# Patient Record
Sex: MALE | Race: BLACK OR AFRICAN AMERICAN | NOT HISPANIC OR LATINO | Employment: OTHER | URBAN - METROPOLITAN AREA
[De-identification: names, ages, dates, MRNs, and addresses within clinical notes are randomized per-mention and may not be internally consistent; named-entity substitution may affect disease eponyms.]

---

## 2017-01-12 RX ORDER — METFORMIN HYDROCHLORIDE 500 MG/1
TABLET, EXTENDED RELEASE ORAL
Qty: 360 TABLET | Refills: 5 | Status: SHIPPED | OUTPATIENT
Start: 2017-01-12 | End: 2017-02-20

## 2017-01-19 ENCOUNTER — TELEPHONE (OUTPATIENT)
Dept: UROLOGY | Facility: CLINIC | Age: 67
End: 2017-01-19

## 2017-01-19 ENCOUNTER — HOSPITAL ENCOUNTER (EMERGENCY)
Facility: OTHER | Age: 67
Discharge: HOME | End: 2017-01-19
Attending: EMERGENCY MEDICINE
Payer: MEDICARE

## 2017-01-19 VITALS
SYSTOLIC BLOOD PRESSURE: 143 MMHG | DIASTOLIC BLOOD PRESSURE: 64 MMHG | RESPIRATION RATE: 18 BRPM | OXYGEN SATURATION: 97 % | HEIGHT: 69 IN | HEART RATE: 88 BPM | WEIGHT: 193 LBS | BODY MASS INDEX: 28.58 KG/M2 | TEMPERATURE: 99 F

## 2017-01-19 DIAGNOSIS — N39.0 URINARY TRACT INFECTION WITH HEMATURIA, SITE UNSPECIFIED: Primary | ICD-10-CM

## 2017-01-19 DIAGNOSIS — N41.9 PROSTATITIS, UNSPECIFIED PROSTATITIS TYPE: ICD-10-CM

## 2017-01-19 DIAGNOSIS — R31.9 URINARY TRACT INFECTION WITH HEMATURIA, SITE UNSPECIFIED: Primary | ICD-10-CM

## 2017-01-19 LAB
ALBUMIN SERPL BCP-MCNC: 3.9 G/DL
ALP SERPL-CCNC: 86 U/L
ALT SERPL W/O P-5'-P-CCNC: 30 U/L
ANION GAP SERPL CALC-SCNC: 13 MMOL/L
AST SERPL-CCNC: 25 U/L
BACTERIA #/AREA URNS HPF: ABNORMAL /HPF
BASOPHILS # BLD AUTO: 0.03 K/UL
BASOPHILS NFR BLD: 0.2 %
BILIRUB SERPL-MCNC: 0.2 MG/DL
BILIRUB UR QL STRIP: ABNORMAL
BUN SERPL-MCNC: 11 MG/DL
CALCIUM SERPL-MCNC: 9.5 MG/DL
CHLORIDE SERPL-SCNC: 105 MMOL/L
CLARITY UR: ABNORMAL
CO2 SERPL-SCNC: 21 MMOL/L
COLOR UR: ABNORMAL
CREAT SERPL-MCNC: 0.9 MG/DL
DIFFERENTIAL METHOD: ABNORMAL
EOSINOPHIL # BLD AUTO: 0.2 K/UL
EOSINOPHIL NFR BLD: 1.4 %
ERYTHROCYTE [DISTWIDTH] IN BLOOD BY AUTOMATED COUNT: 13.7 %
EST. GFR  (AFRICAN AMERICAN): >60 ML/MIN/1.73 M^2
EST. GFR  (NON AFRICAN AMERICAN): >60 ML/MIN/1.73 M^2
GLUCOSE SERPL-MCNC: 136 MG/DL
GLUCOSE UR QL STRIP: ABNORMAL
HCT VFR BLD AUTO: 37.2 %
HGB BLD-MCNC: 12.3 G/DL
HGB UR QL STRIP: ABNORMAL
HYALINE CASTS #/AREA URNS LPF: 0 /LPF
KETONES UR QL STRIP: ABNORMAL
LEUKOCYTE ESTERASE UR QL STRIP: ABNORMAL
LYMPHOCYTES # BLD AUTO: 3.4 K/UL
LYMPHOCYTES NFR BLD: 20.6 %
MCH RBC QN AUTO: 31.8 PG
MCHC RBC AUTO-ENTMCNC: 33.1 %
MCV RBC AUTO: 96 FL
MICROSCOPIC COMMENT: ABNORMAL
MONOCYTES # BLD AUTO: 0.8 K/UL
MONOCYTES NFR BLD: 5.2 %
NEUTROPHILS # BLD AUTO: 11.8 K/UL
NEUTROPHILS NFR BLD: 72.4 %
NITRITE UR QL STRIP: NEGATIVE
PH UR STRIP: 6 [PH] (ref 5–8)
PLATELET # BLD AUTO: 286 K/UL
PMV BLD AUTO: 8.6 FL
POTASSIUM SERPL-SCNC: 4.2 MMOL/L
PROT SERPL-MCNC: 7.5 G/DL
PROT UR QL STRIP: ABNORMAL
RBC # BLD AUTO: 3.87 M/UL
RBC #/AREA URNS HPF: >100 /HPF (ref 0–4)
SODIUM SERPL-SCNC: 139 MMOL/L
SP GR UR STRIP: >=1.03 (ref 1–1.03)
SQUAMOUS #/AREA URNS HPF: 1 /HPF
URN SPEC COLLECT METH UR: ABNORMAL
UROBILINOGEN UR STRIP-ACNC: 1 EU/DL
WBC # BLD AUTO: 16.26 K/UL
WBC #/AREA URNS HPF: 20 /HPF (ref 0–5)
WBC CLUMPS URNS QL MICRO: ABNORMAL

## 2017-01-19 PROCEDURE — 63600175 PHARM REV CODE 636 W HCPCS: Performed by: EMERGENCY MEDICINE

## 2017-01-19 PROCEDURE — 85025 COMPLETE CBC W/AUTO DIFF WBC: CPT

## 2017-01-19 PROCEDURE — 99283 EMERGENCY DEPT VISIT LOW MDM: CPT | Mod: 25

## 2017-01-19 PROCEDURE — 87086 URINE CULTURE/COLONY COUNT: CPT

## 2017-01-19 PROCEDURE — 96365 THER/PROPH/DIAG IV INF INIT: CPT

## 2017-01-19 PROCEDURE — 87088 URINE BACTERIA CULTURE: CPT

## 2017-01-19 PROCEDURE — 80053 COMPREHEN METABOLIC PANEL: CPT

## 2017-01-19 PROCEDURE — 25000003 PHARM REV CODE 250: Performed by: EMERGENCY MEDICINE

## 2017-01-19 PROCEDURE — 81000 URINALYSIS NONAUTO W/SCOPE: CPT

## 2017-01-19 PROCEDURE — 87186 SC STD MICRODIL/AGAR DIL: CPT

## 2017-01-19 PROCEDURE — 87077 CULTURE AEROBIC IDENTIFY: CPT

## 2017-01-19 RX ORDER — SODIUM CHLORIDE 9 MG/ML
500 INJECTION, SOLUTION INTRAVENOUS
Status: COMPLETED | OUTPATIENT
Start: 2017-01-19 | End: 2017-01-19

## 2017-01-19 RX ORDER — CIPROFLOXACIN 500 MG/1
500 TABLET ORAL 2 TIMES DAILY
Qty: 60 TABLET | Refills: 0 | Status: SHIPPED | OUTPATIENT
Start: 2017-01-19 | End: 2017-02-18

## 2017-01-19 RX ADMIN — SODIUM CHLORIDE 500 ML: 0.9 INJECTION, SOLUTION INTRAVENOUS at 05:01

## 2017-01-19 RX ADMIN — CEFTRIAXONE 1 G: 1 INJECTION, SOLUTION INTRAVENOUS at 05:01

## 2017-01-19 NOTE — ED NOTES
Rounding completed; no s/s of distress; respirations regular, even and unlabored; pain is currently 4/10, provider notified and with no new orders at this time; bed in lowest position with side rails up x2; family member at bedside; pt/family updated on POC and verbalizes understanding; personal items and call light within reach; continue to monitor for any changes.

## 2017-01-19 NOTE — TELEPHONE ENCOUNTER
I spoke to pt.  He has another UTI and went to ER today. He was started on Cipro. He is concerned because he has another UTI.  He was treated for one less than a month ago.  I instructed him to take cipro as prescribed and that I would send you a message.

## 2017-01-19 NOTE — ED PROVIDER NOTES
Encounter Date: 1/19/2017    SCRIBE #1 NOTE: I, Cammy Messer , am scribing for, and in the presence of, Dr. Beebe.       History     Chief Complaint   Patient presents with    Hematuria     pt reports having blood in urine, frequency, chills that started around 0100. Pt reports had MRI done yesterday      Review of patient's allergies indicates:   Allergen Reactions    Aspirin Nausea And Vomiting     If over 81mg     HPI Comments: Time seen by provider: 5:19 AM    This is a 66 y.o. male, with histrory of BPH, who presents with complaint of hematuria. Symptoms began last night. Pt reports subjective fever, chills, joint pain, frequency, urgency, intermittent incontinence, and dysuria, but denies nausea, vomiting, abdominal pain, diarrhea, constipation, weakness, or myalgias. Symptoms are described as constant. Pt completed a thirty day course of Cipro to treat UTI, and denies any alleviating factors.    The history is provided by the patient.     Past Medical History   Diagnosis Date    Cluster headaches      1999    Colon polyps      5 polyps in Dec 2010    Coronary artery disease 11/2011     moderate nonobstructive cad on left heart cath at [facility] 11/2011    Diabetes mellitus     GERD (gastroesophageal reflux disease)      history of peptic ulcer disease    Hyperlipidemia     Hypertension     Lumbar disc disease      MRI 2008 - L5-S1    Nephrolithiasis      July 2013     No past medical history pertinent negatives.  Past Surgical History   Procedure Laterality Date    Cholecystectomy      Orif left humerus      Colonoscopy N/A 12/7/2016     Procedure: COLONOSCOPY;  Surgeon: Lance Crocker MD;  Location: [address];  Service: Endoscopy;  Laterality: N/A;     Family History   Problem Relation Age of Onset    Celiac disease Neg Hx     Cirrhosis Neg Hx     Colon cancer Neg Hx     Colon polyps Neg Hx     Crohn's disease Neg Hx     Esophageal cancer Neg Hx     Inflammatory bowel disease  Neg Hx     Liver cancer Neg Hx     Rectal cancer Neg Hx     Stomach cancer Neg Hx     Ulcerative colitis Neg Hx      Social History   Substance Use Topics    Smoking status: Current Every Day Smoker     Packs/day: 1.00    Smokeless tobacco: Never Used      Comment: 1 pack whole life    Alcohol use Yes      Comment: special occassions     Review of Systems   Constitutional: Positive for chills and fever.   HENT: Negative for congestion and sore throat.    Eyes: Negative for redness and visual disturbance.   Respiratory: Negative for cough and shortness of breath.    Cardiovascular: Negative for chest pain and palpitations.   Gastrointestinal: Negative for abdominal pain, constipation, diarrhea, nausea and vomiting.   Genitourinary: Positive for dysuria, frequency, hematuria and urgency.        Positive for incontinence.    Musculoskeletal: Negative for back pain and myalgias.        Positive for joint pain.   Skin: Negative for rash.   Neurological: Negative for weakness and headaches.   Psychiatric/Behavioral: Negative for confusion.       Physical Exam   Initial Vitals   BP Pulse Resp Temp SpO2   01/19/17 0504 01/19/17 0504 01/19/17 0504 01/19/17 0504 01/19/17 0504   170/79 110 18 99.4 °F (37.4 °C) 99 %     Physical Exam    Nursing note and vitals reviewed.  Constitutional: He appears well-developed and well-nourished. He is not diaphoretic. No distress.   Moderately ill-appearing.   HENT:   Head: Normocephalic and atraumatic.   Right Ear: External ear normal.   Left Ear: External ear normal.   Eyes: EOM are normal. Pupils are equal, round, and reactive to light. Right eye exhibits no discharge. Left eye exhibits no discharge.   Neck: Normal range of motion.   Cardiovascular: Normal rate, regular rhythm and normal heart sounds. Exam reveals no gallop and no friction rub.    No murmur heard.  Pulmonary/Chest: Breath sounds normal. No respiratory distress. He has no wheezes. He has no rhonchi. He has no rales.    Abdominal: Soft. There is no tenderness. There is no rebound and no guarding.   Musculoskeletal: Normal range of motion. He exhibits no edema or tenderness.   Lymphadenopathy:     He has no cervical adenopathy.   Neurological: He is alert and oriented to person, place, and time.   Skin: Skin is warm and dry. No rash and no abscess noted. No erythema. No pallor.   Psychiatric: He has a normal mood and affect. His behavior is normal. Judgment and thought content normal.         ED Course   Procedures  Labs Reviewed   CULTURE, URINE   URINALYSIS   CBC W/ AUTO DIFFERENTIAL   COMPREHENSIVE METABOLIC PANEL             Medical Decision Making:   Clinical Tests:   Lab Tests: Ordered and Reviewed  ED Management:  Patient presents with history of prostatitis just completed a course of antibiotics.  Now has burning in blood in his urine again.  Feels like he needs a bowel movement.  Urinalysis is infected.  Culture sent.  Prostate is large significant BPH, tender no significant bogginess at this point.  I still think that recurrent prostatitis is likely diagnosis.  Receives ceftriaxone here.  Prescribed ciprofloxacin.  Will need close follow-up with urology.    SHANNAN Beebe M.D.  01/19/2017  6:27 AM              Scribe Attestation:   Scribe #1: I performed the above scribed service and the documentation accurately describes the services I performed. I attest to the accuracy of the note.    Attending Attestation:           Physician Attestation for Scribe:  Physician Attestation Statement for Scribe #1: I, Dr. Beebe, reviewed documentation, as scribed by Cammy Messer  in my presence, and it is both accurate and complete.                 ED Course     Clinical Impression:     1. Urinary tract infection with hematuria, site unspecified    2. Prostatitis, unspecified prostatitis type                Michel Beebe MD  01/19/17 0628

## 2017-01-19 NOTE — ED NOTES
Pt c/o fever and chills x 8 hrs and hematuria x 3 hrs. Pt states he just finished a month long course of abx for a UTI. Pt denies SOB and N/V/D. Skin is warm and dry w/ pink mucosa. VS. CHRISTIANE x 3mm. BBS- CTA. Abd- SNT. PSM x 4 exts. Bed is locked and in the low position w/ the side rails up and locked for safety. Call bell @ BS. Will continue to monitor closely.

## 2017-01-19 NOTE — ED AVS SNAPSHOT
[facility]  [address] [address]               Tony Han   2017  5:05 AM   ED    Description:  Male : 1950   Department:  [facility]           Your Care was Coordinated By:     Provider Role From To    Michel Beebe MD Attending Provider 17 0500 --      Reason for Visit     Hematuria           Diagnoses this Visit        Comments    Urinary tract infection with hematuria, site unspecified    -  Primary     Prostatitis, unspecified prostatitis type           ED Disposition     None           To Do List           Follow-up Information     Schedule an appointment as soon as possible for a visit with Mehdi Dexter MD.    Specialty:  Urology    Contact information:    [address]  [facility] [address]  [phone]          Follow up with [facility].    Specialty:  Emergency Medicine    Why:  If symptoms worsen    Contact information:    [address] [address] [phone]       These Medications        Disp Refills Start End    ciprofloxacin HCl (CIPRO) 500 MG tablet 60 tablet 0 2017    Take 1 tablet (500 mg total) by mouth 2 (two) times daily. - Oral    Pharmacy: [facility] [address] [place] [address] #: [phone]         [facility]     [facility] Nurse Care [facility] -  Assistance  Registered nurses in the [facility] provide clinical advisement, health education, appointment booking, and other advisory services.  Call for this free service at [phone].             Medications           Message regarding Medications     Verify the changes and/or additions to your medication regime listed below are the same as discussed with your clinician today.  If any of these changes or additions are incorrect, please notify your healthcare provider.         START taking these NEW medications        Refills    ciprofloxacin HCl (CIPRO) 500 MG tablet 0    Sig: Take 1 tablet (500 mg total) by mouth 2 (two) times daily.    Class: Print    Route: Oral      These medications were administered today        Dose Freq    0.9%  NaCl infusion 500 mL ED 1 Time    Sig: Inject 500 mLs into the vein ED 1 Time.    Class: Normal    Route: Intravenous    cefTRIAXone (ROCEPHIN) 1 g in dextrose 5 % 50 mL IVPB 1 g ED 1 Time    Sig: Inject 50 mLs (1 g total) into the vein ED 1 Time.    Class: Normal    Route: Intravenous           Verify that the below list of medications is an accurate representation of the medications you are currently taking.  If none reported, the list may be blank. If incorrect, please contact your healthcare provider. Carry this list with you in case of emergency.           Current Medications     amlodipine (NORVASC) 10 MG tablet Take 1 tablet (10 mg total) by mouth once daily.    aspirin 81 MG Chew Take 1 tablet (81 mg total) by mouth once daily.    blood sugar diagnostic (CONTOUR TEST STRIPS) Strp CHECK BLOOD SUGAR ONCE A DAY    cefTRIAXone (ROCEPHIN) 1 g in dextrose 5 % 50 mL IVPB Inject 50 mLs (1 g total) into the vein ED 1 Time.    ciprofloxacin HCl (CIPRO) 500 MG tablet Take 1 tablet (500 mg total) by mouth 2 (two) times daily.    hydrocodone-acetaminophen 7.5-325mg (NORCO) 7.5-325 mg per tablet Take 1 tablet by mouth every 4 to 6 hours as needed for Pain.     lancets Misc Malu Contour lancets - use daily    losartan (COZAAR) 25 MG tablet TAKE 1 TABLET BY MOUTH ONCE DAILY    metformin (GLUCOPHAGE-XR) 500 MG 24 hr tablet Take 2 tablets (1,000 mg total) by mouth 2 (two) times daily.    metformin (GLUCOPHAGE-XR) 500 MG 24 hr tablet TAKE 2 TABLETS BY MOUTH TWICE DAILY.    metoprolol succinate (TOPROL-XL) 25 MG 24 hr tablet Take 1 tablet (25 mg total) by mouth once daily.    MULTIVITS-MINERALS/FA/LYCOPENE (ONE-A-DAY MEN'S ORAL) Take 1 tablet by mouth once daily.  "   simvastatin (ZOCOR) 20 MG tablet Take 1 tablet (20 mg total) by mouth every evening.    tadalafil (CIALIS) 20 MG Tab Use one tablet every 36 hours    tamsulosin (FLOMAX) 0.4 mg Cp24 Take 1 capsule (0.4 mg total) by mouth once daily.    vitamin D 1000 units Tab Take 1,000 Units by mouth once daily. Take 1 tablet daily           Clinical Reference Information           Your Vitals Were     BP Pulse Temp Resp Height Weight    143/65 96 99.4 °F (37.4 °C) (Oral) 18 5' 9" (1.753 m) 87.5 kg (193 lb)    SpO2 BMI             98% 28.5 kg/m2         Allergies as of 1/19/2017        Reactions    Aspirin Nausea And Vomiting    If over 81mg      Immunizations Administered on Date of Encounter - 1/19/2017     None      ED Micro, Lab, POCT     Start Ordered       Status Ordering Provider    01/19/17 0520 01/19/17 0519  CBC auto differential  STAT      Final result     01/19/17 0520 01/19/17 0519  Comprehensive metabolic panel  STAT      In process     01/19/17 0516 01/19/17 0515  Urinalysis  STAT      Final result     01/19/17 0516 01/19/17 0515  Urine culture **CANNOT BE ORDERED STAT**  Once      In process     01/19/17 0515 01/19/17 0515  Urinalysis Microscopic  Once      Final result       ED Imaging Orders     None      Discharge References/Attachments     PROSTATITIS, CHRONIC/CHRONIC PELVIC PAIN SYNDROME (ENGLISH)    URINARY TRACT INFECTIONS IN MEN  (ENGLISH)    CIPROFLOXACIN HYDROCHLORIDE ORAL TABLET (ENGLISH)      Your Scheduled Appointments     Mar 15, 2017 10:00 AM CDT   Fasting Lab with LAB, APPOINTMENT [facility])    [address]  [place] [address]   [phone]            Mar 22, 2017 11:00 AM CDT   Established Patient Visit with Elizabeth Benitez MD   [facility] ([facility])    [address]  [place] [address]   [phone]            Mar 23, 2017 10:30 AM CDT "   Established Patient Visit with Mehdi Dexter MD   [facility]    [address], [address]  [facility] [address] [phone]              Smoking Cessation     If you would like to quit smoking:   You may be eligible for free services if you are a [place] resident and started smoking cigarettes before September 1, 1988.  Call the Smoking Cessation Trust ([facility]) toll free at ([phone] 419-7988 or (839) 610-4599.   Call 1-800-QUIT-NOW if you do not meet the above criteria.             [facility] complies with applicable Federal civil rights laws and does not discriminate on the basis of race, color, national origin, age, disability, or sex.        Language Assistance Services     ATTENTION: Language assistance services are available, free of charge. Please call [phone].      ATENCIÓN: Si habla español, tiene a gutiérrez disposición servicios gratuitos de asistencia lingüística. Llame al [phone].     CHÚ Ý: N?u b?n nói Ti?ng Vi?t, có các d?ch v? h? tr? ngôn ng? mi?n phí dành cho b?n. G?i s? [phone].

## 2017-01-19 NOTE — TELEPHONE ENCOUNTER
----- Message from Susanna Osborne sent at 1/19/2017  9:00 AM CST -----  Contact: pt   X_  1st Request  _  2nd Request  _  3rd Request    Who:PETER STRATTON [[id number]]    Why: Patient states he would like a call back with instruction on a medication he was prescribed in the ER ciprofloxacin HCl (CIPRO) 500 MG tablet    What Number to Call Back: [phone] or [phone]    When to Expect a call back: (Before the end of the day)   -- if call after 3:00 call back will be tomorrow.

## 2017-01-21 LAB — BACTERIA UR CULT: NORMAL

## 2017-01-30 ENCOUNTER — OFFICE VISIT (OUTPATIENT)
Dept: UROLOGY | Facility: CLINIC | Age: 67
End: 2017-01-30
Attending: UROLOGY
Payer: MEDICARE

## 2017-01-30 VITALS
HEART RATE: 93 BPM | WEIGHT: 191 LBS | DIASTOLIC BLOOD PRESSURE: 77 MMHG | RESPIRATION RATE: 18 BRPM | HEIGHT: 69 IN | SYSTOLIC BLOOD PRESSURE: 139 MMHG | BODY MASS INDEX: 28.29 KG/M2

## 2017-01-30 DIAGNOSIS — N30.00 ACUTE CYSTITIS WITHOUT HEMATURIA: ICD-10-CM

## 2017-01-30 DIAGNOSIS — N41.0 ACUTE PROSTATITIS: Primary | ICD-10-CM

## 2017-01-30 PROCEDURE — 1160F RVW MEDS BY RX/DR IN RCRD: CPT | Mod: S$GLB,,, | Performed by: NURSE PRACTITIONER

## 2017-01-30 PROCEDURE — 99999 PR PBB SHADOW E&M-EST. PATIENT-LVL III: CPT | Mod: PBBFAC,,, | Performed by: UROLOGY

## 2017-01-30 PROCEDURE — 3075F SYST BP GE 130 - 139MM HG: CPT | Mod: S$GLB,,, | Performed by: NURSE PRACTITIONER

## 2017-01-30 PROCEDURE — 1126F AMNT PAIN NOTED NONE PRSNT: CPT | Mod: S$GLB,,, | Performed by: NURSE PRACTITIONER

## 2017-01-30 PROCEDURE — 3078F DIAST BP <80 MM HG: CPT | Mod: S$GLB,,, | Performed by: NURSE PRACTITIONER

## 2017-01-30 PROCEDURE — 1159F MED LIST DOCD IN RCRD: CPT | Mod: S$GLB,,, | Performed by: NURSE PRACTITIONER

## 2017-01-30 PROCEDURE — 1157F ADVNC CARE PLAN IN RCRD: CPT | Mod: S$GLB,,, | Performed by: NURSE PRACTITIONER

## 2017-01-30 PROCEDURE — 99214 OFFICE O/P EST MOD 30 MIN: CPT | Mod: S$GLB,,, | Performed by: NURSE PRACTITIONER

## 2017-01-30 NOTE — PROGRESS NOTES
Subjective:      Tony Han is a 66 y.o. male who returns today regarding his recurrent UTI.     Seen in ER in December 2016 with dysuria and low grade fever. Diagnosed with UTI and DC'd with cipro. No urine culture. He reports that he had 1 prior UTI 2-3 years ago.  He was seen in the clinic on 12/22/16, and was treated for acute prostatitis with a 30 day course of cipro. The patient was not on medications for PSA, which has always been low (0.82 on 2/16). His AUASS was 21/6.  On 1/1917, he again went to the ER c/o fever and chills x 8 hrs and hematuria x 3 hrs. His last dose of Cipro was 1/16/17. + Urine culture with E. Coli. He received ceftriaxone and cipro x 30 days.     Today the patient reports that his symptoms have improved with restarting the Cipro again. He denies dysuria, fever, hematuria. He does report urgency and night sweats, which he has had for months. He denies a history of kidney stones.       The following portions of the patient's history were reviewed and updated as appropriate: allergies, current medications, past family history, past medical history, past social history, past surgical history and problem list.    Review of Systems  A comprehensive multipoint review of systems was negative except as otherwise stated in the HPI.     Objective:   Vitals: There were no vitals taken for this visit.    Physical Exam   General: alert and oriented, no acute distress  Respiratory: Symmetric expansion, non-labored breathing  Cardiovascular: regular rate and rhythm, no peripheral edema  Abdomen: soft, non distended  Genitourinary: not examined  Rectal: not examined  Skin: normal coloration and turgor, no rashes, no suspicious skin lesions noted  Neuro: no gross deficits  Psych: normal judgment and insight, normal mood/affect and non-anxious    Lab Review   Urinalysis demonstrates positive for red blood cells (5-10 kiara/mL), glucose   Lab Results   Component Value Date    WBC 16.26 (H) 01/19/2017    HGB  12.3 (L) 01/19/2017    HCT 37.2 (L) 01/19/2017    MCV 96 01/19/2017     01/19/2017     Lab Results   Component Value Date    CREATININE 0.9 01/19/2017    BUN 11 01/19/2017     Lab Results   Component Value Date    PSA 0.82 01/06/2016     Urine Culture, Routine ESCHERICHIA COLI  >100,000 cfu/ml    Resulting Agency [facility]   Susceptibility    Escherichia coli    CULTURE, URINE    Amikacin <=16  Sensitive    Amox/K Clav'ate <=8/4  Sensitive    Amp/Sulbactam 16/8  Intermediate    Ampicillin >16  Resistant    Cefazolin <=8  Sensitive    Cefepime <=8  Sensitive    Ceftriaxone <=8  Sensitive    Ciprofloxacin <=1  Sensitive    Ertapenem <=2  Sensitive    Gentamicin >8  Resistant    Meropenem <=4  Sensitive    Nitrofurantoin <=32  Sensitive    Piperacillin/Tazo <=16  Sensitive    Tetracycline <=4  Sensitive    Tobramycin >8  Resistant    Trimeth/Sulfa >2/38  Resistant        Assessment and Plan:   Tony was seen today for urinary tract infection.    Diagnoses and all orders for this visit:    Acute prostatitis  -     CT Renal Stone Study ABD Pelvis WO; Future  -     Cystoscopy; Future    Acute cystitis without hematuria  -     CT Renal Stone Study ABD Pelvis WO; Future  -     Cystoscopy; Future    Plan: Continue full 30 days of cipro to treat acute prostatitis. CT renal stone study and cystoscopy scheduled.

## 2017-01-31 ENCOUNTER — TELEPHONE (OUTPATIENT)
Dept: INTERNAL MEDICINE | Facility: CLINIC | Age: 67
End: 2017-01-31

## 2017-01-31 RX ORDER — LANCETS
EACH MISCELLANEOUS
Qty: 100 EACH | Refills: 11 | Status: SHIPPED | OUTPATIENT
Start: 2017-01-31 | End: 2020-01-13

## 2017-01-31 NOTE — TELEPHONE ENCOUNTER
----- Message from Gary Montalvo sent at 1/31/2017  9:11 AM CST -----  Contact: [facility] [phone] Ext [phone]  Type: Orders Request    What orders/ testing are being requested? Diabetic Supplies lancets Misc &  blood sugar diagnostic (CONTOUR TEST STRIPS) Strp    Is there a future appointment scheduled for the patient with PCP? No    Comments:Please fax orders to [phone], advice    Thanks

## 2017-02-15 ENCOUNTER — PATIENT MESSAGE (OUTPATIENT)
Dept: UROLOGY | Facility: CLINIC | Age: 67
End: 2017-02-15

## 2017-02-15 ENCOUNTER — HOSPITAL ENCOUNTER (OUTPATIENT)
Dept: RADIOLOGY | Facility: OTHER | Age: 67
Discharge: HOME | End: 2017-02-15
Attending: UROLOGY
Payer: MEDICARE

## 2017-02-15 ENCOUNTER — PATIENT MESSAGE (OUTPATIENT)
Dept: INTERNAL MEDICINE | Facility: CLINIC | Age: 67
End: 2017-02-15

## 2017-02-15 DIAGNOSIS — N41.0 ACUTE PROSTATITIS: ICD-10-CM

## 2017-02-15 DIAGNOSIS — N30.00 ACUTE CYSTITIS WITHOUT HEMATURIA: ICD-10-CM

## 2017-02-15 PROCEDURE — 74176 CT ABD & PELVIS W/O CONTRAST: CPT | Mod: TC

## 2017-02-15 PROCEDURE — 74176 CT ABD & PELVIS W/O CONTRAST: CPT | Mod: 26,,, | Performed by: INTERNAL MEDICINE

## 2017-02-19 ENCOUNTER — PATIENT MESSAGE (OUTPATIENT)
Dept: INTERNAL MEDICINE | Facility: CLINIC | Age: 67
End: 2017-02-19

## 2017-02-20 ENCOUNTER — PROCEDURE VISIT (OUTPATIENT)
Dept: UROLOGY | Facility: CLINIC | Age: 67
End: 2017-02-20
Attending: UROLOGY
Payer: MEDICARE

## 2017-02-20 ENCOUNTER — TELEPHONE (OUTPATIENT)
Dept: INTERNAL MEDICINE | Facility: CLINIC | Age: 67
End: 2017-02-20

## 2017-02-20 VITALS
HEART RATE: 83 BPM | DIASTOLIC BLOOD PRESSURE: 69 MMHG | BODY MASS INDEX: 28.95 KG/M2 | WEIGHT: 195.44 LBS | HEIGHT: 69 IN | SYSTOLIC BLOOD PRESSURE: 152 MMHG

## 2017-02-20 DIAGNOSIS — N41.0 ACUTE PROSTATITIS: ICD-10-CM

## 2017-02-20 DIAGNOSIS — N30.00 ACUTE CYSTITIS WITHOUT HEMATURIA: ICD-10-CM

## 2017-02-20 PROCEDURE — 52000 CYSTOURETHROSCOPY: CPT | Mod: S$GLB,,, | Performed by: UROLOGY

## 2017-02-20 RX ORDER — FINASTERIDE 5 MG/1
5 TABLET, FILM COATED ORAL DAILY
Qty: 30 TABLET | Refills: 11 | Status: SHIPPED | OUTPATIENT
Start: 2017-02-20 | End: 2017-10-23 | Stop reason: SDUPTHER

## 2017-02-20 RX ORDER — HYDROCODONE BITARTRATE AND ACETAMINOPHEN 5; 325 MG/1; MG/1
TABLET ORAL
Refills: 0 | COMMUNITY
Start: 2017-01-10 | End: 2017-07-24

## 2017-02-20 RX ORDER — CIPROFLOXACIN 500 MG/1
500 TABLET ORAL
Status: COMPLETED | OUTPATIENT
Start: 2017-02-20 | End: 2017-02-20

## 2017-02-20 RX ORDER — LIDOCAINE HYDROCHLORIDE 20 MG/ML
JELLY TOPICAL
Status: COMPLETED | OUTPATIENT
Start: 2017-02-20 | End: 2017-02-20

## 2017-02-20 RX ADMIN — CIPROFLOXACIN 500 MG: 500 TABLET ORAL at 01:02

## 2017-02-20 RX ADMIN — LIDOCAINE HYDROCHLORIDE: 20 JELLY TOPICAL at 02:02

## 2017-02-20 NOTE — MR AVS SNAPSHOT
[facility]  [address], [address]  [place] [address]  Phone: [phone]                  Tony Han   2017 11:00 AM   Procedure visit    Description:  Male : 1950   Provider:  Mehid Dexter MD   Department:  [facility]           Reason for Visit     cystoscopy           Diagnoses this Visit        Comments    Acute prostatitis         Acute cystitis without hematuria                To Do List           Future Appointments        Provider Department Dept Phone    2017 3:00 PM Elizabeth Benitez MD [facility] [phone]    3/15/2017 10:00 AM LAB, APPOINTMENT [facility] [phone]    3/22/2017 11:00 AM Elizabeth Benitez MD [facility] [phone]    2017 10:30 AM Mehdi Dexter MD [facility] [phone]      Goals (5 Years of Data)     None       These Medications        Disp Refills Start End    finasteride (PROSCAR) 5 mg tablet 30 tablet 11 2017    Take 1 tablet (5 mg total) by mouth once daily. - Oral    Pharmacy: [facility] [address] [place] [address] AT [facility] Ph #: [phone]         [facility]     [facility] On Call Nurse Care Line -  Assistance  Registered nurses in the [facility] provide clinical advisement, health education, appointment booking, and other advisory services.  Call for this free service at [phone].             Medications           Message regarding Medications     Verify the changes and/or additions to your medication regime listed below are the same as discussed with your clinician today.  If any of these changes or additions are incorrect, please notify your healthcare provider.        START taking these NEW medications        Refills    finasteride (PROSCAR) 5 mg tablet 11    Sig: Take 1 tablet (5 mg total) by mouth once daily.    Class: Normal    Route:  "Oral      STOP taking these medications     tadalafil (CIALIS) 20 MG Tab Use one tablet every 36 hours           Verify that the below list of medications is an accurate representation of the medications you are currently taking.  If none reported, the list may be blank. If incorrect, please contact your healthcare provider. Carry this list with you in case of emergency.           Current Medications     amlodipine (NORVASC) 10 MG tablet Take 1 tablet (10 mg total) by mouth once daily.    aspirin 81 MG Chew Take 1 tablet (81 mg total) by mouth once daily.    blood sugar diagnostic (CONTOUR TEST STRIPS) Strp CHECK BLOOD SUGAR ONCE A DAY    finasteride (PROSCAR) 5 mg tablet Take 1 tablet (5 mg total) by mouth once daily.    hydrocodone-acetaminophen 5-325mg (NORCO) 5-325 mg per tablet TK 1 T PO  Q 6 H    hydrocodone-acetaminophen 7.5-325mg (NORCO) 7.5-325 mg per tablet Take 1 tablet by mouth every 4 to 6 hours as needed for Pain.     lancets Misc Malu Contour lancets - use one lancet once daily    losartan (COZAAR) 25 MG tablet TAKE 1 TABLET BY MOUTH ONCE DAILY    metformin (GLUCOPHAGE-XR) 500 MG 24 hr tablet Take 2 tablets (1,000 mg total) by mouth 2 (two) times daily.    metoprolol succinate (TOPROL-XL) 25 MG 24 hr tablet Take 1 tablet (25 mg total) by mouth once daily.    MULTIVITS-MINERALS/FA/LYCOPENE (ONE-A-DAY MEN'S ORAL) Take 1 tablet by mouth once daily.    simvastatin (ZOCOR) 20 MG tablet Take 1 tablet (20 mg total) by mouth every evening.    tamsulosin (FLOMAX) 0.4 mg Cp24 Take 1 capsule (0.4 mg total) by mouth once daily.    vitamin D 1000 units Tab Take 1,000 Units by mouth once daily. Take 1 tablet daily           Clinical Reference Information           Your Vitals Were     BP Pulse Height Weight BMI    152/69 (BP Location: Right arm, Patient Position: Sitting, BP Method: Automatic) 83 5' 9" (1.753 m) 88.6 kg (195 lb 7 oz) 28.86 kg/m2      Blood Pressure          Most Recent Value    BP  (!)  152/69    "   Allergies as of 2/20/2017     Aspirin      Immunizations Administered on Date of Encounter - 2/20/2017     None      Orders Placed During Today's Visit      Normal Orders This Visit    Cystoscopy     POCT URINE DIPSTICK WITHOUT MICROSCOPE       Smoking Cessation     If you would like to quit smoking:   You may be eligible for free services if you are a [place] resident and started smoking cigarettes before September 1, 1988.  Call the Smoking Cessation Trust ([facility]) toll free at (968) [phone] or (665) 179-0438.   Call 1-133-QUIT-NOW if you do not meet the above criteria.            Language Assistance Services     ATTENTION: Language assistance services are available, free of charge. Please call [phone].      ATENCIÓN: Si habla español, tiene a gutiérrez disposición servicios gratuitos de asistencia lingüística. Llame al [phone].     CHÚ Ý: N?u b?n nói Ti?ng Vi?t, có các d?ch v? h? tr? ngôn ng? mi?n phí dành cho b?n. G?i s? [phone].         [facility] complies with applicable Federal civil rights laws and does not discriminate on the basis of race, color, national origin, age, disability, or sex.

## 2017-02-20 NOTE — PROCEDURES
Cystoscopy  Date/Time: 2/20/2017 12:23 PM  Performed by: MADHAVI RODRIGUEZ  Authorized by: MADHAVI RODRIGUEZ     Consent Done?:  Yes (Written)  Indications: recurrent UTIs    Position:  Supine  Anesthesia:  Lidocaine jelly  Preparation: Patient was prepped and draped in usual sterile fashion      Scope type:  Flexible cystoscope  External exam normal: Yes    Urethra normal: Yes    Prostate normal: No          Hyperplasia (Trilobar)(Length (cm):  5)Bladder neck normal: Bladder neck normal   Bladder normal: Yes (No tumors, lesions, or stones noted.  Normal bilateral UOs.)      Patient tolerance:  Patient tolerated the procedure well with no immediate complications    Imaging  CT renal stone reviewed.  7mm left renal stone.    Impression:   BPH  Left renal stone    Plan:  -- Discussed treatment of stone and BPH as options to reduce infections  -- He wishes to avoid surgery for now, so will treat BPH first w/ addition of finasteride  -- Understands that he will likely need ESWL if UTIs recur  -- FU 6 months

## 2017-02-20 NOTE — TELEPHONE ENCOUNTER
----- Message from Gary Montalvo sent at 2/20/2017 12:20 PM CST -----  Contact: Self [phone]  The above pt is calling regarding his scheduled apt for tomorrow 02/21/2017, advice    Thanks

## 2017-02-27 ENCOUNTER — HOSPITAL ENCOUNTER (EMERGENCY)
Facility: OTHER | Age: 67
Discharge: HOME | End: 2017-02-27
Attending: EMERGENCY MEDICINE
Payer: MEDICARE

## 2017-02-27 ENCOUNTER — PATIENT MESSAGE (OUTPATIENT)
Dept: UROLOGY | Facility: CLINIC | Age: 67
End: 2017-02-27

## 2017-02-27 VITALS
DIASTOLIC BLOOD PRESSURE: 74 MMHG | BODY MASS INDEX: 28.58 KG/M2 | HEIGHT: 69 IN | HEART RATE: 83 BPM | WEIGHT: 193 LBS | RESPIRATION RATE: 16 BRPM | OXYGEN SATURATION: 98 % | TEMPERATURE: 99 F | SYSTOLIC BLOOD PRESSURE: 161 MMHG

## 2017-02-27 DIAGNOSIS — R80.9 PROTEINURIA, UNSPECIFIED TYPE: ICD-10-CM

## 2017-02-27 DIAGNOSIS — N30.01 ACUTE CYSTITIS WITH HEMATURIA: Primary | ICD-10-CM

## 2017-02-27 DIAGNOSIS — N39.0 RECURRENT UTI: Primary | ICD-10-CM

## 2017-02-27 DIAGNOSIS — S39.012A BACK STRAIN, INITIAL ENCOUNTER: ICD-10-CM

## 2017-02-27 LAB
BACTERIA #/AREA URNS HPF: ABNORMAL /HPF
BILIRUB UR QL STRIP: NEGATIVE
CLARITY UR: ABNORMAL
COLOR UR: YELLOW
GLUCOSE UR QL STRIP: NEGATIVE
HGB UR QL STRIP: ABNORMAL
HYALINE CASTS #/AREA URNS LPF: 0 /LPF
KETONES UR QL STRIP: NEGATIVE
LEUKOCYTE ESTERASE UR QL STRIP: ABNORMAL
MICROSCOPIC COMMENT: ABNORMAL
NITRITE UR QL STRIP: POSITIVE
PH UR STRIP: 6 [PH] (ref 5–8)
PROT UR QL STRIP: ABNORMAL
RBC #/AREA URNS HPF: 5 /HPF (ref 0–4)
SP GR UR STRIP: 1.02 (ref 1–1.03)
URN SPEC COLLECT METH UR: ABNORMAL
UROBILINOGEN UR STRIP-ACNC: NEGATIVE EU/DL
WBC #/AREA URNS HPF: ABNORMAL /HPF (ref 0–5)

## 2017-02-27 PROCEDURE — 87186 SC STD MICRODIL/AGAR DIL: CPT

## 2017-02-27 PROCEDURE — 81000 URINALYSIS NONAUTO W/SCOPE: CPT

## 2017-02-27 PROCEDURE — 87086 URINE CULTURE/COLONY COUNT: CPT

## 2017-02-27 PROCEDURE — 87088 URINE BACTERIA CULTURE: CPT

## 2017-02-27 PROCEDURE — 87077 CULTURE AEROBIC IDENTIFY: CPT

## 2017-02-27 PROCEDURE — 99284 EMERGENCY DEPT VISIT MOD MDM: CPT

## 2017-02-27 RX ORDER — NITROFURANTOIN 25; 75 MG/1; MG/1
100 CAPSULE ORAL 2 TIMES DAILY
Qty: 14 CAPSULE | Refills: 0 | Status: SHIPPED | OUTPATIENT
Start: 2017-02-27 | End: 2017-03-06

## 2017-02-27 NOTE — ED NOTES
Pt to ED c/o right sided flank pain 8 out of 10 since last night. Pt reports urine frequency last night, passing blood clot, and then mami urine. Denies pain on urination. Pt took tylenol 300mg with no relief.

## 2017-02-27 NOTE — ED AVS SNAPSHOT
[facility]  [address]  [facility] [address]               Tony Han   2017  4:58 AM   ED    Description:  Male : 1950   Department:  [facility]           Your Care was Coordinated By:     Provider Role From To    Edgardo Petersen MD Attending Provider 17 0459 --      Reason for Visit     Flank Pain           Diagnoses this Visit        Comments    Acute cystitis with hematuria    -  Primary     Proteinuria, unspecified type         Back strain, initial encounter           ED Disposition     None           To Do List           Follow-up Information     Follow up with Elizabeth Benitez MD In 2 days.    Specialty:  Internal Medicine    Contact information:    [address] [facility] [address]  [phone]          Follow up with Mehdi Dexter MD In 2 days.    Specialty:  Urology    Contact information:    [address] [facility]  [address]  [facility] [address]  [phone]         These Medications        Disp Refills Start End    nitrofurantoin, macrocrystal-monohydrate, (MACROBID) 100 MG capsule 14 capsule 0 2017 3/6/2017    Take 1 capsule (100 mg total) by mouth 2 (two) times daily. - Oral    Pharmacy: [facility] [id number] [place] [address] AT [facility] Ph #: [phone]         [facility]     [facility] Nurse Care [facility] - / Assistance  Registered nurses in the [facility] provide clinical advisement, health education, appointment booking, and other advisory services.  Call for this free service at [phone].             Medications           Message regarding Medications     Verify the changes and/or additions to your medication regime listed below are the same as discussed with your clinician today.  If any of these changes or additions are incorrect, please notify your healthcare provider.        START taking these NEW medications        Refills     nitrofurantoin, macrocrystal-monohydrate, (MACROBID) 100 MG capsule 0    Sig: Take 1 capsule (100 mg total) by mouth 2 (two) times daily.    Class: Print    Route: Oral           Verify that the below list of medications is an accurate representation of the medications you are currently taking.  If none reported, the list may be blank. If incorrect, please contact your healthcare provider. Carry this list with you in case of emergency.           Current Medications     amlodipine (NORVASC) 10 MG tablet Take 1 tablet (10 mg total) by mouth once daily.    aspirin 81 MG Chew Take 1 tablet (81 mg total) by mouth once daily.    blood sugar diagnostic (CONTOUR TEST STRIPS) Strp CHECK BLOOD SUGAR ONCE A DAY    finasteride (PROSCAR) 5 mg tablet Take 1 tablet (5 mg total) by mouth once daily.    hydrocodone-acetaminophen 7.5-325mg (NORCO) 7.5-325 mg per tablet Take 1 tablet by mouth every 4 to 6 hours as needed for Pain.     lancets Misc [employer] Contour lancets - use one lancet once daily    losartan (COZAAR) 25 MG tablet TAKE 1 TABLET BY MOUTH ONCE DAILY    metformin (GLUCOPHAGE-XR) 500 MG 24 hr tablet Take 2 tablets (1,000 mg total) by mouth 2 (two) times daily.    metoprolol succinate (TOPROL-XL) 25 MG 24 hr tablet Take 1 tablet (25 mg total) by mouth once daily.    MULTIVITS-MINERALS/FA/LYCOPENE (ONE-A-DAY MEN'S ORAL) Take 1 tablet by mouth once daily.    simvastatin (ZOCOR) 20 MG tablet Take 1 tablet (20 mg total) by mouth every evening.    tamsulosin (FLOMAX) 0.4 mg Cp24 Take 1 capsule (0.4 mg total) by mouth once daily.    vitamin D 1000 units Tab Take 1,000 Units by mouth once daily. Take 1 tablet daily    hydrocodone-acetaminophen 5-325mg (NORCO) 5-325 mg per tablet TK 1 T PO  Q 6 H    nitrofurantoin, macrocrystal-monohydrate, (MACROBID) 100 MG capsule Take 1 capsule (100 mg total) by mouth 2 (two) times daily.           Clinical Reference Information           Your Vitals Were     BP                   161/74 (BP  Location: Right arm, Patient Position: Sitting)           Allergies as of 2/27/2017        Reactions    Aspirin Nausea And Vomiting    If over 81mg      Immunizations Administered on Date of Encounter - 2/27/2017     None      ED Micro, Lab, POCT     Start Ordered       Status Ordering Provider    02/27/17 0558 02/27/17 0557  Urine culture  Add-on      Completed     02/27/17 0521 02/27/17 0520  Urinalysis Clean Catch  STAT      Final result     02/27/17 0520 02/27/17 0520  Urinalysis Microscopic  Once      Final result       ED Imaging Orders     Start Ordered       Status Ordering Provider    02/27/17 0500 02/27/17 0459    1 time imaging,   Status:  Canceled      Canceled       Discharge References/Attachments     BACK SPRAIN/STRAIN (ENGLISH)    HEMATURIA (ENGLISH)    PROTEIN IN THE URINE (PROTEINURIA) (ENGLISH)    URINARY TRACT INFECTIONS IN MEN  (ENGLISH)      Your Scheduled Appointments     Mar 15, 2017 10:00 AM CDT   Fasting Lab with LAB, APPOINTMENT [facility] ([facility])    [address]  [place] [address]   [phone]            Mar 22, 2017 11:00 AM CDT   Established Patient Visit with Elizabeht Benitez MD   [facility] ([facility])    [address] [place] [address] [phone]            May 23, 2017 10:30 AM CDT   Established Patient Visit with Mehdi Dexter MD   [facility]    [address] [place] [address] [phone]              Smoking Cessation     If you would like to quit smoking:   You may be eligible for free services if you are a [place] resident and started smoking cigarettes before September 1, 1988.  Call the Smoking Cessation Trust (SCT) toll free at (968) 954-3909 or (237) 809-4790.   Call 4-706-QUIT-NOW if you do not meet the above criteria.             [facility] complies with  applicable Federal civil rights laws and does not discriminate on the basis of race, color, national origin, age, disability, or sex.        Language Assistance Services     ATTENTION: Language assistance services are available, free of charge. Please call [phone].      ATENCIÓN: Si habla alfa, tiene a gutiérrez disposición servicios gratuitos de asistencia lingüística. Llame al [phone].     CHÚ Ý: N?u b?n nói Ti?ng Vi?t, có các d?ch v? h? tr? ngôn ng? mi?n phí dành cho b?n. G?i s? [phone].

## 2017-02-27 NOTE — ED PROVIDER NOTES
Encounter Date: 2/27/2017    SCRIBE #1 NOTE: I, Sasha Mcdonough, am scribing for, and in the presence of, Dr. Patiño.       History     Chief Complaint   Patient presents with    Flank Pain     to rt flank history of kidney stones, pains started today     Review of patient's allergies indicates:   Allergen Reactions    Aspirin Nausea And Vomiting     If over 81mg     HPI Comments: Time seen by provider: 5:14 AM    This is a 66 y.o. male who presents with complaint of constant, right lower back pain that began yesterday afternoon. He describes the pain as dull and rates the pain 8/10. He reports taking tylenol without relief. He reports associated dysuria, hematuria, and urinary frequency. He was seen in the ED eight days ago for similar complaints and was diagnosed with UTI. He was given Cipro, which he finished without relief. He denies fever. He also reports sleeping on a sofa last night, and he preformed heavy lifting frequently for the last few days.     The history is provided by the patient and a relative.     Past Medical History:   Diagnosis Date    Cluster headaches     1999    Colon polyps     5 polyps in Dec 2010    Coronary artery disease 11/2011    moderate nonobstructive cad on left heart cath at [facility] 11/2011    Diabetes mellitus     GERD (gastroesophageal reflux disease)     history of peptic ulcer disease    Hyperlipidemia     Hypertension     Lumbar disc disease     MRI 2008 - L5-S1    Nephrolithiasis     July 2013     Past Surgical History:   Procedure Laterality Date    CHOLECYSTECTOMY      COLONOSCOPY N/A 12/7/2016    Procedure: COLONOSCOPY;  Surgeon: Lance Crocker MD;  Location: [address];  Service: Endoscopy;  Laterality: N/A;    ORIF left humerus       Family History   Problem Relation Age of Onset    Celiac disease Neg Hx     Cirrhosis Neg Hx     Colon cancer Neg Hx     Colon polyps Neg Hx     Crohn's disease Neg Hx     Esophageal cancer Neg Hx      Inflammatory bowel disease Neg Hx     Liver cancer Neg Hx     Rectal cancer Neg Hx     Stomach cancer Neg Hx     Ulcerative colitis Neg Hx      Social History   Substance Use Topics    Smoking status: Current Every Day Smoker     Packs/day: 1.00    Smokeless tobacco: Never Used      Comment: 1 pack whole life    Alcohol use Yes      Comment: special occassions     Review of Systems   Constitutional: Negative for chills and fever.   HENT: Negative for congestion and facial swelling.    Respiratory: Negative for chest tightness and shortness of breath.    Cardiovascular: Negative for chest pain.   Gastrointestinal: Negative for abdominal pain, nausea and vomiting.   Endocrine: Negative for polyuria.   Genitourinary: Positive for dysuria, frequency and hematuria.   Musculoskeletal: Positive for back pain. Negative for myalgias.   Skin: Negative for rash.   Neurological: Negative for headaches.       Physical Exam   Initial Vitals   BP Pulse Resp Temp SpO2   02/27/17 0456 02/27/17 0456 02/27/17 0456 02/27/17 0456 02/27/17 0456   161/74 83 16 98.9 °F (37.2 °C) 98 %     Physical Exam    Nursing note and vitals reviewed.  Constitutional: He appears well-developed and well-nourished. He is not diaphoretic. No distress.   HENT:   Head: Normocephalic and atraumatic.   Mouth/Throat: Oropharynx is clear and moist.   Eyes: Conjunctivae and EOM are normal. Pupils are equal, round, and reactive to light.   Neck: Normal range of motion. Neck supple.   Cardiovascular: Normal rate, regular rhythm, S1 normal, S2 normal and normal heart sounds. Exam reveals no gallop and no friction rub.    No murmur heard.  Pulmonary/Chest: Breath sounds normal. No respiratory distress. He has no wheezes. He has no rhonchi. He has no rales.   Abdominal: Soft. Bowel sounds are normal. There is no tenderness. There is no rebound and no guarding.   Musculoskeletal: Normal range of motion. He exhibits no edema or tenderness.   Right-sided  paraspinal tenderness to the right of T11/T12.   No flank tenderness. No lower extremity edema.    Lymphadenopathy:     He has no cervical adenopathy.   Neurological: He is alert and oriented to person, place, and time.   Skin: Skin is warm and dry. No rash noted. No pallor.   Psychiatric: He has a normal mood and affect. His behavior is normal. Judgment and thought content normal.         ED Course   Procedures  Labs Reviewed   URINALYSIS - Abnormal; Notable for the following:        Result Value    Appearance, UA Cloudy (*)     Protein, UA 2+ (*)     Occult Blood UA 3+ (*)     Nitrite, UA Positive (*)     Leukocytes, UA 3+ (*)     All other components within normal limits   URINALYSIS MICROSCOPIC - Abnormal; Notable for the following:     RBC, UA 5 (*)     Bacteria, UA Moderate (*)     All other components within normal limits   CULTURE, URINE   CULTURE, URINE                 Medical Decision Making:   Clinical Tests:   Lab Tests: Ordered and Reviewed            Scribe Attestation:   Scribe #1: I performed the above scribed service and the documentation accurately describes the services I performed. I attest to the accuracy of the note.    Attending Attestation:           Physician Attestation for Scribe:  Physician Attestation Statement for Scribe #1: I, Dr. Patiño, reviewed documentation, as scribed by Sasha Mcdonough in my presence, and it is both accurate and complete.         Attending ED Notes:   Emergent evaluation of 66-year-old male with right-sided flank pain.  Patient also complains of urinary frequency and burning on urination that started 2 days ago then stopped and then began again.  Patient is afebrile, nontoxic-appearing with stable vital signs except for elevation in blood pressure.  There is no flank tenderness to palpation.  Patient has right-sided paraspinal tenderness to palpation to the right of the lower thoracic spine.  No midline C-spine, T-spine or L-spine tenderness to palpation, crepitus  or step-offs.  Patient's medical record was reviewed in Epic including most recent CT scan less than one week ago which revealed a left-sided 7 mm kidney stone without obstruction.  I also reviewed patient's most recent urine culture.  Urinary analysis reveals protein, blood, leukocytes and nitrites with moderate bacteria.  The patient is extensively counseled on his diagnosis and treatment including all laboratory and physical exam findings.  The patient is discharged in good condition and directed to follow-up with his urologist and PCP in the next 24-48 hours.          ED Course     Clinical Impression:     1. Acute cystitis with hematuria    2. Proteinuria, unspecified type    3. Back strain, initial encounter             Edgardo Petersen MD  02/27/17 0606

## 2017-03-01 LAB — BACTERIA UR CULT: NORMAL

## 2017-03-06 NOTE — TELEPHONE ENCOUNTER
He needs a CT (non-contrast) and a cysto for UTI workup. I'll send him message. Can someone call him to schedule? Thanks.

## 2017-03-07 ENCOUNTER — PATIENT MESSAGE (OUTPATIENT)
Dept: UROLOGY | Facility: CLINIC | Age: 67
End: 2017-03-07

## 2017-03-07 ENCOUNTER — TELEPHONE (OUTPATIENT)
Dept: UROLOGY | Facility: CLINIC | Age: 67
End: 2017-03-07

## 2017-03-07 NOTE — TELEPHONE ENCOUNTER
Can you call patient and schedule appointment with me next available. I can see Thursday AM (overbook ok).

## 2017-03-07 NOTE — TELEPHONE ENCOUNTER
"----- Message from Lauryn Basurtoin sent at 3/7/2017 11:06 AM CST -----  Contact: Patient himself / # [phone]  _  1st Request  X  2nd Request  _  3rd Request    Who: Tony Han    Why: Patient called requesting further clarification on a [facility] message he received. Says, "it appear they're requesting him to have another CT scan and another Cystoscopy?" Please give a call back at your earliest convenience.  THANKS!     What Number to Call Back:  [phone]    When to Expect a call back: (Before the end of the day)   -- if the call is after 12:00, the call back will be tomorrow.                        "

## 2017-03-09 ENCOUNTER — OFFICE VISIT (OUTPATIENT)
Dept: UROLOGY | Facility: CLINIC | Age: 67
End: 2017-03-09
Attending: UROLOGY
Payer: MEDICARE

## 2017-03-09 ENCOUNTER — HOSPITAL ENCOUNTER (OUTPATIENT)
Dept: RADIOLOGY | Facility: OTHER | Age: 67
Discharge: HOME | End: 2017-03-09
Attending: UROLOGY
Payer: MEDICARE

## 2017-03-09 ENCOUNTER — PATIENT MESSAGE (OUTPATIENT)
Dept: UROLOGY | Facility: HOSPITAL | Age: 67
End: 2017-03-09

## 2017-03-09 VITALS
HEART RATE: 88 BPM | DIASTOLIC BLOOD PRESSURE: 74 MMHG | HEIGHT: 69 IN | SYSTOLIC BLOOD PRESSURE: 133 MMHG | WEIGHT: 193 LBS | BODY MASS INDEX: 28.58 KG/M2

## 2017-03-09 DIAGNOSIS — N20.0 NEPHROLITHIASIS: ICD-10-CM

## 2017-03-09 DIAGNOSIS — N39.0 RECURRENT UTI: Primary | ICD-10-CM

## 2017-03-09 DIAGNOSIS — N30.00 ACUTE CYSTITIS WITHOUT HEMATURIA: ICD-10-CM

## 2017-03-09 LAB
BILIRUB SERPL-MCNC: ABNORMAL MG/DL
BLOOD URINE, POC: 250
COLOR, POC UA: YELLOW
GLUCOSE UR QL STRIP: ABNORMAL
KETONES UR QL STRIP: ABNORMAL
LEUKOCYTE ESTERASE URINE, POC: ABNORMAL
NITRITE, POC UA: ABNORMAL
PH, POC UA: 5
PROTEIN, POC: ABNORMAL
SPECIFIC GRAVITY, POC UA: 1.01
UROBILINOGEN, POC UA: ABNORMAL

## 2017-03-09 PROCEDURE — 74000 XR KUB: CPT | Mod: 26,,, | Performed by: RADIOLOGY

## 2017-03-09 PROCEDURE — 1159F MED LIST DOCD IN RCRD: CPT | Mod: S$GLB,,, | Performed by: UROLOGY

## 2017-03-09 PROCEDURE — 99214 OFFICE O/P EST MOD 30 MIN: CPT | Mod: 25,S$GLB,, | Performed by: UROLOGY

## 2017-03-09 PROCEDURE — 99999 PR PBB SHADOW E&M-EST. PATIENT-LVL IV: CPT | Mod: PBBFAC,,, | Performed by: UROLOGY

## 2017-03-09 PROCEDURE — 3078F DIAST BP <80 MM HG: CPT | Mod: S$GLB,,, | Performed by: UROLOGY

## 2017-03-09 PROCEDURE — 1125F AMNT PAIN NOTED PAIN PRSNT: CPT | Mod: S$GLB,,, | Performed by: UROLOGY

## 2017-03-09 PROCEDURE — 81002 URINALYSIS NONAUTO W/O SCOPE: CPT | Mod: S$GLB,,, | Performed by: UROLOGY

## 2017-03-09 PROCEDURE — 74000 XR KUB: CPT | Mod: TC

## 2017-03-09 PROCEDURE — 1160F RVW MEDS BY RX/DR IN RCRD: CPT | Mod: S$GLB,,, | Performed by: UROLOGY

## 2017-03-09 PROCEDURE — 1157F ADVNC CARE PLAN IN RCRD: CPT | Mod: S$GLB,,, | Performed by: UROLOGY

## 2017-03-09 PROCEDURE — 3075F SYST BP GE 130 - 139MM HG: CPT | Mod: S$GLB,,, | Performed by: UROLOGY

## 2017-03-09 RX ORDER — CIPROFLOXACIN 500 MG/1
500 TABLET ORAL EVERY 12 HOURS
Qty: 60 TABLET | Refills: 0 | Status: SHIPPED | OUTPATIENT
Start: 2017-03-09 | End: 2017-04-08

## 2017-03-09 NOTE — PROGRESS NOTES
"Subjective:      Tony Han is a 66 y.o. male who returns today regarding his recurrent UTI.    He has had near constant UTI since December with multiple courses of abx. Just finished most recent abx 2 days ago and feels symptoms are returning. He had workup last month with primarily finding of left renal stone. He opted against surgery at that time but given persistence of UTI now wishes to proceed.    The following portions of the patient's history were reviewed and updated as appropriate: allergies, current medications, past family history, past medical history, past social history, past surgical history and problem list.    Review of Systems  A comprehensive multipoint review of systems was negative except as otherwise stated in the HPI.     Objective:   Vitals: /74  Pulse 88  Ht 5' 9" (1.753 m)  Wt 87.5 kg (193 lb)  BMI 28.5 kg/m2    Physical Exam   General: alert and oriented, no acute distress  Respiratory: Symmetric expansion, non-labored breathing  Neuro: no gross deficits  Psych: normal judgment and insight, normal mood/affect and non-anxious    Lab Review   Urinalysis demonstrates positive for leukocytes, red blood cells  Lab Results   Component Value Date    WBC 16.26 (H) 01/19/2017    HGB 12.3 (L) 01/19/2017    HCT 37.2 (L) 01/19/2017    MCV 96 01/19/2017     01/19/2017     Lab Results   Component Value Date    CREATININE 0.9 01/19/2017    BUN 11 01/19/2017     Lab Results   Component Value Date    PSA 0.82 01/06/2016         Assessment and Plan:   1. Recurrent UTI  - Stone management as per below    2. Acute cystitis without hematuria  - Start cipro now and continue through surgery  - Urine culture    3. Nephrolithiasis  - KUB today  - Recommend ESWL in 2 weeks (pending KUB); also discussed possible need for URS if stone not visible on KUB  - He agrees with above  "

## 2017-03-09 NOTE — H&P
Subjective:      Tony Han is a 66 y.o. male who returns today regarding his recurrent UTI.    He has had near constant UTI since December with multiple courses of abx. Just finished most recent abx 2 days ago and feels symptoms are returning. He had workup last month with primarily finding of left renal stone. He opted against surgery at that time but given persistence of UTI now wishes to proceed.    Past Medical History:   Diagnosis Date    Cluster headaches     1999    Colon polyps     5 polyps in Dec 2010    Coronary artery disease 11/2011    moderate nonobstructive cad on [facility] heart cath [facility] 11/2011    Diabetes mellitus     GERD (gastroesophageal reflux disease)     history of peptic ulcer disease    Hyperlipidemia     Hypertension     Lumbar disc disease     MRI 2008 - L5-S1    Nephrolithiasis     July 2013       Past Surgical History:   Procedure Laterality Date    CHOLECYSTECTOMY      COLONOSCOPY N/A 12/7/2016    Procedure: COLONOSCOPY;  Surgeon: Lance Crocker MD;  Location: [address]);  Service: Endoscopy;  Laterality: N/A;    ORIF left humerus         Social History     Social History    Marital status:      Spouse name: N/A    Number of children: N/A    Years of education: N/A     Occupational History    Not on file.     Social History Main Topics    Smoking status: Current Every Day Smoker     Packs/day: 1.00    Smokeless tobacco: Never Used      Comment: 1 pack whole life    Alcohol use Yes      Comment: special occassions    Drug use: No    Sexual activity: Yes     Partners: Female     Birth control/ protection: None     Other Topics Concern    Not on file     Social History Narrative    , 5 children - healthy.  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       Family History   Problem Relation Age of Onset    Celiac disease Neg Hx     Cirrhosis Neg Hx     Colon cancer Neg Hx     Colon polyps Neg Hx     Crohn's disease  Neg Hx     Esophageal cancer Neg Hx     Inflammatory bowel disease Neg Hx     Liver cancer Neg Hx     Rectal cancer Neg Hx     Stomach cancer Neg Hx     Ulcerative colitis Neg Hx        Review of patient's allergies indicates:   Allergen Reactions    Aspirin Nausea And Vomiting     If over 81mg       Current Outpatient Prescriptions on File Prior to Visit   Medication Sig Dispense Refill    amlodipine (NORVASC) 10 MG tablet Take 1 tablet (10 mg total) by mouth once daily. 90 tablet 4    aspirin 81 MG Chew Take 1 tablet (81 mg total) by mouth once daily. 180 tablet 4    blood sugar diagnostic (CONTOUR TEST STRIPS) Strp CHECK BLOOD SUGAR ONCE A  strip 11    finasteride (PROSCAR) 5 mg tablet Take 1 tablet (5 mg total) by mouth once daily. 30 tablet 11    hydrocodone-acetaminophen 5-325mg (NORCO) 5-325 mg per tablet TK 1 T PO  Q 6 H  0    lancets Misc [employer] Contour lancets - use one lancet once daily 100 each 11    losartan (COZAAR) 25 MG tablet TAKE 1 TABLET BY MOUTH ONCE DAILY 90 tablet 4    metformin (GLUCOPHAGE-XR) 500 MG 24 hr tablet Take 2 tablets (1,000 mg total) by mouth 2 (two) times daily. 360 tablet 4    metoprolol succinate (TOPROL-XL) 25 MG 24 hr tablet Take 1 tablet (25 mg total) by mouth once daily. 90 tablet 4    MULTIVITS-MINERALS/FA/LYCOPENE (ONE-A-DAY MEN'S ORAL) Take 1 tablet by mouth once daily.      simvastatin (ZOCOR) 20 MG tablet Take 1 tablet (20 mg total) by mouth every evening. 90 tablet 4    tamsulosin (FLOMAX) 0.4 mg Cp24 Take 1 capsule (0.4 mg total) by mouth once daily. 30 capsule 11    vitamin D 1000 units Tab Take 1,000 Units by mouth once daily. Take 1 tablet daily      [DISCONTINUED] hydrocodone-acetaminophen 7.5-325mg (NORCO) 7.5-325 mg per tablet Take 1 tablet by mouth every 4 to 6 hours as needed for Pain.        No current facility-administered medications on file prior to visit.       Review of Systems  A comprehensive multipoint review of systems was  "negative except as otherwise stated in the HPI.     Objective:   Vitals: /74  Pulse 88  Ht 5' 9" (1.753 m)  Wt 87.5 kg (193 lb)  BMI 28.5 kg/m2    Physical Exam   General: alert and oriented, no acute distress  Respiratory: Symmetric expansion, non-labored breathing  Neuro: no gross deficits  Psych: normal judgment and insight, normal mood/affect and non-anxious    Lab Review   Urinalysis demonstrates positive for leukocytes, red blood cells  Lab Results   Component Value Date    WBC 16.26 (H) 01/19/2017    HGB 12.3 (L) 01/19/2017    HCT 37.2 (L) 01/19/2017    MCV 96 01/19/2017     01/19/2017     Lab Results   Component Value Date    CREATININE 0.9 01/19/2017    BUN 11 01/19/2017     Lab Results   Component Value Date    PSA 0.82 01/06/2016         Assessment and Plan:   1. Recurrent UTI  - Stone management as per below    2. Acute cystitis without hematuria  - Start cipro now and continue through surgery  - Urine culture    3. Nephrolithiasis  - KUB today  - Recommend ESWL in 2 weeks (pending KUB); also discussed possible need for URS if stone not visible on KUB  - He agrees with above    "

## 2017-03-15 ENCOUNTER — LAB VISIT (OUTPATIENT)
Dept: LAB | Facility: HOSPITAL | Age: 67
End: 2017-03-15
Attending: INTERNAL MEDICINE
Payer: MEDICARE

## 2017-03-15 DIAGNOSIS — E13.9 DIABETES MELLITUS DUE TO ABNORMAL INSULIN: ICD-10-CM

## 2017-03-15 DIAGNOSIS — Z12.5 SCREENING PSA (PROSTATE SPECIFIC ANTIGEN): ICD-10-CM

## 2017-03-15 DIAGNOSIS — Z11.59 NEED FOR HEPATITIS C SCREENING TEST: ICD-10-CM

## 2017-03-15 LAB
ALBUMIN SERPL BCP-MCNC: 4 G/DL
ALP SERPL-CCNC: 81 U/L
ALT SERPL W/O P-5'-P-CCNC: 44 U/L
ANION GAP SERPL CALC-SCNC: 13 MMOL/L
AST SERPL-CCNC: 34 U/L
BASOPHILS # BLD AUTO: 0.03 K/UL
BASOPHILS NFR BLD: 0.4 %
BILIRUB SERPL-MCNC: 0.1 MG/DL
BUN SERPL-MCNC: 13 MG/DL
CALCIUM SERPL-MCNC: 10.1 MG/DL
CHLORIDE SERPL-SCNC: 106 MMOL/L
CHOLEST/HDLC SERPL: 4 {RATIO}
CO2 SERPL-SCNC: 21 MMOL/L
COMPLEXED PSA SERPL-MCNC: 3.2 NG/ML
CREAT SERPL-MCNC: 1 MG/DL
DIFFERENTIAL METHOD: ABNORMAL
EOSINOPHIL # BLD AUTO: 0.2 K/UL
EOSINOPHIL NFR BLD: 3.3 %
ERYTHROCYTE [DISTWIDTH] IN BLOOD BY AUTOMATED COUNT: 13.8 %
EST. GFR  (AFRICAN AMERICAN): >60 ML/MIN/1.73 M^2
EST. GFR  (NON AFRICAN AMERICAN): >60 ML/MIN/1.73 M^2
GLUCOSE SERPL-MCNC: 158 MG/DL
HCT VFR BLD AUTO: 40.4 %
HDL/CHOLESTEROL RATIO: 25 %
HDLC SERPL-MCNC: 112 MG/DL
HDLC SERPL-MCNC: 28 MG/DL
HGB BLD-MCNC: 13.1 G/DL
LDLC SERPL CALC-MCNC: 37.8 MG/DL
LYMPHOCYTES # BLD AUTO: 3.5 K/UL
LYMPHOCYTES NFR BLD: 47.8 %
MCH RBC QN AUTO: 32 PG
MCHC RBC AUTO-ENTMCNC: 32.4 %
MCV RBC AUTO: 99 FL
MONOCYTES # BLD AUTO: 0.3 K/UL
MONOCYTES NFR BLD: 3.9 %
NEUTROPHILS # BLD AUTO: 3.3 K/UL
NEUTROPHILS NFR BLD: 44.5 %
NONHDLC SERPL-MCNC: 84 MG/DL
PLATELET # BLD AUTO: 332 K/UL
PMV BLD AUTO: 9.1 FL
POTASSIUM SERPL-SCNC: 4.6 MMOL/L
PROT SERPL-MCNC: 7.7 G/DL
RBC # BLD AUTO: 4.1 M/UL
SODIUM SERPL-SCNC: 140 MMOL/L
TRIGL SERPL-MCNC: 231 MG/DL
TSH SERPL DL<=0.005 MIU/L-ACNC: 0.59 UIU/ML
WBC # BLD AUTO: 7.36 K/UL

## 2017-03-15 PROCEDURE — 80061 LIPID PANEL: CPT

## 2017-03-15 PROCEDURE — 84153 ASSAY OF PSA TOTAL: CPT

## 2017-03-15 PROCEDURE — 83036 HEMOGLOBIN GLYCOSYLATED A1C: CPT

## 2017-03-15 PROCEDURE — 84443 ASSAY THYROID STIM HORMONE: CPT

## 2017-03-15 PROCEDURE — 86803 HEPATITIS C AB TEST: CPT

## 2017-03-15 PROCEDURE — 85025 COMPLETE CBC W/AUTO DIFF WBC: CPT

## 2017-03-15 PROCEDURE — 36415 COLL VENOUS BLD VENIPUNCTURE: CPT

## 2017-03-15 PROCEDURE — 80053 COMPREHEN METABOLIC PANEL: CPT

## 2017-03-16 ENCOUNTER — TELEPHONE (OUTPATIENT)
Dept: UROLOGY | Facility: CLINIC | Age: 67
End: 2017-03-16

## 2017-03-16 LAB
ESTIMATED AVG GLUCOSE: 163 MG/DL
HBA1C MFR BLD HPLC: 7.3 %
HCV AB SERPL QL IA: NEGATIVE

## 2017-03-16 NOTE — TELEPHONE ENCOUNTER
I spoke to pt.  He understands that he will be instructed by anesthesia as to which meds to take. States understanding.

## 2017-03-20 ENCOUNTER — ANESTHESIA EVENT (OUTPATIENT)
Dept: SURGERY | Facility: OTHER | Age: 67
End: 2017-03-20
Payer: MEDICARE

## 2017-03-20 ENCOUNTER — HOSPITAL ENCOUNTER (OUTPATIENT)
Dept: PREADMISSION TESTING | Facility: OTHER | Age: 67
Discharge: HOME | End: 2017-03-20
Attending: UROLOGY
Payer: MEDICARE

## 2017-03-20 VITALS
OXYGEN SATURATION: 100 % | HEART RATE: 74 BPM | TEMPERATURE: 99 F | SYSTOLIC BLOOD PRESSURE: 139 MMHG | BODY MASS INDEX: 28.14 KG/M2 | HEIGHT: 69 IN | WEIGHT: 190 LBS | DIASTOLIC BLOOD PRESSURE: 67 MMHG

## 2017-03-20 RX ORDER — MIDAZOLAM HYDROCHLORIDE 1 MG/ML
5 INJECTION INTRAMUSCULAR; INTRAVENOUS
Status: ACTIVE | OUTPATIENT
Start: 2017-03-20 | End: 2017-03-20

## 2017-03-20 RX ORDER — LIDOCAINE HYDROCHLORIDE 10 MG/ML
1 INJECTION, SOLUTION EPIDURAL; INFILTRATION; INTRACAUDAL; PERINEURAL ONCE
Status: CANCELLED | OUTPATIENT
Start: 2017-03-20 | End: 2017-03-20

## 2017-03-20 RX ORDER — SODIUM CHLORIDE, SODIUM LACTATE, POTASSIUM CHLORIDE, CALCIUM CHLORIDE 600; 310; 30; 20 MG/100ML; MG/100ML; MG/100ML; MG/100ML
INJECTION, SOLUTION INTRAVENOUS CONTINUOUS
Status: CANCELLED | OUTPATIENT
Start: 2017-03-20

## 2017-03-20 NOTE — ANESTHESIA PREPROCEDURE EVALUATION
03/20/2017  Tony Han is a 66 y.o., male.    [facility] Anesthesia Evaluation    I have reviewed the Patient Summary Reports.    I have reviewed the Nursing Notes.   I have reviewed the Medications.     Review of Systems  Anesthesia Hx:  Denies Family Hx of Anesthesia complications.   Denies Personal Hx of Anesthesia complications.   Social:  Smoker 2ppd   Hematology/Oncology:     Oncology Normal     Cardiovascular:   Exercise tolerance: good Hypertension CAD   hyperlipidemia    Pulmonary:  Pulmonary Normal    Renal/:   renal calculi BPH    Hepatic/GI:   GERD Liver Disease,    Musculoskeletal:  Spine Disorders: lumbar    Neurological:   Headaches   Chronic Pain Syndrome   Endocrine:   Diabetes        Physical Exam  General:  Well nourished    Airway/Jaw/Neck:  Airway Findings: Mouth Opening: Normal Tongue: Normal  General Airway Assessment: Adult  Mallampati: III  TM Distance: Normal, at least 6 cm      Dental:  Dental Findings: Upper Dentures, lower partial dentures        Mental Status:  Mental Status Findings:  Alert and Oriented, Cooperative         Anesthesia Plan  Type of Anesthesia, risks & benefits discussed:  Anesthesia Type:  general  Patient's Preference:   Intra-op Monitoring Plan:   Intra-op Monitoring Plan Comments:   Post Op Pain Control Plan:   Post Op Pain Control Plan Comments:   Induction:   IV  Beta Blocker:         Informed Consent: Patient understands risks and agrees with Anesthesia plan.  Questions answered. Anesthesia consent signed with patient.  ASA Score: 3     Day of Surgery Review of History & Physical:    H&P update referred to the surgeon.         Ready For Surgery From Anesthesia Perspective.

## 2017-03-20 NOTE — IP AVS SNAPSHOT
[facility] Location ([facility])  [address] [place], [place] [address]  Phone: [phone]           Patient Discharge Instructions    Our goal is to set you up for success. This packet includes information on your condition, medications, and your home care. It will help you to care for yourself so you don't get sicker.     Please ask your nurse if you have any questions.        There are many details to remember when preparing for your surgery. Here is what you will need to do, please ask your nurse if there are more specific instructions and if you have any questions:    1. 24 hours before procedure Do not smoke or drink alcoholic beverages 24 hours prior to your procedure    2. Eating before procedure Do not eat or drink anything 8 hours before your procedure - this includes gum, mints, and candy.     3. Day of procedure Please remove all jewelry for the procedure. If you wear contact lenses, dentures, hearing aids or glasses, bring a container to put them in during your surgery and give to a family member for safekeeping.  If your doctor has scheduled you for an overnight stay, bring a small overnight bag with any personal items that you need.    4. After procedure Make arrangements in advance for transportation home by a responsible adult. It is not safe to drive a vehicle during the 24 hours following surgery.     PLEASE NOTE: You may be contacted the day before your surgery to confirm your surgery date and arrival time. The Surgery schedule has many variables which may affect the time of your surgery case. Family members should be available if your surgery time changes.                [facility]  Unless otherwise directed by your provider, please contact [facility], our nurse care line that is available for 24/7 assistance.     [phone] (toll-free)    Registered nurses in the [facility] provide clinical advisement, health education, appointment booking, and other  advisory services.                    ** Verify the list of medication(s) below is accurate and up to date. Carry this with you in case of emergency. If your medications have changed, please notify your healthcare provider.             Medication List      TAKE these medications        Additional Info                      amlodipine 10 MG tablet   Commonly known as:  NORVASC   Quantity:  90 tablet   Refills:  4   Dose:  10 mg    Instructions:  Take 1 tablet (10 mg total) by mouth once daily.     Begin Date    AM    Noon    PM    Bedtime       aspirin 81 MG Chew   Quantity:  180 tablet   Refills:  4   Dose:  81 mg    Instructions:  Take 1 tablet (81 mg total) by mouth once daily.     Begin Date    AM    Noon    PM    Bedtime       blood sugar diagnostic Strp   Commonly known as:  CONTOUR TEST STRIPS   Quantity:  100 strip   Refills:  11    Instructions:  CHECK BLOOD SUGAR ONCE A DAY     Begin Date    AM    Noon    PM    Bedtime       ciprofloxacin HCl 500 MG tablet   Commonly known as:  CIPRO   Quantity:  60 tablet   Refills:  0   Dose:  500 mg    Instructions:  Take 1 tablet (500 mg total) by mouth every 12 (twelve) hours.     Begin Date    AM    Noon    PM    Bedtime       finasteride 5 mg tablet   Commonly known as:  PROSCAR   Quantity:  30 tablet   Refills:  11   Dose:  5 mg    Instructions:  Take 1 tablet (5 mg total) by mouth once daily.     Begin Date    AM    Noon    PM    Bedtime       hydrocodone-acetaminophen 5-325mg 5-325 mg per tablet   Commonly known as:  NORCO   Refills:  0    Instructions:  TK 1 T PO  Q 6 H     Begin Date    AM    Noon    PM    Bedtime       lancets Misc   Quantity:  100 each   Refills:  11    Instructions:  Malu Contour lancets - use one lancet once daily     Begin Date    AM    Noon    PM    Bedtime       losartan 25 MG tablet   Commonly known as:  COZAAR   Quantity:  90 tablet   Refills:  4    Instructions:  TAKE 1 TABLET BY MOUTH ONCE DAILY     Begin Date    AM    Noon    PM     Bedtime       metformin 500 MG 24 hr tablet   Commonly known as:  GLUCOPHAGE-XR   Quantity:  360 tablet   Refills:  4   Dose:  1000 mg   Comments:  **Patient requests 90 days supply**    Instructions:  Take 2 tablets (1,000 mg total) by mouth 2 (two) times daily.     Begin Date    AM    Noon    PM    Bedtime       metoprolol succinate 25 MG 24 hr tablet   Commonly known as:  TOPROL-XL   Quantity:  90 tablet   Refills:  4   Dose:  25 mg    Instructions:  Take 1 tablet (25 mg total) by mouth once daily.     Begin Date    AM    Noon    PM    Bedtime       ONE-A-DAY MEN'S ORAL   Refills:  0   Dose:  1 tablet    Instructions:  Take 1 tablet by mouth once daily.     Begin Date    AM    Noon    PM    Bedtime       simvastatin 20 MG tablet   Commonly known as:  ZOCOR   Quantity:  90 tablet   Refills:  4   Dose:  20 mg    Instructions:  Take 1 tablet (20 mg total) by mouth every evening.     Begin Date    AM    Noon    PM    Bedtime       tamsulosin 0.4 mg Cp24   Commonly known as:  FLOMAX   Quantity:  30 capsule   Refills:  11   Dose:  0.4 mg    Instructions:  Take 1 capsule (0.4 mg total) by mouth once daily.     Begin Date    AM    Noon    PM    Bedtime       vitamin D 1000 units Tab   Refills:  0   Dose:  1000 Units    Instructions:  Take 1,000 Units by mouth once daily. Take 1 tablet daily     Begin Date    AM    Noon    PM    Bedtime                  Please bring to all follow up appointments:    1. A copy of your discharge instructions.  2. All medicines you are currently taking in their original bottles.  3. Identification and insurance card.    Please arrive 15 minutes ahead of scheduled appointment time.    Please call 24 hours in advance if you must reschedule your appointment and/or time.        Your Scheduled Appointments     Mar 22, 2017 11:00 AM CDT   Established Patient Visit with MD Ibrahima Schroeder - Internal Medicine ([facility])    [address]  [facility]  [address]   [phone]            May 23, 2017 10:30 AM CDT   Established Patient Visit with Mehdi Dexter MD   [facility] - Urology ([facility])    [address], [address]  [facility] [address] [phone]              Your Future Surgeries/Procedures     Mar 23, 2017   Surgery with Mehdi Dexter MD   [facility]    [address] [place] [address] [phone]                  Discharge Instructions       PRE-ADMIT TESTING -  [phone]    [address] [place] [address]        OUTPATIENT SURGERY UNIT - [phone]    Your surgery has been scheduled at [facility]. We are pleased to have the opportunity to serve you. For Further Information please call [phone].    On the day of surgery please report to the Information Desk on the 1st floor.    CONTACT YOUR PHYSICIAN'S OFFICE THE DAY PRIOR TO YOUR SURGERY TO OBTAIN YOUR ARRIVAL TIME.     The evening before surgery do not eat anything after 9 p.m. ( this includes hard candy, chewing gum and mints).  You may have GATORADE, POWERADE AND WATER FROM 9 p.m. until leaving home to come to the hospital.   DO NOT DRINK ANY LIQUIDS ON THE WAY TO THE HOSPITAL.     SPECIAL MEDICATION INSTRUCTIONS: TAKE medications checked off by the Anesthesiologist on your Medication List.    Angiogram Patients: Take medications as instructed by your physician, including aspirin.     Surgery Patients:    If you take ASPIRIN - Your PHYSICIAN/SURGEON will need to inform you IF/OR when you need to stop taking aspirin prior to your surgery.     Do Not take any medications containing IBUPROFEN.  Do Not Wear any make-up or dark nail polish   (especially eye make-up) to surgery. If you come to surgery with makeup on you will be required to remove the makeup or nail polish.    Do not shave your surgical area at least 5 days prior to your surgery. The surgical prep will be performed at the hospital  according to Infection Control regulations.    Leave all valuables at home.   Do Not wear any jewelry or watches, including any metal in body piercings.  Contact Lens must be removed before surgery. Either do not wear the contact lens or bring a case and solution for storage.  Please bring a container for eyeglasses or dentures as required.  Bring any paperwork your physician has provided, such as consent forms,  history and physicals, doctor's orders, etc.   Bring comfortable clothes that are loose fitting to wear upon discharge. Take into consideration the type of surgery being performed.  Maintain your diet as advised per your physician the day prior to surgery.      Adequate rest the night before surgery is advised.   Park in the Parking lot behind the hospital or in the [employer] [facility] across the street from the parking lot. Parking is complimentary.  If you will be discharged the same day as your procedure, please arrange for a responsible adult to drive you home or to accompany you if traveling by taxi.   YOU WILL NOT BE PERMITTED TO DRIVE OR TO LEAVE THE HOSPITAL ALONE AFTER SURGERY.   It is strongly recommended that you arrange for someone to remain with you for the first 24 hrs following your surgery.       Thank you for your cooperation.  The Staff of [facility].        Bathing Instructions                                                                 Please shower the evening before and morning of your procedure with    ANTIBACTERIAL SOAP. ( DIAL, etc )  Concentrate on the surgical area   for at least 3 minutes and rinse completely. Dry off as usual.   Do not use any deodorant, powder, body lotions, perfume, after shave or    cologne.                                                Admission Information     Date & Time Provider Department CSN    3/20/2017  9:00 AM Mehdi Dexter MD [facility] [id number]      Care Providers     Provider Role Specialty  "Primary office phone    Mehdi Dexter MD Attending Provider Urology [phone]      Your Vitals Were     BP Pulse Temp Height Weight SpO2    139/67 74 98.5 °F (36.9 °C) (Oral) 5' 9" (1.753 m) 86.2 kg (190 lb) 100%    BMI                28.06 kg/m2          Recent Lab Values        6/10/2015 9/30/2015 11/19/2015 1/6/2016 5/4/2016 8/31/2016 12/8/2016 3/15/2017     10:00 AM 10:15 AM  5:28 AM 11:08 AM 10:14 AM 10:25 AM  8:07 AM 10:44 AM    A1C 6.7 (H) 6.5 (H) 6.5 (H) 6.8 (H) 6.7 (H) 6.7 (H) 6.5 (H) 7.3 (H)    Comment for A1C at 10:25 AM on 8/31/2016:  According to ADA guidelines, hemoglobin A1C <7.0% represents  optimal control in non-pregnant diabetic patients.  Different  metrics may apply to specific populations.   Standards of Medical Care in Diabetes - 2016.  For the purpose of screening for the presence of diabetes:  <5.7%     Consistent with the absence of diabetes  5.7-6.4%  Consistent with increasing risk for diabetes   (prediabetes)  >or=6.5%  Consistent with diabetes  Currently no consensus exists for use of hemoglobin A1C  for diagnosis of diabetes for children.      Comment for A1C at  8:07 AM on 12/8/2016:  According to ADA guidelines, hemoglobin A1C <7.0% represents  optimal control in non-pregnant diabetic patients.  Different  metrics may apply to specific populations.   Standards of Medical Care in Diabetes - 2016.  For the purpose of screening for the presence of diabetes:  <5.7%     Consistent with the absence of diabetes  5.7-6.4%  Consistent with increasing risk for diabetes   (prediabetes)  >or=6.5%  Consistent with diabetes  Currently no consensus exists for use of hemoglobin A1C  for diagnosis of diabetes for children.      Comment for A1C at 10:44 AM on 3/15/2017:  According to ADA guidelines, hemoglobin A1C <7.0% represents  optimal control in non-pregnant diabetic patients.  Different  metrics may apply to specific populations.   Standards of Medical Care in Diabetes - 2016.  For the " purpose of screening for the presence of diabetes:  <5.7%     Consistent with the absence of diabetes  5.7-6.4%  Consistent with increasing risk for diabetes   (prediabetes)  >or=6.5%  Consistent with diabetes  Currently no consensus exists for use of hemoglobin A1C  for diagnosis of diabetes for children.        Allergies as of 3/20/2017        Reactions    Aspirin Nausea And Vomiting    If over 81mg      Advance Directives     An advance directive is a document which, in the event you are no longer able to make decisions for yourself, tells your healthcare team what kind of treatment you do or do not want to receive, or who you would like to make those decisions for you.  If you do not currently have an advance directive, [facility] encourages you to create one.  For more information call:  [phone]), [phone] ([phone]),  or log on to [email or web address].        Smoking Cessation     If you would like to quit smoking:   You may be eligible for free services if you are a [place] resident and started smoking cigarettes before September 1, 1988.  Call the Smoking Cessation Trust (SCT) toll free at (195) 158-1277 or (064) 186-2820.   Call 4-297-QUIT-NOW if you do not meet the above criteria.            Language Assistance Services     ATTENTION: Language assistance services are available, free of charge. Please call [phone].      ATENCIÓN: Si habla español, tiene a gutiérrez disposición servicios gratuitos de asistencia lingüística. Llame al [phone].     CHÚ Ý: N?u b?n nói Ti?ng Vi?t, có các d?ch v? h? tr? ngôn ng? mi?n phí dành cho b?n. G?i s? [phone].        Diabetes Discharge Instructions                                    [facility] complies with applicable Federal civil rights laws and does not discriminate on the basis of race, color, national origin, age, disability, or sex.

## 2017-03-20 NOTE — DISCHARGE INSTRUCTIONS
PRE-ADMIT TESTING -  [phone]    [address]  [facility]        OUTPATIENT SURGERY UNIT - [phone]    Your surgery has been scheduled at [facility]. We are pleased to have the opportunity to serve you. For Further Information please call [phone].    On the day of surgery please report to the Information Desk on the 1st floor.    CONTACT YOUR PHYSICIAN'S OFFICE THE DAY PRIOR TO YOUR SURGERY TO OBTAIN YOUR ARRIVAL TIME.     The evening before surgery do not eat anything after 9 p.m. ( this includes hard candy, chewing gum and mints).  You may have GATORADE, POWERADE AND WATER FROM 9 p.m. until leaving home to come to the hospital.   DO NOT DRINK ANY LIQUIDS ON THE WAY TO THE HOSPITAL.     SPECIAL MEDICATION INSTRUCTIONS: TAKE medications checked off by the Anesthesiologist on your Medication List.    Angiogram Patients: Take medications as instructed by your physician, including aspirin.     Surgery Patients:    If you take ASPIRIN - Your PHYSICIAN/SURGEON will need to inform you IF/OR when you need to stop taking aspirin prior to your surgery.     Do Not take any medications containing IBUPROFEN.  Do Not Wear any make-up or dark nail polish   (especially eye make-up) to surgery. If you come to surgery with makeup on you will be required to remove the makeup or nail polish.    Do not shave your surgical area at least 5 days prior to your surgery. The surgical prep will be performed at the hospital according to Infection Control regulations.    Leave all valuables at home.   Do Not wear any jewelry or watches, including any metal in body piercings.  Contact Lens must be removed before surgery. Either do not wear the contact lens or bring a case and solution for storage.  Please bring a container for eyeglasses or dentures as required.  Bring any paperwork your physician has provided, such as consent forms,  history and physicals, doctor's orders, etc.   Bring comfortable  clothes that are loose fitting to wear upon discharge. Take into consideration the type of surgery being performed.  Maintain your diet as advised per your physician the day prior to surgery.      Adequate rest the night before surgery is advised.   Park in the Parking lot behind the hospital or in the [employer] [facility] across the street from the parking lot. Parking is complimentary.  If you will be discharged the same day as your procedure, please arrange for a responsible adult to drive you home or to accompany you if traveling by taxi.   YOU WILL NOT BE PERMITTED TO DRIVE OR TO LEAVE THE HOSPITAL ALONE AFTER SURGERY.   It is strongly recommended that you arrange for someone to remain with you for the first 24 hrs following your surgery.       Thank you for your cooperation.  The Staff of [facility].        Bathing Instructions                                                                 Please shower the evening before and morning of your procedure with    ANTIBACTERIAL SOAP. ( DIAL, etc )  Concentrate on the surgical area   for at least 3 minutes and rinse completely. Dry off as usual.   Do not use any deodorant, powder, body lotions, perfume, after shave or    cologne.

## 2017-03-22 ENCOUNTER — OFFICE VISIT (OUTPATIENT)
Dept: INTERNAL MEDICINE | Facility: CLINIC | Age: 67
End: 2017-03-22
Payer: MEDICARE

## 2017-03-22 VITALS
HEIGHT: 69 IN | DIASTOLIC BLOOD PRESSURE: 60 MMHG | WEIGHT: 194 LBS | BODY MASS INDEX: 28.73 KG/M2 | SYSTOLIC BLOOD PRESSURE: 128 MMHG | HEART RATE: 91 BPM

## 2017-03-22 DIAGNOSIS — E78.5 HYPERLIPIDEMIA, UNSPECIFIED HYPERLIPIDEMIA TYPE: ICD-10-CM

## 2017-03-22 DIAGNOSIS — R97.20 ELEVATED PSA: ICD-10-CM

## 2017-03-22 DIAGNOSIS — K21.9 GASTROESOPHAGEAL REFLUX DISEASE WITHOUT ESOPHAGITIS: ICD-10-CM

## 2017-03-22 DIAGNOSIS — E13.9 DIABETES MELLITUS DUE TO ABNORMAL INSULIN: ICD-10-CM

## 2017-03-22 DIAGNOSIS — I10 ESSENTIAL HYPERTENSION: ICD-10-CM

## 2017-03-22 DIAGNOSIS — Z00.00 ROUTINE GENERAL MEDICAL EXAMINATION AT A HEALTH CARE FACILITY: Primary | ICD-10-CM

## 2017-03-22 DIAGNOSIS — N39.0 RECURRENT UTI: ICD-10-CM

## 2017-03-22 LAB
BILIRUB UR QL STRIP: NEGATIVE
CLARITY UR REFRACT.AUTO: CLEAR
COLOR UR AUTO: YELLOW
CREAT UR-MCNC: 168 MG/DL
GLUCOSE UR QL STRIP: NEGATIVE
HGB UR QL STRIP: NEGATIVE
KETONES UR QL STRIP: NEGATIVE
LEUKOCYTE ESTERASE UR QL STRIP: NEGATIVE
MICROALBUMIN UR DL<=1MG/L-MCNC: 88 UG/ML
MICROALBUMIN/CREATININE RATIO: 52.4 UG/MG
NITRITE UR QL STRIP: NEGATIVE
PH UR STRIP: 5 [PH] (ref 5–8)
PROT UR QL STRIP: NEGATIVE
SP GR UR STRIP: 1.01 (ref 1–1.03)
URN SPEC COLLECT METH UR: NORMAL
UROBILINOGEN UR STRIP-ACNC: NEGATIVE EU/DL

## 2017-03-22 PROCEDURE — 81003 URINALYSIS AUTO W/O SCOPE: CPT

## 2017-03-22 PROCEDURE — 99213 OFFICE O/P EST LOW 20 MIN: CPT | Mod: 25,S$GLB,, | Performed by: INTERNAL MEDICINE

## 2017-03-22 PROCEDURE — 82570 ASSAY OF URINE CREATININE: CPT

## 2017-03-22 PROCEDURE — 99499 UNLISTED E&M SERVICE: CPT | Mod: S$GLB,,, | Performed by: INTERNAL MEDICINE

## 2017-03-22 PROCEDURE — 3074F SYST BP LT 130 MM HG: CPT | Mod: S$GLB,,, | Performed by: INTERNAL MEDICINE

## 2017-03-22 PROCEDURE — 99999 PR PBB SHADOW E&M-EST. PATIENT-LVL III: CPT | Mod: PBBFAC,,, | Performed by: INTERNAL MEDICINE

## 2017-03-22 PROCEDURE — 87086 URINE CULTURE/COLONY COUNT: CPT

## 2017-03-22 PROCEDURE — 99397 PER PM REEVAL EST PAT 65+ YR: CPT | Mod: 25,S$GLB,, | Performed by: INTERNAL MEDICINE

## 2017-03-22 PROCEDURE — 3078F DIAST BP <80 MM HG: CPT | Mod: S$GLB,,, | Performed by: INTERNAL MEDICINE

## 2017-03-22 RX ORDER — PROPRANOLOL HYDROCHLORIDE 10 MG/1
TABLET ORAL
Qty: 270 TABLET | Refills: 1 | OUTPATIENT
Start: 2017-03-22

## 2017-03-22 RX ORDER — PROPRANOLOL HYDROCHLORIDE 10 MG/1
10 TABLET ORAL 3 TIMES DAILY PRN
Qty: 90 TABLET | Refills: 1 | Status: SHIPPED | OUTPATIENT
Start: 2017-03-22 | End: 2017-04-18 | Stop reason: SDDI

## 2017-03-22 NOTE — PROGRESS NOTES
CHIEF COMPLAINT: Annual exam and follow up of pancreatitis, hypertension, diabetes      HISTORY OF PRESENT ILLNESS: This is a 66-year-old man who presents for follow up of above. HE has had recurrent UTI since our last visit. He was admitted in December and had 2 subsequent ER visits, 1/19/17 and 2/27/17.  HE is seeing Dr Mills in Urology. Last saw him on 3/9/17. HE has a left kidney stone - and will have ESWL tomorrow.  No dysuria or hematuria. He is on Cipro 500 mg twice daily. No fever or chills. He has night sweats.    Every time he gets off antibiotics, the symptoms return. He has dysuria and urinary frequency and urgency then hematuria.     He has been doing well. He has not had any alcohol and has not had a recurrence of pancreatitis. No nausea, vomiting, constipation, diarrhea, melana, bloody stools.      He is taking amlodipine 10 mg once daily, Toprol-XL 25 mg once daily and losartan 25 mg daily. No chest pain, shortness of breath. He is now taking metformin  mg 2 tablets twice daily. Blood sugars have been higher with the infection. No polydipsia.  He has a history of hyperlipidemia, currently on Zocor 20 mg daily. No joint pain or muscle pain.       He continues to have one to two irregular heart beats when he climbs his steps in his house. Holter in January revealed PVC. No chest pain or shortness of breath. NO heart racing.       He has some low back pain and takes a hydrocodone apap 7.5/325 one tablet in the evening for his back. He sees Dr Mcdaniel for his back.     He has been anxious and depressed due to the UTI. He has had to stay near a bathroom> He has difficulty sleeping due to the urinary frequency. He has missed social engagements because of the urinary issues. NO homicidal or suicidal ideations.           PAST MEDICAL HISTORY:    1. Hypertension.    2. Coronary artery disease with left heart catheterization in November 2011 with 40 to 50% narrowing in the mid LAD.    3. Diabetes  "mellitus.    4. Hyperlipidemia.    5. History of reflux.    6. Colon polyps.    7. History of cluster headaches.    8. History of glaucoma.    9. Lumbar disk disease at L5 to S1.    10. History of stomach ulcers.    11. Nephrolithiasis.       PAST SURGICAL HISTORY: Cholecystectomy in May 2013, ORIF of the left humerus.       SOCIAL HISTORY: Currently smokes a pack of cigarettes daily, has smoked his whole life. 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 He is a retired schoolteacher.    FAMILY HISTORY: Updated on [facility].       PHYSICAL EXAMINATION:    /60 (BP Location: Right arm, Patient Position: Sitting, BP Method: Manual)  Pulse 91  Ht 5' 9" (1.753 m)  Wt 88 kg (194 lb 0.1 oz)  BMI 28.65 kg/m2    GENERAL: He is alert, oriented, no apparent distress. Affect within normal limits.    HEENT: Conjunctivae anicteric. Tympanic membranes clear.  . .    NECK: Supple. No cervical lymphadenopathy, no thyroid enlargement.    Respiratory: Effort normal. Lungs are clear to auscultation.    HEART: Regular rate and rhythm without murmurs, gallops or rubs. No lower extremity edema.    ABDOMEN: soft, non distended, non tender, bowel sounds present, no hepatosplenomgaly              3/15/17 - CBC, CMP. Lipids, TSH acceptable. Hemoglobin A1C 7.3  PSA 3.2      ASSESSMENT AND PLAN:      Annual exam - discussed diet, exercise and safety issues.    Colonoscopy 12/7/16 - one hyperplastic polyp due in 2021  Influenza 9/7/16  Pneumovax 7/2014  Zostavax 5/16  Prevnar today 12/14/16  PSA 1/16    Other medical problems discussed at this visit. Billing will be separate and based on time. Spent 15 minutes in counseling regarding these medical problems.        1. Recurrent UTI - to have ESWL tomorrow  2. Elevated PSA due to recurrent UTI - will recheck  3. Hypertension - Continue current medications  3. Diabetes mellitus. Doing well.  Sugars are higher due to infection  4. Hyperlipidemia. on Zocor.    5. History of " reflux and peptic ulcer disease -asx. Off pantoprazole  6. Tubular adenomas - colonoscopy  12/2016 - one polyp - due 2021  7. History of glaucoma - not on treatment - pressure in eyes ok - followed [facility]  8. History of nephrolithiasis -asymptomatic.    9. Irregular heart beat sensation - holter revealed PVC  10. Pancreatitis - resolved  11. 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 Anxiety and depression - counseling given. Suspect should improved when infection clears up. Propranolol 10 mg tid prn anxiety  I will see him back in 4 months, sooner if problems arise.

## 2017-03-23 ENCOUNTER — SURGERY (OUTPATIENT)
Age: 67
End: 2017-03-23

## 2017-03-23 ENCOUNTER — ANESTHESIA (OUTPATIENT)
Dept: SURGERY | Facility: OTHER | Age: 67
End: 2017-03-23
Payer: MEDICARE

## 2017-03-23 ENCOUNTER — HOSPITAL ENCOUNTER (OUTPATIENT)
Facility: OTHER | Age: 67
Discharge: HOME | End: 2017-03-23
Attending: UROLOGY | Admitting: UROLOGY
Payer: MEDICARE

## 2017-03-23 VITALS
OXYGEN SATURATION: 96 % | DIASTOLIC BLOOD PRESSURE: 77 MMHG | HEIGHT: 69 IN | RESPIRATION RATE: 18 BRPM | HEART RATE: 60 BPM | WEIGHT: 194 LBS | BODY MASS INDEX: 28.73 KG/M2 | TEMPERATURE: 98 F | SYSTOLIC BLOOD PRESSURE: 173 MMHG

## 2017-03-23 DIAGNOSIS — N20.0 NEPHROLITHIASIS: ICD-10-CM

## 2017-03-23 LAB
BACTERIA UR CULT: NO GROWTH
POCT GLUCOSE: 124 MG/DL (ref 70–110)

## 2017-03-23 PROCEDURE — 37000008 HC ANESTHESIA 1ST 15 MINUTES: Performed by: UROLOGY

## 2017-03-23 PROCEDURE — 36000704 HC OR TIME LEV I 1ST 15 MIN: Performed by: UROLOGY

## 2017-03-23 PROCEDURE — 36000705 HC OR TIME LEV I EA ADD 15 MIN: Performed by: UROLOGY

## 2017-03-23 PROCEDURE — 63600175 PHARM REV CODE 636 W HCPCS: Performed by: ANESTHESIOLOGY

## 2017-03-23 PROCEDURE — 25000242 PHARM REV CODE 250 ALT 637 W/ HCPCS: Performed by: ANESTHESIOLOGY

## 2017-03-23 PROCEDURE — 71000015 HC POSTOP RECOV 1ST HR: Performed by: UROLOGY

## 2017-03-23 PROCEDURE — 71000033 HC RECOVERY, INTIAL HOUR: Performed by: UROLOGY

## 2017-03-23 PROCEDURE — 94640 AIRWAY INHALATION TREATMENT: CPT

## 2017-03-23 PROCEDURE — 25000003 PHARM REV CODE 250: Performed by: NURSE ANESTHETIST, CERTIFIED REGISTERED

## 2017-03-23 PROCEDURE — 63600175 PHARM REV CODE 636 W HCPCS: Performed by: NURSE ANESTHETIST, CERTIFIED REGISTERED

## 2017-03-23 PROCEDURE — 37000009 HC ANESTHESIA EA ADD 15 MINS: Performed by: UROLOGY

## 2017-03-23 PROCEDURE — 71000016 HC POSTOP RECOV ADDL HR: Performed by: UROLOGY

## 2017-03-23 PROCEDURE — 25000003 PHARM REV CODE 250: Performed by: ANESTHESIOLOGY

## 2017-03-23 PROCEDURE — 50590 FRAGMENTING OF KIDNEY STONE: CPT | Mod: LT,,, | Performed by: UROLOGY

## 2017-03-23 RX ORDER — FENTANYL CITRATE 50 UG/ML
25 INJECTION, SOLUTION INTRAMUSCULAR; INTRAVENOUS EVERY 5 MIN PRN
Status: DISCONTINUED | OUTPATIENT
Start: 2017-03-23 | End: 2017-03-23 | Stop reason: HOSPADM

## 2017-03-23 RX ORDER — SODIUM CHLORIDE 0.9 % (FLUSH) 0.9 %
3 SYRINGE (ML) INJECTION
Status: DISCONTINUED | OUTPATIENT
Start: 2017-03-23 | End: 2017-03-23 | Stop reason: HOSPADM

## 2017-03-23 RX ORDER — PHENAZOPYRIDINE HYDROCHLORIDE 200 MG/1
200 TABLET, FILM COATED ORAL ONCE AS NEEDED
Status: DISCONTINUED | OUTPATIENT
Start: 2017-03-23 | End: 2017-03-23 | Stop reason: HOSPADM

## 2017-03-23 RX ORDER — LIDOCAINE HCL/PF 100 MG/5ML
SYRINGE (ML) INTRAVENOUS
Status: DISCONTINUED | OUTPATIENT
Start: 2017-03-23 | End: 2017-03-23

## 2017-03-23 RX ORDER — PROPOFOL 10 MG/ML
VIAL (ML) INTRAVENOUS
Status: DISCONTINUED | OUTPATIENT
Start: 2017-03-23 | End: 2017-03-23

## 2017-03-23 RX ORDER — GLYCOPYRROLATE 0.2 MG/ML
INJECTION INTRAMUSCULAR; INTRAVENOUS
Status: DISCONTINUED | OUTPATIENT
Start: 2017-03-23 | End: 2017-03-23

## 2017-03-23 RX ORDER — HYDROCODONE BITARTRATE AND ACETAMINOPHEN 5; 325 MG/1; MG/1
1 TABLET ORAL EVERY 6 HOURS PRN
Qty: 10 TABLET | Refills: 0 | Status: SHIPPED | OUTPATIENT
Start: 2017-03-23 | End: 2017-07-24

## 2017-03-23 RX ORDER — LIDOCAINE HYDROCHLORIDE 10 MG/ML
1 INJECTION, SOLUTION EPIDURAL; INFILTRATION; INTRACAUDAL; PERINEURAL ONCE
Status: DISCONTINUED | OUTPATIENT
Start: 2017-03-23 | End: 2017-03-23 | Stop reason: HOSPADM

## 2017-03-23 RX ORDER — ACETAMINOPHEN 10 MG/ML
INJECTION, SOLUTION INTRAVENOUS
Status: DISCONTINUED | OUTPATIENT
Start: 2017-03-23 | End: 2017-03-23

## 2017-03-23 RX ORDER — OXYCODONE HYDROCHLORIDE 5 MG/1
5 TABLET ORAL
Status: DISCONTINUED | OUTPATIENT
Start: 2017-03-23 | End: 2017-03-23 | Stop reason: HOSPADM

## 2017-03-23 RX ORDER — MEPERIDINE HYDROCHLORIDE 50 MG/ML
12.5 INJECTION INTRAMUSCULAR; INTRAVENOUS; SUBCUTANEOUS ONCE AS NEEDED
Status: DISCONTINUED | OUTPATIENT
Start: 2017-03-23 | End: 2017-03-23 | Stop reason: HOSPADM

## 2017-03-23 RX ORDER — SODIUM CHLORIDE 9 MG/ML
INJECTION, SOLUTION INTRAVENOUS CONTINUOUS
Status: DISCONTINUED | OUTPATIENT
Start: 2017-03-23 | End: 2017-03-23 | Stop reason: HOSPADM

## 2017-03-23 RX ORDER — ONDANSETRON 2 MG/ML
4 INJECTION INTRAMUSCULAR; INTRAVENOUS ONCE AS NEEDED
Status: DISCONTINUED | OUTPATIENT
Start: 2017-03-23 | End: 2017-03-23 | Stop reason: HOSPADM

## 2017-03-23 RX ORDER — HYDROMORPHONE HYDROCHLORIDE 2 MG/ML
1 INJECTION, SOLUTION INTRAMUSCULAR; INTRAVENOUS; SUBCUTANEOUS EVERY 4 HOURS PRN
Status: DISCONTINUED | OUTPATIENT
Start: 2017-03-23 | End: 2017-03-23 | Stop reason: HOSPADM

## 2017-03-23 RX ORDER — HYDROCODONE BITARTRATE AND ACETAMINOPHEN 5; 325 MG/1; MG/1
1 TABLET ORAL EVERY 4 HOURS PRN
Status: DISCONTINUED | OUTPATIENT
Start: 2017-03-23 | End: 2017-03-23 | Stop reason: HOSPADM

## 2017-03-23 RX ORDER — HYDROMORPHONE HYDROCHLORIDE 2 MG/ML
0.4 INJECTION, SOLUTION INTRAMUSCULAR; INTRAVENOUS; SUBCUTANEOUS EVERY 5 MIN PRN
Status: DISCONTINUED | OUTPATIENT
Start: 2017-03-23 | End: 2017-03-23 | Stop reason: HOSPADM

## 2017-03-23 RX ORDER — ATROPA BELLADONNA AND OPIUM 16.2; 6 MG/1; MG/1
60 SUPPOSITORY RECTAL ONCE AS NEEDED
Status: DISCONTINUED | OUTPATIENT
Start: 2017-03-23 | End: 2017-03-23 | Stop reason: HOSPADM

## 2017-03-23 RX ORDER — MIDAZOLAM HYDROCHLORIDE 1 MG/ML
INJECTION INTRAMUSCULAR; INTRAVENOUS
Status: DISCONTINUED | OUTPATIENT
Start: 2017-03-23 | End: 2017-03-23

## 2017-03-23 RX ORDER — FENTANYL CITRATE 50 UG/ML
INJECTION, SOLUTION INTRAMUSCULAR; INTRAVENOUS
Status: DISCONTINUED | OUTPATIENT
Start: 2017-03-23 | End: 2017-03-23

## 2017-03-23 RX ORDER — ONDANSETRON HYDROCHLORIDE 2 MG/ML
INJECTION, SOLUTION INTRAMUSCULAR; INTRAVENOUS
Status: DISCONTINUED | OUTPATIENT
Start: 2017-03-23 | End: 2017-03-23

## 2017-03-23 RX ORDER — ONDANSETRON 2 MG/ML
4 INJECTION INTRAMUSCULAR; INTRAVENOUS EVERY 12 HOURS PRN
Status: DISCONTINUED | OUTPATIENT
Start: 2017-03-23 | End: 2017-03-23 | Stop reason: HOSPADM

## 2017-03-23 RX ORDER — SODIUM CHLORIDE, SODIUM LACTATE, POTASSIUM CHLORIDE, CALCIUM CHLORIDE 600; 310; 30; 20 MG/100ML; MG/100ML; MG/100ML; MG/100ML
INJECTION, SOLUTION INTRAVENOUS CONTINUOUS
Status: DISCONTINUED | OUTPATIENT
Start: 2017-03-23 | End: 2017-03-23 | Stop reason: HOSPADM

## 2017-03-23 RX ORDER — OXYCODONE HYDROCHLORIDE 5 MG/1
5 TABLET ORAL ONCE AS NEEDED
Status: DISCONTINUED | OUTPATIENT
Start: 2017-03-23 | End: 2017-03-23 | Stop reason: HOSPADM

## 2017-03-23 RX ORDER — ALBUTEROL SULFATE 0.83 MG/ML
2.5 SOLUTION RESPIRATORY (INHALATION) EVERY 4 HOURS PRN
Status: DISCONTINUED | OUTPATIENT
Start: 2017-03-23 | End: 2017-03-23 | Stop reason: HOSPADM

## 2017-03-23 RX ADMIN — ACETAMINOPHEN 1000 MG: 10 INJECTION, SOLUTION INTRAVENOUS at 02:03

## 2017-03-23 RX ADMIN — ALBUTEROL SULFATE 2.5 MG: 2.5 SOLUTION RESPIRATORY (INHALATION) at 02:03

## 2017-03-23 RX ADMIN — GLYCOPYRROLATE 0.2 MG: 0.2 INJECTION, SOLUTION INTRAMUSCULAR; INTRAVENOUS at 02:03

## 2017-03-23 RX ADMIN — PROPOFOL 150 MG: 10 INJECTION, EMULSION INTRAVENOUS at 02:03

## 2017-03-23 RX ADMIN — FENTANYL CITRATE 100 MCG: 50 INJECTION, SOLUTION INTRAMUSCULAR; INTRAVENOUS at 02:03

## 2017-03-23 RX ADMIN — PROPOFOL 100 MG: 10 INJECTION, EMULSION INTRAVENOUS at 02:03

## 2017-03-23 RX ADMIN — SODIUM CHLORIDE, SODIUM LACTATE, POTASSIUM CHLORIDE, AND CALCIUM CHLORIDE: 600; 310; 30; 20 INJECTION, SOLUTION INTRAVENOUS at 02:03

## 2017-03-23 RX ADMIN — ONDANSETRON 4 MG: 2 INJECTION, SOLUTION INTRAMUSCULAR; INTRAVENOUS at 02:03

## 2017-03-23 RX ADMIN — LIDOCAINE HYDROCHLORIDE 100 MG: 20 INJECTION, SOLUTION INTRAVENOUS at 02:03

## 2017-03-23 RX ADMIN — MIDAZOLAM HYDROCHLORIDE 2 MG: 1 INJECTION, SOLUTION INTRAMUSCULAR; INTRAVENOUS at 02:03

## 2017-03-23 RX ADMIN — CARBOXYMETHYLCELLULOSE SODIUM 2 DROP: 2.5 SOLUTION/ DROPS OPHTHALMIC at 02:03

## 2017-03-23 NOTE — ANESTHESIA POSTPROCEDURE EVALUATION
"Anesthesia Post Evaluation    Patient: Tony Han    Procedure(s) Performed: Procedure(s) (LRB):  LITHOTRIPSY-EXTRACORPOREAL SHOCK WAVE (Left)    Final Anesthesia Type: general  Patient location during evaluation: PACU  Patient participation: Yes- Able to Participate  Level of consciousness: awake and alert  Post-procedure vital signs: reviewed and stable  Pain management: adequate  Airway patency: patent  PONV status at discharge: No PONV  Anesthetic complications: no      Cardiovascular status: hemodynamically stable  Respiratory status: unassisted  Hydration status: euvolemic  Follow-up not needed.        Visit Vitals    BP (!) 145/73    Pulse 70    Temp 36.6 °C (97.9 °F)    Resp 16    Ht 5' 9" (1.753 m)    Wt 88 kg (194 lb 0.1 oz)    SpO2 100%    BMI 28.65 kg/m2       Pain/Laura Score: Pain Assessment Performed: Yes (3/23/2017  4:00 PM)  Presence of Pain: denies (3/23/2017  4:00 PM)  Laura Score: 10 (3/23/2017  4:00 PM)      "

## 2017-03-23 NOTE — BRIEF OP NOTE
[facility]  Brief Operative Note     SUMMARY     Surgery Date: 3/23/2017     Surgeon(s) and Role:     * Mehdi Dexter MD - Primary    Assisting Surgeon: None    Pre-op Diagnosis:  Nephrolithiasis [N20.0]    Post-op Diagnosis:  Post-Op Diagnosis Codes:     * Nephrolithiasis [N20.0]    Procedure(s) (LRB):  LITHOTRIPSY-EXTRACORPOREAL SHOCK WAVE (Left)    Anesthesia: General    Description of the findings of the procedure: Stone in left kidney - dissipated well on fluoro imaging.    Estimated Blood Loss: 0cc         Specimens:   Specimen     None          Discharge Note    SUMMARY     Admit Date: 3/23/2017    Discharge Date and Time: 3/23/2017    Hospital Course: Patient was admitted for elective outpatient procedure, which was uncomplicated and well tolerated.      Final Diagnosis: Post-Op Diagnosis Codes:     * Nephrolithiasis [N20.0]    Disposition: Home or Self Care    Follow Up/Patient Instructions:     Medications:  Reconciled Home Medications: Current Discharge Medication List      START taking these medications    Details   !! hydrocodone-acetaminophen 5-325mg (NORCO) 5-325 mg per tablet Take 1 tablet by mouth every 6 (six) hours as needed for Pain.  Qty: 10 tablet, Refills: 0       !! - Potential duplicate medications found. Please discuss with provider.      CONTINUE these medications which have NOT CHANGED    Details   amlodipine (NORVASC) 10 MG tablet Take 1 tablet (10 mg total) by mouth once daily.  Qty: 90 tablet, Refills: 4      ciprofloxacin HCl (CIPRO) 500 MG tablet Take 1 tablet (500 mg total) by mouth every 12 (twelve) hours.  Qty: 60 tablet, Refills: 0    Associated Diagnoses: Nephrolithiasis      finasteride (PROSCAR) 5 mg tablet Take 1 tablet (5 mg total) by mouth once daily.  Qty: 30 tablet, Refills: 11    Associated Diagnoses: Acute prostatitis; Acute cystitis without hematuria      losartan (COZAAR) 25 MG tablet TAKE 1 TABLET BY MOUTH ONCE DAILY  Qty: 90 tablet, Refills: 4       metformin (GLUCOPHAGE-XR) 500 MG 24 hr tablet Take 2 tablets (1,000 mg total) by mouth 2 (two) times daily.  Qty: 360 tablet, Refills: 4    Comments: **Patient requests 90 days supply**      metoprolol succinate (TOPROL-XL) 25 MG 24 hr tablet Take 1 tablet (25 mg total) by mouth once daily.  Qty: 90 tablet, Refills: 4      simvastatin (ZOCOR) 20 MG tablet Take 1 tablet (20 mg total) by mouth every evening.  Qty: 90 tablet, Refills: 4      tamsulosin (FLOMAX) 0.4 mg Cp24 Take 1 capsule (0.4 mg total) by mouth once daily.  Qty: 30 capsule, Refills: 11    Associated Diagnoses: Acute prostatitis      aspirin 81 MG Chew Take 1 tablet (81 mg total) by mouth once daily.  Qty: 180 tablet, Refills: 4      blood sugar diagnostic (CONTOUR TEST STRIPS) Strp CHECK BLOOD SUGAR ONCE A DAY  Qty: 100 strip, Refills: 11      !! hydrocodone-acetaminophen 5-325mg (NORCO) 5-325 mg per tablet TK 1 T PO  Q 6 H  Refills: 0      lancets Misc Malu Contour lancets - use one lancet once daily  Qty: 100 each, Refills: 11      MULTIVITS-MINERALS/FA/LYCOPENE (ONE-A-DAY MEN'S ORAL) Take 1 tablet by mouth once daily.      propranolol (INDERAL) 10 MG tablet Take 1 tablet (10 mg total) by mouth 3 (three) times daily as needed (anxiety).  Qty: 90 tablet, Refills: 1      vitamin D 1000 units Tab Take 1,000 Units by mouth once daily. Take 1 tablet daily       !! - Potential duplicate medications found. Please discuss with provider.          Discharge Procedure Orders  Diet general     Activity as tolerated       Follow-up Information     Follow up with Mehdi Dexter MD In 4 weeks.    Specialty:  Urology    Why:  For post-operative follow-up (go to radiology prior to appointment for x-ray)    Contact information:    [address] [id number]  [facility] [address] [phone]

## 2017-03-23 NOTE — TRANSFER OF CARE
"Anesthesia Transfer of Care Note    Patient: Tony Han    Procedure(s) Performed: Procedure(s) (LRB):  LITHOTRIPSY-EXTRACORPOREAL SHOCK WAVE (Left)    Patient location: PACU    Anesthesia Type: general    Transport from OR: Transported from OR on 2-3 L/min O2 by NC with adequate spontaneous ventilation    Post pain: adequate analgesia    Post assessment: no apparent anesthetic complications    Post vital signs: stable    Level of consciousness: awake and alert    Nausea/Vomiting: no nausea/vomiting    Complications: none          Last vitals:   Visit Vitals    Pulse 80    Resp 18    Ht 5' 9" (1.753 m)    Wt 88 kg (194 lb 0.1 oz)    SpO2 98%    BMI 28.65 kg/m2     "

## 2017-03-23 NOTE — H&P (VIEW-ONLY)
Subjective:      Tony Han is a 66 y.o. male who returns today regarding his recurrent UTI.    He has had near constant UTI since December with multiple courses of abx. Just finished most recent abx 2 days ago and feels symptoms are returning. He had workup last month with primarily finding of left renal stone. He opted against surgery at that time but given persistence of UTI now wishes to proceed.    Past Medical History:   Diagnosis Date    Cluster headaches     1999    Colon polyps     5 polyps in Dec 2010    Coronary artery disease 11/2011    moderate nonobstructive cad on left heart cath at [facility] 11/2011    Diabetes mellitus     GERD (gastroesophageal reflux disease)     history of peptic ulcer disease    Hyperlipidemia     Hypertension     Lumbar disc disease     MRI 2008 - L5-S1    Nephrolithiasis     July 2013       Past Surgical History:   Procedure Laterality Date    CHOLECYSTECTOMY      COLONOSCOPY N/A 12/7/2016    Procedure: COLONOSCOPY;  Surgeon: Lance Crocker MD;  Location: [address]);  Service: Endoscopy;  Laterality: N/A;    ORIF left humerus         Social History     Social History    Marital status:      Spouse name: N/A    Number of children: N/A    Years of education: N/A     Occupational History    Not on file.     Social History Main Topics    Smoking status: Current Every Day Smoker     Packs/day: 1.00    Smokeless tobacco: Never Used      Comment: 1 pack whole life    Alcohol use Yes      Comment: special occassions    Drug use: No    Sexual activity: Yes     Partners: Female     Birth control/ protection: None     Other Topics Concern    Not on file     Social History Narrative    , 5 children - healthy.  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       Family History   Problem Relation Age of Onset    Celiac disease Neg Hx     Cirrhosis Neg Hx     Colon cancer Neg Hx     Colon polyps Neg Hx     Crohn's disease  Neg Hx     Esophageal cancer Neg Hx     Inflammatory bowel disease Neg Hx     Liver cancer Neg Hx     Rectal cancer Neg Hx     Stomach cancer Neg Hx     Ulcerative colitis Neg Hx        Review of patient's allergies indicates:   Allergen Reactions    Aspirin Nausea And Vomiting     If over 81mg       Current Outpatient Prescriptions on File Prior to Visit   Medication Sig Dispense Refill    amlodipine (NORVASC) 10 MG tablet Take 1 tablet (10 mg total) by mouth once daily. 90 tablet 4    aspirin 81 MG Chew Take 1 tablet (81 mg total) by mouth once daily. 180 tablet 4    blood sugar diagnostic (CONTOUR TEST STRIPS) Strp CHECK BLOOD SUGAR ONCE A  strip 11    finasteride (PROSCAR) 5 mg tablet Take 1 tablet (5 mg total) by mouth once daily. 30 tablet 11    hydrocodone-acetaminophen 5-325mg (NORCO) 5-325 mg per tablet TK 1 T PO  Q 6 H  0    lancets Misc Malu Contour lancets - use one lancet once daily 100 each 11    losartan (COZAAR) 25 MG tablet TAKE 1 TABLET BY MOUTH ONCE DAILY 90 tablet 4    metformin (GLUCOPHAGE-XR) 500 MG 24 hr tablet Take 2 tablets (1,000 mg total) by mouth 2 (two) times daily. 360 tablet 4    metoprolol succinate (TOPROL-XL) 25 MG 24 hr tablet Take 1 tablet (25 mg total) by mouth once daily. 90 tablet 4    MULTIVITS-MINERALS/FA/LYCOPENE (ONE-A-DAY MEN'S ORAL) Take 1 tablet by mouth once daily.      simvastatin (ZOCOR) 20 MG tablet Take 1 tablet (20 mg total) by mouth every evening. 90 tablet 4    tamsulosin (FLOMAX) 0.4 mg Cp24 Take 1 capsule (0.4 mg total) by mouth once daily. 30 capsule 11    vitamin D 1000 units Tab Take 1,000 Units by mouth once daily. Take 1 tablet daily      [DISCONTINUED] hydrocodone-acetaminophen 7.5-325mg (NORCO) 7.5-325 mg per tablet Take 1 tablet by mouth every 4 to 6 hours as needed for Pain.        No current facility-administered medications on file prior to visit.       Review of Systems  A comprehensive multipoint review of systems was  "negative except as otherwise stated in the HPI.     Objective:   Vitals: /74  Pulse 88  Ht 5' 9" (1.753 m)  Wt 87.5 kg (193 lb)  BMI 28.5 kg/m2    Physical Exam   General: alert and oriented, no acute distress  Respiratory: Symmetric expansion, non-labored breathing  Neuro: no gross deficits  Psych: normal judgment and insight, normal mood/affect and non-anxious    Lab Review   Urinalysis demonstrates positive for leukocytes, red blood cells  Lab Results   Component Value Date    WBC 16.26 (H) 01/19/2017    HGB 12.3 (L) 01/19/2017    HCT 37.2 (L) 01/19/2017    MCV 96 01/19/2017     01/19/2017     Lab Results   Component Value Date    CREATININE 0.9 01/19/2017    BUN 11 01/19/2017     Lab Results   Component Value Date    PSA 0.82 01/06/2016         Assessment and Plan:   1. Recurrent UTI  - Stone management as per below    2. Acute cystitis without hematuria  - Start cipro now and continue through surgery  - Urine culture    3. Nephrolithiasis  - KUB today  - Recommend ESWL in 2 weeks (pending KUB); also discussed possible need for URS if stone not visible on KUB  - He agrees with above    "

## 2017-03-23 NOTE — PLAN OF CARE
Tony Han has met all discharge criteria from Phase II. Vital Signs are stable, ambulating  without difficulty. Discharge instructions given, patient verbalized understanding. Discharged from facility via wheelchair in stable condition.

## 2017-03-23 NOTE — DISCHARGE INSTRUCTIONS
Anesthesia: After Your Surgery  Youve just had surgery. During surgery, you received medication called anesthesia to keep you comfortable and pain-free. After surgery, you may experience some pain or nausea. This is common. Here are some tips for feeling better and recovering after surgery.    Going home  Your doctor or nurse will show you how to take care of yourself when you go home. He or she will also answer your questions. Have an adult family member or friend drive you home. For the first 24 hours after your surgery:  · Do not drive or use heavy equipment.  · Do not make important decisions or sign legal documents.  · Avoid alcohol.  · Have someone stay with you, if needed. He or she can watch for problems and help keep you safe.  Be sure to keep all follow-up appointments with your doctor. And rest after your procedure for as long as your doctor tells you to.    Coping with pain  If you have pain after surgery, pain medication will help you feel better. Take it as directed, before pain becomes severe. Also, ask your doctor or pharmacist about other ways to control pain, such as with heat, ice, and relaxation. And follow any other instructions your surgeon or nurse gives you.    Tips for taking pain medication  To get the best relief possible, remember these points:  · Pain medications can upset your stomach. Taking them with a little food may help.  · Most pain relievers taken by mouth need at least 20 to 30 minutes to take effect.  · Taking medication on a schedule can help you remember to take it. Try to time your medication so that you can take it before beginning an activity, such as dressing, walking, or sitting down for dinner.  · Constipation is a common side effect of pain medications. Contact your doctor before taking any medications like laxatives or stool softeners to help relieve constipation. Also ask about any dietary restrictions, because drinking lots of fluids and eating foods like fruits  and vegetables that are high in fiber can also help. Remember, dont take laxatives unless your surgeon has prescribed them.  · Mixing alcohol and pain medication can cause dizziness and slow your breathing. It can even be fatal. Dont drink alcohol while taking pain medication.  · Pain medication can slow your reflexes. Dont drive or operate machinery while taking pain medication.  If your health care provider tells you to take acetaminophen to help relieve your pain, ask him or her how much you are supposed to take each day. (Acetaminophen is the generic name for Tylenol and other brand-name pain relievers.) Acetaminophen or other pain relievers may interact with your prescription medicines or other over-the-counter (OTC) drugs. Some prescription medications contain acetaminophen along with other active ingredients. Using both prescription and OTC acetaminophen for pain can cause you to overdose. The FDA recommends that you read the labels on your OTC medications carefully. This will help you to clearly understand the list of active ingredients, dosing instructions, and any warnings. It may also help you avoid taking too much acetaminophen. If you have questions or don't understand the information, ask your pharmacist or health care provider to explain it to you before you take the OTC medication.    Managing nausea  Some people have an upset stomach after surgery. This is often due to anesthesia, pain, pain medications, or the stress of surgery. The following tips will help you manage nausea and get good nutrition as you recover. If you were on a special diet before surgery, ask your doctor if you should follow it during recovery. These tips may help:  · Dont push yourself to eat. Your body will tell you when to eat and how much.  · Start off with clear liquids and soup. They are easier to digest.  · Progress to semi-solid foods (mashed potatoes, applesauce, and gelatin) as you feel ready.  · Slowly move to  solid foods. Dont eat fatty, rich, or spicy foods at first.  · Dont force yourself to have three large meals a day. Instead, eat smaller amounts more often.  · Take pain medications with a small amount of solid food, such as crackers or toast to avoid nausea.      Call your surgeon if  · You still have pain an hour after taking medication (it may not be strong enough).  · You feel too sleepy, dizzy, or groggy (medication may be too strong).  · You have side effects like nausea, vomiting, or skin changes (rash, itching, or hives).   © 3824-0046 [employer]. [address] [place] [address], [place] [address]. All rights reserved. This information is not intended as a substitute for professional medical care. Always follow your healthcare professional's instructions.          Shock Wave Lithotripsy  Passing a kidney stone can be very painful. Shock wave lithotripsy is a treatment that helps by breaking the kidney stone into smaller pieces that are easier to pass. This treatment is also called extracorporeal shock wave lithotripsy (ESWL). Lithotripsy takes about an hour. Its done in a hospital, lithotripsy center, or mobile lithotripsy van. You will likely go home the same day. This treatment is not used for all types of kidney stones. Your healthcare provider will discuss whether this is the right treatment for the type of stone you have.      Energy waves strike the stone, which begins to crack. The stone crumbles into tiny pieces.   During the procedure  · You get medicine to prevent pain and help you relax or sleep during lithotripsy. Once this takes effect, the procedure will start.  · A stent (flexible tube with holes in it) may be placed into your ureter (the tube that connects the kidney and the bladder). This helps keep urine flowing from the kidney.  · Your healthcare provider then uses X-ray or ultrasound to find the exact location of the kidney stone.  · Sound waves are aimed at the stone and sent  at high speed. If youre awake, you may feel a tapping as they pass through your body.  After the procedure  · Youll be monitored in a recovery room for about 1 hour to 3 hours. Antibiotics and pain medicine may be prescribed before you leave.  · Youll have a follow-up visit in a few weeks. If you received a stent, it will be removed. Your doctor will also check for pieces of stone. If large pieces remain, you may need a second lithotripsy or another procedure.  Possible risks and complications  · Infection  · Bleeding in the kidney  · Bruising of the kidney or skin  · Obstruction (blockage) of the ureter  · Failure to break up the stone (other procedures may be needed)   Passing the stone  It can take a day to several weeks for the pieces of stone to leave your body. Drink plenty of liquids to help flush your system. During this time:  · Your urine may be cloudy or slightly bloody. You may even see small pieces of stone.  · You may have a slight fever and some pain. Take prescribed or over-the-counter pain medication as instructed by your healthcare provider.  · You may be asked to strain your urine to collect some stone particles. These will be studied in the lab.  When to call your healthcare provider   Contact your healthcare provider right away if you have any of the following:  · Fever of 100.4°F (38°C) or higher, or as directed by your healthcare provider  · Heavy bleeding  · Pain that doesnt go away with medication  · Upset stomach and vomiting  · Problems urinating   Date Last Reviewed: 11/26/2014  © 0258-7735 [employer]. [address] [place] [address], [place] [address]. All rights reserved. This information is not intended as a substitute for professional medical care. Always follow your healthcare professional's instructions.

## 2017-03-23 NOTE — IP AVS SNAPSHOT
[facility] Location ([facility])  [address] [place] [address]  Phone: [phone]           Patient Discharge Instructions     Our goal is to set you up for success. This packet includes information on your condition, medications, and your home care. It will help you to care for yourself so you don't get sicker and need to go back to the hospital.     Please ask your nurse if you have any questions.        There are many details to remember when preparing to leave the hospital. Here is what you will need to do:    1. Take your medicine. If you are prescribed medications, review your Medication List in the following pages. You may have new medications to  at the pharmacy and others that you'll need to stop taking. Review the instructions for how and when to take your medications. Talk with your doctor or nurses if you are unsure of what to do.     2. Go to your follow-up appointments. Specific follow-up information is listed in the following pages. Your may be contacted by a transition nurse or clinical provider about future appointments. Be sure we have all of the phone numbers to reach you, if needed. Please contact your provider's office if you are unable to make an appointment.     3. Watch for warning signs. Your doctor or nurse will give you detailed warning signs to watch for and when to call for assistance. These instructions may also include educational information about your condition. If you experience any of warning signs to your health, call your doctor.               ** Verify the list of medication(s) below is accurate and up to date. Carry this with you in case of emergency. If your medications have changed, please notify your healthcare provider.             Medication List      CHANGE how you take these medications        Additional Info                      * hydrocodone-acetaminophen 5-325mg 5-325 mg per tablet   Commonly known as:  NORCO   Refills:  0   What changed:   Another medication with the same name was added. Make sure you understand how and when to take each.    Instructions:  TK 1 T PO  Q 6 H     Begin Date    AM    Noon    PM    Bedtime       * hydrocodone-acetaminophen 5-325mg 5-325 mg per tablet   Commonly known as:  NORCO   Quantity:  10 tablet   Refills:  0   Dose:  1 tablet   What changed:  You were already taking a medication with the same name, and this prescription was added. Make sure you understand how and when to take each.    Instructions:  Take 1 tablet by mouth every 6 (six) hours as needed for Pain.     Begin Date    AM    Noon    PM    Bedtime       * Notice:  This list has 2 medication(s) that are the same as other medications prescribed for you. Read the directions carefully, and ask your doctor or other care provider to review them with you.      CONTINUE taking these medications        Additional Info                      amlodipine 10 MG tablet   Commonly known as:  NORVASC   Quantity:  90 tablet   Refills:  4   Dose:  10 mg    Instructions:  Take 1 tablet (10 mg total) by mouth once daily.     Begin Date    AM    Noon    PM    Bedtime       aspirin 81 MG Chew   Quantity:  180 tablet   Refills:  4   Dose:  81 mg    Instructions:  Take 1 tablet (81 mg total) by mouth once daily.     Begin Date    AM    Noon    PM    Bedtime       blood sugar diagnostic Strp   Commonly known as:  CONTOUR TEST STRIPS   Quantity:  100 strip   Refills:  11    Instructions:  CHECK BLOOD SUGAR ONCE A DAY     Begin Date    AM    Noon    PM    Bedtime       ciprofloxacin HCl 500 MG tablet   Commonly known as:  CIPRO   Quantity:  60 tablet   Refills:  0   Dose:  500 mg    Instructions:  Take 1 tablet (500 mg total) by mouth every 12 (twelve) hours.     Begin Date    AM    Noon    PM    Bedtime       finasteride 5 mg tablet   Commonly known as:  PROSCAR   Quantity:  30 tablet   Refills:  11   Dose:  5 mg    Instructions:  Take 1 tablet (5 mg total) by mouth once daily.      Begin Date    AM    Noon    PM    Bedtime       lancets Misc   Quantity:  100 each   Refills:  11    Instructions:  Malu Contour lancets - use one lancet once daily     Begin Date    AM    Noon    PM    Bedtime       losartan 25 MG tablet   Commonly known as:  COZAAR   Quantity:  90 tablet   Refills:  4    Instructions:  TAKE 1 TABLET BY MOUTH ONCE DAILY     Begin Date    AM    Noon    PM    Bedtime       metformin 500 MG 24 hr tablet   Commonly known as:  GLUCOPHAGE-XR   Quantity:  360 tablet   Refills:  4   Dose:  1000 mg   Comments:  **Patient requests 90 days supply**    Instructions:  Take 2 tablets (1,000 mg total) by mouth 2 (two) times daily.     Begin Date    AM    Noon    PM    Bedtime       metoprolol succinate 25 MG 24 hr tablet   Commonly known as:  TOPROL-XL   Quantity:  90 tablet   Refills:  4   Dose:  25 mg    Instructions:  Take 1 tablet (25 mg total) by mouth once daily.     Begin Date    AM    Noon    PM    Bedtime       ONE-A-DAY MEN'S ORAL   Refills:  0   Dose:  1 tablet    Instructions:  Take 1 tablet by mouth once daily.     Begin Date    AM    Noon    PM    Bedtime       propranolol 10 MG tablet   Commonly known as:  INDERAL   Quantity:  90 tablet   Refills:  1   Dose:  10 mg    Instructions:  Take 1 tablet (10 mg total) by mouth 3 (three) times daily as needed (anxiety).     Begin Date    AM    Noon    PM    Bedtime       simvastatin 20 MG tablet   Commonly known as:  ZOCOR   Quantity:  90 tablet   Refills:  4   Dose:  20 mg    Instructions:  Take 1 tablet (20 mg total) by mouth every evening.     Begin Date    AM    Noon    PM    Bedtime       tamsulosin 0.4 mg Cp24   Commonly known as:  FLOMAX   Quantity:  30 capsule   Refills:  11   Dose:  0.4 mg    Instructions:  Take 1 capsule (0.4 mg total) by mouth once daily.     Begin Date    AM    Noon    PM    Bedtime       vitamin D 1000 units Tab   Refills:  0   Dose:  1000 Units    Instructions:  Take 1,000 Units by mouth once daily. Take 1  tablet daily     Begin Date    AM    Noon    PM    Bedtime            Where to Get Your Medications      These medications were sent to [facility] [address] [place], [place] - [address] [facility] [address]  [address] [place] [address]    Hours:  24-hours Phone:  [phone]     hydrocodone-acetaminophen 5-325mg 5-325 mg per tablet                  Please bring to all follow up appointments:    1. A copy of your discharge instructions.  2. All medicines you are currently taking in their original bottles.  3. Identification and insurance card.    Please arrive 15 minutes ahead of scheduled appointment time.    Please call 24 hours in advance if you must reschedule your appointment and/or time.        Your Scheduled Appointments     May 23, 2017 10:30 AM CDT   Established Patient Visit with Mehdi Dexter MD   [facility]    [address] [place] [id number] [phone]            Jul 19, 2017  9:00 AM CDT   Fasting Lab with LAB, APPOINTMENT [facility] ([facility])    [address] [place] [id number]   [phone]            Jul 26, 2017 11:00 AM CDT   Established Patient Visit with Elizabeth Benitez MD   [facility] - Internal Medicine ([facility])    [address] [place] [id number]   [phone]              Follow-up Information     Follow up with Mehdi Dexter MD In 4 weeks.    Specialty:  Urology    Why:  For post-operative follow-up (go to radiology prior to appointment for x-ray)    Contact information:    [address] [place] [id number]  [phone]          Discharge Instructions     Future Orders    Activity as tolerated     Diet general     Questions:    Total calories:      Fat restriction, if any:      Protein restriction, if any:      Na restriction, if any:      Fluid restriction:       Additional restrictions:          Discharge Instructions         Anesthesia: After Your Surgery  Youve just had surgery. During surgery, you received medication called anesthesia to keep you comfortable and pain-free. After surgery, you may experience some pain or nausea. This is common. Here are some tips for feeling better and recovering after surgery.    Going home  Your doctor or nurse will show you how to take care of yourself when you go home. He or she will also answer your questions. Have an adult family member or friend drive you home. For the first 24 hours after your surgery:  · Do not drive or use heavy equipment.  · Do not make important decisions or sign legal documents.  · Avoid alcohol.  · Have someone stay with you, if needed. He or she can watch for problems and help keep you safe.  Be sure to keep all follow-up appointments with your doctor. And rest after your procedure for as long as your doctor tells you to.    Coping with pain  If you have pain after surgery, pain medication will help you feel better. Take it as directed, before pain becomes severe. Also, ask your doctor or pharmacist about other ways to control pain, such as with heat, ice, and relaxation. And follow any other instructions your surgeon or nurse gives you.    Tips for taking pain medication  To get the best relief possible, remember these points:  · Pain medications can upset your stomach. Taking them with a little food may help.  · Most pain relievers taken by mouth need at least 20 to 30 minutes to take effect.  · Taking medication on a schedule can help you remember to take it. Try to time your medication so that you can take it before beginning an activity, such as dressing, walking, or sitting down for dinner.  · Constipation is a common side effect of pain medications. Contact your doctor before taking any medications like laxatives or stool softeners to help relieve constipation. Also ask about any dietary  restrictions, because drinking lots of fluids and eating foods like fruits and vegetables that are high in fiber can also help. Remember, dont take laxatives unless your surgeon has prescribed them.  · Mixing alcohol and pain medication can cause dizziness and slow your breathing. It can even be fatal. Dont drink alcohol while taking pain medication.  · Pain medication can slow your reflexes. Dont drive or operate machinery while taking pain medication.  If your health care provider tells you to take acetaminophen to help relieve your pain, ask him or her how much you are supposed to take each day. (Acetaminophen is the generic name for Tylenol and other brand-name pain relievers.) Acetaminophen or other pain relievers may interact with your prescription medicines or other over-the-counter (OTC) drugs. Some prescription medications contain acetaminophen along with other active ingredients. Using both prescription and OTC acetaminophen for pain can cause you to overdose. The FDA recommends that you read the labels on your OTC medications carefully. This will help you to clearly understand the list of active ingredients, dosing instructions, and any warnings. It may also help you avoid taking too much acetaminophen. If you have questions or don't understand the information, ask your pharmacist or health care provider to explain it to you before you take the OTC medication.    Managing nausea  Some people have an upset stomach after surgery. This is often due to anesthesia, pain, pain medications, or the stress of surgery. The following tips will help you manage nausea and get good nutrition as you recover. If you were on a special diet before surgery, ask your doctor if you should follow it during recovery. These tips may help:  · Dont push yourself to eat. Your body will tell you when to eat and how much.  · Start off with clear liquids and soup. They are easier to digest.  · Progress to semi-solid foods (mashed  potatoes, applesauce, and gelatin) as you feel ready.  · Slowly move to solid foods. Dont eat fatty, rich, or spicy foods at first.  · Dont force yourself to have three large meals a day. Instead, eat smaller amounts more often.  · Take pain medications with a small amount of solid food, such as crackers or toast to avoid nausea.      Call your surgeon if  · You still have pain an hour after taking medication (it may not be strong enough).  · You feel too sleepy, dizzy, or groggy (medication may be too strong).  · You have side effects like nausea, vomiting, or skin changes (rash, itching, or hives).   © 9429-6471 [employer]. [address] [place] [address], [place] [address]. All rights reserved. This information is not intended as a substitute for professional medical care. Always follow your healthcare professional's instructions.          Shock Wave Lithotripsy  Passing a kidney stone can be very painful. Shock wave lithotripsy is a treatment that helps by breaking the kidney stone into smaller pieces that are easier to pass. This treatment is also called extracorporeal shock wave lithotripsy (ESWL). Lithotripsy takes about an hour. Its done in a hospital, lithotripsy center, or mobile lithotripsy van. You will likely go home the same day. This treatment is not used for all types of kidney stones. Your healthcare provider will discuss whether this is the right treatment for the type of stone you have.      Energy waves strike the stone, which begins to crack. The stone crumbles into tiny pieces.   During the procedure  · You get medicine to prevent pain and help you relax or sleep during lithotripsy. Once this takes effect, the procedure will start.  · A stent (flexible tube with holes in it) may be placed into your ureter (the tube that connects the kidney and the bladder). This helps keep urine flowing from the kidney.  · Your healthcare provider then uses X-ray or ultrasound to find the exact  location of the kidney stone.  · Sound waves are aimed at the stone and sent at high speed. If youre awake, you may feel a tapping as they pass through your body.  After the procedure  · Youll be monitored in a recovery room for about 1 hour to 3 hours. Antibiotics and pain medicine may be prescribed before you leave.  · Youll have a follow-up visit in a few weeks. If you received a stent, it will be removed. Your doctor will also check for pieces of stone. If large pieces remain, you may need a second lithotripsy or another procedure.  Possible risks and complications  · Infection  · Bleeding in the kidney  · Bruising of the kidney or skin  · Obstruction (blockage) of the ureter  · Failure to break up the stone (other procedures may be needed)   Passing the stone  It can take a day to several weeks for the pieces of stone to leave your body. Drink plenty of liquids to help flush your system. During this time:  · Your urine may be cloudy or slightly bloody. You may even see small pieces of stone.  · You may have a slight fever and some pain. Take prescribed or over-the-counter pain medication as instructed by your healthcare provider.  · You may be asked to strain your urine to collect some stone particles. These will be studied in the lab.  When to call your healthcare provider   Contact your healthcare provider right away if you have any of the following:  · Fever of 100.4°F (38°C) or higher, or as directed by your healthcare provider  · Heavy bleeding  · Pain that doesnt go away with medication  · Upset stomach and vomiting  · Problems urinating   Date Last Reviewed: 11/26/2014 © 2000-2016 [employer]. [address] [place] [address], [place] [address]. All rights reserved. This information is not intended as a substitute for professional medical care. Always follow your healthcare professional's instructions.            Primary Diagnosis     Your primary diagnosis was:  Kidney Stone      Admission  "Information     Date & Time Provider Department CSN    3/23/2017 11:10 AM Mehdi Dexter MD [facility] [id number]      Care Providers     Provider Role Specialty Primary office phone    Mehdi Dexter MD Attending Provider Urology [phone]    Mehdi Dexter MD Surgeon  Urology [phone]      Your Vitals Were     BP Pulse Temp Resp Height Weight    145/73 70 97.9 °F (36.6 °C) 16 5' 9" (1.753 m) 88 kg (194 lb 0.1 oz)    SpO2 BMI             100% 28.65 kg/m2         Recent Lab Values        6/10/2015 9/30/2015 11/19/2015 1/6/2016 5/4/2016 8/31/2016 12/8/2016 3/15/2017     10:00 AM 10:15 AM  5:28 AM 11:08 AM 10:14 AM 10:25 AM  8:07 AM 10:44 AM    A1C 6.7 (H) 6.5 (H) 6.5 (H) 6.8 (H) 6.7 (H) 6.7 (H) 6.5 (H) 7.3 (H)    Comment for A1C at 10:25 AM on 8/31/2016:  According to ADA guidelines, hemoglobin A1C <7.0% represents  optimal control in non-pregnant diabetic patients.  Different  metrics may apply to specific populations.   Standards of Medical Care in Diabetes - 2016.  For the purpose of screening for the presence of diabetes:  <5.7%     Consistent with the absence of diabetes  5.7-6.4%  Consistent with increasing risk for diabetes   (prediabetes)  >or=6.5%  Consistent with diabetes  Currently no consensus exists for use of hemoglobin A1C  for diagnosis of diabetes for children.      Comment for A1C at  8:07 AM on 12/8/2016:  According to ADA guidelines, hemoglobin A1C <7.0% represents  optimal control in non-pregnant diabetic patients.  Different  metrics may apply to specific populations.   Standards of Medical Care in Diabetes - 2016.  For the purpose of screening for the presence of diabetes:  <5.7%     Consistent with the absence of diabetes  5.7-6.4%  Consistent with increasing risk for diabetes   (prediabetes)  >or=6.5%  Consistent with diabetes  Currently no consensus exists for use of hemoglobin A1C  for diagnosis of diabetes for children.      Comment for A1C at 10:44 AM " on 3/15/2017:  According to ADA guidelines, hemoglobin A1C <7.0% represents  optimal control in non-pregnant diabetic patients.  Different  metrics may apply to specific populations.   Standards of Medical Care in Diabetes - 2016.  For the purpose of screening for the presence of diabetes:  <5.7%     Consistent with the absence of diabetes  5.7-6.4%  Consistent with increasing risk for diabetes   (prediabetes)  >or=6.5%  Consistent with diabetes  Currently no consensus exists for use of hemoglobin A1C  for diagnosis of diabetes for children.        Allergies as of 3/23/2017        Reactions    Aspirin Nausea And Vomiting    If over 81mg      [facility]     [facility] Nurse Care Line - 24/7 Assistance  Unless otherwise directed by your provider, please contact [facility], our nurse care line that is available for 24/7 assistance.     Registered nurses in the [facility] provide clinical advisement, health education, appointment booking, and other advisory services.  Call for this free service at [phone].        Advance Directives     An advance directive is a document which, in the event you are no longer able to make decisions for yourself, tells your healthcare team what kind of treatment you do or do not want to receive, or who you would like to make those decisions for you.  If you do not currently have an advance directive, [facility] encourages you to create one.  For more information call:  [phone]), [phone] ([phone]),  or log on to [email or web address].        Language Assistance Services     ATTENTION: Language assistance services are available, free of charge. Please call [phone].      ATENCIÓN: Si habla español, tiene a gutiérrez disposición servicios gratuitos de asistencia lingüística. Llame al [phone].     CHÚ Ý: N?u b?n nói Ti?ng Vi?t, có các d?ch v? h? tr? ngôn ng? mi?n phí dành cho b?n. G?i s? [phone].        Diabetes  Discharge Instructions                                    [facility] complies with applicable Federal civil rights laws and does not discriminate on the basis of race, color, national origin, age, disability, or sex.

## 2017-04-09 ENCOUNTER — HOSPITAL ENCOUNTER (EMERGENCY)
Facility: OTHER | Age: 67
Discharge: HOME | End: 2017-04-09
Attending: EMERGENCY MEDICINE
Payer: MEDICARE

## 2017-04-09 VITALS
RESPIRATION RATE: 18 BRPM | HEIGHT: 69 IN | DIASTOLIC BLOOD PRESSURE: 65 MMHG | TEMPERATURE: 98 F | BODY MASS INDEX: 28.29 KG/M2 | OXYGEN SATURATION: 98 % | HEART RATE: 74 BPM | WEIGHT: 191 LBS | SYSTOLIC BLOOD PRESSURE: 145 MMHG

## 2017-04-09 DIAGNOSIS — N30.01 ACUTE CYSTITIS WITH HEMATURIA: ICD-10-CM

## 2017-04-09 DIAGNOSIS — R31.9 HEMATURIA: Primary | ICD-10-CM

## 2017-04-09 LAB
ANION GAP SERPL CALC-SCNC: 11 MMOL/L
BACTERIA #/AREA URNS HPF: ABNORMAL /HPF
BASOPHILS # BLD AUTO: 0.02 K/UL
BASOPHILS NFR BLD: 0.2 %
BILIRUB UR QL STRIP: NEGATIVE
BUN SERPL-MCNC: 10 MG/DL
CALCIUM SERPL-MCNC: 9.7 MG/DL
CHLORIDE SERPL-SCNC: 107 MMOL/L
CLARITY UR: ABNORMAL
CO2 SERPL-SCNC: 23 MMOL/L
COLOR UR: ABNORMAL
CREAT SERPL-MCNC: 0.9 MG/DL
DIFFERENTIAL METHOD: ABNORMAL
EOSINOPHIL # BLD AUTO: 0.2 K/UL
EOSINOPHIL NFR BLD: 2.7 %
ERYTHROCYTE [DISTWIDTH] IN BLOOD BY AUTOMATED COUNT: 13.9 %
EST. GFR  (AFRICAN AMERICAN): >60 ML/MIN/1.73 M^2
EST. GFR  (NON AFRICAN AMERICAN): >60 ML/MIN/1.73 M^2
GLUCOSE SERPL-MCNC: 148 MG/DL
GLUCOSE UR QL STRIP: NEGATIVE
HCT VFR BLD AUTO: 36.7 %
HGB BLD-MCNC: 12.5 G/DL
HGB UR QL STRIP: ABNORMAL
HYALINE CASTS #/AREA URNS LPF: 0 /LPF
KETONES UR QL STRIP: NEGATIVE
LEUKOCYTE ESTERASE UR QL STRIP: ABNORMAL
LYMPHOCYTES # BLD AUTO: 3.7 K/UL
LYMPHOCYTES NFR BLD: 41.4 %
MCH RBC QN AUTO: 32.8 PG
MCHC RBC AUTO-ENTMCNC: 34.1 %
MCV RBC AUTO: 96 FL
MICROSCOPIC COMMENT: ABNORMAL
MONOCYTES # BLD AUTO: 0.4 K/UL
MONOCYTES NFR BLD: 4.8 %
NEUTROPHILS # BLD AUTO: 4.6 K/UL
NEUTROPHILS NFR BLD: 50.7 %
NITRITE UR QL STRIP: NEGATIVE
PH UR STRIP: 8 [PH] (ref 5–8)
PLATELET # BLD AUTO: 299 K/UL
PMV BLD AUTO: 8.6 FL
POTASSIUM SERPL-SCNC: 4.3 MMOL/L
PROT UR QL STRIP: ABNORMAL
RBC # BLD AUTO: 3.81 M/UL
RBC #/AREA URNS HPF: >100 /HPF (ref 0–4)
SODIUM SERPL-SCNC: 141 MMOL/L
SP GR UR STRIP: 1.01 (ref 1–1.03)
URN SPEC COLLECT METH UR: ABNORMAL
UROBILINOGEN UR STRIP-ACNC: NEGATIVE EU/DL
WBC # BLD AUTO: 8.99 K/UL
WBC #/AREA URNS HPF: 0 /HPF (ref 0–5)

## 2017-04-09 PROCEDURE — 85025 COMPLETE CBC W/AUTO DIFF WBC: CPT

## 2017-04-09 PROCEDURE — 96365 THER/PROPH/DIAG IV INF INIT: CPT

## 2017-04-09 PROCEDURE — 81000 URINALYSIS NONAUTO W/SCOPE: CPT

## 2017-04-09 PROCEDURE — 96361 HYDRATE IV INFUSION ADD-ON: CPT

## 2017-04-09 PROCEDURE — 87086 URINE CULTURE/COLONY COUNT: CPT

## 2017-04-09 PROCEDURE — 63600175 PHARM REV CODE 636 W HCPCS: Performed by: PHYSICIAN ASSISTANT

## 2017-04-09 PROCEDURE — 80048 BASIC METABOLIC PNL TOTAL CA: CPT

## 2017-04-09 PROCEDURE — 25000003 PHARM REV CODE 250: Performed by: PHYSICIAN ASSISTANT

## 2017-04-09 PROCEDURE — 99284 EMERGENCY DEPT VISIT MOD MDM: CPT | Mod: 25

## 2017-04-09 RX ORDER — PHENAZOPYRIDINE HYDROCHLORIDE 200 MG/1
200 TABLET, FILM COATED ORAL 3 TIMES DAILY PRN
Qty: 12 TABLET | Refills: 0 | Status: SHIPPED | OUTPATIENT
Start: 2017-04-09 | End: 2017-04-18 | Stop reason: SDDI

## 2017-04-09 RX ORDER — CIPROFLOXACIN 500 MG/1
500 TABLET ORAL 2 TIMES DAILY
Qty: 28 TABLET | Refills: 0 | Status: SHIPPED | OUTPATIENT
Start: 2017-04-09 | End: 2017-04-23

## 2017-04-09 RX ADMIN — CEFTRIAXONE 1 G: 1 INJECTION, SOLUTION INTRAVENOUS at 09:04

## 2017-04-09 RX ADMIN — SODIUM CHLORIDE 1000 ML: 0.9 INJECTION, SOLUTION INTRAVENOUS at 09:04

## 2017-04-09 NOTE — ED AVS SNAPSHOT
[facility]  [address]  [facility] [address]               Tony Han   2017  8:48 PM   ED    Description:  Male : 1950   Department:  [facility]           Your Care was Coordinated By:     Provider Role From To    Cleo Smith MD Attending Provider 17 --    Eloise Fritz PA-C Physician Assistant 17 --      Reason for Visit     Hematuria           Diagnoses this Visit        Comments    Hematuria    -  Primary     Acute cystitis with hematuria           ED Disposition     None           To Do List           Follow-up Information     Follow up with Mehdi Dexter MD In 1 day.    Specialty:  Urology    Contact information:    [address]  [address]  [facility] [address]  [phone]         These Medications        Disp Refills Start End    ciprofloxacin HCl (CIPRO) 500 MG tablet 28 tablet 0 2017    Take 1 tablet (500 mg total) by mouth 2 (two) times daily. - Oral    Pharmacy: [facility] [address] [place] [address] - [address] AT [address] Ph #: [phone]       phenazopyridine (PYRIDIUM) 200 MG tablet 12 tablet 0 2017    Take 1 tablet (200 mg total) by mouth 3 (three) times daily as needed for Pain. - Oral    Pharmacy: [facility] [id number] [place] [address] - [address] AT [address] Ph #: [phone]         [facility]     [facility] Nurse Care [facility] -  Assistance  Unless otherwise directed by your provider, please contact [facility], our nurse care line that is available for  assistance.     Registered nurses in the [facility] provide: appointment scheduling, clinical advisement, health education, and other advisory services.  Call: [phone] (toll free)               Medications           Message regarding Medications     Verify the changes and/or additions to your medication  regime listed below are the same as discussed with your clinician today.  If any of these changes or additions are incorrect, please notify your healthcare provider.        START taking these NEW medications        Refills    ciprofloxacin HCl (CIPRO) 500 MG tablet 0    Sig: Take 1 tablet (500 mg total) by mouth 2 (two) times daily.    Class: Print    Route: Oral    phenazopyridine (PYRIDIUM) 200 MG tablet 0    Sig: Take 1 tablet (200 mg total) by mouth 3 (three) times daily as needed for Pain.    Class: Print    Route: Oral      These medications were administered today        Dose Freq    cefTRIAXone (ROCEPHIN) 1 g in dextrose 5 % 50 mL IVPB 1 g ED 1 Time    Sig: Inject 50 mLs (1 g total) into the vein ED 1 Time.    Class: Normal    Route: Intravenous    sodium chloride 0.9% bolus 1,000 mL 1,000 mL ED 1 Time    Sig: Inject 1,000 mLs into the vein ED 1 Time.    Class: Normal    Route: Intravenous           Verify that the below list of medications is an accurate representation of the medications you are currently taking.  If none reported, the list may be blank. If incorrect, please contact your healthcare provider. Carry this list with you in case of emergency.           Current Medications     amlodipine (NORVASC) 10 MG tablet Take 1 tablet (10 mg total) by mouth once daily.    aspirin 81 MG Chew Take 1 tablet (81 mg total) by mouth once daily.    blood sugar diagnostic (CONTOUR TEST STRIPS) Strp CHECK BLOOD SUGAR ONCE A DAY    ciprofloxacin HCl (CIPRO) 500 MG tablet Take 1 tablet (500 mg total) by mouth 2 (two) times daily.    finasteride (PROSCAR) 5 mg tablet Take 1 tablet (5 mg total) by mouth once daily.    hydrocodone-acetaminophen 5-325mg (NORCO) 5-325 mg per tablet TK 1 T PO  Q 6 H    hydrocodone-acetaminophen 5-325mg (NORCO) 5-325 mg per tablet Take 1 tablet by mouth every 6 (six) hours as needed for Pain.    lancets Misc Maul Contour lancets - use one lancet once daily    losartan (COZAAR) 25 MG tablet  "TAKE 1 TABLET BY MOUTH ONCE DAILY    metformin (GLUCOPHAGE-XR) 500 MG 24 hr tablet Take 2 tablets (1,000 mg total) by mouth 2 (two) times daily.    metoprolol succinate (TOPROL-XL) 25 MG 24 hr tablet Take 1 tablet (25 mg total) by mouth once daily.    MULTIVITS-MINERALS/FA/LYCOPENE (ONE-A-DAY MEN'S ORAL) Take 1 tablet by mouth once daily.    phenazopyridine (PYRIDIUM) 200 MG tablet Take 1 tablet (200 mg total) by mouth 3 (three) times daily as needed for Pain.    propranolol (INDERAL) 10 MG tablet Take 1 tablet (10 mg total) by mouth 3 (three) times daily as needed (anxiety).    simvastatin (ZOCOR) 20 MG tablet Take 1 tablet (20 mg total) by mouth every evening.    tamsulosin (FLOMAX) 0.4 mg Cp24 Take 1 capsule (0.4 mg total) by mouth once daily.    vitamin D 1000 units Tab Take 1,000 Units by mouth once daily. Take 1 tablet daily           Clinical Reference Information           Your Vitals Were     BP Pulse Temp Resp Height Weight    145/65 76 98.2 °F (36.8 °C) (Oral) 18 5' 9" (1.753 m) 86.6 kg (191 lb)    SpO2 BMI             99% 28.21 kg/m2         Allergies as of 4/9/2017        Reactions    Aspirin Nausea And Vomiting    If over 81mg      Immunizations Administered on Date of Encounter - 4/9/2017     None      ED Micro, Lab, POCT     Start Ordered       Status Ordering Provider    04/09/17 2110 04/09/17 2110  Urine culture  STAT      In process     04/09/17 2110 04/09/17 2110  Basic metabolic panel  STAT      Final result     04/09/17 2110 04/09/17 2110  CBC auto differential  STAT      Final result     04/09/17 2055 04/09/17 2054  Urinalysis  STAT      Final result     04/09/17 2054 04/09/17 2054  Urinalysis Microscopic  Once      Final result       ED Imaging Orders     None      Discharge References/Attachments     HEMATURIA (ENGLISH)    URINARY TRACT INFECTIONS (UTIS), UNDERSTANDING (ENGLISH)      Your Scheduled Appointments     May 23, 2017 10:30 AM CDT   Established Patient Visit with Mehdi Dexter, " MD Lucio - Urology ([facility])    [address] [facility], [address]  [facility] [id number] [phone]            Jul 19, 2017  9:00 AM CDT   Fasting Lab with LAB, APPOINTMENT [facility]   [facility] ([facility])    [address]  [facility] [id number] [phone]            Jul 26, 2017 11:00 AM CDT   Established Patient Visit with MD Ibrahima Schroeder mckenna - Internal Medicine ([facility])    [address]  [facility] [id number] [phone]               [facility] complies with applicable Federal civil rights laws and does not discriminate on the basis of race, color, national origin, age, disability, or sex.        Language Assistance Services     ATTENTION: Language assistance services are available, free of charge. Please call [phone].      ATENCIÓN: Si habla español, tiene a gutiérrez disposición servicios gratuitos de asistencia lingüística. Llame al [phone].     CHÚ Ý: N?u b?n nói Ti?ng Vi?t, có các d?ch v? h? tr? ngôn ng? mi?n phí dành cho b?n. G?i s? [phone].

## 2017-04-10 ENCOUNTER — TELEPHONE (OUTPATIENT)
Dept: UROLOGY | Facility: CLINIC | Age: 67
End: 2017-04-10

## 2017-04-10 ENCOUNTER — PATIENT MESSAGE (OUTPATIENT)
Dept: UROLOGY | Facility: CLINIC | Age: 67
End: 2017-04-10

## 2017-04-10 NOTE — TELEPHONE ENCOUNTER
Spoke with pt he states that he has been having recurrent U.T.Is. Pt states that after taking his last rx antibiotics his symptoms come back 2-3 days later. He states he can't go anywhere and this he is depress. Mr. Han wants to know what is the next step. Please advise. Pt was offer to schedule but didn't want to at the time.

## 2017-04-10 NOTE — TELEPHONE ENCOUNTER
"----- Message from Karma Yang sent at 4/10/2017  2:38 PM CDT -----  Contact: PETER STRATTON [[id number]]  Patient states " The last antibiotic medication was taken 04/06/17 @ 11:00 a.m.   I was admitted to the emergency room on 04/09/17 @ 8:48 pm with another UTI. The onset interval of UTI  after last dose of antibiotic  is nearly the same each time.   This is the 5th time      What Number to Call Back: [phone]                  "

## 2017-04-10 NOTE — ED PROVIDER NOTES
Encounter Date: 4/9/2017       History     Chief Complaint   Patient presents with    Hematuria     pt reports hematuria starting today. reports hx of hematuria.      Review of patient's allergies indicates:   Allergen Reactions    Aspirin Nausea And Vomiting     If over 81mg     HPI Comments: 66-year-old male with hypertension, diabetes, hyperlipidemia, coronary artery disease, GERD, lumbar disc disease, kidney stones with recurrent UTIs presents emergency department with complaints of hematuria, dysuria, urgency and frequency.  He states that he just recently finished a course of a 3 month.  Cipro.  He admits to having lithotripsy just over a week ago due to a stone.  He states that he thinks that it may have been successful because he is not having any flank pain.  He denies abdominal pain, nausea, vomiting, fever.  He complains of burning when he urinates that is a 10 out of 10.  He states that he stopped the Cipro on Thursday in his symptoms returned today.  He states that every time he stops taking antibiotics his symptoms return.    The history is provided by the patient and the spouse.     Past Medical History:   Diagnosis Date    Back pain     Cluster headaches     1999    Colon polyps     5 polyps in Dec 2010    Coronary artery disease 11/2011    moderate nonobstructive cad on left heart cath at [facility] 11/2011    Diabetes mellitus     GERD (gastroesophageal reflux disease)     history of peptic ulcer disease    Hyperlipidemia     Hypertension     Lumbar disc disease     MRI 2008 - L5-S1    Nephrolithiasis     July 2013     Past Surgical History:   Procedure Laterality Date    CHOLECYSTECTOMY      COLONOSCOPY N/A 12/7/2016    Procedure: COLONOSCOPY;  Surgeon: Lance Crocker MD;  Location: [address];  Service: Endoscopy;  Laterality: N/A;    ORIF left humerus       Family History   Problem Relation Age of Onset    Celiac disease Neg Hx     Cirrhosis Neg Hx     Colon cancer Neg  Hx     Colon polyps Neg Hx     Crohn's disease Neg Hx     Esophageal cancer Neg Hx     Inflammatory bowel disease Neg Hx     Liver cancer Neg Hx     Rectal cancer Neg Hx     Stomach cancer Neg Hx     Ulcerative colitis Neg Hx      Social History   Substance Use Topics    Smoking status: Current Every Day Smoker     Packs/day: 1.50    Smokeless tobacco: Never Used      Comment: 1 pack whole life    Alcohol use Yes      Comment: special occassions     Review of Systems   Constitutional: Negative for chills and fever.   HENT: Negative for sore throat.    Respiratory: Negative for shortness of breath.    Cardiovascular: Negative for chest pain.   Gastrointestinal: Negative for abdominal pain, nausea and vomiting.   Genitourinary: Positive for dysuria, frequency, hematuria and urgency. Negative for difficulty urinating and flank pain.   Musculoskeletal: Negative for back pain.   Skin: Negative for rash.   Neurological: Negative for weakness.   Hematological: Does not bruise/bleed easily.       Physical Exam   Initial Vitals   BP Pulse Resp Temp SpO2   04/09/17 2039 04/09/17 2039 04/09/17 2039 04/09/17 2039 04/09/17 2039   185/84 97 18 98.2 °F (36.8 °C) 99 %     Physical Exam    Nursing note and vitals reviewed.  Constitutional: He appears well-developed and well-nourished. He is not diaphoretic.  Non-toxic appearance. No distress.   HENT:   Head: Normocephalic and atraumatic.   Right Ear: External ear normal.   Left Ear: External ear normal.   Nose: Nose normal.   Mouth/Throat: Oropharynx is clear and moist.   Eyes: Conjunctivae, EOM and lids are normal. Pupils are equal, round, and reactive to light. No scleral icterus.   Neck: Normal range of motion and phonation normal. Neck supple.   Cardiovascular: Normal rate, regular rhythm and normal heart sounds. Exam reveals no gallop and no friction rub.    No murmur heard.  Pulmonary/Chest: Effort normal and breath sounds normal. No respiratory distress. He has no  decreased breath sounds. He has no wheezes. He has no rhonchi. He has no rales.   Abdominal: Soft. Normal appearance and bowel sounds are normal. He exhibits no distension and no mass. There is no tenderness. There is no rigidity, no rebound, no guarding, no CVA tenderness, no tenderness at McBurney's point and negative Coto's sign.   Musculoskeletal: Normal range of motion.   No obvious deformities, moving all extremities, normal gait   Neurological: He is alert and oriented to person, place, and time. He has normal strength and normal reflexes. No sensory deficit.   Skin: Skin is warm, dry and intact. No lesion and no rash noted. No erythema.   Psychiatric: He has a normal mood and affect. His speech is normal and behavior is normal. Judgment normal. Cognition and memory are normal.         ED Course   Procedures  Labs Reviewed   URINALYSIS - Abnormal; Notable for the following:        Result Value    Color, UA Red (*)     Appearance, UA Cloudy (*)     Protein, UA 1+ (*)     Occult Blood UA 3+ (*)     Leukocytes, UA 2+ (*)     All other components within normal limits   BASIC METABOLIC PANEL - Abnormal; Notable for the following:     Glucose 148 (*)     All other components within normal limits   CBC W/ AUTO DIFFERENTIAL - Abnormal; Notable for the following:     RBC 3.81 (*)     Hemoglobin 12.5 (*)     Hematocrit 36.7 (*)     MCH 32.8 (*)     MPV 8.6 (*)     All other components within normal limits   URINALYSIS MICROSCOPIC - Abnormal; Notable for the following:     RBC, UA >100 (*)     All other components within normal limits   CULTURE, URINE             Medical Decision Making:   History:   I obtained history from: someone other than patient.       <> Summary of History: Relative  Old Medical Records: I decided to obtain old medical records.  Old Records Summarized: other records and records from clinic visits.       <> Summary of Records: Patient with ongoing problem since December.  Treated for UTI with  treatment.  Also been treated with Macrobid and Rocephin.  Patient also status post lithotripsy with renal calculi.  Initial Assessment:   66-year-old male with complaints consistent with hematuria concerning for UTI.  Afebrile neurovascularly intact.  He is alert, healthy and nontoxic appearing.  He is in no apparent distress.  Exam documented above.  No CVA tenderness palpation.  Abdomen is soft and nontender.  Gross hematuria noted at bedside.  Clinical Tests:   Lab Tests: Ordered and Reviewed       <> Summary of Lab: No elevation in white blood cell count.  H&H stable at 12.5 and 36.7.  No acute electrolyte abnormality.  BUN and creatinine within normal limits.  Glucose 148  ED Management:  Urine, urine culture, CBC and BMP obtained.  Urine with gross hematuria.  Greater than 100 RBCs.  Leukocyte positive however no blood cells on microscopic.  Urine culture pending.  I did review previous culture from February and patient was sensitive to both Cipro and Rocephin.  He was administered IV Rocephin here in the emergency department.  I do not suspect that this time.  Patient denies flank pain and is no CVA tenderness palpation.  Abdomen is soft and nontender with no evidence of acute or surgical abdomen.  Discharged home with a prescription for Cipro, pyridium and care instructions.  He is to follow-up with his urologist in the next 24-48 hours or return for any worsening signs or symptoms.  He states understanding.  This patient was discussed with the attending physician who also evaluated him and agrees with treatment plan.  Other:   I have discussed this case with another health care provider.       <> Summary of the Discussion: Luis  This note was created using Dragon Medical dictation.  There may be typographical errors secondary to dictation.                     ED Course     Clinical Impression:     1. Hematuria    2. Acute cystitis with hematuria          Disposition:   Disposition: Discharged  Condition:  Stable       Eloise Fritz PA-C  04/09/17 1394

## 2017-04-10 NOTE — ED NOTES
Pt reports onset of hematuria today and dysuria. Pt reports hx of hematuria and UTI's requiring IV antibiotics. Pt denies any fever/chills or n/v/d.

## 2017-04-11 ENCOUNTER — TELEPHONE (OUTPATIENT)
Dept: ADMINISTRATIVE | Facility: HOSPITAL | Age: 67
End: 2017-04-11

## 2017-04-11 LAB — BACTERIA UR CULT: NORMAL

## 2017-04-11 NOTE — TELEPHONE ENCOUNTER
His culture from ER yesterday is negative. He does not have a UTI. I think he should come see me so we can discuss his symptoms and I can figure out how best to help him.

## 2017-04-11 NOTE — TELEPHONE ENCOUNTER
Please be advised as follows:    Per PHN: Patient was seen in the ED 4/9/17, Dx: Hematuria. Please assist with ED follow up appointment with Dr. Benitez.   Thanks,  Priscila Garcia, RN  RN Navigator  [facility]

## 2017-04-13 ENCOUNTER — TELEPHONE (OUTPATIENT)
Dept: INTERNAL MEDICINE | Facility: CLINIC | Age: 67
End: 2017-04-13

## 2017-04-13 ENCOUNTER — PATIENT MESSAGE (OUTPATIENT)
Dept: INTERNAL MEDICINE | Facility: CLINIC | Age: 67
End: 2017-04-13

## 2017-04-13 NOTE — TELEPHONE ENCOUNTER
----- Message from Sheritagus Alvarez sent at 4/13/2017 11:47 AM CDT -----  Contact: patient- [phone] or [phone]  Patient sent an e-mail and hasn't received a response concernig an upcoming appointment. Please call to discuss.

## 2017-04-18 ENCOUNTER — OFFICE VISIT (OUTPATIENT)
Dept: UROLOGY | Facility: CLINIC | Age: 67
End: 2017-04-18
Attending: UROLOGY
Payer: MEDICARE

## 2017-04-18 VITALS
SYSTOLIC BLOOD PRESSURE: 136 MMHG | DIASTOLIC BLOOD PRESSURE: 77 MMHG | WEIGHT: 190.94 LBS | HEIGHT: 69 IN | HEART RATE: 67 BPM | BODY MASS INDEX: 28.28 KG/M2

## 2017-04-18 DIAGNOSIS — N39.0 RECURRENT UTI: Primary | ICD-10-CM

## 2017-04-18 DIAGNOSIS — N13.8 BPH (BENIGN PROSTATIC HYPERTROPHY) WITH URINARY OBSTRUCTION: ICD-10-CM

## 2017-04-18 DIAGNOSIS — N20.0 NEPHROLITHIASIS: ICD-10-CM

## 2017-04-18 DIAGNOSIS — N40.1 BPH (BENIGN PROSTATIC HYPERTROPHY) WITH URINARY OBSTRUCTION: ICD-10-CM

## 2017-04-18 LAB
BILIRUB SERPL-MCNC: NORMAL MG/DL
BLOOD URINE, POC: NORMAL
COLOR, POC UA: YELLOW
GLUCOSE UR QL STRIP: NORMAL
KETONES UR QL STRIP: NORMAL
LEUKOCYTE ESTERASE URINE, POC: NORMAL
NITRITE, POC UA: NORMAL
PH, POC UA: 5
POC RESIDUAL URINE VOLUME: 55 ML (ref 0–100)
PROTEIN, POC: NORMAL
SPECIFIC GRAVITY, POC UA: 1.01
UROBILINOGEN, POC UA: NORMAL

## 2017-04-18 PROCEDURE — 51798 US URINE CAPACITY MEASURE: CPT | Mod: S$GLB,,, | Performed by: UROLOGY

## 2017-04-18 PROCEDURE — 1160F RVW MEDS BY RX/DR IN RCRD: CPT | Mod: S$GLB,,, | Performed by: UROLOGY

## 2017-04-18 PROCEDURE — 99214 OFFICE O/P EST MOD 30 MIN: CPT | Mod: 24,25,S$GLB, | Performed by: UROLOGY

## 2017-04-18 PROCEDURE — 3078F DIAST BP <80 MM HG: CPT | Mod: S$GLB,,, | Performed by: UROLOGY

## 2017-04-18 PROCEDURE — 1159F MED LIST DOCD IN RCRD: CPT | Mod: S$GLB,,, | Performed by: UROLOGY

## 2017-04-18 PROCEDURE — 1126F AMNT PAIN NOTED NONE PRSNT: CPT | Mod: S$GLB,,, | Performed by: UROLOGY

## 2017-04-18 PROCEDURE — 81002 URINALYSIS NONAUTO W/O SCOPE: CPT | Mod: S$GLB,,, | Performed by: UROLOGY

## 2017-04-18 PROCEDURE — 3075F SYST BP GE 130 - 139MM HG: CPT | Mod: S$GLB,,, | Performed by: UROLOGY

## 2017-04-18 PROCEDURE — 99999 PR PBB SHADOW E&M-EST. PATIENT-LVL III: CPT | Mod: PBBFAC,,, | Performed by: UROLOGY

## 2017-04-18 RX ORDER — CEPHALEXIN 250 MG/1
250 CAPSULE ORAL DAILY
Qty: 90 CAPSULE | Refills: 1 | Status: SHIPPED | OUTPATIENT
Start: 2017-04-18 | End: 2017-07-24

## 2017-04-18 NOTE — PROGRESS NOTES
"Subjective:      Tony Han is a 66 y.o. male who returns today regarding his recurrent UTI symptoms.     The patient has had near constant UTI since December with multiple courses of abx.  He had workup in February with primarily finding of left renal stone. He is now s/p left ESWL on 3/23/17.   The patient was seen in the ED on 4/9/17 for urinary frequency, urgency, and hematuria. UA was + for leukocytes and RBCs. He was discharged with cipro x 14 day. Urine culture showed "no significant growth."     Today he presents to the clinic reporting a significant improvement in his symptoms since he began the antibiotics. He is currently on day 10 of Cipro. The frequency, urgency, hematuria and dysuria has resolved with the antibiotics. He denies fever. The patient does report right lower back pain, which has been present for the last few months. He is currently on Flomax and Finasteride for his BPH.     The following portions of the patient's history were reviewed and updated as appropriate: allergies, current medications, past family history, past medical history, past social history, past surgical history and problem list.    Review of Systems  A comprehensive multipoint review of systems was negative except as otherwise stated in the HPI.     Objective:   Vitals: /77 (BP Location: Left arm, Patient Position: Sitting, BP Method: Automatic)  Pulse 67  Ht 5' 9" (1.753 m)  Wt 86.6 kg (190 lb 14.7 oz)  BMI 28.19 kg/m2    Physical Exam   General: alert and oriented, no acute distress  Respiratory: Symmetric expansion, non-labored breathing  Neuro: no gross deficits  Psych: normal judgment and insight, normal mood/affect and non-anxious  No CVA tenderness    Lab Review   Urinalysis demonstrates : all negative  PVR: 55 ml    Lab Results   Component Value Date    WBC 8.99 04/09/2017    HGB 12.5 (L) 04/09/2017    HCT 36.7 (L) 04/09/2017    MCV 96 04/09/2017     04/09/2017     Lab Results   Component Value " Date    CREATININE 0.9 04/09/2017    BUN 10 04/09/2017     Lab Results   Component Value Date    PSA 3.2 03/15/2017         Assessment and Plan:   Recurrent UTI  BPH (benign prostatic hypertrophy) with urinary obstruction  -- Recent episode c/w prior UTIs but w/ negative culture  -- Discussed continued observation vs prophylactic abx - he wishes to trial latter  -- Keflex 250mg daily for 6 mos    Nephrolithiasis  -- S/p ESWL  -- KUB in 3 months

## 2017-05-02 ENCOUNTER — OFFICE VISIT (OUTPATIENT)
Dept: INTERNAL MEDICINE | Facility: CLINIC | Age: 67
End: 2017-05-02
Payer: MEDICARE

## 2017-05-02 VITALS
HEIGHT: 69 IN | BODY MASS INDEX: 28.83 KG/M2 | DIASTOLIC BLOOD PRESSURE: 64 MMHG | HEART RATE: 72 BPM | SYSTOLIC BLOOD PRESSURE: 120 MMHG | OXYGEN SATURATION: 98 % | WEIGHT: 194.63 LBS

## 2017-05-02 DIAGNOSIS — E78.5 HYPERLIPIDEMIA, UNSPECIFIED HYPERLIPIDEMIA TYPE: ICD-10-CM

## 2017-05-02 DIAGNOSIS — R51.9 FACIAL PAIN: Primary | ICD-10-CM

## 2017-05-02 DIAGNOSIS — M26.621 ARTHRALGIA OF RIGHT TEMPOROMANDIBULAR JOINT: ICD-10-CM

## 2017-05-02 DIAGNOSIS — E13.9 DIABETES MELLITUS DUE TO ABNORMAL INSULIN: ICD-10-CM

## 2017-05-02 DIAGNOSIS — I10 ESSENTIAL HYPERTENSION: ICD-10-CM

## 2017-05-02 PROCEDURE — 1159F MED LIST DOCD IN RCRD: CPT | Mod: S$GLB,,, | Performed by: INTERNAL MEDICINE

## 2017-05-02 PROCEDURE — 1160F RVW MEDS BY RX/DR IN RCRD: CPT | Mod: S$GLB,,, | Performed by: INTERNAL MEDICINE

## 2017-05-02 PROCEDURE — 99214 OFFICE O/P EST MOD 30 MIN: CPT | Mod: S$GLB,,, | Performed by: INTERNAL MEDICINE

## 2017-05-02 PROCEDURE — 3074F SYST BP LT 130 MM HG: CPT | Mod: S$GLB,,, | Performed by: INTERNAL MEDICINE

## 2017-05-02 PROCEDURE — 3045F PR MOST RECENT HEMOGLOBIN A1C LEVEL 7.0-9.0%: CPT | Mod: S$GLB,,, | Performed by: INTERNAL MEDICINE

## 2017-05-02 PROCEDURE — 4010F ACE/ARB THERAPY RXD/TAKEN: CPT | Mod: S$GLB,,, | Performed by: INTERNAL MEDICINE

## 2017-05-02 PROCEDURE — 99999 PR PBB SHADOW E&M-EST. PATIENT-LVL IV: CPT | Mod: PBBFAC,,, | Performed by: INTERNAL MEDICINE

## 2017-05-02 PROCEDURE — 99499 UNLISTED E&M SERVICE: CPT | Mod: S$GLB,,, | Performed by: INTERNAL MEDICINE

## 2017-05-02 PROCEDURE — 1157F ADVNC CARE PLAN IN RCRD: CPT | Mod: S$GLB,,, | Performed by: INTERNAL MEDICINE

## 2017-05-02 PROCEDURE — 1126F AMNT PAIN NOTED NONE PRSNT: CPT | Mod: S$GLB,,, | Performed by: INTERNAL MEDICINE

## 2017-05-02 PROCEDURE — 3078F DIAST BP <80 MM HG: CPT | Mod: S$GLB,,, | Performed by: INTERNAL MEDICINE

## 2017-06-03 ENCOUNTER — PATIENT MESSAGE (OUTPATIENT)
Dept: UROLOGY | Facility: CLINIC | Age: 67
End: 2017-06-03

## 2017-06-12 ENCOUNTER — PATIENT MESSAGE (OUTPATIENT)
Dept: INTERNAL MEDICINE | Facility: CLINIC | Age: 67
End: 2017-06-12

## 2017-06-13 ENCOUNTER — HOSPITAL ENCOUNTER (OUTPATIENT)
Dept: RADIOLOGY | Facility: HOSPITAL | Age: 67
Discharge: HOME | End: 2017-06-13
Attending: INTERNAL MEDICINE
Payer: MEDICARE

## 2017-06-13 ENCOUNTER — OFFICE VISIT (OUTPATIENT)
Dept: INTERNAL MEDICINE | Facility: CLINIC | Age: 67
End: 2017-06-13
Payer: MEDICARE

## 2017-06-13 VITALS
HEIGHT: 69 IN | HEART RATE: 78 BPM | DIASTOLIC BLOOD PRESSURE: 60 MMHG | BODY MASS INDEX: 29.16 KG/M2 | OXYGEN SATURATION: 96 % | WEIGHT: 196.88 LBS | SYSTOLIC BLOOD PRESSURE: 139 MMHG

## 2017-06-13 DIAGNOSIS — M25.512 ACUTE PAIN OF LEFT SHOULDER: ICD-10-CM

## 2017-06-13 DIAGNOSIS — E11.9 TYPE 2 DIABETES MELLITUS WITHOUT COMPLICATION, WITHOUT LONG-TERM CURRENT USE OF INSULIN: ICD-10-CM

## 2017-06-13 DIAGNOSIS — E78.5 HYPERLIPIDEMIA, UNSPECIFIED HYPERLIPIDEMIA TYPE: ICD-10-CM

## 2017-06-13 DIAGNOSIS — I10 ESSENTIAL HYPERTENSION: ICD-10-CM

## 2017-06-13 DIAGNOSIS — M25.512 ACUTE PAIN OF LEFT SHOULDER: Primary | ICD-10-CM

## 2017-06-13 PROCEDURE — 99499 UNLISTED E&M SERVICE: CPT | Mod: S$GLB,,, | Performed by: INTERNAL MEDICINE

## 2017-06-13 PROCEDURE — 1159F MED LIST DOCD IN RCRD: CPT | Mod: S$GLB,,, | Performed by: INTERNAL MEDICINE

## 2017-06-13 PROCEDURE — 1125F AMNT PAIN NOTED PAIN PRSNT: CPT | Mod: S$GLB,,, | Performed by: INTERNAL MEDICINE

## 2017-06-13 PROCEDURE — 73030 X-RAY EXAM OF SHOULDER: CPT | Mod: TC,LT

## 2017-06-13 PROCEDURE — 4010F ACE/ARB THERAPY RXD/TAKEN: CPT | Mod: S$GLB,,, | Performed by: INTERNAL MEDICINE

## 2017-06-13 PROCEDURE — 73030 X-RAY EXAM OF SHOULDER: CPT | Mod: 26,LT,, | Performed by: RADIOLOGY

## 2017-06-13 PROCEDURE — 99214 OFFICE O/P EST MOD 30 MIN: CPT | Mod: S$GLB,,, | Performed by: INTERNAL MEDICINE

## 2017-06-13 PROCEDURE — 99999 PR PBB SHADOW E&M-EST. PATIENT-LVL III: CPT | Mod: PBBFAC,,, | Performed by: INTERNAL MEDICINE

## 2017-06-13 PROCEDURE — 3045F PR MOST RECENT HEMOGLOBIN A1C LEVEL 7.0-9.0%: CPT | Mod: S$GLB,,, | Performed by: INTERNAL MEDICINE

## 2017-06-13 NOTE — PROGRESS NOTES
CHIEF COMPLAINT: left shoulder pain     HISTORY OF PRESENT ILLNESS: This is a 67-year-old man who presents due to left shoulder pain for the last week. Pain is constant. He is taking hydrocodone apap 7.5/325 every 4 hours which brings the pain down to 3-4/10.  Pain is severe without the hydrocodone apap. No injury.  No numbness in left arm. Pain is worse in certain positions. Slight left sided back pain. No spasms. He has tried heat and topical analgesics which has not helped.     He is still having right TMJ pain. He is not doing excessive chewing which has helped.      He is now taking Keflex 250 mg daily to prevent recurrent UTI.  He had lithotripsy on 3/23/17. No dysuria, hematuria.        He has not had any alcohol and has not had a recurrence of pancreatitis. No nausea, vomiting, constipation, diarrhea, melana, bloody stools.     He is taking amlodipine 10 mg once daily, Toprol-XL 25 mg once daily and losartan 25 mg daily. No chest pain, shortness of breath. He is now taking metformin  mg 2 tablets twice daily. Blood sugar this morning fasting was 116. No polydipsia. He has a history of hyperlipidemia, currently on Zocor 20 mg daily. No joint pain or muscle pain.      Back pain is the same.  HE has pain with prolonged sitting or standing. HE will be establishing with Dr Carrasquillo. He used to see Dr Mcdaniel for his back. HE retired. He had a MRI 1/18/17 through DR Pedroza who he did not like.         PAST MEDICAL HISTORY:    1. Hypertension.    2. Coronary artery disease with left heart catheterization in November 2011 with 40 to 50% narrowing in the mid LAD.    3. Diabetes mellitus.    4. Hyperlipidemia.    5. History of reflux.    6. Colon polyps.    7. History of cluster headaches.    8. History of glaucoma.    9. Lumbar disk disease at L5 to S1.    10. History of stomach ulcers.    11. Nephrolithiasis.       PAST SURGICAL HISTORY: Cholecystectomy in May 2013, ORIF of the left humerus.      SOCIAL  "HISTORY: Currently smokes a pack of cigarettes daily, has smoked his whole life. 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 He is a retired schoolteacher.    FAMILY HISTORY: Updated on EPIC.      PHYSICAL EXAMINATION:    /60 (BP Location: Left arm, Patient Position: Sitting, BP Method: Manual)   Pulse 78   Ht 5' 9" (1.753 m)   Wt 89.3 kg (196 lb 13.9 oz)   SpO2 96%   BMI 29.07 kg/m²     GENERAL: He is alert, oriented, no apparent distress. Affect within normal limits.    HEENT: Conjunctivae anicteric. Tympanic membranes clear.  . .    NECK: Supple. No cervical lymphadenopathy, no thyroid enlargement.    Respiratory: Effort normal. Lungs are clear to auscultation.    HEART: Regular rate and rhythm without murmurs, gallops or rubs. No lower extremity edema.    ABDOMEN: soft, non distended, non tender, bowel sounds present, no hepatosplenomgaly    Pain upon the deltoid with external rotation of the left shoulder    Protective Sensation (w/ 10 gram monofilament):  Right: Decreased  Left: Decreased    Visual Inspection:  Dry Skin -  Bilateral    Pedal Pulses:   Right: Present  Left: Present    Posterior tibialis:   Right:Present  Left: Present                 ASSESSMENT AND PLAN:    1. Left shoulder pain - xray and to sports medicine for evaluation.   2. Recurrent UTI -on cephalexin. S/p ESWL  3. Hypertension - Continue current medications  3. Diabetes mellitus. Doing well.    4. Hyperlipidemia. on Zocor.    5. History of reflux and peptic ulcer disease -asx. Off pantoprazole  6. Tubular adenomas - colonoscopy 12/2016 - one polyp - due 2021  7. History of glaucoma - not on treatment - pressure in eyes ok - followed [facility]  8. History of nephrolithiasis -asymptomatic.    9. Irregular heart beat sensation - holter revealed PVC  10. Pancreatitis - resolved  11. 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 Anxiety and depression -stable  I will see him back as scheduled, " sooner if problems arise.          Colonoscopy 12/7/16 - one hyperplastic polyp due in 2021  Influenza 9/7/16  Pneumovax 7/2014  Zostavax 5/16  Prevnar today 12/14/16  PSA 1/16

## 2017-06-15 ENCOUNTER — TELEPHONE (OUTPATIENT)
Dept: INTERNAL MEDICINE | Facility: CLINIC | Age: 67
End: 2017-06-15

## 2017-06-15 RX ORDER — PANTOPRAZOLE SODIUM 40 MG/1
40 TABLET, DELAYED RELEASE ORAL DAILY
Qty: 30 TABLET | Refills: 1 | Status: SHIPPED | OUTPATIENT
Start: 2017-06-15 | End: 2017-06-15 | Stop reason: SDUPTHER

## 2017-06-15 RX ORDER — PANTOPRAZOLE SODIUM 40 MG/1
TABLET, DELAYED RELEASE ORAL
Qty: 90 TABLET | Refills: 0 | Status: SHIPPED | OUTPATIENT
Start: 2017-06-15 | End: 2017-07-24

## 2017-06-15 RX ORDER — MELOXICAM 15 MG/1
15 TABLET ORAL DAILY
Qty: 30 TABLET | Refills: 1 | Status: SHIPPED | OUTPATIENT
Start: 2017-06-15 | End: 2017-06-15 | Stop reason: SDUPTHER

## 2017-06-15 RX ORDER — MELOXICAM 15 MG/1
TABLET ORAL
Qty: 90 TABLET | Refills: 0 | Status: SHIPPED | OUTPATIENT
Start: 2017-06-15 | End: 2017-07-24

## 2017-06-15 NOTE — TELEPHONE ENCOUNTER
Prescription of mobic 15 mg daily as anti inflammatory sent to and pantoprazole 40 mg daily to protect stomach sent to [facility]

## 2017-06-15 NOTE — TELEPHONE ENCOUNTER
----- Message from Kishore Beauchampr sent at 6/15/2017 10:31 AM CDT -----  Contact: self/ [phone] cell/ [phone] home  Pt would like to speak with Dr. Benitez to discuss getting the medications that she recommended at his last visit.  The pt states that he has changed his mind about taking the medications and would like them sent in today, because he is in a lot of pain.  The pt states that it was a pain medication and a medication to stop the stomach irritation.  He would like to speak with someone in the office today, to discuss.  Please call and advise.    Thank you

## 2017-06-16 NOTE — TELEPHONE ENCOUNTER
----- Message from Tristen Herzog sent at 6/16/2017  9:31 AM CDT -----  Contact: self [phone]  Patient would like to know can he still take hydrocodone-acetaminophen 5-325mg (NORCO) 5-325 mg per tablet while taking meloxicam (MOBIC) 15 MG tablet . Please advise , Thanks !

## 2017-06-16 NOTE — TELEPHONE ENCOUNTER
----- Message from Hamilton Luis sent at 6/16/2017 12:11 PM CDT -----  Contact: Pt at [phone]  Pt requesting a call back regarding scripts meloxicam (MOBIC) 15 MG tablet and aspirin 81 MG Chew

## 2017-06-26 ENCOUNTER — OFFICE VISIT (OUTPATIENT)
Dept: SPORTS MEDICINE | Facility: CLINIC | Age: 67
End: 2017-06-26
Payer: MEDICARE

## 2017-06-26 ENCOUNTER — PATIENT MESSAGE (OUTPATIENT)
Dept: INTERNAL MEDICINE | Facility: CLINIC | Age: 67
End: 2017-06-26

## 2017-06-26 VITALS
HEART RATE: 83 BPM | DIASTOLIC BLOOD PRESSURE: 76 MMHG | WEIGHT: 198 LBS | BODY MASS INDEX: 29.33 KG/M2 | HEIGHT: 69 IN | SYSTOLIC BLOOD PRESSURE: 148 MMHG

## 2017-06-26 DIAGNOSIS — G25.89 SCAPULAR DYSKINESIS: ICD-10-CM

## 2017-06-26 DIAGNOSIS — M75.42 IMPINGEMENT SYNDROME OF LEFT SHOULDER: Primary | ICD-10-CM

## 2017-06-26 PROCEDURE — 99999 PR PBB SHADOW E&M-EST. PATIENT-LVL IV: CPT | Mod: PBBFAC,,, | Performed by: ORTHOPAEDIC SURGERY

## 2017-06-26 PROCEDURE — 99499 UNLISTED E&M SERVICE: CPT | Mod: S$GLB,,, | Performed by: ORTHOPAEDIC SURGERY

## 2017-06-26 PROCEDURE — 1159F MED LIST DOCD IN RCRD: CPT | Mod: S$GLB,,, | Performed by: ORTHOPAEDIC SURGERY

## 2017-06-26 PROCEDURE — 99214 OFFICE O/P EST MOD 30 MIN: CPT | Mod: 25,S$GLB,, | Performed by: ORTHOPAEDIC SURGERY

## 2017-06-26 PROCEDURE — 20610 DRAIN/INJ JOINT/BURSA W/O US: CPT | Mod: LT,S$GLB,, | Performed by: ORTHOPAEDIC SURGERY

## 2017-06-26 PROCEDURE — 1125F AMNT PAIN NOTED PAIN PRSNT: CPT | Mod: S$GLB,,, | Performed by: ORTHOPAEDIC SURGERY

## 2017-06-26 RX ORDER — TRIAMCINOLONE ACETONIDE 40 MG/ML
80 INJECTION, SUSPENSION INTRA-ARTICULAR; INTRAMUSCULAR
Status: DISCONTINUED | OUTPATIENT
Start: 2017-06-26 | End: 2017-06-26 | Stop reason: HOSPADM

## 2017-06-26 RX ADMIN — TRIAMCINOLONE ACETONIDE 80 MG: 40 INJECTION, SUSPENSION INTRA-ARTICULAR; INTRAMUSCULAR at 03:06

## 2017-06-26 NOTE — LETTER
June 26, 2017      Elizabeth Benitez MD  [address]  [facility] [address]           [facility]  [address]  [facility] [address]  Phone: [phone]          Patient: Tony Han   MR Number: [id number]   YOB: 1950   Date of Visit: 6/26/2017       Dear Dr. Elizabeth Benitez:    Thank you for referring Tony Han to me for evaluation. Attached you will find relevant portions of my assessment and plan of care.    If you have questions, please do not hesitate to call me. I look forward to following Tony Han along with you.    Sincerely,    Marcela John MD    Enclosure  CC:  No Recipients    If you would like to receive this communication electronically, please contact [email or web address] or [phone] to request more information on [facility] access.    For providers and/or their staff who would like to refer a patient to [facility], please contact us through our one-stop-shop provider referral line, [facility] [place], at [phone].    If you feel you have received this communication in error or would no longer like to receive these types of communications, please e-mail [email or web address]

## 2017-06-26 NOTE — PROCEDURES
Large Joint Aspiration/Injection  Date/Time: 6/26/2017 3:21 PM  Performed by: ISMAEL YBARRA  Authorized by: ISMAEL YBARRA     Consent Done?:  Yes (Verbal)  Indications:  Pain  Procedure site marked: Yes    Timeout: Prior to procedure the correct patient, procedure, and site was verified      Location:  Shoulder  Site:  L subacromial bursa  Prep: Patient was prepped and draped in usual sterile fashion    Needle size:  22 G  Approach:  Posterior  Medications:  80 mg triamcinolone acetonide 40 mg/mL  Patient tolerance:  Patient tolerated the procedure well with no immediate complications

## 2017-06-26 NOTE — PROGRESS NOTES
CC: left shoulder pain, referred by Dr. Tiana Benitez, patient is retired      67 y.o. Male RHD reports that the pain is severe and not responding to any conservative care.      Patient has had pain since 6/6/2017, he denies any injury and notes random onset of pain  His pain has stayed the same and at times worsened     He notes taking hydrocodone for his back but that it does not help his shoulder pain   He also notes he has been taking Meloxicam prescribed by Dr. Benitez without any relief     He does not report any specific motion causing him pain. It also bothers him at night.    Is affecting ADLs.     He has a history of humerus fracture when he was 7 year old     Review of Systems   Constitution: Negative. Negative for chills, fever and night sweats.   HENT: Negative for congestion and headaches.    Eyes: Negative for blurred vision, left vision loss and right vision loss.   Cardiovascular: Negative for chest pain and syncope.   Respiratory: Negative for cough and shortness of breath.    Endocrine: Negative for polydipsia, polyphagia and polyuria.   Hematologic/Lymphatic: Negative for bleeding problem. Does not bruise/bleed easily.   Skin: Negative for dry skin, itching and rash.   Musculoskeletal: Negative for falls and muscle weakness.   Gastrointestinal: Negative for abdominal pain and bowel incontinence.   Genitourinary: Negative for bladder incontinence and nocturia.   Neurological: Negative for disturbances in coordination, loss of balance and seizures.   Psychiatric/Behavioral: Negative for depression. The patient does not have insomnia.    Allergic/Immunologic: Negative for hives and persistent infections.     PAST MEDICAL HISTORY:   Past Medical History:   Diagnosis Date    Back pain     Cluster headaches     1999    Colon polyps     5 polyps in Dec 2010    Coronary artery disease 11/2011    moderate nonobstructive cad on left heart cath at [facility] 11/2011    Diabetes mellitus     GERD  (gastroesophageal reflux disease)     history of peptic ulcer disease    Hyperlipidemia     Hypertension     Lumbar disc disease     MRI 2008 - L5-S1    Nephrolithiasis     July 2013     PAST SURGICAL HISTORY:   Past Surgical History:   Procedure Laterality Date    CHOLECYSTECTOMY      COLONOSCOPY N/A 12/7/2016    Procedure: COLONOSCOPY;  Surgeon: Lance Crocker MD;  Location: [address]);  Service: Endoscopy;  Laterality: N/A;    ORIF left humerus       FAMILY HISTORY:   Family History   Problem Relation Age of Onset    Celiac disease Neg Hx     Cirrhosis Neg Hx     Colon cancer Neg Hx     Colon polyps Neg Hx     Crohn's disease Neg Hx     Esophageal cancer Neg Hx     Inflammatory bowel disease Neg Hx     Liver cancer Neg Hx     Rectal cancer Neg Hx     Stomach cancer Neg Hx     Ulcerative colitis Neg Hx      SOCIAL HISTORY:   Social History     Social History    Marital status:      Spouse name: N/A    Number of children: N/A    Years of education: N/A     Occupational History    Not on file.     Social History Main Topics    Smoking status: Current Every Day Smoker    Smokeless tobacco: Not on file    Alcohol use Not on file    Drug use: Unknown    Sexual activity: Not on file     Other Topics Concern    Not on file     Social History Narrative    , 5 children - healthy.  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       MEDICATIONS:   Current Outpatient Prescriptions:     amlodipine (NORVASC) 10 MG tablet, Take 1 tablet (10 mg total) by mouth once daily., Disp: 90 tablet, Rfl: 4    aspirin 81 MG Chew, Take 1 tablet (81 mg total) by mouth once daily., Disp: 180 tablet, Rfl: 4    blood sugar diagnostic (CONTOUR TEST STRIPS) Strp, CHECK BLOOD SUGAR ONCE A DAY, Disp: 100 strip, Rfl: 11    cephALEXin (KEFLEX) 250 MG capsule, Take 1 capsule (250 mg total) by mouth once daily., Disp: 90 capsule, Rfl: 1    finasteride (PROSCAR) 5 mg tablet,  "Take 1 tablet (5 mg total) by mouth once daily., Disp: 30 tablet, Rfl: 11    hydrocodone-acetaminophen 5-325mg (NORCO) 5-325 mg per tablet, TK 1 T PO  Q 6 H, Disp: , Rfl: 0    hydrocodone-acetaminophen 5-325mg (NORCO) 5-325 mg per tablet, Take 1 tablet by mouth every 6 (six) hours as needed for Pain., Disp: 10 tablet, Rfl: 0    lancets Misc, Malu Contour lancets - use one lancet once daily, Disp: 100 each, Rfl: 11    losartan (COZAAR) 25 MG tablet, TAKE 1 TABLET BY MOUTH ONCE DAILY, Disp: 90 tablet, Rfl: 4    meloxicam (MOBIC) 15 MG tablet, TAKE 1 TABLET(15 MG) BY MOUTH EVERY DAY, Disp: 90 tablet, Rfl: 0    metformin (GLUCOPHAGE-XR) 500 MG 24 hr tablet, Take 2 tablets (1,000 mg total) by mouth 2 (two) times daily., Disp: 360 tablet, Rfl: 4    metoprolol succinate (TOPROL-XL) 25 MG 24 hr tablet, Take 1 tablet (25 mg total) by mouth once daily., Disp: 90 tablet, Rfl: 4    MULTIVITS-MINERALS/FA/LYCOPENE (ONE-A-DAY MEN'S ORAL), Take 1 tablet by mouth once daily., Disp: , Rfl:     pantoprazole (PROTONIX) 40 MG tablet, TAKE 1 TABLET(40 MG) BY MOUTH EVERY DAY, Disp: 90 tablet, Rfl: 0    simvastatin (ZOCOR) 20 MG tablet, Take 1 tablet (20 mg total) by mouth every evening., Disp: 90 tablet, Rfl: 4    tamsulosin (FLOMAX) 0.4 mg Cp24, Take 1 capsule (0.4 mg total) by mouth once daily., Disp: 30 capsule, Rfl: 11    vitamin D 1000 units Tab, Take 1,000 Units by mouth once daily. Take 1 tablet daily, Disp: , Rfl:   ALLERGIES:   Review of patient's allergies indicates:   Allergen Reactions    Aspirin Nausea And Vomiting     If over 81mg       VITAL SIGNS: BP (!) 148/76   Pulse 83   Ht 5' 9" (1.753 m)   Wt 89.8 kg (198 lb)   BMI 29.24 kg/m²      PHYSICAL EXAMINATION:  General:  The patient is alert and oriented x 3.  Mood is pleasant.  Observation of ears, eyes and nose reveal no gross abnormalities.  No labored breathing observed.  Gait is coordinated. Patient can toe walk and heel walk without " difficulty.      left Shoulder / Upper Extremity Exam    OBSERVATION:     Swelling  none  Deformity  none   Discoloration  none   Scapular winging none   Scars   none  Atrophy  none    TENDERNESS / CREPITUS (T/C):          T/C      T/C   Clavicle   -/-  SUPRAspinatus    -/-     AC Jt.    -/-  INFRAspinatus  -/-    SC Jt.    -/-  Deltoid    -/-      G. Tuberosity  -/-  LH BICEP groove  +/-   Acromion:  -/-  Midline Neck   -/-     Scapular Spine -/-  Trapezium   -/-   SMA Scapula  -/-  GH jt. line - post  -/-     Scapulothoracic  -/-         ROM: (* = with pain)  Right shoulder   Left shoulder        AROM (PROM)   AROM (PROM)   FE    170° (175°)     170° (175°)     ER at 0°    60°  (65°)    60°  (65°)   ER at 90° ABD  90°  (90°)    90°  (90°)   IR at 90°  ABD   NA  (40°)     NA  (40°)      IR (spine level)   T10     T10    STRENGTH: (* = with pain) RIGHT SHOULDER  LEFT SHOULDER   SCAPTION at 0  5/5    5/5   SCAPTION at 30  5/5    5/5    IR    5/5    5/5   ER    5/5    5/5   BICEPS   5/5    5/5   Deltoid    5/5    5/5     SIGNS:  Painful side       NEER   neg    OKEVINS  neg    BANDA   +    SPEEDS  neg     DROP ARM   neg   BELLY PRESS neg   Superior escape none    LIFT-OFF  neg   X-Body ADD    neg    MOVING VALGUS neg        STABILITY TESTING    RIGHT SHOULDER   LEFT SHOULDER     Translation     Anterior  up face     up face    Posterior  up face    up face    Sulcus   < 10mm    < 10 mm     Signs   Apprehension   neg      neg       Relocation   no change     no change      Jerk test  neg     neg    EXTREMITY NEURO-VASCULAR EXAM    Sensation grossly intact to light touch all dermatomal regions.    DTR 2+ Biceps, Triceps, BR and Negative Joannes sign   Grossly intact motor function at Elbow, Wrist and Hand   Distal pulses radial and ulnar 2+, brisk cap refill, symmetric.      NECK:  Painless FROM and spinous processes non-tender. Negative Spurlings sign.      OTHER FINDINGS:  + scapular  dyskinesia    XRAYS:  Shoulder trauma series left,  were obtained and reviewed  No convincing fracture or dislocation is noted. The osseous structures appear well mineralized and well aligned      Degenerative change is present at the acromioclavicular joint    ASSESSMENT:   left:  1. Shoulder pain, impingement   2.  Scapular dyskinesia    PLAN:    PROCEDURE NOTE:   After cortisone consent and post-injection information was given, the shoulder was prepped with betadyne and alcohol. The left subacromial space of the shoulder was injected with 2 cc 40 mg kenalog and 4 cc lidocaine and 4 cc .5% marcaine.   Bandaid was applied. Patient was reminded to rest with RICE for 48 hours post injection and to call clinic immediately for any adverse side effects as explained in clinic today.     Patient has diabetes and was educated on the changes in his blood sugar due to the cortisone injection     PT for scapular stabilization: Scapular dyskinesia   Scapular stabilization - Sara protocol, 1-3x/week x 6-8 weeks with HEP      All questions were answered, pt will contact us for questions or concerns in the interim.

## 2017-07-12 ENCOUNTER — PATIENT MESSAGE (OUTPATIENT)
Dept: INTERNAL MEDICINE | Facility: CLINIC | Age: 67
End: 2017-07-12

## 2017-07-14 ENCOUNTER — OFFICE VISIT (OUTPATIENT)
Dept: INTERNAL MEDICINE | Facility: CLINIC | Age: 67
End: 2017-07-14
Payer: MEDICARE

## 2017-07-14 ENCOUNTER — HOSPITAL ENCOUNTER (OUTPATIENT)
Dept: RADIOLOGY | Facility: HOSPITAL | Age: 67
Discharge: HOME | End: 2017-07-14
Attending: INTERNAL MEDICINE
Payer: MEDICARE

## 2017-07-14 ENCOUNTER — TELEPHONE (OUTPATIENT)
Dept: INTERNAL MEDICINE | Facility: CLINIC | Age: 67
End: 2017-07-14

## 2017-07-14 VITALS
TEMPERATURE: 98 F | HEIGHT: 66 IN | HEART RATE: 105 BPM | DIASTOLIC BLOOD PRESSURE: 78 MMHG | WEIGHT: 187.81 LBS | BODY MASS INDEX: 30.18 KG/M2 | OXYGEN SATURATION: 97 % | SYSTOLIC BLOOD PRESSURE: 158 MMHG

## 2017-07-14 DIAGNOSIS — I10 ESSENTIAL HYPERTENSION: ICD-10-CM

## 2017-07-14 DIAGNOSIS — R63.4 WEIGHT LOSS, UNINTENTIONAL: Primary | ICD-10-CM

## 2017-07-14 DIAGNOSIS — K86.1 CHRONIC PANCREATITIS, UNSPECIFIED PANCREATITIS TYPE: ICD-10-CM

## 2017-07-14 DIAGNOSIS — E11.9 TYPE 2 DIABETES MELLITUS WITHOUT COMPLICATION, WITHOUT LONG-TERM CURRENT USE OF INSULIN: ICD-10-CM

## 2017-07-14 DIAGNOSIS — R63.4 WEIGHT LOSS, UNINTENTIONAL: ICD-10-CM

## 2017-07-14 LAB
BILIRUB SERPL-MCNC: NEGATIVE MG/DL
BLOOD URINE, POC: NEGATIVE
COLOR, POC UA: YELLOW
GLUCOSE UR QL STRIP: NORMAL
KETONES UR QL STRIP: ABNORMAL
LEUKOCYTE ESTERASE URINE, POC: NEGATIVE
NITRITE, POC UA: NEGATIVE
PH, POC UA: 5
PROTEIN, POC: ABNORMAL
SPECIFIC GRAVITY, POC UA: 1.02
UROBILINOGEN, POC UA: NORMAL

## 2017-07-14 PROCEDURE — 71020 XR CHEST PA AND LATERAL: CPT | Mod: 26,,, | Performed by: RADIOLOGY

## 2017-07-14 PROCEDURE — 1159F MED LIST DOCD IN RCRD: CPT | Mod: S$GLB,,, | Performed by: INTERNAL MEDICINE

## 2017-07-14 PROCEDURE — 99499 UNLISTED E&M SERVICE: CPT | Mod: S$GLB,,, | Performed by: INTERNAL MEDICINE

## 2017-07-14 PROCEDURE — 4010F ACE/ARB THERAPY RXD/TAKEN: CPT | Mod: S$GLB,,, | Performed by: INTERNAL MEDICINE

## 2017-07-14 PROCEDURE — 81002 URINALYSIS NONAUTO W/O SCOPE: CPT | Mod: S$GLB,,, | Performed by: INTERNAL MEDICINE

## 2017-07-14 PROCEDURE — 71020 XR CHEST PA AND LATERAL: CPT | Mod: TC

## 2017-07-14 PROCEDURE — 3045F PR MOST RECENT HEMOGLOBIN A1C LEVEL 7.0-9.0%: CPT | Mod: S$GLB,,, | Performed by: INTERNAL MEDICINE

## 2017-07-14 PROCEDURE — 99999 PR PBB SHADOW E&M-EST. PATIENT-LVL IV: CPT | Mod: PBBFAC,,, | Performed by: INTERNAL MEDICINE

## 2017-07-14 PROCEDURE — 1126F AMNT PAIN NOTED NONE PRSNT: CPT | Mod: S$GLB,,, | Performed by: INTERNAL MEDICINE

## 2017-07-14 PROCEDURE — 99214 OFFICE O/P EST MOD 30 MIN: CPT | Mod: 25,S$GLB,, | Performed by: INTERNAL MEDICINE

## 2017-07-14 NOTE — PROGRESS NOTES
Subjective:       Patient ID: Tony Han is a 67 y.o. male.    Chief Complaint: Weight Loss    Patient has lost 10 pounds in the last month without changing diet or exercise routine (none).  He is very worried that he has cancer..  Denies fever, chills, GI or  complaint, any pain      Review of Systems   Constitutional: Negative for activity change, appetite change and fever.   HENT: Negative for congestion, postnasal drip and sore throat.    Respiratory: Negative for cough, shortness of breath and wheezing.    Cardiovascular: Negative for chest pain and palpitations.   Gastrointestinal: Negative for abdominal pain, blood in stool, constipation, diarrhea, nausea and vomiting.   Genitourinary: Negative for decreased urine volume, difficulty urinating, flank pain and frequency.   Musculoskeletal: Negative for arthralgias.   Neurological: Negative for dizziness, weakness and headaches.       Objective:      Physical Exam   Constitutional: He is oriented to person, place, and time. He appears well-developed and well-nourished. No distress.   HENT:   Head: Normocephalic and atraumatic.   Right Ear: External ear normal.   Left Ear: External ear normal.   Eyes: Conjunctivae and EOM are normal. Pupils are equal, round, and reactive to light.   Neck: Normal range of motion. Neck supple. No thyromegaly present.   Cardiovascular: Normal rate and regular rhythm.    Pulmonary/Chest: Effort normal and breath sounds normal.   Abdominal: Soft. Bowel sounds are normal. He exhibits no mass. There is no tenderness. There is no rebound and no guarding.   Musculoskeletal: Normal range of motion.   Lymphadenopathy:     He has no cervical adenopathy.   Neurological: He is alert and oriented to person, place, and time. He has normal reflexes. He displays normal reflexes. No cranial nerve deficit. He exhibits normal muscle tone. Coordination normal.   Skin: Skin is warm and dry.       Assessment:       1. Weight loss, unintentional    2.  Chronic pancreatitis, unspecified pancreatitis type    3. Essential hypertension    4. Type 2 diabetes mellitus without complication, without long-term current use of insulin        Plan:   Tony was seen today for weight loss.    Diagnoses and all orders for this visit:    Weight loss, unintentional  -     POCT URINE DIPSTICK WITHOUT MICROSCOPE  -     CBC auto differential; Future  -     Comprehensive metabolic panel; Future  -     Amylase; Future  -     Lipase; Future  -     TSH; Future  -     X-Ray Chest PA And Lateral; Future  -     CT Abdomen Pelvis With Contrast; Future    Chronic pancreatitis, unspecified pancreatitis type  -     Amylase; Future  -     Lipase; Future    Essential hypertension  -     POCT URINE DIPSTICK WITHOUT MICROSCOPE  -     Comprehensive metabolic panel; Future    Type 2 diabetes mellitus without complication, without long-term current use of insulin  -     POCT URINE DIPSTICK WITHOUT MICROSCOPE  -     Lipid panel; Future

## 2017-07-14 NOTE — TELEPHONE ENCOUNTER
Spoke with pt he states that he is seeing Dr. Merritt today to discuss his issues. He would like answers today.

## 2017-07-14 NOTE — TELEPHONE ENCOUNTER
----- Message from Karen Harris MA sent at 7/14/2017  9:24 AM CDT -----  Contact: self - [phone]   patient stated he sent a message on 7/12/17 regarding test results and health but has not received a return call. Requesting to speak with someone today. Please call. Thanks!

## 2017-07-17 ENCOUNTER — HOSPITAL ENCOUNTER (OUTPATIENT)
Dept: RADIOLOGY | Facility: HOSPITAL | Age: 67
Discharge: HOME | End: 2017-07-17
Attending: INTERNAL MEDICINE
Payer: MEDICARE

## 2017-07-17 DIAGNOSIS — R63.4 WEIGHT LOSS, UNINTENTIONAL: ICD-10-CM

## 2017-07-17 PROCEDURE — 25500020 PHARM REV CODE 255: Performed by: INTERNAL MEDICINE

## 2017-07-17 PROCEDURE — 74177 CT ABD & PELVIS W/CONTRAST: CPT | Mod: 26,,, | Performed by: INTERNAL MEDICINE

## 2017-07-17 PROCEDURE — 74177 CT ABD & PELVIS W/CONTRAST: CPT | Mod: TC

## 2017-07-17 RX ADMIN — IOHEXOL 100 ML: 350 INJECTION, SOLUTION INTRAVENOUS at 10:07

## 2017-07-17 RX ADMIN — IOHEXOL 15 ML: 350 INJECTION, SOLUTION INTRAVENOUS at 09:07

## 2017-07-17 RX ADMIN — IOHEXOL 15 ML: 350 INJECTION, SOLUTION INTRAVENOUS at 08:07

## 2017-07-24 ENCOUNTER — OFFICE VISIT (OUTPATIENT)
Dept: UROLOGY | Facility: CLINIC | Age: 67
End: 2017-07-24
Attending: UROLOGY
Payer: MEDICARE

## 2017-07-24 ENCOUNTER — HOSPITAL ENCOUNTER (OUTPATIENT)
Dept: RADIOLOGY | Facility: OTHER | Age: 67
Discharge: HOME | End: 2017-07-24
Attending: UROLOGY
Payer: MEDICARE

## 2017-07-24 VITALS
HEART RATE: 88 BPM | SYSTOLIC BLOOD PRESSURE: 141 MMHG | WEIGHT: 183.19 LBS | BODY MASS INDEX: 29.44 KG/M2 | DIASTOLIC BLOOD PRESSURE: 77 MMHG | HEIGHT: 66 IN

## 2017-07-24 DIAGNOSIS — N39.0 RECURRENT UTI: Primary | ICD-10-CM

## 2017-07-24 DIAGNOSIS — N20.0 NEPHROLITHIASIS: ICD-10-CM

## 2017-07-24 DIAGNOSIS — N40.1 BENIGN NON-NODULAR PROSTATIC HYPERPLASIA WITH LOWER URINARY TRACT SYMPTOMS: ICD-10-CM

## 2017-07-24 PROCEDURE — 74000 XR KUB: CPT | Mod: 26,,, | Performed by: RADIOLOGY

## 2017-07-24 PROCEDURE — 1126F AMNT PAIN NOTED NONE PRSNT: CPT | Mod: S$GLB,,, | Performed by: UROLOGY

## 2017-07-24 PROCEDURE — 99214 OFFICE O/P EST MOD 30 MIN: CPT | Mod: S$GLB,,, | Performed by: UROLOGY

## 2017-07-24 PROCEDURE — 1159F MED LIST DOCD IN RCRD: CPT | Mod: S$GLB,,, | Performed by: UROLOGY

## 2017-07-24 PROCEDURE — 99999 PR PBB SHADOW E&M-EST. PATIENT-LVL III: CPT | Mod: PBBFAC,,, | Performed by: UROLOGY

## 2017-07-24 RX ORDER — HYDROCODONE BITARTRATE AND ACETAMINOPHEN 7.5; 325 MG/1; MG/1
TABLET ORAL
Refills: 0 | COMMUNITY
Start: 2017-07-11 | End: 2020-09-02

## 2017-07-24 NOTE — PROGRESS NOTES
"Subjective:      Tony Han is a 67 y.o. male who returns today regarding his recurrent UTI symptoms.     He was initially seen in late 2016 for several months of near constant UTI symptoms with multiple courses of abx.      He had workup in February with primarily finding of left renal stone. He is now s/p left ESWL on 3/23/17.     He was treated for UTI by ER on 4/9/17 (cipro x 14 days). He was seen a few days later and reported symptom improvement.  He opted for prophylactic abx at that time and started daily Keflex.     He remains on flomax and finasteride for BPH. Is bothered by sexual side effects.    Today he reports that he is doing much better. No UTIs in last 3 months.     The following portions of the patient's history were reviewed and updated as appropriate: allergies, current medications, past family history, past medical history, past social history, past surgical history and problem list.    Review of Systems  A comprehensive multipoint review of systems was negative except as otherwise stated in the HPI.     Objective:   Vitals: BP (!) 141/77 (BP Location: Left arm, Patient Position: Sitting, BP Method: Automatic)   Pulse 88   Ht 5' 6" (1.676 m)   Wt 83.1 kg (183 lb 3.2 oz)   BMI 29.57 kg/m²     Physical Exam   General: alert and oriented, no acute distress  Respiratory: Symmetric expansion, non-labored breathing  Neuro: no gross deficits  Psych: normal judgment and insight, normal mood/affect and non-anxious    Lab Review   Lab Results   Component Value Date    WBC 7.89 07/14/2017    HGB 13.8 (L) 07/14/2017    HCT 40.2 07/14/2017    MCV 96 07/14/2017     07/14/2017     Lab Results   Component Value Date    CREATININE 0.9 07/14/2017    BUN 15 07/14/2017     Lab Results   Component Value Date    PSA 3.2 03/15/2017    PSADIAG 0.60 07/14/2017     Imaging (all images personally reviewed; agree with report below)   Results for orders placed in visit on 04/18/17   X-Ray KUB    Narrative " Technique: Supine view of the abdomen and pelvis, 2 views    Comparison: 03/09/2017    Results:There is subcentimeter fragmented hyperdensities project over the left lower renal shadow concerning for renal calculi previous core prominent single hyperdensity may be sequela of lithotripsy with reduced caliber calculus. There is no definite hyperdensity projects over the expected right renal shadow to suggest definite right renal calculus allowing for limitation but overlapping gas and fecal filled loops of bowel. Continued surgical clips right upper abdomen likely post cholecystectomy. Further evaluation as warrented clinically.    Impression  See Above .      Electronically signed by: REED RAMOS DO  Date:     07/24/17  Time:    14:52         Assessment and Plan:   Recurrent UTI  BPH (benign prostatic hypertrophy) with urinary obstruction  -- Doing well w/ daily keflex - complete 6 mos course  -- Discussed alternatives to meds for his BPH, including Urolift. He is interested but wishes to consider further. Will need repeat cysto prior to scheduling.    Nephrolithiasis  -- S/p ESWL  -- Stone still present on KUB and CT, though appearance c/w residual debris  -- Consider further treatment only if UTIs recur    FU 3 mos

## 2017-07-26 ENCOUNTER — OFFICE VISIT (OUTPATIENT)
Dept: INTERNAL MEDICINE | Facility: CLINIC | Age: 67
End: 2017-07-26
Payer: MEDICARE

## 2017-07-26 VITALS
HEIGHT: 69 IN | HEART RATE: 88 BPM | BODY MASS INDEX: 27.13 KG/M2 | SYSTOLIC BLOOD PRESSURE: 138 MMHG | DIASTOLIC BLOOD PRESSURE: 72 MMHG | WEIGHT: 183.19 LBS

## 2017-07-26 DIAGNOSIS — K21.9 GASTROESOPHAGEAL REFLUX DISEASE WITHOUT ESOPHAGITIS: ICD-10-CM

## 2017-07-26 DIAGNOSIS — R63.4 WEIGHT LOSS: Primary | ICD-10-CM

## 2017-07-26 DIAGNOSIS — E11.9 TYPE 2 DIABETES MELLITUS WITHOUT COMPLICATION, WITHOUT LONG-TERM CURRENT USE OF INSULIN: ICD-10-CM

## 2017-07-26 DIAGNOSIS — N39.0 URINARY TRACT INFECTION WITHOUT HEMATURIA, SITE UNSPECIFIED: ICD-10-CM

## 2017-07-26 DIAGNOSIS — I10 ESSENTIAL HYPERTENSION: ICD-10-CM

## 2017-07-26 LAB
BACTERIA #/AREA URNS AUTO: ABNORMAL /HPF
BILIRUB UR QL STRIP: NEGATIVE
CLARITY UR REFRACT.AUTO: ABNORMAL
COLOR UR AUTO: YELLOW
GLUCOSE UR QL STRIP: NEGATIVE
HGB UR QL STRIP: NEGATIVE
HYALINE CASTS UR QL AUTO: 0 /LPF
KETONES UR QL STRIP: NEGATIVE
LEUKOCYTE ESTERASE UR QL STRIP: ABNORMAL
MICROSCOPIC COMMENT: ABNORMAL
NITRITE UR QL STRIP: NEGATIVE
PH UR STRIP: 5 [PH] (ref 5–8)
PROT UR QL STRIP: ABNORMAL
RBC #/AREA URNS AUTO: 1 /HPF (ref 0–4)
SP GR UR STRIP: 1.02 (ref 1–1.03)
URN SPEC COLLECT METH UR: ABNORMAL
UROBILINOGEN UR STRIP-ACNC: NEGATIVE EU/DL
WBC #/AREA URNS AUTO: 44 /HPF (ref 0–5)

## 2017-07-26 PROCEDURE — 87086 URINE CULTURE/COLONY COUNT: CPT

## 2017-07-26 PROCEDURE — 4010F ACE/ARB THERAPY RXD/TAKEN: CPT | Mod: S$GLB,,, | Performed by: INTERNAL MEDICINE

## 2017-07-26 PROCEDURE — 81001 URINALYSIS AUTO W/SCOPE: CPT

## 2017-07-26 PROCEDURE — 1126F AMNT PAIN NOTED NONE PRSNT: CPT | Mod: S$GLB,,, | Performed by: INTERNAL MEDICINE

## 2017-07-26 PROCEDURE — 3044F HG A1C LEVEL LT 7.0%: CPT | Mod: S$GLB,,, | Performed by: INTERNAL MEDICINE

## 2017-07-26 PROCEDURE — 99999 PR PBB SHADOW E&M-EST. PATIENT-LVL III: CPT | Mod: PBBFAC,,, | Performed by: INTERNAL MEDICINE

## 2017-07-26 PROCEDURE — 1159F MED LIST DOCD IN RCRD: CPT | Mod: S$GLB,,, | Performed by: INTERNAL MEDICINE

## 2017-07-26 PROCEDURE — 99214 OFFICE O/P EST MOD 30 MIN: CPT | Mod: S$GLB,,, | Performed by: INTERNAL MEDICINE

## 2017-07-26 PROCEDURE — 99499 UNLISTED E&M SERVICE: CPT | Mod: S$GLB,,, | Performed by: INTERNAL MEDICINE

## 2017-07-26 RX ORDER — MIRTAZAPINE 15 MG/1
TABLET, FILM COATED ORAL
Qty: 79 TABLET | Refills: 3 | Status: SHIPPED | OUTPATIENT
Start: 2017-07-26 | End: 2019-01-21

## 2017-07-26 RX ORDER — MIRTAZAPINE 15 MG/1
TABLET, FILM COATED ORAL
Qty: 30 TABLET | Refills: 3 | Status: SHIPPED | OUTPATIENT
Start: 2017-07-26 | End: 2017-07-26 | Stop reason: SDUPTHER

## 2017-07-26 NOTE — PROGRESS NOTES
CHIEF COMPLAINT: Follow up of left shoulder pain     HISTORY OF PRESENT ILLNESS: This is a 67-year-old man who presents for follow up of above. He had an injection in the left shoulder. Shoulder is doing better.  He is not needed the physical therapy.      Right jaw pain is better because he is not opening his mouth as wide.      He is now taking Keflex 250 mg daily (to be total 6 months) to prevent recurrent UTI.  He had lithotripsy on 3/23/17. No dysuria, hematuria.   He saw Dr cerna on 7/24/17. He still has a stone in the left kidney. He is on finasteride and tamsulosin for his prostate     He has not had any alcohol and has not had a recurrence of pancreatitis. No nausea, vomiting, constipation, diarrhea, melana, bloody stools.     He is taking amlodipine 10 mg once daily, Toprol-XL 25 mg once daily and losartan 25 mg daily. No chest pain, shortness of breath. He is now taking metformin  mg 2 tablets twice daily. Blood sugar this morning fasting was 116. No polydipsia. He has a history of hyperlipidemia, currently on Zocor 20 mg daily. No joint pain or muscle pain.      Back pain is the same.  HE has pain with prolonged sitting or standing. HE saw Dr Carrasquillo. He is taking hydrocodone apap 7.5/325 one to two times a day. He used to see Dr Mcdaniel for his back. HE retired. He had a MRI 1/18/17 through DR Pedroza who he did not like.         He has lost 15 pounds in the last 2 months. He is trying to eat.  No diarrhea.    He smokes 1-2 packs per day.  He is anxious and stressed. HE is worried about his health and dying. He is actually smoking more now. He does not have an appetite. He thinks he could be possibly be depressed. He is not sleeping well due to stress and anxiety. No homicidal or suicidal ideations. He is irritable at times.     PAST MEDICAL HISTORY:    1. Hypertension.    2. Coronary artery disease with left heart catheterization in November 2011 with 40 to 50% narrowing in the mid LAD.    3.  "Diabetes mellitus.    4. Hyperlipidemia.    5. History of reflux.    6. Colon polyps.    7. History of cluster headaches.    8. History of glaucoma.    9. Lumbar disk disease at L5 to S1.    10. History of stomach ulcers.    11. Nephrolithiasis.       PAST SURGICAL HISTORY: Cholecystectomy in May 2013, ORIF of the left humerus.      SOCIAL HISTORY: Currently smokes a pack of cigarettes daily, has smoked his whole life. 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 He is a retired schoolteacher.    FAMILY HISTORY: Updated on [facility].      PHYSICAL EXAMINATION:     /72 (BP Location: Left arm, Patient Position: Sitting, BP Method: Manual) Comment: 170 89  Pulse 88   Ht 5' 9" (1.753 m)   Wt 83.1 kg (183 lb 3.2 oz)   BMI 27.05 kg/m²     GENERAL: He is alert, oriented, no apparent distress. Affect within normal limits.    HEENT: Conjunctivae anicteric. Tympanic membranes clear.  . .    NECK: Supple. No cervical lymphadenopathy, no thyroid enlargement.    Respiratory: Effort normal. Lungs are clear to auscultation.    HEART: Regular rate and rhythm without murmurs, gallops or rubs. No lower extremity edema.    ABDOMEN: soft, non distended, non tender, bowel sounds present, no hepatosplenomgaly     Labs and CT abdomen and pelvis reviewed        ASSESSMENT AND PLAN:    1. Weight loss - wonder if stress and anxiety are playing a role. Will start on mirtazapine 15 mg 1/2 tablet daily for 8 days then one tablet daily. Discussed CT chest for lung cancer screening. He will think about it.    2. Recurrent UTI -on cephalexin. S/p ESWL  3. Hypertension - Continue current medications  3. Diabetes mellitus. Doing well.    4. Hyperlipidemia. on Zocor.    5. History of reflux and peptic ulcer disease -asx. Off pantoprazole  6. Tubular adenomas - colonoscopy 12/2016 - one polyp - due 2021  7. History of glaucoma - not on treatment - pressure in eyes ok - followed [facility]  8. History of nephrolithiasis " -asymptomatic.    9. Irregular heart beat sensation - holter revealed PVC  10. Pancreatitis - resolved. Do not suspect malabsorption as cause of weight loss as he is not having diarrhea.   11. 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 Anxiety and depression -mirtazapine as above  I will see him back in 2 weeks, sooner if problems arise.          Colonoscopy 12/7/16 - one hyperplastic polyp due in 2021  Influenza 9/7/16  Pneumovax 7/2014  Zostavax 5/16  Prevnar today 12/14/16  PSA 1/16

## 2017-07-27 LAB — BACTERIA UR CULT: NO GROWTH

## 2017-08-09 ENCOUNTER — OFFICE VISIT (OUTPATIENT)
Dept: INTERNAL MEDICINE | Facility: CLINIC | Age: 67
End: 2017-08-09
Payer: MEDICARE

## 2017-08-09 VITALS
SYSTOLIC BLOOD PRESSURE: 136 MMHG | DIASTOLIC BLOOD PRESSURE: 60 MMHG | WEIGHT: 195.81 LBS | HEIGHT: 69 IN | BODY MASS INDEX: 29 KG/M2 | HEART RATE: 80 BPM

## 2017-08-09 DIAGNOSIS — K21.9 GASTROESOPHAGEAL REFLUX DISEASE WITHOUT ESOPHAGITIS: ICD-10-CM

## 2017-08-09 DIAGNOSIS — R63.4 WEIGHT LOSS: ICD-10-CM

## 2017-08-09 DIAGNOSIS — E11.9 TYPE 2 DIABETES MELLITUS WITHOUT COMPLICATION, WITHOUT LONG-TERM CURRENT USE OF INSULIN: ICD-10-CM

## 2017-08-09 DIAGNOSIS — I10 ESSENTIAL HYPERTENSION: Primary | ICD-10-CM

## 2017-08-09 PROCEDURE — 99999 PR PBB SHADOW E&M-EST. PATIENT-LVL III: CPT | Mod: PBBFAC,,, | Performed by: INTERNAL MEDICINE

## 2017-08-09 PROCEDURE — 99499 UNLISTED E&M SERVICE: CPT | Mod: S$GLB,,, | Performed by: INTERNAL MEDICINE

## 2017-08-09 PROCEDURE — 99214 OFFICE O/P EST MOD 30 MIN: CPT | Mod: S$GLB,,, | Performed by: INTERNAL MEDICINE

## 2017-08-09 PROCEDURE — 3078F DIAST BP <80 MM HG: CPT | Mod: S$GLB,,, | Performed by: INTERNAL MEDICINE

## 2017-08-09 PROCEDURE — 1159F MED LIST DOCD IN RCRD: CPT | Mod: S$GLB,,, | Performed by: INTERNAL MEDICINE

## 2017-08-09 PROCEDURE — 3075F SYST BP GE 130 - 139MM HG: CPT | Mod: S$GLB,,, | Performed by: INTERNAL MEDICINE

## 2017-08-09 PROCEDURE — 3044F HG A1C LEVEL LT 7.0%: CPT | Mod: S$GLB,,, | Performed by: INTERNAL MEDICINE

## 2017-08-09 PROCEDURE — 1126F AMNT PAIN NOTED NONE PRSNT: CPT | Mod: S$GLB,,, | Performed by: INTERNAL MEDICINE

## 2017-08-09 PROCEDURE — 4010F ACE/ARB THERAPY RXD/TAKEN: CPT | Mod: S$GLB,,, | Performed by: INTERNAL MEDICINE

## 2017-08-09 PROCEDURE — 3008F BODY MASS INDEX DOCD: CPT | Mod: S$GLB,,, | Performed by: INTERNAL MEDICINE

## 2017-08-09 NOTE — PROGRESS NOTES
CHIEF COMPLAINT: Follow up of weight loss.      HISTORY OF PRESENT ILLNESS: This is a 67-year-old man who presents for follow up of above. Since our last visit 2 weeks ago, he has been eating 2 meals a day and snacks.  He is not that hungry but is making himself eat. He has gained 12 pounds since that visit.     Mood has been better. He took mirtazapine 15 mg 1/2 tablet once and slept very well. He did not take it continuously. Slight anxiety. No depression.     He is now taking Keflex 250 mg daily (to be total 6 months) to prevent recurrent UTI.  He had lithotripsy on 3/23/17. No dysuria, hematuria.   He saw Dr ceran on 7/24/17. He still has a stone in the left kidney. He is on finasteride and tamsulosin for his prostate     He has not had any alcohol and has not had a recurrence of pancreatitis. No nausea, vomiting, constipation, diarrhea, melana, bloody stools.     He is taking amlodipine 10 mg once daily, Toprol-XL 25 mg once daily and losartan 25 mg daily. No chest pain, shortness of breath. He is now taking metformin  mg 2 tablets twice daily. Blood sugar this morning fasting was 116. No polydipsia. He has a history of hyperlipidemia, currently on Zocor 20 mg daily. No joint pain or muscle pain.      Back pain is the same.  HE has pain with prolonged sitting or standing. HE saw Dr Carrasquillo. He is taking hydrocodone apap 7.5/325 one to two times a day. He used to see Dr Mcdaniel for his back. HE retired. He had a MRI 1/18/17 through DR Pedroza who he did not like.          He smokes 1-2 packs per day.       PAST MEDICAL HISTORY:    1. Hypertension.    2. Coronary artery disease with left heart catheterization in November 2011 with 40 to 50% narrowing in the mid LAD.    3. Diabetes mellitus.    4. Hyperlipidemia.    5. History of reflux.    6. Colon polyps.    7. History of cluster headaches.    8. History of glaucoma.    9. Lumbar disk disease at L5 to S1.    10. History of stomach ulcers.    11.  "Nephrolithiasis.       PAST SURGICAL HISTORY: Cholecystectomy in May 2013, ORIF of the left humerus.      SOCIAL HISTORY: Currently smokes a pack of cigarettes daily, has smoked his whole life. 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 He is a retired schoolteacher.    FAMILY HISTORY: Updated on [facility].      PHYSICAL EXAMINATION:     /60 (BP Location: Left arm, Patient Position: Sitting, BP Method: Manual)   Pulse 80   Ht 5' 9" (1.753 m)   Wt 88.8 kg (195 lb 12.8 oz)   BMI 28.91 kg/m²        GENERAL: He is alert, oriented, no apparent distress. Affect within normal limits.    HEENT: Conjunctivae anicteric. Tympanic membranes clear.  . .    NECK: Supple. No cervical lymphadenopathy, no thyroid enlargement.    Respiratory: Effort normal. Lungs are clear to auscultation.    HEART: Regular rate and rhythm without murmurs, gallops or rubs. No lower extremity edema.    ABDOMEN: soft, non distended, non tender, bowel sounds present, no hepatosplenomgaly         ASSESSMENT AND PLAN:    1. Weight loss - better.  2. Recurrent UTI -on cephalexin. S/p ESWL  3. Hypertension - Continue current medications  3. Diabetes mellitus. Doing well.    4. Hyperlipidemia. on Zocor.    5. History of reflux and peptic ulcer disease -asx. Off pantoprazole  6. Tubular adenomas - colonoscopy 12/2016 - one polyp - due 2021  7. History of glaucoma - not on treatment - pressure in eyes ok - followed [facility]ina  8. History of nephrolithiasis -asymptomatic.    9. Irregular heart beat sensation - holter revealed PVC  10. Pancreatitis - resolved. Do not suspect malabsorption as cause of weight loss as he is not having diarrhea.   11. 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 Anxiety and depression -take mirtazapine 1/2 tablet at bedtime  I will see him back in 2 weeks, sooner if problems arise.          Colonoscopy 12/7/16 - one hyperplastic polyp due in 2021  Influenza 9/7/16  Pneumovax " 7/2014  Zostavax 5/16  Prevnar today 12/14/16  PSA 1/16

## 2017-09-10 RX ORDER — MELOXICAM 15 MG/1
TABLET ORAL
Qty: 90 TABLET | Refills: 0 | Status: SHIPPED | OUTPATIENT
Start: 2017-09-10 | End: 2017-10-09

## 2017-10-09 ENCOUNTER — HOSPITAL ENCOUNTER (EMERGENCY)
Facility: OTHER | Age: 67
Discharge: HOME | End: 2017-10-09
Attending: EMERGENCY MEDICINE
Payer: MEDICARE

## 2017-10-09 VITALS
WEIGHT: 195 LBS | SYSTOLIC BLOOD PRESSURE: 157 MMHG | BODY MASS INDEX: 28.88 KG/M2 | OXYGEN SATURATION: 97 % | DIASTOLIC BLOOD PRESSURE: 68 MMHG | HEIGHT: 69 IN | TEMPERATURE: 98 F | HEART RATE: 86 BPM | RESPIRATION RATE: 18 BRPM

## 2017-10-09 DIAGNOSIS — S91.331A PUNCTURE WOUND OF RIGHT FOOT, INITIAL ENCOUNTER: Primary | ICD-10-CM

## 2017-10-09 PROCEDURE — 90715 TDAP VACCINE 7 YRS/> IM: CPT | Performed by: EMERGENCY MEDICINE

## 2017-10-09 PROCEDURE — 99283 EMERGENCY DEPT VISIT LOW MDM: CPT

## 2017-10-09 PROCEDURE — 63600175 PHARM REV CODE 636 W HCPCS: Performed by: EMERGENCY MEDICINE

## 2017-10-09 PROCEDURE — 90471 IMMUNIZATION ADMIN: CPT | Performed by: EMERGENCY MEDICINE

## 2017-10-09 RX ADMIN — CLOSTRIDIUM TETANI TOXOID ANTIGEN (FORMALDEHYDE INACTIVATED), CORYNEBACTERIUM DIPHTHERIAE TOXOID ANTIGEN (FORMALDEHYDE INACTIVATED), BORDETELLA PERTUSSIS TOXOID ANTIGEN (GLUTARALDEHYDE INACTIVATED), BORDETELLA PERTUSSIS FILAMENTOUS HEMAGGLUTININ ANTIGEN (FORMALDEHYDE INACTIVATED), BORDETELLA PERTUSSIS PERTACTIN ANTIGEN, AND BORDETELLA PERTUSSIS FIMBRIAE 2/3 ANTIGEN 0.5 ML: 5; 2; 2.5; 5; 3; 5 INJECTION, SUSPENSION INTRAMUSCULAR at 05:10

## 2017-10-09 NOTE — ED PROVIDER NOTES
"Encounter Date: 10/9/2017       History     Chief Complaint   Patient presents with    Needs Tetanus     pt reports "I just need a tetanus shot. I hadn't gotten one since before Marce and I stepped on a nail today."     Patient is a 67 y.o. male presenting to the emergency department with complaints of a puncture wound to his right foot.  The patient reports that earlier this morning he was walking in his backyard when he accidentally stepped on a nail.  He states that he believes it punctured the bottom of his right foot.  He denies significant bleeding. He denies pain, numbness, tingling, weakness.  He states he has not had a tetanus shot since before Marce, and was concerned he may need another one.  He has no complaints at this time.      The history is provided by the patient.     Review of patient's allergies indicates:   Allergen Reactions    Aspirin Nausea And Vomiting     If over 81mg     Past Medical History:   Diagnosis Date    Back pain     Cluster headaches     1999    Colon polyps     5 polyps in Dec 2010    Coronary artery disease 11/2011    moderate nonobstructive cad on left heart cath at [facility] 11/2011    Diabetes mellitus     GERD (gastroesophageal reflux disease)     history of peptic ulcer disease    Hyperlipidemia     Hypertension     Lumbar disc disease     MRI 2008 - L5-S1    Nephrolithiasis     July 2013     Past Surgical History:   Procedure Laterality Date    CHOLECYSTECTOMY      COLONOSCOPY N/A 12/7/2016    Procedure: COLONOSCOPY;  Surgeon: Lance Crocker MD;  Location: [facility] ([address] [place] [address]);  Service: Endoscopy;  Laterality: N/A;    LITHOTRIPSY      ORIF left humerus       Family History   Problem Relation Age of Onset    Celiac disease Neg Hx     Cirrhosis Neg Hx     Colon cancer Neg Hx     Colon polyps Neg Hx     Crohn's disease Neg Hx     Esophageal cancer Neg Hx     Inflammatory bowel disease Neg Hx     Liver cancer Neg Hx     Rectal cancer Neg Hx     " Stomach cancer Neg Hx     Ulcerative colitis Neg Hx      Social History   Substance Use Topics    Smoking status: Current Every Day Smoker     Packs/day: 1.00    Smokeless tobacco: Never Used    Alcohol use No     Review of Systems   Constitutional: Negative for activity change, chills, fatigue and fever.   HENT: Negative for congestion, rhinorrhea and sore throat.    Eyes: Negative for photophobia and visual disturbance.   Respiratory: Negative for cough and shortness of breath.    Cardiovascular: Negative for chest pain.   Gastrointestinal: Negative for abdominal pain, diarrhea, nausea and vomiting.   Genitourinary: Negative for dysuria, hematuria and urgency.   Musculoskeletal: Negative for back pain, myalgias and neck pain.   Skin: Negative for color change and wound.   Neurological: Negative for weakness and headaches.   Psychiatric/Behavioral: Negative for agitation and confusion.       Physical Exam     Initial Vitals [10/09/17 1735]   BP Pulse Resp Temp SpO2   (!) 157/68 86 18 98.1 °F (36.7 °C) 97 %      MAP       97.67         Physical Exam    Nursing note and vitals reviewed.  Constitutional: Vital signs are normal. He appears well-developed and well-nourished. He is not diaphoretic.  Non-toxic appearance. He does not have a sickly appearance. He does not appear ill. No distress.   Well appearing, -American male unaccompanied in the emergency department.  He speaking in clear sentences.  He is in no acute distress.  He ambulates without difficulty.   HENT:   Head: Normocephalic and atraumatic.   Right Ear: External ear normal.   Left Ear: External ear normal.   Nose: Nose normal.   Mouth/Throat: Oropharynx is clear and moist.   Eyes: Conjunctivae and EOM are normal.   Neck: Normal range of motion. Neck supple.   Musculoskeletal: Normal range of motion.   No tenderness to palpation of the plantar aspect of the foot.  No visible puncture wound.  No active bleeding.  No abnormalities, lacerations,  or abrasions.  Ambulatory and weightbearing.   Neurological: He is alert and oriented to person, place, and time. He has normal strength. GCS eye subscore is 4. GCS verbal subscore is 5. GCS motor subscore is 6.   Skin: Skin is warm.   Psychiatric: He has a normal mood and affect. His behavior is normal. Judgment and thought content normal.         ED Course   Procedures  Labs Reviewed - No data to display          Medical Decision Making:   Initial Assessment:   Urgent evaluation of a 67-year-old male presenting to the emergency department for tetanus shot.  Patient is afebrile, nontoxic appearing, hemodynamically stable.  Physical exam reveals no evidence of puncture wound, laceration, abrasion, or tenderness of the right foot.  No active bleeding.  We'll administer tetanus.  ED Management:  Patient is stable for discharge home.  He has no evidence of puncture wounds.  He denies bleeding at time of the incident.  Low suspicion for deep penetration.  I do not feel he requires antibiotics at this time.  Patient is counseled on symptomatic care and treatment.  Stable for discharge home. The patient was instructed to follow up with a primary care provider in 2 days or to return to the emergency department for worsening symptoms. The treatment plan was discussed with the patient who demonstrated understanding and comfort with plan. The patient's history, physical exam, and plan of care was discussed with and agreed upon with my supervising physician.    Other:   I have discussed this case with another health care provider.       <> Summary of the Discussion: Dr. Patiño  This note was created using Dragon Medical Dictation. There may be typographical errors secondary to dictation.                    ED Course      Clinical Impression:     1. Puncture wound of right foot, initial encounter         Disposition:   Disposition: Discharged  Condition: Stable                        Augusta Cavazos PA-C  10/09/17 1292

## 2017-10-09 NOTE — ED TRIAGE NOTES
Pt to ER requesting tetanus shot due to stepping on nail while wearing shoes to sole of right foot. Denies pain. Last tetanus in 2005.

## 2017-10-23 ENCOUNTER — OFFICE VISIT (OUTPATIENT)
Dept: UROLOGY | Facility: CLINIC | Age: 67
End: 2017-10-23
Attending: UROLOGY
Payer: MEDICARE

## 2017-10-23 VITALS
SYSTOLIC BLOOD PRESSURE: 139 MMHG | BODY MASS INDEX: 29.5 KG/M2 | DIASTOLIC BLOOD PRESSURE: 72 MMHG | HEART RATE: 91 BPM | WEIGHT: 199.19 LBS | HEIGHT: 69 IN

## 2017-10-23 DIAGNOSIS — N13.8 BPH WITH OBSTRUCTION/LOWER URINARY TRACT SYMPTOMS: ICD-10-CM

## 2017-10-23 DIAGNOSIS — N41.0 ACUTE PROSTATITIS: ICD-10-CM

## 2017-10-23 DIAGNOSIS — N40.1 BPH WITH OBSTRUCTION/LOWER URINARY TRACT SYMPTOMS: ICD-10-CM

## 2017-10-23 DIAGNOSIS — N30.00 ACUTE CYSTITIS WITHOUT HEMATURIA: ICD-10-CM

## 2017-10-23 LAB
BILIRUB SERPL-MCNC: NORMAL MG/DL
BLOOD URINE, POC: NORMAL
COLOR, POC UA: YELLOW
GLUCOSE UR QL STRIP: NORMAL
KETONES UR QL STRIP: NORMAL
LEUKOCYTE ESTERASE URINE, POC: NORMAL
NITRITE, POC UA: NORMAL
PH, POC UA: 5
POC RESIDUAL URINE VOLUME: 104 ML (ref 0–100)
PROTEIN, POC: NORMAL
SPECIFIC GRAVITY, POC UA: 1
UROBILINOGEN, POC UA: NORMAL

## 2017-10-23 PROCEDURE — 81002 URINALYSIS NONAUTO W/O SCOPE: CPT | Mod: S$GLB,,, | Performed by: UROLOGY

## 2017-10-23 PROCEDURE — 51798 US URINE CAPACITY MEASURE: CPT | Mod: S$GLB,,, | Performed by: UROLOGY

## 2017-10-23 PROCEDURE — 99214 OFFICE O/P EST MOD 30 MIN: CPT | Mod: 25,S$GLB,, | Performed by: UROLOGY

## 2017-10-23 RX ORDER — FINASTERIDE 5 MG/1
5 TABLET, FILM COATED ORAL DAILY
Qty: 30 TABLET | Refills: 11 | Status: SHIPPED | OUTPATIENT
Start: 2017-10-23 | End: 2018-06-15

## 2017-10-23 RX ORDER — CIPROFLOXACIN 250 MG/5ML
KIT ORAL
COMMUNITY
Start: 2017-04-18 | End: 2017-11-02

## 2017-10-23 RX ORDER — TAMSULOSIN HYDROCHLORIDE 0.4 MG/1
0.4 CAPSULE ORAL DAILY
Qty: 30 CAPSULE | Refills: 11 | Status: SHIPPED | OUTPATIENT
Start: 2017-10-23 | End: 2018-07-23 | Stop reason: SDUPTHER

## 2017-10-25 ENCOUNTER — TELEPHONE (OUTPATIENT)
Dept: INTERNAL MEDICINE | Facility: CLINIC | Age: 67
End: 2017-10-25

## 2017-10-25 ENCOUNTER — PATIENT MESSAGE (OUTPATIENT)
Dept: GASTROENTEROLOGY | Facility: CLINIC | Age: 67
End: 2017-10-25

## 2017-10-25 NOTE — TELEPHONE ENCOUNTER
----- Message from Linnette Carbajal sent at 10/25/2017  1:45 PM CDT -----  Contact: self  Patient need to speak with nurse regarding labwork for A1C.    Pt want to know if labwork is needed.    Please call pt at [phone]

## 2017-10-26 ENCOUNTER — PATIENT MESSAGE (OUTPATIENT)
Dept: INTERNAL MEDICINE | Facility: CLINIC | Age: 67
End: 2017-10-26

## 2017-10-26 DIAGNOSIS — E13.9 DIABETES MELLITUS DUE TO ABNORMAL INSULIN: Primary | ICD-10-CM

## 2017-10-26 RX ORDER — LOSARTAN POTASSIUM 25 MG/1
25 TABLET ORAL DAILY
Qty: 90 TABLET | Refills: 4 | Status: SHIPPED | OUTPATIENT
Start: 2017-10-26 | End: 2017-10-27 | Stop reason: SDUPTHER

## 2017-10-26 NOTE — TELEPHONE ENCOUNTER
Pt is wanting to know when is he due for his A1c lab. Last A1c was 7/14/2017. Pt also requesting refill on BP medication.     Please advise

## 2017-10-26 NOTE — TELEPHONE ENCOUNTER
----- Message from Tristen Herzog sent at 10/26/2017  2:55 PM CDT -----  Contact: self [phone]  Patient requesting a call back from the office to discuss his BP medication . Please call and advise  Thanks !

## 2017-10-27 RX ORDER — LOSARTAN POTASSIUM 25 MG/1
25 TABLET ORAL DAILY
Qty: 90 TABLET | Refills: 4 | Status: SHIPPED | OUTPATIENT
Start: 2017-10-27 | End: 2019-06-19 | Stop reason: SDUPTHER

## 2017-10-27 NOTE — TELEPHONE ENCOUNTER
See what he needs. I booked him to see me NOv 2 at noon. See when he wants to do his blood work prior the apt -non fasting blood work

## 2017-10-27 NOTE — TELEPHONE ENCOUNTER
Let him know I booked him NOvember 2nd at noon, same day as wife. See when he wants to come get his blood drawn - non fasting

## 2017-10-31 ENCOUNTER — LAB VISIT (OUTPATIENT)
Dept: LAB | Facility: HOSPITAL | Age: 67
End: 2017-10-31
Attending: INTERNAL MEDICINE
Payer: MEDICARE

## 2017-10-31 DIAGNOSIS — E13.9 DIABETES MELLITUS DUE TO ABNORMAL INSULIN: ICD-10-CM

## 2017-10-31 LAB
ALBUMIN SERPL BCP-MCNC: 4 G/DL
ALP SERPL-CCNC: 68 U/L
ALT SERPL W/O P-5'-P-CCNC: 44 U/L
ANION GAP SERPL CALC-SCNC: 10 MMOL/L
AST SERPL-CCNC: 38 U/L
BASOPHILS # BLD AUTO: 0.04 K/UL
BASOPHILS NFR BLD: 0.6 %
BILIRUB SERPL-MCNC: 0.3 MG/DL
BUN SERPL-MCNC: 12 MG/DL
CALCIUM SERPL-MCNC: 9.6 MG/DL
CHLORIDE SERPL-SCNC: 105 MMOL/L
CO2 SERPL-SCNC: 25 MMOL/L
CREAT SERPL-MCNC: 0.9 MG/DL
DIFFERENTIAL METHOD: ABNORMAL
EOSINOPHIL # BLD AUTO: 0.2 K/UL
EOSINOPHIL NFR BLD: 3.3 %
ERYTHROCYTE [DISTWIDTH] IN BLOOD BY AUTOMATED COUNT: 13.2 %
EST. GFR  (AFRICAN AMERICAN): >60 ML/MIN/1.73 M^2
EST. GFR  (NON AFRICAN AMERICAN): >60 ML/MIN/1.73 M^2
ESTIMATED AVG GLUCOSE: 154 MG/DL
GLUCOSE SERPL-MCNC: 127 MG/DL
HBA1C MFR BLD HPLC: 7 %
HCT VFR BLD AUTO: 39.5 %
HGB BLD-MCNC: 13 G/DL
LYMPHOCYTES # BLD AUTO: 3.5 K/UL
LYMPHOCYTES NFR BLD: 48.9 %
MCH RBC QN AUTO: 32.5 PG
MCHC RBC AUTO-ENTMCNC: 32.9 G/DL
MCV RBC AUTO: 99 FL
MONOCYTES # BLD AUTO: 0.4 K/UL
MONOCYTES NFR BLD: 5.8 %
NEUTROPHILS # BLD AUTO: 3 K/UL
NEUTROPHILS NFR BLD: 41.4 %
NRBC BLD-RTO: 0 /100 WBC
PLATELET # BLD AUTO: 305 K/UL
PMV BLD AUTO: 9.3 FL
POTASSIUM SERPL-SCNC: 4.8 MMOL/L
PROT SERPL-MCNC: 7.4 G/DL
RBC # BLD AUTO: 4 M/UL
SODIUM SERPL-SCNC: 140 MMOL/L
WBC # BLD AUTO: 7.18 K/UL

## 2017-10-31 PROCEDURE — 80053 COMPREHEN METABOLIC PANEL: CPT

## 2017-10-31 PROCEDURE — 36415 COLL VENOUS BLD VENIPUNCTURE: CPT

## 2017-10-31 PROCEDURE — 85025 COMPLETE CBC W/AUTO DIFF WBC: CPT

## 2017-10-31 PROCEDURE — 83036 HEMOGLOBIN GLYCOSYLATED A1C: CPT

## 2017-11-02 ENCOUNTER — OFFICE VISIT (OUTPATIENT)
Dept: INTERNAL MEDICINE | Facility: CLINIC | Age: 67
End: 2017-11-02
Payer: MEDICARE

## 2017-11-02 VITALS
WEIGHT: 201.63 LBS | SYSTOLIC BLOOD PRESSURE: 128 MMHG | HEART RATE: 96 BPM | BODY MASS INDEX: 29.86 KG/M2 | HEIGHT: 69 IN | DIASTOLIC BLOOD PRESSURE: 74 MMHG

## 2017-11-02 DIAGNOSIS — E78.5 HYPERLIPIDEMIA, UNSPECIFIED HYPERLIPIDEMIA TYPE: ICD-10-CM

## 2017-11-02 DIAGNOSIS — N40.0 BENIGN PROSTATIC HYPERPLASIA WITHOUT LOWER URINARY TRACT SYMPTOMS: ICD-10-CM

## 2017-11-02 DIAGNOSIS — E13.9 DIABETES MELLITUS DUE TO ABNORMAL INSULIN: ICD-10-CM

## 2017-11-02 DIAGNOSIS — I10 ESSENTIAL HYPERTENSION: Primary | ICD-10-CM

## 2017-11-02 PROCEDURE — 99499 UNLISTED E&M SERVICE: CPT | Mod: S$GLB,,, | Performed by: INTERNAL MEDICINE

## 2017-11-02 PROCEDURE — 99214 OFFICE O/P EST MOD 30 MIN: CPT | Mod: S$GLB,,, | Performed by: INTERNAL MEDICINE

## 2017-11-02 PROCEDURE — 99999 PR PBB SHADOW E&M-EST. PATIENT-LVL III: CPT | Mod: PBBFAC,,, | Performed by: INTERNAL MEDICINE

## 2017-11-02 NOTE — PROGRESS NOTES
CHIEF COMPLAINT: Follow up of hypertension, diabetes, recurrent UTI, hyperlipidemia.      HISTORY OF PRESENT ILLNESS: This is a 67-year-old man who presents for follow up of above. Weight is doing well.      Mood has been better. He took mirtazapine 15 mg 1/2 tablet at bedtime as needed when he has trouble sleeping.  Slight anxiety. No depression.      HE has recurrent UTI.   He had lithotripsy on 3/23/17. No dysuria, hematuria.   He saw Dr cerna on 7/24/17. He still has a stone in the left kidney. He is on finasteride and tamsulosin for his prostate. He finished antibiotic 4 days ago     He has not had any alcohol and has not had a recurrence of pancreatitis. No nausea, vomiting, constipation, diarrhea, melana, bloody stools.     He is taking amlodipine 10 mg once daily, Toprol-XL 25 mg once daily and losartan 25 mg daily. No chest pain, shortness of breath. He is now taking metformin  mg 2 tablets twice daily. Blood sugar this morning fasting was 116. No polydipsia. He has a history of hyperlipidemia, currently on Zocor 20 mg daily. No joint pain or muscle pain. NO chest pain, shortness breath, headaches, vision, sinus congestion.      Back pain is the same.  HE has pain with prolonged sitting or standing. HE saw Dr Carrasquillo. He is taking hydrocodone apap 7.5/325 one to two times a day. He used to see Dr Mcdaniel for his back. HE retired. He had a MRI 1/18/17 through DR Pedroza who he did not like.          He smokes 1-2 packs per day.       PAST MEDICAL HISTORY:    1. Hypertension.    2. Coronary artery disease with left heart catheterization in November 2011 with 40 to 50% narrowing in the mid LAD.    3. Diabetes mellitus.    4. Hyperlipidemia.    5. History of reflux.    6. Colon polyps.    7. History of cluster headaches.    8. History of glaucoma.    9. Lumbar disk disease at L5 to S1.    10. History of stomach ulcers.    11. Nephrolithiasis.       PAST SURGICAL HISTORY: Cholecystectomy in May 2013, [facility]  "of the left humerus.      SOCIAL HISTORY: Currently smokes a pack of cigarettes daily, has smoked his whole life. 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 He is a retired schoolteacher.    FAMILY HISTORY: Updated on [facility].      PHYSICAL EXAMINATION:     /74 (BP Location: Right arm, Patient Position: Sitting, BP Method: Medium (Manual))   Pulse 96   Ht 5' 9" (1.753 m)   Wt 91.4 kg (201 lb 9.6 oz)   BMI 29.77 kg/m²     GENERAL: He is alert, oriented, no apparent distress. Affect within normal limits.    HEENT: Conjunctivae anicteric. Tympanic membranes clear.  . .    NECK: Supple. No cervical lymphadenopathy, no thyroid enlargement.    Respiratory: Effort normal. Lungs are clear to auscultation.    HEART: Regular rate and rhythm without murmurs, gallops or rubs. No lower extremity edema.    ABDOMEN: soft, non distended, non tender, bowel sounds present, no hepatosplenomgaly     Labs 10/31/17 reviewed - A1C 7.0, CBC, CMP acceptable        ASSESSMENT AND PLAN:    1. Recurrent UTI -asx. Saw urology 10/23/17. S/p ESWL  3. Hypertension - Continue current medications  3. Diabetes mellitus. Doing well.    4. Hyperlipidemia. on Zocor.    5. History of reflux and peptic ulcer disease -asx. Off pantoprazole  6. Tubular adenomas - colonoscopy 12/2016 - one polyp - due 2021  7. History of glaucoma - not on treatment - pressure in eyes ok - followed [facility] Avina  8. History of nephrolithiasis -asymptomatic.    9. Irregular heart beat sensation - holter revealed PVC  10. Pancreatitis - resolved. Do not suspect malabsorption as cause of weight loss as he is not having diarrhea.   11. 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 Anxiety and depression -take mirtazapine 1/2 tablet at bedtime as needed  I will see him back in 3 months, sooner if problems arise.          Colonoscopy 12/7/16 - one hyperplastic polyp due in 2021  Influenza 9/7/16 - declines  Pneumovax 7/2014  Zostavax " 5/16  Prevnar  12/14/16  PSA 1/16

## 2017-11-03 ENCOUNTER — PATIENT MESSAGE (OUTPATIENT)
Dept: INTERNAL MEDICINE | Facility: CLINIC | Age: 67
End: 2017-11-03

## 2017-11-27 ENCOUNTER — PATIENT MESSAGE (OUTPATIENT)
Dept: INTERNAL MEDICINE | Facility: CLINIC | Age: 67
End: 2017-11-27

## 2017-11-27 NOTE — LETTER
November 28, 2017    Tony Han  [address] [facility] [address] [facility] [address]     Date of Birth 1950        [facility]  [address]  [place] [address]  Phone: [phone]  Fax: [phone] To Whom It May Concern:    Due to multiple medical problems, Mr Han cannot participate in jury duty. Please excuse him from jury duty.    Sincerely,        Elizabeth Benitez MD

## 2018-01-18 RX ORDER — AMLODIPINE BESYLATE 10 MG/1
TABLET ORAL
Qty: 90 TABLET | Refills: 4 | Status: SHIPPED | OUTPATIENT
Start: 2018-01-18 | End: 2019-04-13 | Stop reason: SDUPTHER

## 2018-01-18 RX ORDER — METOPROLOL SUCCINATE 25 MG/1
TABLET, EXTENDED RELEASE ORAL
Qty: 90 TABLET | Refills: 4 | Status: SHIPPED | OUTPATIENT
Start: 2018-01-18 | End: 2019-04-13 | Stop reason: SDUPTHER

## 2018-01-19 RX ORDER — METFORMIN HYDROCHLORIDE 500 MG/1
TABLET, EXTENDED RELEASE ORAL
Qty: 360 TABLET | Refills: 4 | Status: SHIPPED | OUTPATIENT
Start: 2018-01-19 | End: 2019-04-13 | Stop reason: SDUPTHER

## 2018-01-22 ENCOUNTER — OFFICE VISIT (OUTPATIENT)
Dept: UROLOGY | Facility: CLINIC | Age: 68
End: 2018-01-22
Attending: UROLOGY
Payer: MEDICARE

## 2018-01-22 VITALS
WEIGHT: 195.69 LBS | HEART RATE: 87 BPM | HEIGHT: 69 IN | DIASTOLIC BLOOD PRESSURE: 75 MMHG | SYSTOLIC BLOOD PRESSURE: 133 MMHG | BODY MASS INDEX: 28.98 KG/M2

## 2018-01-22 DIAGNOSIS — N13.8 BPH WITH OBSTRUCTION/LOWER URINARY TRACT SYMPTOMS: Primary | ICD-10-CM

## 2018-01-22 DIAGNOSIS — N20.0 NEPHROLITHIASIS: ICD-10-CM

## 2018-01-22 DIAGNOSIS — N40.1 BPH WITH OBSTRUCTION/LOWER URINARY TRACT SYMPTOMS: Primary | ICD-10-CM

## 2018-01-22 DIAGNOSIS — N39.0 RECURRENT UTI: ICD-10-CM

## 2018-01-22 LAB
BILIRUB SERPL-MCNC: NORMAL MG/DL
BLOOD URINE, POC: NORMAL
COLOR, POC UA: YELLOW
GLUCOSE UR QL STRIP: NORMAL
KETONES UR QL STRIP: NORMAL
LEUKOCYTE ESTERASE URINE, POC: NORMAL
NITRITE, POC UA: NORMAL
PH, POC UA: 5
POC RESIDUAL URINE VOLUME: 24 ML (ref 0–100)
PROTEIN, POC: 30
SPECIFIC GRAVITY, POC UA: 1.02
UROBILINOGEN, POC UA: NORMAL

## 2018-01-22 PROCEDURE — 51798 US URINE CAPACITY MEASURE: CPT | Mod: S$GLB,,, | Performed by: UROLOGY

## 2018-01-22 PROCEDURE — 99214 OFFICE O/P EST MOD 30 MIN: CPT | Mod: 25,S$GLB,, | Performed by: UROLOGY

## 2018-01-22 PROCEDURE — 81002 URINALYSIS NONAUTO W/O SCOPE: CPT | Mod: S$GLB,,, | Performed by: UROLOGY

## 2018-01-22 NOTE — PROGRESS NOTES
"Subjective:      Tony Han is a 67 y.o. male who returns today regarding his recurrent UTI symptoms.     He was initially seen in late 2016 for several months of near constant UTI symptoms with multiple courses of abx.      He had workup with primarily finding of left renal stone. He is now s/p left ESWL on 3/23/17.     He was last treated for UTI by ER on 4/9/17 (cipro x 14 days). He opted for prophylactic abx at that time and started daily Keflex, which he completed in October 2017.    He has remained UTI free in the interim.    He remains on flomax and finasteride for BPH. Is bothered by sexual side effects.    He continues to have LUTS with AUASS 16/3.    Today he reports that he is doing much better. No UTIs in last 3 months.     The following portions of the patient's history were reviewed and updated as appropriate: allergies, current medications, past family history, past medical history, past social history, past surgical history and problem list.    Review of Systems  A comprehensive multipoint review of systems was negative except as otherwise stated in the HPI.     Objective:   Vitals: /75 (BP Location: Left arm, Patient Position: Sitting, BP Method: Large (Automatic))   Pulse 87   Ht 5' 9" (1.753 m)   Wt 88.7 kg (195 lb 10.5 oz)   BMI 28.89 kg/m²     Physical Exam   General: alert and oriented, no acute distress  Respiratory: Symmetric expansion, non-labored breathing  Neuro: no gross deficits  Psych: normal judgment and insight, normal mood/affect and non-anxious    Bladder Scan PVR: 24cc      Lab Review   Lab Results   Component Value Date    WBC 7.18 10/31/2017    HGB 13.0 (L) 10/31/2017    HCT 39.5 (L) 10/31/2017    MCV 99 (H) 10/31/2017     10/31/2017     Lab Results   Component Value Date    CREATININE 0.9 10/31/2017    BUN 12 10/31/2017     Lab Results   Component Value Date    PSA 3.2 03/15/2017    PSADIAG 0.60 07/14/2017     Imaging   None today     Assessment and Plan: "   Recurrent UTI  BPH (benign prostatic hypertrophy) with urinary obstruction  -- Completed 6 mos ppx and has remained UTI free since  -- Continue flomax and finasteride  -- Discussed alternatives to meds for his BPH, including Urolift. He is again interested but wishes to consider further. Will need repeat cysto prior to scheduling.    Nephrolithiasis  -- S/p ESWL  -- Stone still present on KUB and CT, though appearance c/w residual debris  -- Consider further treatment only if UTIs recur (likely will address BPH first)    FU 6 mos

## 2018-02-05 RX ORDER — SIMVASTATIN 20 MG/1
TABLET, FILM COATED ORAL
Qty: 90 TABLET | Refills: 4 | Status: SHIPPED | OUTPATIENT
Start: 2018-02-05 | End: 2019-05-03 | Stop reason: SDUPTHER

## 2018-02-16 DIAGNOSIS — N41.0 ACUTE PROSTATITIS: ICD-10-CM

## 2018-02-16 DIAGNOSIS — N30.00 ACUTE CYSTITIS WITHOUT HEMATURIA: ICD-10-CM

## 2018-02-16 RX ORDER — FINASTERIDE 5 MG/1
TABLET, FILM COATED ORAL
Qty: 30 TABLET | Refills: 0 | Status: SHIPPED | OUTPATIENT
Start: 2018-02-16 | End: 2018-03-18 | Stop reason: SDUPTHER

## 2018-02-19 ENCOUNTER — LAB VISIT (OUTPATIENT)
Dept: LAB | Facility: HOSPITAL | Age: 68
End: 2018-02-19
Attending: INTERNAL MEDICINE
Payer: MEDICARE

## 2018-02-19 DIAGNOSIS — E13.9 DIABETES MELLITUS DUE TO ABNORMAL INSULIN: ICD-10-CM

## 2018-02-19 LAB
ALBUMIN SERPL BCP-MCNC: 4.4 G/DL
ALP SERPL-CCNC: 72 U/L
ALT SERPL W/O P-5'-P-CCNC: 64 U/L
ANION GAP SERPL CALC-SCNC: 10 MMOL/L
AST SERPL-CCNC: 37 U/L
BASOPHILS # BLD AUTO: 0.03 K/UL
BASOPHILS NFR BLD: 0.4 %
BILIRUB SERPL-MCNC: 0.4 MG/DL
BUN SERPL-MCNC: 14 MG/DL
CALCIUM SERPL-MCNC: 9.9 MG/DL
CHLORIDE SERPL-SCNC: 106 MMOL/L
CO2 SERPL-SCNC: 24 MMOL/L
CREAT SERPL-MCNC: 0.9 MG/DL
DIFFERENTIAL METHOD: ABNORMAL
EOSINOPHIL # BLD AUTO: 0.4 K/UL
EOSINOPHIL NFR BLD: 4.7 %
ERYTHROCYTE [DISTWIDTH] IN BLOOD BY AUTOMATED COUNT: 13.7 %
EST. GFR  (AFRICAN AMERICAN): >60 ML/MIN/1.73 M^2
EST. GFR  (NON AFRICAN AMERICAN): >60 ML/MIN/1.73 M^2
ESTIMATED AVG GLUCOSE: 148 MG/DL
GLUCOSE SERPL-MCNC: 136 MG/DL
HBA1C MFR BLD HPLC: 6.8 %
HCT VFR BLD AUTO: 39.7 %
HGB BLD-MCNC: 13.2 G/DL
LYMPHOCYTES # BLD AUTO: 3.5 K/UL
LYMPHOCYTES NFR BLD: 43.7 %
MCH RBC QN AUTO: 33.1 PG
MCHC RBC AUTO-ENTMCNC: 33.2 G/DL
MCV RBC AUTO: 100 FL
MONOCYTES # BLD AUTO: 0.5 K/UL
MONOCYTES NFR BLD: 6.3 %
NEUTROPHILS # BLD AUTO: 3.6 K/UL
NEUTROPHILS NFR BLD: 44.8 %
NRBC BLD-RTO: 0 /100 WBC
PLATELET # BLD AUTO: 323 K/UL
PMV BLD AUTO: 9.2 FL
POTASSIUM SERPL-SCNC: 4.4 MMOL/L
PROT SERPL-MCNC: 7.6 G/DL
RBC # BLD AUTO: 3.99 M/UL
SODIUM SERPL-SCNC: 140 MMOL/L
WBC # BLD AUTO: 8.08 K/UL

## 2018-02-19 PROCEDURE — 80053 COMPREHEN METABOLIC PANEL: CPT

## 2018-02-19 PROCEDURE — 83036 HEMOGLOBIN GLYCOSYLATED A1C: CPT

## 2018-02-19 PROCEDURE — 85025 COMPLETE CBC W/AUTO DIFF WBC: CPT

## 2018-02-19 PROCEDURE — 36415 COLL VENOUS BLD VENIPUNCTURE: CPT

## 2018-02-21 ENCOUNTER — OFFICE VISIT (OUTPATIENT)
Dept: INTERNAL MEDICINE | Facility: CLINIC | Age: 68
End: 2018-02-21
Payer: MEDICARE

## 2018-02-21 ENCOUNTER — PATIENT MESSAGE (OUTPATIENT)
Dept: INTERNAL MEDICINE | Facility: CLINIC | Age: 68
End: 2018-02-21

## 2018-02-21 VITALS
WEIGHT: 196 LBS | HEIGHT: 69 IN | BODY MASS INDEX: 29.03 KG/M2 | SYSTOLIC BLOOD PRESSURE: 128 MMHG | DIASTOLIC BLOOD PRESSURE: 60 MMHG | HEART RATE: 86 BPM

## 2018-02-21 DIAGNOSIS — E13.9 DIABETES MELLITUS DUE TO ABNORMAL INSULIN: Primary | ICD-10-CM

## 2018-02-21 DIAGNOSIS — R07.9 CHEST PAIN, UNSPECIFIED TYPE: ICD-10-CM

## 2018-02-21 DIAGNOSIS — M51.9 LUMBAR DISC DISEASE: ICD-10-CM

## 2018-02-21 DIAGNOSIS — N40.0 BENIGN PROSTATIC HYPERPLASIA WITHOUT LOWER URINARY TRACT SYMPTOMS: ICD-10-CM

## 2018-02-21 DIAGNOSIS — I10 ESSENTIAL HYPERTENSION: ICD-10-CM

## 2018-02-21 DIAGNOSIS — E78.5 HYPERLIPIDEMIA, UNSPECIFIED HYPERLIPIDEMIA TYPE: ICD-10-CM

## 2018-02-21 DIAGNOSIS — K21.9 GASTROESOPHAGEAL REFLUX DISEASE WITHOUT ESOPHAGITIS: ICD-10-CM

## 2018-02-21 DIAGNOSIS — K76.0 FATTY LIVER: ICD-10-CM

## 2018-02-21 DIAGNOSIS — Z01.811 PRE-OP CHEST EXAM: Primary | ICD-10-CM

## 2018-02-21 PROCEDURE — 1159F MED LIST DOCD IN RCRD: CPT | Mod: S$GLB,,, | Performed by: INTERNAL MEDICINE

## 2018-02-21 PROCEDURE — 3008F BODY MASS INDEX DOCD: CPT | Mod: S$GLB,,, | Performed by: INTERNAL MEDICINE

## 2018-02-21 PROCEDURE — 1126F AMNT PAIN NOTED NONE PRSNT: CPT | Mod: S$GLB,,, | Performed by: INTERNAL MEDICINE

## 2018-02-21 PROCEDURE — 99214 OFFICE O/P EST MOD 30 MIN: CPT | Mod: S$GLB,,, | Performed by: INTERNAL MEDICINE

## 2018-02-21 PROCEDURE — 99499 UNLISTED E&M SERVICE: CPT | Mod: S$GLB,,, | Performed by: INTERNAL MEDICINE

## 2018-02-21 PROCEDURE — 99999 PR PBB SHADOW E&M-EST. PATIENT-LVL II: CPT | Mod: PBBFAC,,, | Performed by: INTERNAL MEDICINE

## 2018-02-21 NOTE — PROGRESS NOTES
CHIEF COMPLAINT: Follow up of hypertension, diabetes, recurrent UTI, hyperlipidemia.      HISTORY OF PRESENT ILLNESS: This is a 67-year-old man who presents for follow up of above. Weight is stable.      Mood has been good.He took mirtazapine 15 mg 1/2 tablet at bedtime as needed when he has trouble sleeping.  Slight anxiety. No depression.  HE sleeps 5.5 to 6 hours a day. He likes staying up at night watching TV.     HE has recurrent UTI.   He had lithotripsy on 3/23/17. No dysuria, hematuria.   He saw Dr cerna on 7/24/17. He still has a stone in the left kidney. He is on finasteride and tamsulosin for his prostate.     He has not had any alcohol and has not had a recurrence of pancreatitis. No nausea, vomiting, constipation, diarrhea, melana, bloody stools.     He is taking amlodipine 10 mg once daily, Toprol-XL 25 mg once daily and losartan 25 mg daily. No chest pain, shortness of breath. He is now taking metformin  mg 2 tablets twice daily. Blood sugar this morning fasting was 116. No polydipsia. He has a history of hyperlipidemia, currently on Zocor 20 mg daily. No joint pain or muscle pain. NO shortness breath, headaches, vision, sinus congestion. He notes an occasional chest pain - very vague. He wants to chech his heart out     Back pain is the same.  HE has pain with prolonged sitting or standing. HE saw Dr Carrasquillo. He is taking hydrocodone apap 7.5/325 one to two times a day. He used to see Dr Mcdaniel for his back. HE retired. He had a MRI 1/18/17 through DR Pedroza who he did not like.          He smokes 1-2 packs per day.       PAST MEDICAL HISTORY:    1. Hypertension.    2. Coronary artery disease with left heart catheterization in November 2011 with 40 to 50% narrowing in the mid LAD.    3. Diabetes mellitus.    4. Hyperlipidemia.    5. History of reflux.    6. Colon polyps.    7. History of cluster headaches.    8. History of glaucoma.    9. Lumbar disk disease at L5 to S1.    10. History of  "stomach ulcers.    11. Nephrolithiasis.       PAST SURGICAL HISTORY: Cholecystectomy in May 2013, ORIF of the left humerus.      SOCIAL HISTORY: Currently smokes a pack of cigarettes daily, has smoked his whole life. 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 He is a retired schoolteacher.    FAMILY HISTORY: Updated on [facility].      PHYSICAL EXAMINATION:     /60 (BP Location: Right arm, Patient Position: Sitting, BP Method: Medium (Manual))   Pulse 86   Ht 5' 9" (1.753 m)   Wt 88.9 kg (196 lb)   BMI 28.94 kg/m²     GENERAL: He is alert, oriented, no apparent distress. Affect within normal limits.    HEENT: Conjunctivae anicteric. Tympanic membranes clear.  . .    NECK: Supple. No cervical lymphadenopathy, no thyroid enlargement.    Respiratory: Effort normal. Lungs are clear to auscultation.    HEART: Regular rate and rhythm without murmurs, gallops or rubs. No lower extremity edema.    ABDOMEN: soft, non distended, non tender, bowel sounds present, no hepatosplenomgaly      Labs 2/19/18 reviewed - A1C 6.8, CBC, CMP acceptable except ALT 64        ASSESSMENT AND PLAN:    1. Recurrent UTI -asx. Saw urology 10/23/17. S/p ESWL  3. Hypertension - Continue current medications  3. Diabetes mellitus. Doing well.    4. Hyperlipidemia. on Zocor.    5. History of reflux and peptic ulcer disease -asx. Off pantoprazole  6. Tubular adenomas - colonoscopy 12/2016 - one polyp - due 2021  7. History of glaucoma - not on treatment - pressure in eyes ok - followed Nile Avina and retinal specialist at [facility]  8. History of nephrolithiasis -asymptomatic.    9. Irregular heart beat sensation - holter revealed PVC  10. Pancreatitis - resolved. Do not suspect malabsorption as cause of weight loss as he is not having diarrhea.   11. 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 Anxiety and depression -take mirtazapine 1/2 tablet at bedtime as needed  13. Occasional chest pain =- stress echo  I will " see him back in 3 months, sooner if problems arise.          Colonoscopy 12/7/16 - one hyperplastic polyp due in 2021  Influenza 9/7/16 - declines  Pneumovax 7/2014  Zostavax 5/16  Prevnar  12/14/16  PSA 1/16

## 2018-02-26 ENCOUNTER — PATIENT MESSAGE (OUTPATIENT)
Dept: INTERNAL MEDICINE | Facility: CLINIC | Age: 68
End: 2018-02-26

## 2018-02-28 ENCOUNTER — HOSPITAL ENCOUNTER (OUTPATIENT)
Dept: CARDIOLOGY | Facility: CLINIC | Age: 68
Discharge: HOME | End: 2018-02-28
Attending: INTERNAL MEDICINE
Payer: MEDICARE

## 2018-02-28 DIAGNOSIS — R07.9 CHEST PAIN, UNSPECIFIED TYPE: ICD-10-CM

## 2018-02-28 LAB
DIASTOLIC DYSFUNCTION: NO
RETIRED EF AND QEF - SEE NOTES: 65 (ref 55–65)

## 2018-02-28 PROCEDURE — 93351 STRESS TTE COMPLETE: CPT | Mod: S$GLB,,, | Performed by: INTERNAL MEDICINE

## 2018-02-28 PROCEDURE — 93321 DOPPLER ECHO F-UP/LMTD STD: CPT | Mod: S$GLB,,, | Performed by: INTERNAL MEDICINE

## 2018-03-18 DIAGNOSIS — N41.0 ACUTE PROSTATITIS: ICD-10-CM

## 2018-03-18 DIAGNOSIS — N30.00 ACUTE CYSTITIS WITHOUT HEMATURIA: ICD-10-CM

## 2018-03-19 RX ORDER — FINASTERIDE 5 MG/1
TABLET, FILM COATED ORAL
Qty: 30 TABLET | Refills: 0 | Status: SHIPPED | OUTPATIENT
Start: 2018-03-19 | End: 2018-04-19 | Stop reason: SDUPTHER

## 2018-04-19 DIAGNOSIS — N41.0 ACUTE PROSTATITIS: ICD-10-CM

## 2018-04-19 DIAGNOSIS — N30.00 ACUTE CYSTITIS WITHOUT HEMATURIA: ICD-10-CM

## 2018-04-19 RX ORDER — FINASTERIDE 5 MG/1
TABLET, FILM COATED ORAL
Qty: 30 TABLET | Refills: 0 | Status: SHIPPED | OUTPATIENT
Start: 2018-04-19 | End: 2018-05-18 | Stop reason: SDUPTHER

## 2018-05-18 DIAGNOSIS — N41.0 ACUTE PROSTATITIS: ICD-10-CM

## 2018-05-18 DIAGNOSIS — N30.00 ACUTE CYSTITIS WITHOUT HEMATURIA: ICD-10-CM

## 2018-05-18 RX ORDER — FINASTERIDE 5 MG/1
TABLET, FILM COATED ORAL
Qty: 30 TABLET | Refills: 0 | Status: SHIPPED | OUTPATIENT
Start: 2018-05-18 | End: 2018-06-19 | Stop reason: SDUPTHER

## 2018-05-21 ENCOUNTER — PATIENT MESSAGE (OUTPATIENT)
Dept: INTERNAL MEDICINE | Facility: CLINIC | Age: 68
End: 2018-05-21

## 2018-05-24 ENCOUNTER — PATIENT MESSAGE (OUTPATIENT)
Dept: INTERNAL MEDICINE | Facility: CLINIC | Age: 68
End: 2018-05-24

## 2018-05-31 DIAGNOSIS — E11.9 TYPE 2 DIABETES MELLITUS WITHOUT COMPLICATION: ICD-10-CM

## 2018-06-13 ENCOUNTER — PATIENT MESSAGE (OUTPATIENT)
Dept: INTERNAL MEDICINE | Facility: CLINIC | Age: 68
End: 2018-06-13

## 2018-06-15 ENCOUNTER — OFFICE VISIT (OUTPATIENT)
Dept: INTERNAL MEDICINE | Facility: CLINIC | Age: 68
End: 2018-06-15
Payer: MEDICARE

## 2018-06-15 ENCOUNTER — PATIENT MESSAGE (OUTPATIENT)
Dept: INTERNAL MEDICINE | Facility: CLINIC | Age: 68
End: 2018-06-15

## 2018-06-15 ENCOUNTER — LAB VISIT (OUTPATIENT)
Dept: LAB | Facility: HOSPITAL | Age: 68
End: 2018-06-15
Attending: INTERNAL MEDICINE
Payer: MEDICARE

## 2018-06-15 VITALS
SYSTOLIC BLOOD PRESSURE: 134 MMHG | OXYGEN SATURATION: 97 % | WEIGHT: 196 LBS | DIASTOLIC BLOOD PRESSURE: 86 MMHG | BODY MASS INDEX: 29.03 KG/M2 | HEIGHT: 69 IN | HEART RATE: 86 BPM

## 2018-06-15 DIAGNOSIS — E11.9 TYPE 2 DIABETES MELLITUS WITHOUT COMPLICATION, WITHOUT LONG-TERM CURRENT USE OF INSULIN: ICD-10-CM

## 2018-06-15 DIAGNOSIS — R22.43 LOCALIZED SWELLING OF BOTH LOWER LEGS: Primary | ICD-10-CM

## 2018-06-15 DIAGNOSIS — R22.43 LOCALIZED SWELLING OF BOTH LOWER LEGS: ICD-10-CM

## 2018-06-15 DIAGNOSIS — Z01.811 PRE-OP CHEST EXAM: ICD-10-CM

## 2018-06-15 DIAGNOSIS — E78.5 HYPERLIPIDEMIA, UNSPECIFIED HYPERLIPIDEMIA TYPE: ICD-10-CM

## 2018-06-15 DIAGNOSIS — I10 ESSENTIAL HYPERTENSION: ICD-10-CM

## 2018-06-15 LAB
ALBUMIN SERPL BCP-MCNC: 4.3 G/DL
ALP SERPL-CCNC: 82 U/L
ALT SERPL W/O P-5'-P-CCNC: 37 U/L
ANION GAP SERPL CALC-SCNC: 10 MMOL/L
AST SERPL-CCNC: 28 U/L
BILIRUB SERPL-MCNC: 0.2 MG/DL
BILIRUB SERPL-MCNC: NORMAL MG/DL
BLOOD URINE, POC: NORMAL
BUN SERPL-MCNC: 12 MG/DL
CALCIUM SERPL-MCNC: 10 MG/DL
CHLORIDE SERPL-SCNC: 106 MMOL/L
CO2 SERPL-SCNC: 24 MMOL/L
COLOR, POC UA: NORMAL
CREAT SERPL-MCNC: 0.9 MG/DL
EST. GFR  (AFRICAN AMERICAN): >60 ML/MIN/1.73 M^2
EST. GFR  (NON AFRICAN AMERICAN): >60 ML/MIN/1.73 M^2
GLUCOSE SERPL-MCNC: 133 MG/DL
GLUCOSE UR QL STRIP: NORMAL
KETONES UR QL STRIP: NORMAL
LEUKOCYTE ESTERASE URINE, POC: NORMAL
NITRITE, POC UA: NORMAL
PH, POC UA: 5
POTASSIUM SERPL-SCNC: 4.6 MMOL/L
PROT SERPL-MCNC: 7.5 G/DL
PROTEIN, POC: 30
SODIUM SERPL-SCNC: 140 MMOL/L
SPECIFIC GRAVITY, POC UA: 1.02
UROBILINOGEN, POC UA: NORMAL

## 2018-06-15 PROCEDURE — 99999 PR PBB SHADOW E&M-EST. PATIENT-LVL IV: CPT | Mod: PBBFAC,,, | Performed by: NURSE PRACTITIONER

## 2018-06-15 PROCEDURE — 3075F SYST BP GE 130 - 139MM HG: CPT | Mod: CPTII,S$GLB,, | Performed by: NURSE PRACTITIONER

## 2018-06-15 PROCEDURE — 99213 OFFICE O/P EST LOW 20 MIN: CPT | Mod: 25,S$GLB,, | Performed by: NURSE PRACTITIONER

## 2018-06-15 PROCEDURE — 3079F DIAST BP 80-89 MM HG: CPT | Mod: CPTII,S$GLB,, | Performed by: NURSE PRACTITIONER

## 2018-06-15 PROCEDURE — 80053 COMPREHEN METABOLIC PANEL: CPT | Mod: 91

## 2018-06-15 PROCEDURE — 3044F HG A1C LEVEL LT 7.0%: CPT | Mod: CPTII,S$GLB,, | Performed by: NURSE PRACTITIONER

## 2018-06-15 PROCEDURE — 81002 URINALYSIS NONAUTO W/O SCOPE: CPT | Mod: S$GLB,,, | Performed by: NURSE PRACTITIONER

## 2018-06-15 PROCEDURE — 86901 BLOOD TYPING SEROLOGIC RH(D): CPT | Mod: 91

## 2018-06-15 NOTE — PROGRESS NOTES
INTERNAL MEDICINE URGENT CARE NOTE    CHIEF COMPLAINT     Chief Complaint   Patient presents with    Foot Swelling     x8 days, trip to [facility]     Tony Han is a 68 y.o. male with lumbar disc disease, HTN, HLD, BPH, DM, GERD, insomnia, and colon adenoma who presents for an urgent visit today.  He is an established pt of Dr. Benitez.     Here w/ c/o foot swelling bilateral x 8 days. Started with swelling after arriving in [place] on June 7th. Flew to [place] and reports increased car travel when there. Used sea salt (only dietary change). Elevated legs with minimal improvement.   Denies pain and redness.    Compliant with metoprolol, amlodipine, and losartan   Swelling improves after elevation throughout the night but worsens throughout the day     Past Medical History:  Past Medical History:   Diagnosis Date    Back pain     Cluster headaches     1999    Colon polyps     5 polyps in Dec 2010    Coronary artery disease 11/2011    moderate nonobstructive cad on left heart cath at [facility] 11/2011    Diabetes mellitus     GERD (gastroesophageal reflux disease)     history of peptic ulcer disease    Hyperlipidemia     Hypertension     Lumbar disc disease     MRI 2008 - L5-S1    Nephrolithiasis     July 2013       Home Medications:  Prior to Admission medications    Medication Sig Start Date End Date Taking? Authorizing Provider   amLODIPine (NORVASC) 10 MG tablet TAKE 1 TABLET(10 MG) BY MOUTH EVERY DAY 1/18/18  Yes Elizabeth Benitez MD   aspirin 81 MG Chew Take 1 tablet (81 mg total) by mouth once daily. 9/28/14  Yes Elizabeth Benitez MD   blood sugar diagnostic (CONTOUR TEST STRIPS) Strp CHECK BLOOD SUGAR ONCE A DAY 1/31/17  Yes Elizabeth Benitez MD   finasteride (PROSCAR) 5 mg tablet TAKE 1 TABLET(5 MG) BY MOUTH EVERY DAY 5/18/18  Yes Mehdi Dexter MD   lancets Misc Malu Contour lancets - use one lancet once daily 1/31/17  Yes Elizabeth Benitez MD   losartan (COZAAR) 25 MG tablet  Take 1 tablet (25 mg total) by mouth once daily. 10/27/17  Yes Elizabeth Benitez MD   metFORMIN (GLUCOPHAGE-XR) 500 MG 24 hr tablet TAKE 2 TABLETS BY MOUTH TWICE DAILY. 1/19/18  Yes Elizabeth Benitez MD   metoprolol succinate (TOPROL-XL) 25 MG 24 hr tablet TAKE 1 TABLET(25 MG) BY MOUTH EVERY DAY 1/18/18  Yes Elizabeth Benitez MD   mirtazapine (REMERON) 15 MG tablet TAKE ONE HALF OF A TABLET FOR 8 DAYS, THEN TAKE ONE TABLET IN THE EVENING WITH THE REST OF THE TABLETS 7/26/17  Yes Elizabeth Benitez MD   MULTIVITS-MINERALS/FA/LYCOPENE (ONE-A-DAY MEN'S ORAL) Take 1 tablet by mouth once daily.   Yes Historical Provider, MD   simvastatin (ZOCOR) 20 MG tablet TAKE 1 TABLET(20 MG) BY MOUTH EVERY EVENING 2/5/18  Yes Elizabeth Benitez MD   tamsulosin (FLOMAX) 0.4 mg Cp24 Take 1 capsule (0.4 mg total) by mouth once daily. 10/23/17 10/23/18 Yes Mehdi Dexter MD   vitamin D 1000 units Tab Take 1,000 Units by mouth once daily. Take 1 tablet daily   Yes Historical Provider, MD   hydrocodone-acetaminophen 7.5-325mg (NORCO) 7.5-325 mg per tablet TK 1 T PO TID 7/11/17   Historical Provider, MD   finasteride (PROSCAR) 5 mg tablet Take 1 tablet (5 mg total) by mouth once daily. 10/23/17 6/15/18  Mehdi Dexter MD   metformin (GLUCOPHAGE-XR) 500 MG 24 hr tablet Take 2 tablets (1,000 mg total) by mouth 2 (two) times daily. 12/14/16 6/15/18  Elizabeth Benitez MD       Review of Systems:  Review of Systems   Constitutional: Negative for chills, diaphoresis and unexpected weight change.   Respiratory: Positive for shortness of breath (minimal with exertion, no change from baseline. ). Negative for cough and wheezing.    Cardiovascular: Negative for chest pain and palpitations.   Gastrointestinal: Negative for abdominal pain, blood in stool, constipation, diarrhea, nausea and vomiting.   Skin: Negative for rash.   Neurological: Negative for dizziness, light-headedness and headaches.       Health Maintainence:  "  Immunizations:  Health Maintenance       Date Due Completion Date    Urine Microalbumin 03/22/2018 3/22/2017    Foot Exam 06/13/2018 6/13/2017    Override on 6/13/2017: Done    PROSTATE-SPECIFIC ANTIGEN 07/14/2018 7/14/2017    Lipid Panel 07/14/2018 7/14/2017    Influenza Vaccine 08/01/2018 9/7/2016    Hemoglobin A1c 08/19/2018 2/19/2018    Low Dose Statin 02/21/2019 2/21/2018    Eye Exam 02/21/2019 2/21/2018 (Done)    Override on 2/21/2018: Done (Sees Dr Estrella and retinal specialist at [facility])    Override on 2/13/2017: Done (Nile Avina)    Override on 1/16/2017: Done (Dr Jaime Rosenbaum)    Override on 7/10/2014: Done    Colonoscopy 12/07/2021 12/7/2016    TETANUS VACCINE 10/09/2027 10/9/2017    Override on 12/14/2016: Declined           PHYSICAL EXAM     /86 (BP Location: Left arm, Patient Position: Sitting, BP Method: Large (Manual))   Pulse 86   Ht 5' 9" (1.753 m)   Wt 88.9 kg (195 lb 15.8 oz)   SpO2 97%   BMI 28.94 kg/m²     Physical Exam   Constitutional: He is oriented to person, place, and time. He appears well-developed and well-nourished.   HENT:   Head: Normocephalic.   Right Ear: External ear normal.   Left Ear: External ear normal.   Nose: Nose normal.   Mouth/Throat: Oropharynx is clear and moist. No oropharyngeal exudate.   Eyes: Pupils are equal, round, and reactive to light.   Neck: No JVD present. No tracheal deviation present. No thyromegaly present.   Cardiovascular: Normal rate, regular rhythm and intact distal pulses.  Exam reveals no gallop and no friction rub.    No murmur heard.  Pulmonary/Chest: Breath sounds normal. No respiratory distress. He has no wheezes. He has no rales. He exhibits no tenderness.   Abdominal: Soft. Bowel sounds are normal. He exhibits no distension. There is no tenderness.   Musculoskeletal: Normal range of motion. He exhibits edema (trace - 1+ to the ankles and lower legs ). He exhibits no tenderness.   Lymphadenopathy:     He has no cervical " adenopathy.   Neurological: He is alert and oriented to person, place, and time.   Skin: Skin is warm and dry. No rash noted.   Psychiatric: He has a normal mood and affect. His behavior is normal.   Vitals reviewed.      LABS     Lab Results   Component Value Date    HGBA1C 6.8 (H) 02/19/2018     CMP  Sodium   Date Value Ref Range Status   02/19/2018 140 136 - 145 mmol/L Final     Potassium   Date Value Ref Range Status   02/19/2018 4.4 3.5 - 5.1 mmol/L Final     Chloride   Date Value Ref Range Status   02/19/2018 106 95 - 110 mmol/L Final     CO2   Date Value Ref Range Status   02/19/2018 24 23 - 29 mmol/L Final     Glucose   Date Value Ref Range Status   02/19/2018 136 (H) 70 - 110 mg/dL Final     BUN, Bld   Date Value Ref Range Status   02/19/2018 14 8 - 23 mg/dL Final     Creatinine   Date Value Ref Range Status   02/19/2018 0.9 0.5 - 1.4 mg/dL Final     Calcium   Date Value Ref Range Status   02/19/2018 9.9 8.7 - 10.5 mg/dL Final     Total Protein   Date Value Ref Range Status   02/19/2018 7.6 6.0 - 8.4 g/dL Final     Albumin   Date Value Ref Range Status   02/19/2018 4.4 3.5 - 5.2 g/dL Final     Total Bilirubin   Date Value Ref Range Status   02/19/2018 0.4 0.1 - 1.0 mg/dL Final     Comment:     For infants and newborns, interpretation of results should be based  on gestational age, weight and in agreement with clinical  observations.  Premature Infant recommended reference ranges:  Up to 24 hours.............<8.0 mg/dL  Up to 48 hours............<12.0 mg/dL  3-5 days..................<15.0 mg/dL  6-29 days.................<15.0 mg/dL       Alkaline Phosphatase   Date Value Ref Range Status   02/19/2018 72 55 - 135 U/L Final     AST   Date Value Ref Range Status   02/19/2018 37 10 - 40 U/L Final     ALT   Date Value Ref Range Status   02/19/2018 64 (H) 10 - 44 U/L Final     Anion Gap   Date Value Ref Range Status   02/19/2018 10 8 - 16 mmol/L Final     eGFR if    Date Value Ref Range Status    02/19/2018 >60 >60 mL/min/1.73 m^2 Final     eGFR if non    Date Value Ref Range Status   02/19/2018 >60 >60 mL/min/1.73 m^2 Final     Comment:     Calculation used to obtain the estimated glomerular filtration  rate (eGFR) is the CKD-EPI equation.        Lab Results   Component Value Date    WBC 8.08 02/19/2018    HGB 13.2 (L) 02/19/2018    HCT 39.7 (L) 02/19/2018     (H) 02/19/2018     02/19/2018     Lab Results   Component Value Date    CHOL 142 07/14/2017    CHOL 112 (L) 03/15/2017    CHOL 125 11/19/2015     Lab Results   Component Value Date    HDL 47 07/14/2017    HDL 28 (L) 03/15/2017    HDL 24 (L) 11/19/2015     Lab Results   Component Value Date    LDLCALC 56.0 (L) 07/14/2017    LDLCALC 37.8 (L) 03/15/2017    LDLCALC 73.0 11/19/2015     Lab Results   Component Value Date    TRIG 195 (H) 07/14/2017    TRIG 231 (H) 03/15/2017    TRIG 140 11/19/2015     Lab Results   Component Value Date    CHOLHDL 33.1 07/14/2017    CHOLHDL 25.0 03/15/2017    CHOLHDL 19.2 (L) 11/19/2015     Lab Results   Component Value Date    TSH 0.477 07/14/2017       ASSESSMENT/PLAN     Tony Han is a 68 y.o. male with  Past Medical History:   Diagnosis Date    Back pain     Cluster headaches     1999    Colon polyps     5 polyps in Dec 2010    Coronary artery disease 11/2011    moderate nonobstructive cad on left heart cath at [facility] 11/2011    Diabetes mellitus     GERD (gastroesophageal reflux disease)     history of peptic ulcer disease    Hyperlipidemia     Hypertension     Lumbar disc disease     MRI 2008 - L5-S1    Nephrolithiasis     July 2013     Localized swelling of both lower legs- BP controlled. Echo 2/2018 with 60-65% EF. Will check u/a, microalbumin, cmp today. Dependent edema- advised low na, elevate legs and wear compression stockings.   -     POCT urine dipstick without microscope    Essential hypertension- at goal. Cont current meds.     Hyperlipidemia, unspecified  hyperlipidemia type- stable. Cont current meds.     Type 2 diabetes mellitus without complication, without long-term current use of insulin- Last a1c 2/19/2018 6.8. Will repeat today. Cont current meds.       Follow up as needed and with PCP     Patient education provided from [facility]. Patient was counseled on when and how to seek emergent care.       Bernie PIPER, LALIT, FNP-c   Department of Internal Medicine - [facility]  10:29 AM

## 2018-06-19 DIAGNOSIS — N30.00 ACUTE CYSTITIS WITHOUT HEMATURIA: ICD-10-CM

## 2018-06-19 DIAGNOSIS — N41.0 ACUTE PROSTATITIS: ICD-10-CM

## 2018-06-19 LAB
ABO + RH BLD: NORMAL
BLD GP AB SCN CELLS X3 SERPL QL: NORMAL

## 2018-06-19 RX ORDER — FINASTERIDE 5 MG/1
TABLET, FILM COATED ORAL
Qty: 30 TABLET | Refills: 0 | Status: SHIPPED | OUTPATIENT
Start: 2018-06-19 | End: 2018-07-18 | Stop reason: SDUPTHER

## 2018-06-27 ENCOUNTER — OFFICE VISIT (OUTPATIENT)
Dept: INTERNAL MEDICINE | Facility: CLINIC | Age: 68
End: 2018-06-27
Payer: MEDICARE

## 2018-06-27 VITALS
HEIGHT: 69 IN | BODY MASS INDEX: 29.12 KG/M2 | HEART RATE: 75 BPM | WEIGHT: 196.63 LBS | OXYGEN SATURATION: 99 % | SYSTOLIC BLOOD PRESSURE: 134 MMHG | DIASTOLIC BLOOD PRESSURE: 70 MMHG

## 2018-06-27 DIAGNOSIS — N40.0 BENIGN PROSTATIC HYPERPLASIA WITHOUT LOWER URINARY TRACT SYMPTOMS: ICD-10-CM

## 2018-06-27 DIAGNOSIS — E78.5 HYPERLIPIDEMIA, UNSPECIFIED HYPERLIPIDEMIA TYPE: ICD-10-CM

## 2018-06-27 DIAGNOSIS — M51.9 LUMBAR DISC DISEASE: ICD-10-CM

## 2018-06-27 DIAGNOSIS — I10 ESSENTIAL HYPERTENSION: ICD-10-CM

## 2018-06-27 DIAGNOSIS — E13.9 DIABETES MELLITUS DUE TO ABNORMAL INSULIN: Primary | ICD-10-CM

## 2018-06-27 PROCEDURE — 3075F SYST BP GE 130 - 139MM HG: CPT | Mod: CPTII,S$GLB,, | Performed by: INTERNAL MEDICINE

## 2018-06-27 PROCEDURE — 3044F HG A1C LEVEL LT 7.0%: CPT | Mod: CPTII,S$GLB,, | Performed by: INTERNAL MEDICINE

## 2018-06-27 PROCEDURE — 3078F DIAST BP <80 MM HG: CPT | Mod: CPTII,S$GLB,, | Performed by: INTERNAL MEDICINE

## 2018-06-27 PROCEDURE — 99214 OFFICE O/P EST MOD 30 MIN: CPT | Mod: S$GLB,,, | Performed by: INTERNAL MEDICINE

## 2018-06-27 PROCEDURE — 99999 PR PBB SHADOW E&M-EST. PATIENT-LVL II: CPT | Mod: PBBFAC,,, | Performed by: INTERNAL MEDICINE

## 2018-06-27 NOTE — PROGRESS NOTES
CHIEF COMPLAINT: Follow up of hypertension, diabetes, recurrent UTI, hyperlipidemia.      HISTORY OF PRESENT ILLNESS: This is a 67-year-old man who presents for follow up of above. Weight is stable.      Mood has been good.He took mirtazapine 15 mg 1/2 tablet at bedtime as needed when he has trouble sleeping.  Has not needed.  No depression.  HE sleeps 5.5 to 6 hours a day. He likes staying up at night watching TV.     HE has recurrent UTI.   He had lithotripsy on 3/23/17. No dysuria, hematuria.   He saw Dr cerna on 7/24/17. He still has a stone in the left kidney. He is on finasteride and tamsulosin for his prostate.      He has not had any alcohol and has not had a recurrence of pancreatitis. No nausea, vomiting, constipation, diarrhea, melana, bloody stools.     He is taking amlodipine 10 mg once daily, Toprol-XL 25 mg once daily and losartan 25 mg daily. No chest pain, shortness of breath. He is now taking metformin  mg 2 tablets twice daily. Blood sugar this morning fasting was 116. No polydipsia. He has a history of hyperlipidemia, currently on Zocor 20 mg daily. No joint pain or muscle pain. NO shortness breath, headaches, vision, sinus congestion. He notes an occasional chest pain - very vague. He wants to chech his heart out     Back pain is the same.  HE has pain with prolonged sitting or standing. HE saw Dr Carrasquillo. He is taking hydrocodone apap 7.5/325 one to two times a day. He used to see Dr Mcdaniel for his back. HE retired. He had a MRI 1/18/17 through DR Pedroza who he did not like.          He smokes 1-2 packs per day.       PAST MEDICAL HISTORY:    1. Hypertension.    2. Coronary artery disease with left heart catheterization in November 2011 with 40 to 50% narrowing in the mid LAD.    3. Diabetes mellitus.    4. Hyperlipidemia.    5. History of reflux.    6. Colon polyps.    7. History of cluster headaches.    8. History of glaucoma.    9. Lumbar disk disease at L5 to S1.    10. History of  "stomach ulcers.    11. Nephrolithiasis.       PAST SURGICAL HISTORY: Cholecystectomy in May 2013, ORIF of the left humerus.      SOCIAL HISTORY: Currently smokes a pack of cigarettes daily, has smoked his whole life. 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 He is a retired schoolteacher.    FAMILY HISTORY: Updated on [facility].      PHYSICAL EXAMINATION:      /70   Pulse 75   Ht 5' 9" (1.753 m)   Wt 89.2 kg (196 lb 9.6 oz)   SpO2 99%   BMI 29.03 kg/m²     GENERAL: He is alert, oriented, no apparent distress. Affect within normal limits.    HEENT: Conjunctivae anicteric. Tympanic membranes clear.  . .    NECK: Supple. No cervical lymphadenopathy, no thyroid enlargement.    Respiratory: Effort normal. Lungs are clear to auscultation.    HEART: Regular rate and rhythm without murmurs, gallops or rubs. No lower extremity edema.    ABDOMEN: soft, non distended, non tender, bowel sounds present, no hepatosplenomgaly     Protective Sensation (w/ 10 gram monofilament):  Right: Decreased  Left: Decreased    Visual Inspection:  Dry Skin -  Bilateral    Pedal Pulses:   Right: Present  Left: Present    Posterior tibialis:   Right:Present  Left: Present         Labs 6/15/18 reviewed - A1C 6.9, CBC, CMP, TSH acceptable        ASSESSMENT AND PLAN:    1. Recurrent UTI -asx. Saw urology 10/23/17. S/p ESWL  3. Hypertension - Continue current medications  3. Diabetes mellitus. Doing well.    4. Hyperlipidemia. on Zocor.    5. History of reflux and peptic ulcer disease -asx. Off pantoprazole  6. Tubular adenomas - colonoscopy 12/2016 - one polyp - due 2021  7. History of glaucoma - not on treatment - pressure in eyes ok - followed Nile Avina and retinal specialist at [facility]  8. History of nephrolithiasis -asymptomatic.    9. Irregular heart beat sensation - holter revealed PVC  10. Pancreatitis - resolved. Do not suspect malabsorption as cause of weight loss as he is not having diarrhea.   11. Possible " splenic vein thrombosis - US of spleen fine   12. Anxiety and depression -take mirtazapine 1/2 tablet at bedtime as needed  13.   I will see him back in 3 months, sooner if problems arise.          Colonoscopy 12/7/16 - one hyperplastic polyp due in 2021  Influenza 9/7/16 - declines  Pneumovax 7/2014  Zostavax 5/16  Prevnar  12/14/16  PSA 1/16

## 2018-07-18 DIAGNOSIS — N30.00 ACUTE CYSTITIS WITHOUT HEMATURIA: ICD-10-CM

## 2018-07-18 DIAGNOSIS — N41.0 ACUTE PROSTATITIS: ICD-10-CM

## 2018-07-18 RX ORDER — FINASTERIDE 5 MG/1
TABLET, FILM COATED ORAL
Qty: 30 TABLET | Refills: 0 | Status: SHIPPED | OUTPATIENT
Start: 2018-07-18 | End: 2018-07-23 | Stop reason: SDUPTHER

## 2018-07-23 ENCOUNTER — OFFICE VISIT (OUTPATIENT)
Dept: UROLOGY | Facility: CLINIC | Age: 68
End: 2018-07-23
Attending: UROLOGY
Payer: MEDICARE

## 2018-07-23 VITALS
DIASTOLIC BLOOD PRESSURE: 76 MMHG | SYSTOLIC BLOOD PRESSURE: 155 MMHG | BODY MASS INDEX: 29.12 KG/M2 | HEIGHT: 69 IN | HEART RATE: 83 BPM | WEIGHT: 196.63 LBS

## 2018-07-23 DIAGNOSIS — N40.1 BPH WITH OBSTRUCTION/LOWER URINARY TRACT SYMPTOMS: Primary | ICD-10-CM

## 2018-07-23 DIAGNOSIS — N41.0 ACUTE PROSTATITIS: ICD-10-CM

## 2018-07-23 DIAGNOSIS — N20.0 NEPHROLITHIASIS: ICD-10-CM

## 2018-07-23 DIAGNOSIS — N39.0 RECURRENT UTI: ICD-10-CM

## 2018-07-23 DIAGNOSIS — N13.8 BPH WITH OBSTRUCTION/LOWER URINARY TRACT SYMPTOMS: Primary | ICD-10-CM

## 2018-07-23 DIAGNOSIS — N30.00 ACUTE CYSTITIS WITHOUT HEMATURIA: ICD-10-CM

## 2018-07-23 PROCEDURE — 51798 US URINE CAPACITY MEASURE: CPT | Mod: S$GLB,,, | Performed by: UROLOGY

## 2018-07-23 PROCEDURE — 3077F SYST BP >= 140 MM HG: CPT | Mod: CPTII,S$GLB,, | Performed by: UROLOGY

## 2018-07-23 PROCEDURE — 99214 OFFICE O/P EST MOD 30 MIN: CPT | Mod: 25,S$GLB,, | Performed by: UROLOGY

## 2018-07-23 PROCEDURE — 81002 URINALYSIS NONAUTO W/O SCOPE: CPT | Mod: S$GLB,,, | Performed by: UROLOGY

## 2018-07-23 PROCEDURE — 3078F DIAST BP <80 MM HG: CPT | Mod: CPTII,S$GLB,, | Performed by: UROLOGY

## 2018-07-23 RX ORDER — TAMSULOSIN HYDROCHLORIDE 0.4 MG/1
0.4 CAPSULE ORAL DAILY
Qty: 30 CAPSULE | Refills: 11 | Status: SHIPPED | OUTPATIENT
Start: 2018-07-23 | End: 2019-01-21 | Stop reason: SDUPTHER

## 2018-07-23 RX ORDER — FINASTERIDE 5 MG/1
TABLET, FILM COATED ORAL
Qty: 30 TABLET | Refills: 11 | Status: SHIPPED | OUTPATIENT
Start: 2018-07-23 | End: 2019-01-21 | Stop reason: SDUPTHER

## 2018-07-23 NOTE — PROGRESS NOTES
"Subjective:      Tony Han is a 68 y.o. male who returns today regarding his recurrent UTI symptoms.     He was initially seen in late 2016 for several months of near constant UTI symptoms with multiple courses of abx.      He had workup with primarily finding of left renal stone. He is now s/p left ESWL on 3/23/17.     He was last treated for UTI by ER on 4/9/17. He opted for prophylactic abx at that time and started daily Keflex, which he completed in October 2017.    He has remained UTI free in the interim.    He remains on flomax and finasteride for BPH. Is bothered by sexual side effects.    He continues to have LUTS with AUASS 13/3.    The following portions of the patient's history were reviewed and updated as appropriate: allergies, current medications, past family history, past medical history, past social history, past surgical history and problem list.    Review of Systems  A comprehensive multipoint review of systems was negative except as otherwise stated in the HPI.     Objective:   Vitals: BP (!) 155/76 (BP Location: Right arm, Patient Position: Sitting, BP Method: Large (Automatic))   Pulse 83   Ht 5' 9" (1.753 m)   Wt 89.2 kg (196 lb 10.4 oz)   BMI 29.04 kg/m²     Physical Exam   General: alert and oriented, no acute distress  Respiratory: Symmetric expansion, non-labored breathing  Neuro: no gross deficits  Psych: normal judgment and insight, normal mood/affect and non-anxious    Bladder Scan PVR: 0cc      Lab Review   Lab Results   Component Value Date    WBC 7.85 06/15/2018    HGB 13.0 (L) 06/15/2018    HCT 38.4 (L) 06/15/2018    MCV 98 06/15/2018     06/15/2018     Lab Results   Component Value Date    CREATININE 0.9 06/15/2018    CREATININE 0.9 06/15/2018    BUN 12 06/15/2018    BUN 12 06/15/2018     Lab Results   Component Value Date    PSA 3.2 03/15/2017    PSADIAG 0.60 07/14/2017     Imaging   None today     Assessment and Plan:   Recurrent UTI  BPH (benign prostatic " hypertrophy) with urinary obstruction  -- Completed 6 mos ppx and has remained UTI free since  -- Continue flomax and finasteride  -- Discussed alternatives to meds for his BPH, including Urolift. Wishes to defer for now.    Nephrolithiasis  -- S/p ESWL  -- Stone still present on KUB and CT, though appearance c/w residual debris  -- Consider further treatment only if UTIs recur (likely will address BPH first)    FU 6 mos

## 2018-07-26 ENCOUNTER — PATIENT MESSAGE (OUTPATIENT)
Dept: INTERNAL MEDICINE | Facility: CLINIC | Age: 68
End: 2018-07-26

## 2018-07-26 LAB
BILIRUB SERPL-MCNC: NORMAL MG/DL
BLOOD URINE, POC: NORMAL
COLOR, POC UA: YELLOW
GLUCOSE UR QL STRIP: 250
KETONES UR QL STRIP: NORMAL
LEUKOCYTE ESTERASE URINE, POC: NORMAL
NITRITE, POC UA: NORMAL
PH, POC UA: 5
POC RESIDUAL URINE VOLUME: 0 ML (ref 0–100)
PROTEIN, POC: 30
SPECIFIC GRAVITY, POC UA: 1.01
UROBILINOGEN, POC UA: NORMAL

## 2018-09-17 DIAGNOSIS — N41.0 ACUTE PROSTATITIS: ICD-10-CM

## 2018-09-17 DIAGNOSIS — N30.00 ACUTE CYSTITIS WITHOUT HEMATURIA: ICD-10-CM

## 2018-09-17 RX ORDER — FINASTERIDE 5 MG/1
TABLET, FILM COATED ORAL
Qty: 30 TABLET | Refills: 0 | Status: SHIPPED | OUTPATIENT
Start: 2018-09-17 | End: 2018-09-18 | Stop reason: SDUPTHER

## 2018-09-18 ENCOUNTER — PATIENT MESSAGE (OUTPATIENT)
Dept: UROLOGY | Facility: CLINIC | Age: 68
End: 2018-09-18

## 2018-09-18 RX ORDER — FINASTERIDE 5 MG/1
TABLET, FILM COATED ORAL
Qty: 30 TABLET | Refills: 11 | Status: CANCELLED | OUTPATIENT
Start: 2018-09-18

## 2018-09-19 RX ORDER — FINASTERIDE 5 MG/1
5 TABLET, FILM COATED ORAL DAILY
Qty: 90 TABLET | Refills: 3 | Status: SHIPPED | OUTPATIENT
Start: 2018-09-19 | End: 2019-01-21

## 2018-10-17 ENCOUNTER — LAB VISIT (OUTPATIENT)
Dept: LAB | Facility: OTHER | Age: 68
End: 2018-10-17
Attending: INTERNAL MEDICINE
Payer: MEDICARE

## 2018-10-17 DIAGNOSIS — E13.9 DIABETES MELLITUS DUE TO ABNORMAL INSULIN: ICD-10-CM

## 2018-10-17 LAB
ALBUMIN SERPL BCP-MCNC: 4.5 G/DL
ALP SERPL-CCNC: 65 U/L
ALT SERPL W/O P-5'-P-CCNC: 42 U/L
ANION GAP SERPL CALC-SCNC: 12 MMOL/L
AST SERPL-CCNC: 36 U/L
BASOPHILS # BLD AUTO: 0.03 K/UL
BASOPHILS NFR BLD: 0.5 %
BILIRUB SERPL-MCNC: 0.2 MG/DL
BUN SERPL-MCNC: 14 MG/DL
CALCIUM SERPL-MCNC: 10.2 MG/DL
CHLORIDE SERPL-SCNC: 106 MMOL/L
CHOLEST SERPL-MCNC: 112 MG/DL
CHOLEST/HDLC SERPL: 3.5 {RATIO}
CO2 SERPL-SCNC: 22 MMOL/L
CREAT SERPL-MCNC: 0.9 MG/DL
DIFFERENTIAL METHOD: ABNORMAL
EOSINOPHIL # BLD AUTO: 0.4 K/UL
EOSINOPHIL NFR BLD: 6.1 %
ERYTHROCYTE [DISTWIDTH] IN BLOOD BY AUTOMATED COUNT: 13.5 %
EST. GFR  (AFRICAN AMERICAN): >60 ML/MIN/1.73 M^2
EST. GFR  (NON AFRICAN AMERICAN): >60 ML/MIN/1.73 M^2
ESTIMATED AVG GLUCOSE: 140 MG/DL
GLUCOSE SERPL-MCNC: 110 MG/DL
HBA1C MFR BLD HPLC: 6.5 %
HCT VFR BLD AUTO: 40.5 %
HDLC SERPL-MCNC: 32 MG/DL
HDLC SERPL: 28.6 %
HGB BLD-MCNC: 13.5 G/DL
LDLC SERPL CALC-MCNC: 17.6 MG/DL
LYMPHOCYTES # BLD AUTO: 3.4 K/UL
LYMPHOCYTES NFR BLD: 51.7 %
MCH RBC QN AUTO: 32.8 PG
MCHC RBC AUTO-ENTMCNC: 33.3 G/DL
MCV RBC AUTO: 99 FL
MONOCYTES # BLD AUTO: 0.3 K/UL
MONOCYTES NFR BLD: 5.2 %
NEUTROPHILS # BLD AUTO: 2.4 K/UL
NEUTROPHILS NFR BLD: 36.3 %
NONHDLC SERPL-MCNC: 80 MG/DL
PLATELET # BLD AUTO: 311 K/UL
PMV BLD AUTO: 9.3 FL
POTASSIUM SERPL-SCNC: 4.4 MMOL/L
PROT SERPL-MCNC: 7.7 G/DL
RBC # BLD AUTO: 4.11 M/UL
SODIUM SERPL-SCNC: 140 MMOL/L
TRIGL SERPL-MCNC: 312 MG/DL
TSH SERPL DL<=0.005 MIU/L-ACNC: 0.97 UIU/ML
WBC # BLD AUTO: 6.56 K/UL

## 2018-10-17 PROCEDURE — 80061 LIPID PANEL: CPT

## 2018-10-17 PROCEDURE — 85025 COMPLETE CBC W/AUTO DIFF WBC: CPT

## 2018-10-17 PROCEDURE — 84443 ASSAY THYROID STIM HORMONE: CPT

## 2018-10-17 PROCEDURE — 36415 COLL VENOUS BLD VENIPUNCTURE: CPT

## 2018-10-17 PROCEDURE — 80053 COMPREHEN METABOLIC PANEL: CPT

## 2018-10-17 PROCEDURE — 83036 HEMOGLOBIN GLYCOSYLATED A1C: CPT

## 2018-10-24 ENCOUNTER — OFFICE VISIT (OUTPATIENT)
Dept: INTERNAL MEDICINE | Facility: CLINIC | Age: 68
End: 2018-10-24
Payer: MEDICARE

## 2018-10-24 VITALS
HEIGHT: 69 IN | OXYGEN SATURATION: 99 % | WEIGHT: 195.38 LBS | BODY MASS INDEX: 28.94 KG/M2 | SYSTOLIC BLOOD PRESSURE: 136 MMHG | HEART RATE: 76 BPM | DIASTOLIC BLOOD PRESSURE: 60 MMHG

## 2018-10-24 DIAGNOSIS — N40.0 BENIGN PROSTATIC HYPERPLASIA WITHOUT LOWER URINARY TRACT SYMPTOMS: ICD-10-CM

## 2018-10-24 DIAGNOSIS — I10 ESSENTIAL HYPERTENSION: ICD-10-CM

## 2018-10-24 DIAGNOSIS — K21.9 GASTROESOPHAGEAL REFLUX DISEASE WITHOUT ESOPHAGITIS: ICD-10-CM

## 2018-10-24 DIAGNOSIS — M51.9 LUMBAR DISC DISEASE: ICD-10-CM

## 2018-10-24 DIAGNOSIS — Z00.00 ROUTINE GENERAL MEDICAL EXAMINATION AT A HEALTH CARE FACILITY: Primary | ICD-10-CM

## 2018-10-24 DIAGNOSIS — E13.9 DIABETES MELLITUS DUE TO ABNORMAL INSULIN: ICD-10-CM

## 2018-10-24 DIAGNOSIS — E78.5 HYPERLIPIDEMIA, UNSPECIFIED HYPERLIPIDEMIA TYPE: ICD-10-CM

## 2018-10-24 PROCEDURE — 99999 PR PBB SHADOW E&M-EST. PATIENT-LVL III: CPT | Mod: PBBFAC,,, | Performed by: INTERNAL MEDICINE

## 2018-10-24 PROCEDURE — 99213 OFFICE O/P EST LOW 20 MIN: CPT | Mod: 25,S$PBB,, | Performed by: INTERNAL MEDICINE

## 2018-10-24 PROCEDURE — 3075F SYST BP GE 130 - 139MM HG: CPT | Mod: CPTII,,, | Performed by: INTERNAL MEDICINE

## 2018-10-24 PROCEDURE — 3044F HG A1C LEVEL LT 7.0%: CPT | Mod: CPTII,,, | Performed by: INTERNAL MEDICINE

## 2018-10-24 PROCEDURE — 99213 OFFICE O/P EST LOW 20 MIN: CPT | Mod: PBBFAC | Performed by: INTERNAL MEDICINE

## 2018-10-24 PROCEDURE — 3078F DIAST BP <80 MM HG: CPT | Mod: CPTII,,, | Performed by: INTERNAL MEDICINE

## 2018-10-24 RX ORDER — DIAZEPAM 5 MG/1
5 TABLET ORAL NIGHTLY PRN
Qty: 30 TABLET | Refills: 0 | Status: SHIPPED | OUTPATIENT
Start: 2018-10-24 | End: 2019-06-19 | Stop reason: SDUPTHER

## 2018-10-24 NOTE — PROGRESS NOTES
CHIEF COMPLAINT: Annual exam and Follow up of hypertension, diabetes, recurrent UTI, hyperlipidemia.      HISTORY OF PRESENT ILLNESS: This is a 68-year-old man who presents for follow up of above.     Weight is stable.     He buried his brother 5 days ago. He has not been sleeping well. He would like to have a few valium to help him sleep.  He had been more anxious.  He is not taking mirtazaptine (was taking 15 mg 1/2 tablet at bedtime). He did not like how it made him feel. He felt paralized and had difficulty swallowing.     HE has recurrent UTI.   He had lithotripsy on 3/23/17. No dysuria, hematuria.   He saw Dr cerna on 7/18. He still has a stone in the left kidney. He is on finasteride and tamsulosin for his prostate.      He has not had any alcohol and has not had a recurrence of pancreatitis. No nausea, vomiting, constipation, diarrhea, melana, bloody stools.     He is taking amlodipine 10 mg once daily, Toprol-XL 25 mg once daily and losartan 25 mg daily. No chest pain, shortness of breath. He is now taking metformin  mg 2 tablets twice daily. HE checks his blood sugar twice daily due to fluctuating blood sugars.  No polydipsia. He has a history of hyperlipidemia, currently on Zocor 20 mg daily. No joint pain or muscle pain. NO shortness breath, headaches, vision, sinus congestion.      Back pain is the same.  Hehas pain with prolonged sitting or standing. He saw Dr Carrasquillo. He is taking hydrocodone apap 7.5/325 one to two times a day depending upon his activity. . He used to see Dr Mcdaniel for his back. HE retired. He had a MRI 1/18/17 through DR Pedroza who he did not like.          He smokes 1-2 packs per day.       PAST MEDICAL HISTORY:    1. Hypertension.    2. Coronary artery disease with left heart catheterization in November 2011 with 40 to 50% narrowing in the mid LAD.    3. Diabetes mellitus.    4. Hyperlipidemia.    5. History of reflux.    6. Colon polyps.    7. History of cluster  "headaches.    8. History of glaucoma.    9. Lumbar disk disease at L5 to S1.    10. History of stomach ulcers.    11. Nephrolithiasis.       PAST SURGICAL HISTORY: Cholecystectomy in May 2013, ORIF of the left humerus.      SOCIAL HISTORY: Currently smokes a pack of cigarettes daily, has smoked his whole life. 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 He is a retired schoolteacher.    FAMILY HISTORY: Updated on [facility].      PHYSICAL EXAMINATION:        /60   Pulse 76   Ht 5' 9" (1.753 m)   Wt 88.6 kg (195 lb 6.4 oz)   SpO2 99%   BMI 28.86 kg/m²   GENERAL: He is alert, oriented, no apparent distress. Affect within normal limits.    HEENT: Conjunctivae anicteric. Tympanic membranes clear.  . .    NECK: Supple. No cervical lymphadenopathy, no thyroid enlargement.    Respiratory: Effort normal. Lungs are clear to auscultation.    HEART: Regular rate and rhythm without murmurs, gallops or rubs. No lower extremity edema.    ABDOMEN: soft, non distended, non tender, bowel sounds present, no hepatosplenomgaly         Labs 10/17/18 reviewed - A1C 6.5, CBC, CMP, TSH acceptable, Total chol 112, Trig 312, HDL 32, LDL 12        ASSESSMENT AND PLAN:      Annual exam - discussed diet, exercise and safety issues.      Colonoscopy 12/7/16 - one hyperplastic polyp due in 2021  Influenza 9/7/16 - declines  Pneumovax 7/2014  Zostavax 5/16  Prevnar  12/14/16  PSA 7/17        Other medical problems discussed at this visit. Billing will be separate and based on time. Spent 15 minutes in counseling regarding these medical problems.    1. Recurrent UTI -asx. Saw urology 10/23/17. S/p ESWL  3. Hypertension - Continue current medications  3. Diabetes mellitus. Doing well.    4. Hyperlipidemia. on Zocor.  Work on diet.  Eating chocolate covered peanuts.   5. History of reflux and peptic ulcer disease -asx. Off pantoprazole  6. Tubular adenomas - colonoscopy 12/2016 - one polyp - due 2021  7. History of glaucoma - " not on treatment - pressure in eyes ok - followed Nile Avina and retinal specialist at [facility]  8. History of nephrolithiasis -asymptomatic.    9. Irregular heart beat sensation - holter revealed PVC  10. Pancreatitis - resolved. Do not suspect malabsorption as cause of weight loss as he is not having diarrhea.   11. 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 Anxiety and depression -valium 5 mg nightly prn anxiety  I will see him back in 4 months, sooner if problems arise.          Colonoscopy 12/7/16 - one hyperplastic polyp due in 2021  Influenza 9/7/16 - declines  Pneumovax 7/2014  Zostavax 5/16  Prevnar  12/14/16  PSA 7/17

## 2018-10-29 ENCOUNTER — TELEPHONE (OUTPATIENT)
Dept: INTERNAL MEDICINE | Facility: CLINIC | Age: 68
End: 2018-10-29

## 2018-10-29 NOTE — TELEPHONE ENCOUNTER
"----- Message from Radha Huang sent at 10/29/2018  9:06 AM CDT -----  Contact: Freddie #[id number] [phone]      ----- Message -----  From: Radha Huang  Sent: 10/29/2018   9:05 AM  To: Emmanuel KIM Staff    Prior Authorization Needed    Rx: diazePAM (VALIUM) 5 MG tablet    To submit the PA:    1: Go to " [email or web address] " and click "Enter a Key"    2. Enter the patient's last name and date of birth and the key.      KEY: [id number]    3. Complete the forms and click "send to Plan"    Note chart when prior authorization has been submitted.    Please notify pharmacy when prior authorization has been approved.    Thank You    "

## 2018-10-30 ENCOUNTER — TELEPHONE (OUTPATIENT)
Dept: INTERNAL MEDICINE | Facility: CLINIC | Age: 68
End: 2018-10-30

## 2018-10-30 NOTE — TELEPHONE ENCOUNTER
----- Message from Dariela Hernandez sent at 10/30/2018  8:29 AM CDT -----  Contact: [facility]/[phone].   [employer] is requesting authorization for  diazePAM (VALIUM) 5 MG tablet. Please call and advise. Thank you

## 2018-10-31 ENCOUNTER — TELEPHONE (OUTPATIENT)
Dept: INTERNAL MEDICINE | Facility: CLINIC | Age: 68
End: 2018-10-31

## 2018-10-31 NOTE — TELEPHONE ENCOUNTER
"----- Message from Radha Tobin Sal sent at 10/31/2018 11:09 AM CDT -----  Contact: Freddie   Prior Authorization Needed    To submit the PA:    1: Go to " [email or web address] " and click "Enter a Key"    2. Enter the patient's last name and date of birth and the key.      KEY: [id number]    3. Complete the forms and click "send to Plan"    Note chart when prior authorization has been submitted.    Please notify pharmacy when prior authorization has been approved.    Thank You      "

## 2018-10-31 NOTE — TELEPHONE ENCOUNTER
PA was already sent via cover my meds on 10/29, [facility] to [facility] questions.

## 2018-10-31 NOTE — TELEPHONE ENCOUNTER
----- Message from Dariela Hernandez sent at 10/31/2018  4:10 PM CDT -----  Contact: dany/[employer],  # [phone]  Dany with [employer] is asking for Medical necessities, chart notes, orders for patient to be approve for diabetic supplies. Thank you

## 2018-11-06 NOTE — TELEPHONE ENCOUNTER
Spoke with [facility], rep states they need documentation for PA for pt to test twice daily.

## 2018-11-07 DIAGNOSIS — N40.1 BPH WITH OBSTRUCTION/LOWER URINARY TRACT SYMPTOMS: ICD-10-CM

## 2018-11-07 DIAGNOSIS — N13.8 BPH WITH OBSTRUCTION/LOWER URINARY TRACT SYMPTOMS: ICD-10-CM

## 2018-11-07 RX ORDER — TAMSULOSIN HYDROCHLORIDE 0.4 MG/1
CAPSULE ORAL
Qty: 30 CAPSULE | Refills: 5 | Status: SHIPPED | OUTPATIENT
Start: 2018-11-07 | End: 2019-01-21 | Stop reason: SDUPTHER

## 2018-11-10 ENCOUNTER — PATIENT MESSAGE (OUTPATIENT)
Dept: INTERNAL MEDICINE | Facility: CLINIC | Age: 68
End: 2018-11-10

## 2018-11-13 ENCOUNTER — TELEPHONE (OUTPATIENT)
Dept: INTERNAL MEDICINE | Facility: CLINIC | Age: 68
End: 2018-11-13

## 2018-11-13 NOTE — TELEPHONE ENCOUNTER
----- Message from Adi Stoddard sent at 11/13/2018 11:23 AM CST -----  [phone]   [facility] calling requesting for Dr to sign and put the prescription as well as ID # on forms, also stating patient is taking RX twice per day Fax [phone].  [facility] [phone]     Please call an advise  Thank you

## 2018-11-14 NOTE — TELEPHONE ENCOUNTER
Spoke with [facility], a medical necessity form needs to be filled out. Alone with a new prescription stating pt test blood glucose twice daily. Medical necessity form filled out just need new prescription to be printed and signed.       Orders pended.

## 2018-11-15 ENCOUNTER — TELEPHONE (OUTPATIENT)
Dept: INTERNAL MEDICINE | Facility: CLINIC | Age: 68
End: 2018-11-15

## 2018-11-26 ENCOUNTER — TELEPHONE (OUTPATIENT)
Dept: INTERNAL MEDICINE | Facility: CLINIC | Age: 68
End: 2018-11-26

## 2018-11-26 NOTE — TELEPHONE ENCOUNTER
----- Message from Angela Ricci sent at 11/26/2018  3:34 PM CST -----  Contact: [facility] [phone]    Calling for Anu. Has several questions about prescriptions that were phoned in.    Received Rx for Contour Test Strips but file shows patient has a Gabriel Metric meter.      Please call and advise    Thank you

## 2018-11-27 RX ORDER — LANCETS
EACH MISCELLANEOUS
Qty: 100 EACH | Refills: 3 | Status: SHIPPED | OUTPATIENT
Start: 2018-11-27 | End: 2019-10-30 | Stop reason: SDUPTHER

## 2018-12-05 ENCOUNTER — OFFICE VISIT (OUTPATIENT)
Dept: INTERNAL MEDICINE | Facility: CLINIC | Age: 68
End: 2018-12-05
Payer: MEDICARE

## 2018-12-05 VITALS
TEMPERATURE: 98 F | WEIGHT: 192.44 LBS | SYSTOLIC BLOOD PRESSURE: 152 MMHG | HEART RATE: 86 BPM | OXYGEN SATURATION: 98 % | BODY MASS INDEX: 28.5 KG/M2 | HEIGHT: 69 IN | DIASTOLIC BLOOD PRESSURE: 78 MMHG

## 2018-12-05 DIAGNOSIS — M62.838 MUSCLE SPASM: ICD-10-CM

## 2018-12-05 DIAGNOSIS — M54.9 ACUTE BILATERAL BACK PAIN, UNSPECIFIED BACK LOCATION: Primary | ICD-10-CM

## 2018-12-05 LAB
BILIRUB SERPL-MCNC: ABNORMAL MG/DL
BLOOD URINE, POC: ABNORMAL
COLOR, POC UA: YELLOW
GLUCOSE UR QL STRIP: NORMAL
KETONES UR QL STRIP: ABNORMAL
LEUKOCYTE ESTERASE URINE, POC: ABNORMAL
NITRITE, POC UA: ABNORMAL
PH, POC UA: 5
PROTEIN, POC: ABNORMAL
SPECIFIC GRAVITY, POC UA: 1.02
UROBILINOGEN, POC UA: 1

## 2018-12-05 PROCEDURE — 3078F DIAST BP <80 MM HG: CPT | Mod: CPTII,S$GLB,, | Performed by: INTERNAL MEDICINE

## 2018-12-05 PROCEDURE — 99213 OFFICE O/P EST LOW 20 MIN: CPT | Mod: 25,S$GLB,, | Performed by: INTERNAL MEDICINE

## 2018-12-05 PROCEDURE — 99999 PR PBB SHADOW E&M-EST. PATIENT-LVL III: CPT | Mod: PBBFAC,,, | Performed by: INTERNAL MEDICINE

## 2018-12-05 PROCEDURE — 3077F SYST BP >= 140 MM HG: CPT | Mod: CPTII,S$GLB,, | Performed by: INTERNAL MEDICINE

## 2018-12-05 PROCEDURE — 1101F PT FALLS ASSESS-DOCD LE1/YR: CPT | Mod: CPTII,S$GLB,, | Performed by: INTERNAL MEDICINE

## 2018-12-05 PROCEDURE — 81002 URINALYSIS NONAUTO W/O SCOPE: CPT | Mod: S$GLB,,, | Performed by: INTERNAL MEDICINE

## 2018-12-05 RX ORDER — TIZANIDINE 2 MG/1
TABLET ORAL
Qty: 900 TABLET | Refills: 0 | OUTPATIENT
Start: 2018-12-05

## 2018-12-05 RX ORDER — TIZANIDINE 2 MG/1
4 TABLET ORAL EVERY 6 HOURS PRN
Qty: 30 TABLET | Refills: 0 | Status: SHIPPED | OUTPATIENT
Start: 2018-12-05 | End: 2018-12-15

## 2018-12-05 NOTE — PROGRESS NOTES
Subjective:       Patient ID: Tony Han is a 68 y.o. male.    Chief Complaint: Back Pain (mid back)    68 year old man complains of mid back pain for about 10 days.  With questioning, he reports that he collects rain water and uses it to flush the toilets in his house.. So he carries water, 2 gallons at a time, to bathrooms on first and second floors of his house.      Review of Systems   Constitutional: Negative for activity change, appetite change and fever.   HENT: Negative for congestion, postnasal drip and sore throat.    Respiratory: Negative for cough, shortness of breath and wheezing.    Cardiovascular: Negative for chest pain and palpitations.   Gastrointestinal: Negative for abdominal pain, blood in stool, constipation, diarrhea, nausea and vomiting.   Genitourinary: Negative for decreased urine volume, difficulty urinating, flank pain and frequency.   Musculoskeletal: Negative for arthralgias.   Neurological: Negative for dizziness, weakness and headaches.       Objective:      Physical Exam   Musculoskeletal:        Arms:      Assessment:       1. Acute bilateral back pain, unspecified back location    2. Muscle spasm        Plan:   Tony was seen today for back pain.    Diagnoses and all orders for this visit:    Acute bilateral back pain, unspecified back location  -     POCT urine dipstick without microscope  -     Ambulatory consult to Physical Therapy    Muscle spasm  -     Ambulatory consult to Physical Therapy    Other orders  -     tiZANidine (ZANAFLEX) 2 MG tablet; Take 2 tablets (4 mg total) by mouth every 6 (six) hours as needed.

## 2018-12-06 RX ORDER — TIZANIDINE 2 MG/1
TABLET ORAL
Qty: 30 TABLET | Refills: 0 | OUTPATIENT
Start: 2018-12-06

## 2018-12-22 DIAGNOSIS — N41.0 ACUTE PROSTATITIS: ICD-10-CM

## 2018-12-22 DIAGNOSIS — N30.00 ACUTE CYSTITIS WITHOUT HEMATURIA: ICD-10-CM

## 2018-12-24 RX ORDER — FINASTERIDE 5 MG/1
TABLET, FILM COATED ORAL
Qty: 30 TABLET | Refills: 0 | Status: SHIPPED | OUTPATIENT
Start: 2018-12-24 | End: 2019-01-21

## 2019-01-14 RX ORDER — LOSARTAN POTASSIUM 25 MG/1
TABLET ORAL
Qty: 90 TABLET | Refills: 4 | Status: SHIPPED | OUTPATIENT
Start: 2019-01-14 | End: 2019-01-21 | Stop reason: SDUPTHER

## 2019-01-21 ENCOUNTER — OFFICE VISIT (OUTPATIENT)
Dept: UROLOGY | Facility: CLINIC | Age: 69
End: 2019-01-21
Attending: UROLOGY
Payer: MEDICARE

## 2019-01-21 VITALS
HEIGHT: 69 IN | HEART RATE: 91 BPM | DIASTOLIC BLOOD PRESSURE: 76 MMHG | SYSTOLIC BLOOD PRESSURE: 156 MMHG | WEIGHT: 194 LBS | BODY MASS INDEX: 28.73 KG/M2

## 2019-01-21 DIAGNOSIS — N40.1 BPH WITH OBSTRUCTION/LOWER URINARY TRACT SYMPTOMS: Primary | ICD-10-CM

## 2019-01-21 DIAGNOSIS — N13.8 BPH WITH OBSTRUCTION/LOWER URINARY TRACT SYMPTOMS: Primary | ICD-10-CM

## 2019-01-21 DIAGNOSIS — N39.0 RECURRENT UTI: ICD-10-CM

## 2019-01-21 DIAGNOSIS — N41.0 ACUTE PROSTATITIS: ICD-10-CM

## 2019-01-21 DIAGNOSIS — N30.00 ACUTE CYSTITIS WITHOUT HEMATURIA: ICD-10-CM

## 2019-01-21 DIAGNOSIS — N20.0 NEPHROLITHIASIS: ICD-10-CM

## 2019-01-21 LAB
BILIRUB SERPL-MCNC: NORMAL MG/DL
BLOOD URINE, POC: NORMAL
COLOR, POC UA: YELLOW
GLUCOSE UR QL STRIP: NORMAL
KETONES UR QL STRIP: NORMAL
LEUKOCYTE ESTERASE URINE, POC: NORMAL
NITRITE, POC UA: NORMAL
PH, POC UA: 5
POC RESIDUAL URINE VOLUME: 68 ML (ref 0–100)
PROTEIN, POC: 30
SPECIFIC GRAVITY, POC UA: 1.02
UROBILINOGEN, POC UA: NORMAL

## 2019-01-21 PROCEDURE — 1101F PR PT FALLS ASSESS DOC 0-1 FALLS W/OUT INJ PAST YR: ICD-10-PCS | Mod: CPTII,S$GLB,, | Performed by: UROLOGY

## 2019-01-21 PROCEDURE — 3077F PR MOST RECENT SYSTOLIC BLOOD PRESSURE >= 140 MM HG: ICD-10-PCS | Mod: CPTII,S$GLB,, | Performed by: UROLOGY

## 2019-01-21 PROCEDURE — 3078F PR MOST RECENT DIASTOLIC BLOOD PRESSURE < 80 MM HG: ICD-10-PCS | Mod: CPTII,S$GLB,, | Performed by: UROLOGY

## 2019-01-21 PROCEDURE — 81002 POCT URINE DIPSTICK WITHOUT MICROSCOPE: ICD-10-PCS | Mod: S$GLB,,, | Performed by: UROLOGY

## 2019-01-21 PROCEDURE — 99213 OFFICE O/P EST LOW 20 MIN: CPT | Mod: 25,S$GLB,, | Performed by: UROLOGY

## 2019-01-21 PROCEDURE — 81002 URINALYSIS NONAUTO W/O SCOPE: CPT | Mod: S$GLB,,, | Performed by: UROLOGY

## 2019-01-21 PROCEDURE — 3077F SYST BP >= 140 MM HG: CPT | Mod: CPTII,S$GLB,, | Performed by: UROLOGY

## 2019-01-21 PROCEDURE — 99213 PR OFFICE/OUTPT VISIT, EST, LEVL III, 20-29 MIN: ICD-10-PCS | Mod: 25,S$GLB,, | Performed by: UROLOGY

## 2019-01-21 PROCEDURE — 51798 POCT BLADDER SCAN: ICD-10-PCS | Mod: S$GLB,,, | Performed by: UROLOGY

## 2019-01-21 PROCEDURE — 51798 US URINE CAPACITY MEASURE: CPT | Mod: S$GLB,,, | Performed by: UROLOGY

## 2019-01-21 PROCEDURE — 1101F PT FALLS ASSESS-DOCD LE1/YR: CPT | Mod: CPTII,S$GLB,, | Performed by: UROLOGY

## 2019-01-21 PROCEDURE — 3078F DIAST BP <80 MM HG: CPT | Mod: CPTII,S$GLB,, | Performed by: UROLOGY

## 2019-01-21 RX ORDER — TAMSULOSIN HYDROCHLORIDE 0.4 MG/1
0.4 CAPSULE ORAL DAILY
Qty: 90 CAPSULE | Refills: 3 | Status: SHIPPED | OUTPATIENT
Start: 2019-01-21 | End: 2020-01-13

## 2019-01-21 RX ORDER — FINASTERIDE 5 MG/1
TABLET, FILM COATED ORAL
Qty: 90 TABLET | Refills: 3 | Status: SHIPPED | OUTPATIENT
Start: 2019-01-21 | End: 2020-02-28

## 2019-01-21 NOTE — PROGRESS NOTES
"Subjective:      Tony Han is a 68 y.o. male who returns today regarding his recurrent UTI symptoms.     He was initially seen in late 2016 for several months of near constant UTI symptoms with multiple courses of abx.      He had workup with primarily finding of left renal stone. He is now s/p left ESWL on 3/23/17.     He was last treated for UTI by ER on 4/9/17. He opted for prophylactic abx at that time and started daily Keflex, which he completed in October 2017.    He has remained UTI free in the interim.    He remains on flomax and finasteride for BPH. Is bothered by sexual side effects.    No c/o today.    The following portions of the patient's history were reviewed and updated as appropriate: allergies, current medications, past family history, past medical history, past social history, past surgical history and problem list.    Review of Systems  A comprehensive multipoint review of systems was negative except as otherwise stated in the HPI.     Objective:   Vitals: BP (!) 156/76 (BP Location: Right arm, Patient Position: Sitting, BP Method: Medium (Automatic))   Pulse 91   Ht 5' 9" (1.753 m)   Wt 88 kg (194 lb 0.1 oz)   BMI 28.65 kg/m²     Physical Exam   General: alert and oriented, no acute distress  Respiratory: Symmetric expansion, non-labored breathing  Neuro: no gross deficits  Psych: normal judgment and insight, normal mood/affect and non-anxious    Bladder Scan PVR:68cc      Lab Review   Urinalysis demonstrates negative for all components   Lab Results   Component Value Date    WBC 6.56 10/17/2018    HGB 13.5 (L) 10/17/2018    HCT 40.5 10/17/2018    MCV 99 (H) 10/17/2018     10/17/2018     Lab Results   Component Value Date    CREATININE 0.9 10/17/2018    BUN 14 10/17/2018     Lab Results   Component Value Date    PSA 3.2 03/15/2017    PSADIAG 0.60 07/14/2017     Imaging   None today     Assessment and Plan:   Recurrent UTI  BPH (benign prostatic hypertrophy) with urinary " obstruction  -- Completed 6 mos ppx and has remained UTI free since  -- Continue flomax and finasteride  -- Discussed alternatives to meds for his BPH, including Urolift. Wishes to defer for now.    Nephrolithiasis  -- S/p ESWL  -- Stone still present on KUB and CT, though appearance c/w residual debris  -- Consider further treatment only if UTIs recur (likely will address BPH first)    FU 12 mos

## 2019-02-13 ENCOUNTER — LAB VISIT (OUTPATIENT)
Dept: LAB | Facility: OTHER | Age: 69
End: 2019-02-13
Payer: MEDICARE

## 2019-02-13 DIAGNOSIS — E13.9 DIABETES MELLITUS DUE TO ABNORMAL INSULIN: ICD-10-CM

## 2019-02-13 LAB
ALBUMIN SERPL BCP-MCNC: 4.4 G/DL
ALP SERPL-CCNC: 67 U/L
ALT SERPL W/O P-5'-P-CCNC: 50 U/L
ANION GAP SERPL CALC-SCNC: 8 MMOL/L
AST SERPL-CCNC: 40 U/L
BASOPHILS # BLD AUTO: 0.02 K/UL
BASOPHILS NFR BLD: 0.3 %
BILIRUB SERPL-MCNC: 0.2 MG/DL
BUN SERPL-MCNC: 10 MG/DL
CALCIUM SERPL-MCNC: 9.9 MG/DL
CHLORIDE SERPL-SCNC: 106 MMOL/L
CHOLEST SERPL-MCNC: 101 MG/DL
CHOLEST/HDLC SERPL: 2.8 {RATIO}
CO2 SERPL-SCNC: 27 MMOL/L
CREAT SERPL-MCNC: 1 MG/DL
DIFFERENTIAL METHOD: ABNORMAL
EOSINOPHIL # BLD AUTO: 0.4 K/UL
EOSINOPHIL NFR BLD: 5.5 %
ERYTHROCYTE [DISTWIDTH] IN BLOOD BY AUTOMATED COUNT: 13.5 %
EST. GFR  (AFRICAN AMERICAN): >60 ML/MIN/1.73 M^2
EST. GFR  (NON AFRICAN AMERICAN): >60 ML/MIN/1.73 M^2
ESTIMATED AVG GLUCOSE: 140 MG/DL
GLUCOSE SERPL-MCNC: 127 MG/DL
HBA1C MFR BLD HPLC: 6.5 %
HCT VFR BLD AUTO: 39.7 %
HDLC SERPL-MCNC: 36 MG/DL
HDLC SERPL: 35.6 %
HGB BLD-MCNC: 13.2 G/DL
LDLC SERPL CALC-MCNC: 30 MG/DL
LYMPHOCYTES # BLD AUTO: 3.9 K/UL
LYMPHOCYTES NFR BLD: 50.2 %
MCH RBC QN AUTO: 32.7 PG
MCHC RBC AUTO-ENTMCNC: 33.2 G/DL
MCV RBC AUTO: 98 FL
MONOCYTES # BLD AUTO: 0.3 K/UL
MONOCYTES NFR BLD: 4.4 %
NEUTROPHILS # BLD AUTO: 3.1 K/UL
NEUTROPHILS NFR BLD: 39.5 %
NONHDLC SERPL-MCNC: 65 MG/DL
PLATELET # BLD AUTO: 319 K/UL
PMV BLD AUTO: 9.1 FL
POTASSIUM SERPL-SCNC: 4.9 MMOL/L
PROT SERPL-MCNC: 7.5 G/DL
RBC # BLD AUTO: 4.04 M/UL
SODIUM SERPL-SCNC: 141 MMOL/L
TRIGL SERPL-MCNC: 175 MG/DL
WBC # BLD AUTO: 7.75 K/UL

## 2019-02-13 PROCEDURE — 80053 COMPREHEN METABOLIC PANEL: CPT

## 2019-02-13 PROCEDURE — 80061 LIPID PANEL: CPT

## 2019-02-13 PROCEDURE — 36415 COLL VENOUS BLD VENIPUNCTURE: CPT

## 2019-02-13 PROCEDURE — 83036 HEMOGLOBIN GLYCOSYLATED A1C: CPT

## 2019-02-13 PROCEDURE — 85025 COMPLETE CBC W/AUTO DIFF WBC: CPT

## 2019-02-20 ENCOUNTER — OFFICE VISIT (OUTPATIENT)
Dept: INTERNAL MEDICINE | Facility: CLINIC | Age: 69
End: 2019-02-20
Payer: MEDICARE

## 2019-02-20 VITALS
WEIGHT: 193.25 LBS | BODY MASS INDEX: 28.62 KG/M2 | HEART RATE: 80 BPM | HEIGHT: 69 IN | OXYGEN SATURATION: 99 % | DIASTOLIC BLOOD PRESSURE: 60 MMHG | SYSTOLIC BLOOD PRESSURE: 122 MMHG

## 2019-02-20 DIAGNOSIS — K21.9 GASTROESOPHAGEAL REFLUX DISEASE WITHOUT ESOPHAGITIS: ICD-10-CM

## 2019-02-20 DIAGNOSIS — Z87.891 PERSONAL HISTORY OF TOBACCO USE: ICD-10-CM

## 2019-02-20 DIAGNOSIS — E13.9 DIABETES MELLITUS DUE TO ABNORMAL INSULIN: Primary | ICD-10-CM

## 2019-02-20 DIAGNOSIS — M51.9 LUMBAR DISC DISEASE: ICD-10-CM

## 2019-02-20 DIAGNOSIS — I10 ESSENTIAL HYPERTENSION: ICD-10-CM

## 2019-02-20 DIAGNOSIS — E78.5 HYPERLIPIDEMIA, UNSPECIFIED HYPERLIPIDEMIA TYPE: ICD-10-CM

## 2019-02-20 DIAGNOSIS — Z12.9 SCREENING FOR CANCER: ICD-10-CM

## 2019-02-20 DIAGNOSIS — N40.0 BENIGN PROSTATIC HYPERPLASIA WITHOUT LOWER URINARY TRACT SYMPTOMS: ICD-10-CM

## 2019-02-20 PROCEDURE — 99999 PR PBB SHADOW E&M-EST. PATIENT-LVL III: ICD-10-PCS | Mod: PBBFAC,,, | Performed by: INTERNAL MEDICINE

## 2019-02-20 PROCEDURE — 99499 UNLISTED E&M SERVICE: CPT | Mod: S$GLB,,, | Performed by: INTERNAL MEDICINE

## 2019-02-20 PROCEDURE — 3078F PR MOST RECENT DIASTOLIC BLOOD PRESSURE < 80 MM HG: ICD-10-PCS | Mod: CPTII,S$GLB,, | Performed by: INTERNAL MEDICINE

## 2019-02-20 PROCEDURE — 3078F DIAST BP <80 MM HG: CPT | Mod: CPTII,S$GLB,, | Performed by: INTERNAL MEDICINE

## 2019-02-20 PROCEDURE — 99499 RISK ADDL DX/OHS AUDIT: ICD-10-PCS | Mod: S$GLB,,, | Performed by: INTERNAL MEDICINE

## 2019-02-20 PROCEDURE — 3044F HG A1C LEVEL LT 7.0%: CPT | Mod: CPTII,S$GLB,, | Performed by: INTERNAL MEDICINE

## 2019-02-20 PROCEDURE — 3074F PR MOST RECENT SYSTOLIC BLOOD PRESSURE < 130 MM HG: ICD-10-PCS | Mod: CPTII,S$GLB,, | Performed by: INTERNAL MEDICINE

## 2019-02-20 PROCEDURE — 99214 OFFICE O/P EST MOD 30 MIN: CPT | Mod: S$GLB,,, | Performed by: INTERNAL MEDICINE

## 2019-02-20 PROCEDURE — 1101F PT FALLS ASSESS-DOCD LE1/YR: CPT | Mod: CPTII,S$GLB,, | Performed by: INTERNAL MEDICINE

## 2019-02-20 PROCEDURE — 3044F PR MOST RECENT HEMOGLOBIN A1C LEVEL <7.0%: ICD-10-PCS | Mod: CPTII,S$GLB,, | Performed by: INTERNAL MEDICINE

## 2019-02-20 PROCEDURE — 3074F SYST BP LT 130 MM HG: CPT | Mod: CPTII,S$GLB,, | Performed by: INTERNAL MEDICINE

## 2019-02-20 PROCEDURE — 99214 PR OFFICE/OUTPT VISIT, EST, LEVL IV, 30-39 MIN: ICD-10-PCS | Mod: S$GLB,,, | Performed by: INTERNAL MEDICINE

## 2019-02-20 PROCEDURE — 1101F PR PT FALLS ASSESS DOC 0-1 FALLS W/OUT INJ PAST YR: ICD-10-PCS | Mod: CPTII,S$GLB,, | Performed by: INTERNAL MEDICINE

## 2019-02-20 PROCEDURE — 99999 PR PBB SHADOW E&M-EST. PATIENT-LVL III: CPT | Mod: PBBFAC,,, | Performed by: INTERNAL MEDICINE

## 2019-02-20 NOTE — PROGRESS NOTES
Follow up of hypertension, diabetes, recurrent UTI, hyperlipidemia.      HISTORY OF PRESENT ILLNESS: This is a 68-year-old man who presents for follow up of above.      Weight is stable.      Mood is overall doing ok.  She has some stressors with his wife. She sleeps a lot and is cold.      HE has recurrent UTI.   He had lithotripsy on 3/23/17. No dysuria, hematuria.   He saw Dr cerna on 7/18. He still has a stone in the left kidney. He is on finasteride and tamsulosin for his prostate.      He has not had any alcohol and has not had a recurrence of pancreatitis. No nausea, vomiting, constipation, diarrhea, melana, bloody stools.     He is taking amlodipine 10 mg once daily, Toprol-XL 25 mg once daily and losartan 25 mg daily. No chest pain, shortness of breath. He is now taking metformin  mg 2 tablets twice daily. HE checks his blood sugar twice daily due to fluctuating blood sugars.  No polydipsia. He has a history of hyperlipidemia, currently on Zocor 20 mg daily. No joint pain or muscle pain. NO shortness breath, headaches, vision, sinus congestion.      Back pain is the same.  Hehas pain with prolonged sitting or standing. He is followed by Dr Carrasquillo. He is taking hydrocodone apap 7.5/325 3-4 times a day depending upon his activity. . He used to see Dr Mcdaniel for his back. HE retired. He had a MRI 1/18/17 through DR Pedroza who he did not like.          He smokes 1-2 packs per day.  HE has had night sweats the last 1-2 years.      PAST MEDICAL HISTORY:    1. Hypertension.    2. Coronary artery disease with left heart catheterization in November 2011 with 40 to 50% narrowing in the mid LAD.    3. Diabetes mellitus.    4. Hyperlipidemia.    5. History of reflux.    6. Colon polyps.    7. History of cluster headaches.    8. History of glaucoma.    9. Lumbar disk disease at L5 to S1.    10. History of stomach ulcers.    11. Nephrolithiasis.       PAST SURGICAL HISTORY: Cholecystectomy in May 2013, ORIF of  "the left humerus.      SOCIAL HISTORY: Currently smokes a pack of cigarettes daily, has smoked his whole life. 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 He is a retired schoolteacher.    FAMILY HISTORY: Updated on EPIC.      PHYSICAL EXAMINATION:      /60 (BP Location: Right arm, Patient Position: Sitting, BP Method: Medium (Manual))   Pulse 80   Ht 5' 9" (1.753 m)   Wt 87.6 kg (193 lb 3.7 oz)   SpO2 99%   BMI 28.54 kg/m²     GENERAL: He is alert, oriented, no apparent distress. Affect within normal limits.    HEENT: Conjunctivae anicteric. Tympanic membranes clear.  . .    NECK: Supple. No cervical lymphadenopathy, no thyroid enlargement.    Respiratory: Effort normal. Lungs are clear to auscultation.    HEART: Regular rate and rhythm without murmurs, gallops or rubs. No lower extremity edema.          Labs 2/13/19 reviewed - A1C 6.5, CBC, CMP (ALT 50), TSH acceptable, Total chol 101, Trig 175 (better), HDL 36, LDL 30        ASSESSMENT AND PLAN:           1. Recurrent UTI -asx. Saw urology 10/23/17. S/p ESWL  3. Hypertension - Continue current medications  3. Diabetes mellitus. Doing well.    4. Hyperlipidemia. on Zocor.  Work on diet.  Eating chocolate covered peanuts.   5. History of reflux and peptic ulcer disease -asx. Off pantoprazole  6. Tubular adenomas - colonoscopy 12/2016 - one polyp - due 2021  7. History of glaucoma - not on treatment - pressure in eyes ok - followed Nile vAina and retinal specialist at [facility]  8. History of nephrolithiasis -asymptomatic.    9. Irregular heart beat sensation - holter revealed PVC  10. Pancreatitis - resolved. Do not suspect malabsorption as cause of weight loss as he is not having diarrhea.   11. 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 Anxiety and depression -valium 5 mg nightly prn anxiety  13. Tobacco abuse - CT chest.   I will see him back in 4 months, sooner if problems arise.          Colonoscopy 12/7/16 - one " hyperplastic polyp due in 2021  Influenza 9/7/16 - declines  Pneumovax 7/2014  Zostavax 5/16  Prevnar  12/14/16  PSA 7/17

## 2019-03-27 ENCOUNTER — PES CALL (OUTPATIENT)
Dept: ADMINISTRATIVE | Facility: CLINIC | Age: 69
End: 2019-03-27

## 2019-04-01 ENCOUNTER — PES CALL (OUTPATIENT)
Dept: ADMINISTRATIVE | Facility: CLINIC | Age: 69
End: 2019-04-01

## 2019-04-05 ENCOUNTER — LAB VISIT (OUTPATIENT)
Dept: LAB | Facility: OTHER | Age: 69
End: 2019-04-05
Payer: MEDICARE

## 2019-04-05 ENCOUNTER — OFFICE VISIT (OUTPATIENT)
Dept: INTERNAL MEDICINE | Facility: CLINIC | Age: 69
End: 2019-04-05
Payer: MEDICARE

## 2019-04-05 VITALS
DIASTOLIC BLOOD PRESSURE: 70 MMHG | OXYGEN SATURATION: 98 % | SYSTOLIC BLOOD PRESSURE: 120 MMHG | HEIGHT: 69 IN | BODY MASS INDEX: 28.71 KG/M2 | HEART RATE: 75 BPM | WEIGHT: 193.81 LBS

## 2019-04-05 DIAGNOSIS — Z00.00 ENCOUNTER FOR PREVENTIVE HEALTH EXAMINATION: Primary | ICD-10-CM

## 2019-04-05 DIAGNOSIS — M51.9 LUMBAR DISC DISEASE: ICD-10-CM

## 2019-04-05 DIAGNOSIS — E11.9 TYPE 2 DIABETES MELLITUS WITHOUT COMPLICATION, WITHOUT LONG-TERM CURRENT USE OF INSULIN: ICD-10-CM

## 2019-04-05 DIAGNOSIS — R79.89 LFT ELEVATION: ICD-10-CM

## 2019-04-05 DIAGNOSIS — K82.4 CHOLESTEROLOSIS OF GALLBLADDER: ICD-10-CM

## 2019-04-05 DIAGNOSIS — Z12.5 SCREENING FOR PROSTATE CANCER: ICD-10-CM

## 2019-04-05 DIAGNOSIS — Z86.010 HISTORY OF ADENOMATOUS POLYP OF COLON: ICD-10-CM

## 2019-04-05 DIAGNOSIS — D12.6 COLON ADENOMA: ICD-10-CM

## 2019-04-05 DIAGNOSIS — I10 ESSENTIAL HYPERTENSION: ICD-10-CM

## 2019-04-05 DIAGNOSIS — N20.0 NEPHROLITHIASIS: ICD-10-CM

## 2019-04-05 DIAGNOSIS — N40.0 BENIGN PROSTATIC HYPERPLASIA WITHOUT LOWER URINARY TRACT SYMPTOMS: ICD-10-CM

## 2019-04-05 DIAGNOSIS — E78.5 HYPERLIPIDEMIA, UNSPECIFIED HYPERLIPIDEMIA TYPE: ICD-10-CM

## 2019-04-05 DIAGNOSIS — K31.9 MUCOSAL ABNORMALITY OF DUODENUM: ICD-10-CM

## 2019-04-05 DIAGNOSIS — K21.9 GASTROESOPHAGEAL REFLUX DISEASE WITHOUT ESOPHAGITIS: ICD-10-CM

## 2019-04-05 DIAGNOSIS — I70.0 ATHEROSCLEROSIS OF ABDOMINAL AORTA: ICD-10-CM

## 2019-04-05 DIAGNOSIS — L81.8 TATTOOS: ICD-10-CM

## 2019-04-05 DIAGNOSIS — K76.0 FATTY LIVER: ICD-10-CM

## 2019-04-05 DIAGNOSIS — K63.5 POLYP OF COLON, UNSPECIFIED PART OF COLON, UNSPECIFIED TYPE: ICD-10-CM

## 2019-04-05 DIAGNOSIS — G47.09 OTHER INSOMNIA: ICD-10-CM

## 2019-04-05 LAB — COMPLEXED PSA SERPL-MCNC: 0.49 NG/ML (ref 0–4)

## 2019-04-05 PROCEDURE — 3044F HG A1C LEVEL LT 7.0%: CPT | Mod: CPTII,S$GLB,, | Performed by: NURSE PRACTITIONER

## 2019-04-05 PROCEDURE — 36415 COLL VENOUS BLD VENIPUNCTURE: CPT

## 2019-04-05 PROCEDURE — 99499 UNLISTED E&M SERVICE: CPT | Mod: S$GLB,,, | Performed by: NURSE PRACTITIONER

## 2019-04-05 PROCEDURE — 3074F PR MOST RECENT SYSTOLIC BLOOD PRESSURE < 130 MM HG: ICD-10-PCS | Mod: CPTII,S$GLB,, | Performed by: NURSE PRACTITIONER

## 2019-04-05 PROCEDURE — 99499 RISK ADDL DX/OHS AUDIT: ICD-10-PCS | Mod: S$GLB,,, | Performed by: NURSE PRACTITIONER

## 2019-04-05 PROCEDURE — 99999 PR PBB SHADOW E&M-EST. PATIENT-LVL IV: CPT | Mod: PBBFAC,,, | Performed by: NURSE PRACTITIONER

## 2019-04-05 PROCEDURE — 3074F SYST BP LT 130 MM HG: CPT | Mod: CPTII,S$GLB,, | Performed by: NURSE PRACTITIONER

## 2019-04-05 PROCEDURE — G0439 PPPS, SUBSEQ VISIT: HCPCS | Mod: S$GLB,,, | Performed by: NURSE PRACTITIONER

## 2019-04-05 PROCEDURE — 3044F PR MOST RECENT HEMOGLOBIN A1C LEVEL <7.0%: ICD-10-PCS | Mod: CPTII,S$GLB,, | Performed by: NURSE PRACTITIONER

## 2019-04-05 PROCEDURE — 99999 PR PBB SHADOW E&M-EST. PATIENT-LVL IV: ICD-10-PCS | Mod: PBBFAC,,, | Performed by: NURSE PRACTITIONER

## 2019-04-05 PROCEDURE — G0439 PR MEDICARE ANNUAL WELLNESS SUBSEQUENT VISIT: ICD-10-PCS | Mod: S$GLB,,, | Performed by: NURSE PRACTITIONER

## 2019-04-05 PROCEDURE — 84153 ASSAY OF PSA TOTAL: CPT

## 2019-04-05 PROCEDURE — 3078F DIAST BP <80 MM HG: CPT | Mod: CPTII,S$GLB,, | Performed by: NURSE PRACTITIONER

## 2019-04-05 PROCEDURE — 3078F PR MOST RECENT DIASTOLIC BLOOD PRESSURE < 80 MM HG: ICD-10-PCS | Mod: CPTII,S$GLB,, | Performed by: NURSE PRACTITIONER

## 2019-04-05 NOTE — PATIENT INSTRUCTIONS
Counseling and Referral of Other Preventative  (Italic type indicates deductible and co-insurance are waived)    Patient Name: Tony Han  Today's Date: 4/5/2019    Health Maintenance       Date Due Completion Date    PROSTATE-SPECIFIC ANTIGEN 07/14/2018 7/14/2017    Foot Exam 06/27/2019 6/27/2018    Override on 6/13/2017: Done    Hemoglobin A1c 08/13/2019 2/13/2019    Eye Exam 11/01/2019 11/1/2018    Override on 2/21/2018: Done (Sees Dr Estrella and retinal specialist at [facility])    Override on 2/13/2017: Done (Nile Avina)    Override on 1/16/2017: Done (Dr Jaime Rosenbaum)    Override on 7/10/2014: Done    Urine Microalbumin 12/05/2019 12/5/2018    Lipid Panel 02/13/2020 2/13/2019    Low Dose Statin 02/20/2020 2/20/2019    Colonoscopy 12/07/2021 12/7/2016    TETANUS VACCINE 10/09/2027 10/9/2017    Override on 12/14/2016: Declined        Orders Placed This Encounter   Procedures    PSA, Screening     The following information is provided to all patients.  This information is to help you find resources for any of the problems found today that may be affecting your health:                Living healthy guide: www.[facility].[place].gov      Understanding Diabetes: www.diabetes.org      Eating healthy: www.cdc.gov/healthyweight      CDC home safety checklist: www.cdc.gov/steadi/patient.html      Agency on Aging: www.goea.[place].[facility]      Alcoholics anonymous (AA): www.aa.org      Physical Activity: www.tamiko.nih.gov/ew6zoys      Tobacco use: www.quitwithusla.org     Counseling and Referral of Other Preventative  (Italic type indicates deductible and co-insurance are waived)    Patient Name: Tony Han  Today's Date: 4/5/2019    Health Maintenance       Date Due Completion Date    LDCT Lung Screen 05/06/2003 ---    PROSTATE-SPECIFIC ANTIGEN 07/14/2018 7/14/2017    Foot Exam 06/27/2019 6/27/2018    Override on 6/13/2017: Done    Hemoglobin A1c 08/13/2019 2/13/2019    Eye Exam 11/01/2019 11/1/2018    Override on  2/21/2018: Done ([facility] Dr Estrella and [facility] specialist [facility])    Override on 2/13/2017: Done (Nile Avina)    Override on 1/16/2017: Done (Dr Jaime Rosenbaum)    Override on 7/10/2014: Done    Urine Microalbumin 12/05/2019 12/5/2018    Lipid Panel 02/13/2020 2/13/2019    Low Dose Statin 04/05/2020 4/5/2019    Colonoscopy 12/07/2021 12/7/2016    TETANUS VACCINE 10/09/2027 10/9/2017    Override on 12/14/2016: Declined        Orders Placed This Encounter   Procedures    PSA, Screening     The following information is provided to all patients.  This information is to help you find resources for any of the problems found today that may be affecting your health:                Living healthy guide: www.[facility].[place].[facility]      Understanding Diabetes: www.diabetes.org      Eating healthy: www.cdc.gov/healthyweight      [facility] home safety checklist: www.cdc.gov/steadi/patient.html      Agency on Aging: www.goea.[place].[facility]      Alcoholics anonymous (AA): www.aa.org      Physical Activity: www.tamiko.nih.gov/pz2ffbl      Tobacco use: www.quitwithusla.org

## 2019-04-05 NOTE — PROGRESS NOTES
"Tony Han presented for a  Medicare AWV and comprehensive Health Risk Assessment today. The following components were reviewed and updated:    · Medical history  · Family History  · Social history  · Allergies and Current Medications  · Health Risk Assessment  · Health Maintenance  · Care Team     ** See Completed Assessments for Annual Wellness Visit within the encounter summary.**       The following assessments were completed:  · Living Situation  · CAGE  · Depression Screening  · Timed Get Up and Go  · Whisper Test  · Cognitive Function Screening  · Nutrition Screening  · ADL Screening  · PAQ Screening          Vitals:    04/05/19 1238   BP: 120/70   BP Location: Left arm   Patient Position: Sitting   Pulse: 75   SpO2: 98%   Weight: 87.9 kg (193 lb 12.6 oz)   Height: 5' 9" (1.753 m)     Body mass index is 28.62 kg/m².     Physical Exam   Constitutional: He is oriented to person, place, and time. He appears well-developed and well-nourished.   HENT:   Head: Normocephalic and atraumatic. Not macrocephalic and not microcephalic. Head is without raccoon's eyes, without Brown's sign, without abrasion, without contusion, without laceration, without right periorbital erythema and without left periorbital erythema. Hair is normal.   Right Ear: No lacerations. No drainage, swelling or tenderness. No foreign bodies. No mastoid tenderness. Tympanic membrane is not injected, not scarred, not perforated, not erythematous, not retracted and not bulging. Tympanic membrane mobility is normal. No middle ear effusion. No hemotympanum. No decreased hearing is noted.   Left Ear: No lacerations. No drainage, swelling or tenderness. No foreign bodies. No mastoid tenderness. Tympanic membrane is not injected, not scarred, not perforated, not erythematous, not retracted and not bulging. Tympanic membrane mobility is normal.  No middle ear effusion. No hemotympanum. No decreased hearing is noted.   Nose: Nose normal. No mucosal " edema, rhinorrhea, nose lacerations, sinus tenderness or nasal deformity.   Mouth/Throat: Uvula is midline.   Eyes: Conjunctivae and lids are normal. No scleral icterus.   Neck: Trachea normal. Neck supple. No spinous process tenderness and no muscular tenderness present. No neck rigidity. No edema, no erythema and normal range of motion present. No thyroid mass and no thyromegaly present.   Cardiovascular: Normal rate, regular rhythm, normal heart sounds and intact distal pulses. Exam reveals no gallop and no friction rub.   No murmur heard.  Pulmonary/Chest: Effort normal and breath sounds normal. No stridor. No respiratory distress. He has no wheezes. He has no rales. He exhibits no tenderness.   Abdominal: Soft. Bowel sounds are normal. He exhibits no distension and no mass. There is no tenderness. There is no rebound and no guarding. No hernia.   Musculoskeletal: Normal range of motion.   Lymphadenopathy:        Head (right side): No submental, no submandibular, no tonsillar, no preauricular and no posterior auricular adenopathy present.        Head (left side): No submental, no submandibular, no tonsillar, no preauricular, no posterior auricular and no occipital adenopathy present.   Neurological: He is alert and oriented to person, place, and time.   Skin: Skin is warm and dry.   Psychiatric: He has a normal mood and affect. His behavior is normal. Judgment and thought content normal.   Vitals reviewed.      Diagnoses and health risks identified today and associated recommendations/orders:    1. Screening for prostate cancer  - PSA, Screening; Future    2. Atherosclerosis of abdominal aorta  Chronic. Stable. Continue current treatment plan as previously prescribed by PCP.    3. Essential hypertension  Chronic. Stable. Uncontrolled. Encouraged to increase exercise as tolerated (moderate-intensity aerobic activity and \muscle-strengthening activities) improve diet to heart healthy, low sodium diet. Continue  current treatment plan as previously prescribed by PCP.    4. Hyperlipidemia, unspecified hyperlipidemia type  Chronic. Stable. Continue current treatment plan as previously prescribed by PCP.    5. Benign prostatic hyperplasia without lower urinary tract symptoms  Chronic. Stable. Continue current treatment plan as previously prescribed by PCP.    6. Nephrolithiasis  Chronic. Stable. Continue current treatment plan as previously prescribed by PCP.    7. Type 2 diabetes mellitus without complication, without long-term current use of insulin  Chronic. Stable. Last Hgb A1c=6.5 from 2/13/19. Continue current treatment plan as previously prescribed by PCP.    8. History of adenomatous polyp of colon  Chronic. Stable. Continue current treatment plan as previously prescribed by PCP.    9. Cholesterolosis of gallbladder  Chronic. Stable. Continue current treatment plan as previously prescribed by PCP.    10. Gastroesophageal reflux disease without esophagitis  Chronic. Stable. Continue current treatment plan as previously prescribed by PCP.    11. Polyp of colon, unspecified part of colon, unspecified type  Chronic. Stable. Continue current treatment plan as previously prescribed by PCP.    12. Mucosal abnormality of duodenum  Chronic. Stable. Continue current treatment plan as previously prescribed by PCP.    13. Colon adenoma  Chronic. Stable. Continue current treatment plan as previously prescribed by PCP.    14. LFT elevation  Chronic. Stable. Continue current treatment plan as previously prescribed by PCP.    15. Fatty liver  Chronic. Stable. Continue current treatment plan as previously prescribed by PCP.    16. Other insomnia  Chronic. Stable. Continue current treatment plan as previously prescribed by PCP.    17. Lumbar disc disease  Chronic. Stable. Continue current treatment plan as previously prescribed by PCP.    18. Tattoos  Chronic. Stable. Continue current treatment plan as previously prescribed by  PCP.      19. Encounter for preventive health examination  Annual Health Risk Assessment (HRA) visit today.  Counseling and referral of health maintenance and preventative health measures performed.  Patient given annual wellness paperwork to take home.  Encouraged to return in 1 year for subsequent HRA visit.   - PSA, Screening; Future    Provided Tony with a 5-10 year written screening schedule and personal prevention plan. Recommendations were developed using the USPSTF age appropriate recommendations. Education, counseling, and referrals were provided as needed. After Visit Summary printed and given to patient which includes a list of additional screenings\tests needed.    No follow-ups on file.    Mateo Capone NP  I offered to discuss end of life issues, including information on how to make advance directives that the patient could use to name someone who would make medical decisions on their behalf if they became too ill to make themselves.    ___Patient declined  _X_Patient is interested, I provided paper work and offered to discuss.

## 2019-04-14 RX ORDER — METFORMIN HYDROCHLORIDE 500 MG/1
TABLET, EXTENDED RELEASE ORAL
Qty: 360 TABLET | Refills: 4 | Status: SHIPPED | OUTPATIENT
Start: 2019-04-14 | End: 2020-07-02

## 2019-04-14 RX ORDER — AMLODIPINE BESYLATE 10 MG/1
TABLET ORAL
Qty: 90 TABLET | Refills: 4 | Status: SHIPPED | OUTPATIENT
Start: 2019-04-14 | End: 2020-07-02

## 2019-04-14 RX ORDER — METOPROLOL SUCCINATE 25 MG/1
TABLET, EXTENDED RELEASE ORAL
Qty: 90 TABLET | Refills: 4 | Status: SHIPPED | OUTPATIENT
Start: 2019-04-14 | End: 2020-07-02

## 2019-05-03 RX ORDER — SIMVASTATIN 20 MG/1
TABLET, FILM COATED ORAL
Qty: 90 TABLET | Refills: 4 | Status: SHIPPED | OUTPATIENT
Start: 2019-05-03 | End: 2020-07-13

## 2019-05-15 ENCOUNTER — PATIENT MESSAGE (OUTPATIENT)
Dept: INTERNAL MEDICINE | Facility: CLINIC | Age: 69
End: 2019-05-15

## 2019-05-15 ENCOUNTER — TELEPHONE (OUTPATIENT)
Dept: INTERNAL MEDICINE | Facility: CLINIC | Age: 69
End: 2019-05-15

## 2019-05-15 DIAGNOSIS — M79.641 PAIN OF RIGHT HAND: Primary | ICD-10-CM

## 2019-05-15 NOTE — TELEPHONE ENCOUNTER
----- Message from Tristen Herzog sent at 5/15/2019 11:28 AM CDT -----  Contact: [phone]  Patient requesting an order for Xray stated he has pain in right hand . Please call and advise, Thanks

## 2019-05-16 ENCOUNTER — TELEPHONE (OUTPATIENT)
Dept: DERMATOLOGY | Facility: CLINIC | Age: 69
End: 2019-05-16

## 2019-05-16 ENCOUNTER — HOSPITAL ENCOUNTER (OUTPATIENT)
Dept: RADIOLOGY | Facility: HOSPITAL | Age: 69
Discharge: HOME | End: 2019-05-16
Attending: INTERNAL MEDICINE
Payer: MEDICARE

## 2019-05-16 ENCOUNTER — OFFICE VISIT (OUTPATIENT)
Dept: DERMATOLOGY | Facility: CLINIC | Age: 69
End: 2019-05-16
Payer: MEDICARE

## 2019-05-16 ENCOUNTER — PATIENT MESSAGE (OUTPATIENT)
Dept: INTERNAL MEDICINE | Facility: CLINIC | Age: 69
End: 2019-05-16

## 2019-05-16 DIAGNOSIS — M79.641 PAIN OF RIGHT HAND: ICD-10-CM

## 2019-05-16 DIAGNOSIS — L82.1 DERMATOSIS PAPULOSA NIGRA: ICD-10-CM

## 2019-05-16 DIAGNOSIS — L91.8 SKIN TAG: Primary | ICD-10-CM

## 2019-05-16 PROCEDURE — 73130 X-RAY EXAM OF HAND: CPT | Mod: TC,RT

## 2019-05-16 PROCEDURE — 99202 OFFICE O/P NEW SF 15 MIN: CPT | Mod: S$GLB,,, | Performed by: PHYSICIAN ASSISTANT

## 2019-05-16 PROCEDURE — 99202 PR OFFICE/OUTPT VISIT, NEW, LEVL II, 15-29 MIN: ICD-10-PCS | Mod: S$GLB,,, | Performed by: PHYSICIAN ASSISTANT

## 2019-05-16 PROCEDURE — 1101F PR PT FALLS ASSESS DOC 0-1 FALLS W/OUT INJ PAST YR: ICD-10-PCS | Mod: CPTII,S$GLB,, | Performed by: PHYSICIAN ASSISTANT

## 2019-05-16 PROCEDURE — 73130 XR HAND COMPLETE 3 VIEW RIGHT: ICD-10-PCS | Mod: 26,RT,, | Performed by: RADIOLOGY

## 2019-05-16 PROCEDURE — 99999 PR PBB SHADOW E&M-EST. PATIENT-LVL II: CPT | Mod: PBBFAC,,, | Performed by: PHYSICIAN ASSISTANT

## 2019-05-16 PROCEDURE — 1101F PT FALLS ASSESS-DOCD LE1/YR: CPT | Mod: CPTII,S$GLB,, | Performed by: PHYSICIAN ASSISTANT

## 2019-05-16 PROCEDURE — 73130 X-RAY EXAM OF HAND: CPT | Mod: 26,RT,, | Performed by: RADIOLOGY

## 2019-05-16 PROCEDURE — 99999 PR PBB SHADOW E&M-EST. PATIENT-LVL II: ICD-10-PCS | Mod: PBBFAC,,, | Performed by: PHYSICIAN ASSISTANT

## 2019-05-16 NOTE — TELEPHONE ENCOUNTER
Called PT in regards to assisting with scheduling cosmetic appointment. LVM for PT call back at his convenience.

## 2019-05-16 NOTE — TELEPHONE ENCOUNTER
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 I gave him Dr. Bernal's card and told him someone would contact him to schedule. Please let me know if you have any questions or concerns. Thanks!

## 2019-05-16 NOTE — PROGRESS NOTES
Subjective:       Patient ID:  Tony Han is a 69 y.o. male who presents for   Chief Complaint   Patient presents with    Skin Tags     Left lower back     Skin Tags  - Initial  Affected locations: back  Duration: 5 years  Signs / symptoms: growing  Aggravated by: nothing  Relieving factors/Treatments tried: nothing    Pt is also c/o brown spots on his face he would like removed. Had them treated in the past with good results.    Review of Systems   Constitutional: Negative for fever.   Skin: Negative for itching, sensitivity to antibiotic ointment, sensitivity to bandage adhesive and tendency to form keloidal scars.   Hematologic/Lymphatic: Does not bruise/bleed easily.        Objective:    Physical Exam   Constitutional: He appears well-developed and well-nourished. No distress.   Neurological: He is alert and oriented to person, place, and time. He is not disoriented.   Psychiatric: He has a normal mood and affect.   Skin:   Areas Examined (abnormalities noted in diagram):   Head / Face Inspection Performed  Back Inspection Performed                   Diagram Legend     Erythematous scaling macule/papule c/w actinic keratosis       Vascular papule c/w angioma      Pigmented verrucoid papule/plaque c/w seborrheic keratosis      Yellow umbilicated papule c/w sebaceous hyperplasia      Irregularly shaped tan macule c/w lentigo     1-2 mm smooth white papules consistent with Milia      Movable subcutaneous cyst with punctum c/w epidermal inclusion cyst      Subcutaneous movable cyst c/w pilar cyst      Firm pink to brown papule c/w dermatofibroma      Pedunculated fleshy papule(s) c/w skin tag(s)      Evenly pigmented macule c/w junctional nevus     Mildly variegated pigmented, slightly irregular-bordered macule c/w mildly atypical nevus      Flesh colored to evenly pigmented papule c/w intradermal nevus       Pink pearly papule/plaque c/w basal cell carcinoma      Erythematous hyperkeratotic cursted plaque c/w  SCC      Surgical scar with no sign of skin cancer recurrence      Open and closed comedones      Inflammatory papules and pustules      Verrucoid papule consistent consistent with wart     Erythematous eczematous patches and plaques     Dystrophic onycholytic nail with subungual debris c/w onychomycosis     Umbilicated papule    Erythematous-base heme-crusted tan verrucoid plaque consistent with inflamed seborrheic keratosis     Erythematous Silvery Scaling Plaque c/w Psoriasis     See annotation    Assessment / Plan:      Skin tag  Reassurance given to patient. No treatment is necessary.   Treatment of benign, asymptomatic lesions may be considered cosmetic.  Patient wishes to defer treatment at this time.    Dermatosis papulosa nigra  These are benign inherited growths without a malignant potential. Reassurance given to patient. No treatment is necessary.   Pt is interested in cosmetic consult for removal - given Dr. Bernal's information and message sent to her staff.       Follow up if symptoms worsen or fail to improve.

## 2019-05-16 NOTE — Clinical Note
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 I gave him Dr. Bernal's card and told him someone would contact him to schedule. Please let me know if you have any questions or concerns. Thanks!

## 2019-05-17 ENCOUNTER — TELEPHONE (OUTPATIENT)
Dept: DERMATOLOGY | Facility: CLINIC | Age: 69
End: 2019-05-17

## 2019-05-17 NOTE — TELEPHONE ENCOUNTER
----- Message from Nancie Batres sent at 5/17/2019  1:40 PM CDT -----  Contact: pt  Name of Who is Calling: PETER STRATTON [[id number]]      What is the request in detail:pt returning call.. Please advise    Can the clinic reply by [facility]: no      What Number to Call Back if not in [facility]:[phone]

## 2019-05-17 NOTE — TELEPHONE ENCOUNTER
Called PT in regards to scheduling sooner appointment. PT stated that he would like to keep his appointment for July.

## 2019-05-23 ENCOUNTER — PATIENT MESSAGE (OUTPATIENT)
Dept: INTERNAL MEDICINE | Facility: CLINIC | Age: 69
End: 2019-05-23

## 2019-05-24 NOTE — TELEPHONE ENCOUNTER
marlon reschedule Mr Han and his wife, Nelly Han' lab apt on 6/12/19 from [facility] to [facility]

## 2019-06-12 ENCOUNTER — LAB VISIT (OUTPATIENT)
Dept: LAB | Facility: OTHER | Age: 69
End: 2019-06-12
Payer: MEDICARE

## 2019-06-12 DIAGNOSIS — E13.9 DIABETES MELLITUS DUE TO ABNORMAL INSULIN: ICD-10-CM

## 2019-06-12 LAB
ALBUMIN SERPL BCP-MCNC: 4.2 G/DL (ref 3.5–5.2)
ALP SERPL-CCNC: 63 U/L (ref 55–135)
ALT SERPL W/O P-5'-P-CCNC: 34 U/L (ref 10–44)
ANION GAP SERPL CALC-SCNC: 8 MMOL/L (ref 8–16)
AST SERPL-CCNC: 28 U/L (ref 10–40)
BASOPHILS # BLD AUTO: 0.02 K/UL (ref 0–0.2)
BASOPHILS NFR BLD: 0.4 % (ref 0–1.9)
BILIRUB SERPL-MCNC: 0.3 MG/DL (ref 0.1–1)
BUN SERPL-MCNC: 9 MG/DL (ref 8–23)
CALCIUM SERPL-MCNC: 9.5 MG/DL (ref 8.7–10.5)
CHLORIDE SERPL-SCNC: 106 MMOL/L (ref 95–110)
CO2 SERPL-SCNC: 27 MMOL/L (ref 23–29)
CREAT SERPL-MCNC: 0.9 MG/DL (ref 0.5–1.4)
DIFFERENTIAL METHOD: ABNORMAL
EOSINOPHIL # BLD AUTO: 0.3 K/UL (ref 0–0.5)
EOSINOPHIL NFR BLD: 5.6 % (ref 0–8)
ERYTHROCYTE [DISTWIDTH] IN BLOOD BY AUTOMATED COUNT: 13.8 % (ref 11.5–14.5)
EST. GFR  (AFRICAN AMERICAN): >60 ML/MIN/1.73 M^2
EST. GFR  (NON AFRICAN AMERICAN): >60 ML/MIN/1.73 M^2
GLUCOSE SERPL-MCNC: 137 MG/DL (ref 70–110)
HCT VFR BLD AUTO: 38.7 % (ref 40–54)
HGB BLD-MCNC: 13 G/DL (ref 14–18)
LYMPHOCYTES # BLD AUTO: 2.9 K/UL (ref 1–4.8)
LYMPHOCYTES NFR BLD: 55.9 % (ref 18–48)
MCH RBC QN AUTO: 33 PG (ref 27–31)
MCHC RBC AUTO-ENTMCNC: 33.6 G/DL (ref 32–36)
MCV RBC AUTO: 98 FL (ref 82–98)
MONOCYTES # BLD AUTO: 0.3 K/UL (ref 0.3–1)
MONOCYTES NFR BLD: 5.4 % (ref 4–15)
NEUTROPHILS # BLD AUTO: 1.7 K/UL (ref 1.8–7.7)
NEUTROPHILS NFR BLD: 32.7 % (ref 38–73)
PLATELET # BLD AUTO: 316 K/UL (ref 150–350)
PMV BLD AUTO: 9 FL (ref 9.2–12.9)
POTASSIUM SERPL-SCNC: 4.3 MMOL/L (ref 3.5–5.1)
PROT SERPL-MCNC: 7.2 G/DL (ref 6–8.4)
RBC # BLD AUTO: 3.94 M/UL (ref 4.6–6.2)
SODIUM SERPL-SCNC: 141 MMOL/L (ref 136–145)
WBC # BLD AUTO: 5.22 K/UL (ref 3.9–12.7)

## 2019-06-12 PROCEDURE — 85025 COMPLETE CBC W/AUTO DIFF WBC: CPT

## 2019-06-12 PROCEDURE — 80053 COMPREHEN METABOLIC PANEL: CPT

## 2019-06-12 PROCEDURE — 36415 COLL VENOUS BLD VENIPUNCTURE: CPT

## 2019-06-12 PROCEDURE — 83036 HEMOGLOBIN GLYCOSYLATED A1C: CPT

## 2019-06-13 LAB
ESTIMATED AVG GLUCOSE: 146 MG/DL (ref 68–131)
HBA1C MFR BLD HPLC: 6.7 % (ref 4–5.6)

## 2019-06-19 ENCOUNTER — OFFICE VISIT (OUTPATIENT)
Dept: INTERNAL MEDICINE | Facility: CLINIC | Age: 69
End: 2019-06-19
Payer: MEDICARE

## 2019-06-19 VITALS
DIASTOLIC BLOOD PRESSURE: 62 MMHG | TEMPERATURE: 99 F | HEART RATE: 86 BPM | WEIGHT: 193.81 LBS | BODY MASS INDEX: 28.71 KG/M2 | SYSTOLIC BLOOD PRESSURE: 134 MMHG | OXYGEN SATURATION: 98 % | HEIGHT: 69 IN

## 2019-06-19 DIAGNOSIS — I10 ESSENTIAL HYPERTENSION: ICD-10-CM

## 2019-06-19 DIAGNOSIS — E13.9 DIABETES MELLITUS DUE TO ABNORMAL INSULIN: Primary | ICD-10-CM

## 2019-06-19 DIAGNOSIS — K21.9 GASTROESOPHAGEAL REFLUX DISEASE WITHOUT ESOPHAGITIS: ICD-10-CM

## 2019-06-19 DIAGNOSIS — E78.5 HYPERLIPIDEMIA, UNSPECIFIED HYPERLIPIDEMIA TYPE: ICD-10-CM

## 2019-06-19 PROCEDURE — 99999 PR PBB SHADOW E&M-EST. PATIENT-LVL III: ICD-10-PCS | Mod: PBBFAC,,, | Performed by: INTERNAL MEDICINE

## 2019-06-19 PROCEDURE — 1101F PT FALLS ASSESS-DOCD LE1/YR: CPT | Mod: CPTII,S$GLB,, | Performed by: INTERNAL MEDICINE

## 2019-06-19 PROCEDURE — 99214 PR OFFICE/OUTPT VISIT, EST, LEVL IV, 30-39 MIN: ICD-10-PCS | Mod: S$GLB,,, | Performed by: INTERNAL MEDICINE

## 2019-06-19 PROCEDURE — 1101F PR PT FALLS ASSESS DOC 0-1 FALLS W/OUT INJ PAST YR: ICD-10-PCS | Mod: CPTII,S$GLB,, | Performed by: INTERNAL MEDICINE

## 2019-06-19 PROCEDURE — 3075F PR MOST RECENT SYSTOLIC BLOOD PRESS GE 130-139MM HG: ICD-10-PCS | Mod: CPTII,S$GLB,, | Performed by: INTERNAL MEDICINE

## 2019-06-19 PROCEDURE — 3044F PR MOST RECENT HEMOGLOBIN A1C LEVEL <7.0%: ICD-10-PCS | Mod: CPTII,S$GLB,, | Performed by: INTERNAL MEDICINE

## 2019-06-19 PROCEDURE — 99999 PR PBB SHADOW E&M-EST. PATIENT-LVL III: CPT | Mod: PBBFAC,,, | Performed by: INTERNAL MEDICINE

## 2019-06-19 PROCEDURE — 3078F PR MOST RECENT DIASTOLIC BLOOD PRESSURE < 80 MM HG: ICD-10-PCS | Mod: CPTII,S$GLB,, | Performed by: INTERNAL MEDICINE

## 2019-06-19 PROCEDURE — 99214 OFFICE O/P EST MOD 30 MIN: CPT | Mod: S$GLB,,, | Performed by: INTERNAL MEDICINE

## 2019-06-19 PROCEDURE — 3078F DIAST BP <80 MM HG: CPT | Mod: CPTII,S$GLB,, | Performed by: INTERNAL MEDICINE

## 2019-06-19 PROCEDURE — 3075F SYST BP GE 130 - 139MM HG: CPT | Mod: CPTII,S$GLB,, | Performed by: INTERNAL MEDICINE

## 2019-06-19 PROCEDURE — 3044F HG A1C LEVEL LT 7.0%: CPT | Mod: CPTII,S$GLB,, | Performed by: INTERNAL MEDICINE

## 2019-06-19 RX ORDER — LOSARTAN POTASSIUM 25 MG/1
25 TABLET ORAL DAILY
Qty: 90 TABLET | Refills: 4 | Status: SHIPPED | OUTPATIENT
Start: 2019-06-19 | End: 2020-07-13

## 2019-06-19 RX ORDER — DIAZEPAM 5 MG/1
5 TABLET ORAL NIGHTLY PRN
Qty: 30 TABLET | Refills: 0 | Status: SHIPPED | OUTPATIENT
Start: 2019-06-19 | End: 2020-09-02 | Stop reason: SDUPTHER

## 2019-06-19 NOTE — PROGRESS NOTES
Follow up of hypertension, diabetes, recurrent UTI, hyperlipidemia.      HISTORY OF PRESENT ILLNESS: This is a 68-year-old man who presents for follow up of above.     She has been carrying gallon jugs of water. He had sudden onset of right arm and hand pain. He uses warm water which helps temporarily.  Occurring 2 months.  No topical medications. No brace. No numbness     Weight is stable.      He has had some right jaw pain - he saw his dentist.  He takes his denture plate out at night.      HE has recurrent UTI.   He had lithotripsy on 3/23/17. No dysuria, hematuria.   He saw Dr cerna on 1/19. He still has a stone in the left kidney. He is on finasteride and tamsulosin for his prostate.      He has not had any alcohol and has not had a recurrence of pancreatitis. No nausea, vomiting, constipation, diarrhea, melana, bloody stools.     He is taking amlodipine 10 mg once daily, Toprol-XL 25 mg once daily and losartan 25 mg daily. No chest pain, shortness of breath. He is now taking metformin  mg 2 tablets twice daily. HE checks his blood sugar twice daily due to fluctuating blood sugars.  No polydipsia. He has a history of hyperlipidemia, currently on Zocor 20 mg daily. No joint pain or muscle pain. NO shortness breath, headaches, vision, sinus congestion.      Back pain is the same.  Hehas pain with prolonged sitting or standing. He is followed by Dr Carrasquillo. He is taking hydrocodone apap 7.5/325 3-4 times a day depending upon his activity. . He used to see Dr Mcdaniel for his back. HE retired. He had a MRI 1/18/17 through DR Pedroza who he did not like.          He smokes 1-2 packs per day.  HE has had night sweats the last 1-2 years.      PAST MEDICAL HISTORY:    1. Hypertension.    2. Coronary artery disease with left heart catheterization in November 2011 with 40 to 50% narrowing in the mid LAD.    3. Diabetes mellitus.    4. Hyperlipidemia.    5. History of reflux.    6. Colon polyps.    7. History of  "cluster headaches.    8. History of glaucoma.    9. Lumbar disk disease at L5 to S1.    10. History of stomach ulcers.    11. Nephrolithiasis.       PAST SURGICAL HISTORY: Cholecystectomy in May 2013, ORIF of the left humerus.      SOCIAL HISTORY: Currently smokes a pack of cigarettes daily, has smoked his whole life. 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 He is a retired schoolteacher.    FAMILY HISTORY: Updated on [facility].      PHYSICAL EXAMINATION:      /62 (BP Location: Right arm, Patient Position: Sitting, BP Method: Large (Manual))   Pulse 86   Temp 98.5 °F (36.9 °C) (Oral)   Ht 5' 9" (1.753 m)   Wt 87.9 kg (193 lb 12.6 oz)   SpO2 98%   BMI 28.62 kg/m²     GENERAL: He is alert, oriented, no apparent distress. Affect within normal limits.    HEENT: Conjunctivae anicteric. Tympanic membranes clear.  . .    NECK: Supple. No cervical lymphadenopathy, no thyroid enlargement.    Respiratory: Effort normal. Lungs are clear to auscultation.    HEART: Regular rate and rhythm without murmurs, gallops or rubs. No lower extremity edema.          Labs 6/12/19 reviewed - A1C 6.7, CBC, CMP acceptable      ASSESSMENT AND PLAN:           1. Recurrent UTI -asx. Saw urology 10/23/17. S/p ESWL  3. Hypertension - Continue current medications  3. Diabetes mellitus. Doing well.    4. Hyperlipidemia. on Zocor.  Work on diet.  Eating chocolate covered peanuts.   5. History of reflux and peptic ulcer disease -asx. Off pantoprazole  6. Tubular adenomas - colonoscopy 12/2016 - one polyp - due 2021  7. History of glaucoma - not on treatment - pressure in eyes ok - followed Nile Avina and retinal specialist at [facility]  8. History of nephrolithiasis -asymptomatic.    9. Irregular heart beat sensation - holter revealed PVC  10. Pancreatitis - resolved. Do not suspect malabsorption as cause of weight loss as he is not having diarrhea.   11. 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 Anxiety and " depression -valium 5 mg nightly prn anxiety  13. Tobacco abuse - CT chest - declined..   14. Right hand pain - suspect due to tendonitis. brace to the right hand and wrist. aspercreme.  I will see him back in 4 months, sooner if problems arise.          Colonoscopy 12/7/16 - one hyperplastic polyp due in 2021  Influenza 9/7/16 - declines  Pneumovax 7/2014  Zostavax 5/16  Prevnar  12/14/16  PSA 7/17

## 2019-06-25 ENCOUNTER — TELEPHONE (OUTPATIENT)
Dept: INTERNAL MEDICINE | Facility: CLINIC | Age: 69
End: 2019-06-25

## 2019-06-25 NOTE — TELEPHONE ENCOUNTER
"----- Message from Ilene Parrish MA sent at 6/24/2019  9:25 AM CDT -----      ----- Message -----  From: Pippa Siegel  Sent: 6/24/2019   8:24 AM  To: Emmanuel KIM Staff    Prior Authorization Needed    Rx:     To submit the PA:    1: Go to " [email or web address] " and click "Enter a Key"    2. Enter the patient's last name and date of birth and the key.      KEY:  [id number]    3. Complete the forms and click "send to Plan"    Note chart when prior authorization has been submitted.    Please notify pharmacy when prior authorization has been approved.    Thank You    "

## 2019-06-27 ENCOUNTER — PATIENT MESSAGE (OUTPATIENT)
Dept: DERMATOLOGY | Facility: CLINIC | Age: 69
End: 2019-06-27

## 2019-06-28 ENCOUNTER — PATIENT MESSAGE (OUTPATIENT)
Dept: INTERNAL MEDICINE | Facility: CLINIC | Age: 69
End: 2019-06-28

## 2019-06-28 ENCOUNTER — PATIENT MESSAGE (OUTPATIENT)
Dept: GASTROENTEROLOGY | Facility: CLINIC | Age: 69
End: 2019-06-28

## 2019-07-02 ENCOUNTER — OFFICE VISIT (OUTPATIENT)
Dept: DERMATOLOGY | Facility: CLINIC | Age: 69
End: 2019-07-02
Payer: MEDICARE

## 2019-07-02 DIAGNOSIS — L91.8 ACROCHORDON: ICD-10-CM

## 2019-07-02 DIAGNOSIS — L82.1 SEBORRHEIC KERATOSIS: ICD-10-CM

## 2019-07-02 DIAGNOSIS — L82.1 DERMATOSIS PAPULOSA NIGRA: Primary | ICD-10-CM

## 2019-07-02 DIAGNOSIS — L72.0 EIC (EPIDERMAL INCLUSION CYST): ICD-10-CM

## 2019-07-02 DIAGNOSIS — D22.61 MELANOCYTIC NEVUS OF RIGHT UPPER EXTREMITY: ICD-10-CM

## 2019-07-02 PROCEDURE — 1101F PT FALLS ASSESS-DOCD LE1/YR: CPT | Mod: CPTII,S$GLB,, | Performed by: DERMATOLOGY

## 2019-07-02 PROCEDURE — 1101F PR PT FALLS ASSESS DOC 0-1 FALLS W/OUT INJ PAST YR: ICD-10-PCS | Mod: CPTII,S$GLB,, | Performed by: DERMATOLOGY

## 2019-07-02 PROCEDURE — 99214 OFFICE O/P EST MOD 30 MIN: CPT | Mod: S$GLB,,, | Performed by: DERMATOLOGY

## 2019-07-02 PROCEDURE — 99214 PR OFFICE/OUTPT VISIT, EST, LEVL IV, 30-39 MIN: ICD-10-PCS | Mod: S$GLB,,, | Performed by: DERMATOLOGY

## 2019-07-02 NOTE — PROGRESS NOTES
Subjective:       Patient ID:  Tony Han is a 69 y.o. male who presents for   Chief Complaint   Patient presents with    Lesion     face    Growth     back     Lesion  - Initial  Affected locations: face  Duration: 5 years  Signs / symptoms: spreading  Severity: mild  Timing: constant  Aggravated by: nothing  Relieving factors/Treatments tried: nothing  Improvement on treatment: no relief    Growth  - Initial  Affected locations: back  Duration: 4 years  Signs / symptoms: growing and rough  Severity: mild  Timing: constant  Aggravated by: nothing  Relieving factors/Treatments tried: nothing  Improvement on treatment: no relief      Review of Systems   Musculoskeletal: Positive for joint swelling and arthralgias (back).   Skin: Negative for recent sunburn.   Hematologic/Lymphatic: Bruises/bleeds easily (due to ASA).        Objective:    Physical Exam   Constitutional: He appears well-developed and well-nourished. No distress.   HENT:   Mouth/Throat: Lips normal.    Eyes: Lids are normal.  No conjunctival no injection.   Neurological: He is alert and oriented to person, place, and time. He is not disoriented.   Psychiatric: He has a normal mood and affect.   Skin:   Areas Examined (abnormalities noted in diagram):   Scalp / Hair Palpated and Inspected  Head / Face Inspection Performed  Neck Inspection Performed  Chest / Axilla Inspection Performed  Abdomen Inspection Performed  Back Inspection Performed  RUE Inspected  LUE Inspection Performed  Nails and Digits Inspection Performed                   Diagram Legend     Erythematous scaling macule/papule c/w actinic keratosis       Vascular papule c/w angioma      Pigmented verrucoid papule/plaque c/w seborrheic keratosis      Yellow umbilicated papule c/w sebaceous hyperplasia      Irregularly shaped tan macule c/w lentigo     1-2 mm smooth white papules consistent with Milia      Movable subcutaneous cyst with punctum c/w epidermal inclusion cyst       Subcutaneous movable cyst c/w pilar cyst      Firm pink to brown papule c/w dermatofibroma      Pedunculated fleshy papule(s) c/w skin tag(s)      Evenly pigmented macule c/w junctional nevus     Mildly variegated pigmented, slightly irregular-bordered macule c/w mildly atypical nevus      Flesh colored to evenly pigmented papule c/w intradermal nevus       Pink pearly papule/plaque c/w basal cell carcinoma      Erythematous hyperkeratotic cursted plaque c/w SCC      Surgical scar with no sign of skin cancer recurrence      Open and closed comedones      Inflammatory papules and pustules      Verrucoid papule consistent consistent with wart     Erythematous eczematous patches and plaques     Dystrophic onycholytic nail with subungual debris c/w onychomycosis     Umbilicated papule    Erythematous-base heme-crusted tan verrucoid plaque consistent with inflamed seborrheic keratosis     Erythematous Silvery Scaling Plaque c/w Psoriasis     See annotation      Assessment / Plan:        Dermatosis papulosa nigra  These are benign, inherited growths without a malignant potential. Reassurance given to patient.   Discussed with the patient that treatment of these benign lesions is not covered by insurance as it is considered cosmetic. Warned about risk of scarring and hypo- or hyperpigmentation with treatment, as well as risk of recurrence and/or development of more lesions. Will RTC next month for treatment.    Seborrheic keratosis  These are benign, inherited growths without a malignant potential. Reassurance given to patient. No treatment is necessary. Handout was provided.    EIC (epidermal inclusion cyst)  This is a benign cyst of the hair follicle. Reassurance provided. No treatment is necessary unless it is symptomatic.     Melanocytic nevus of right upper extremity  Benign-appearing nevus on exam today. Counseled patient to periodically examine moles and return to clinic if any changes in size, shape, or color are  noted or if it becomes symptomatic (bleeding, itching, pain, etc).    Acrochordon  Reassurance was given to the patient. No treatment is necessary.      Follow up in about 1 month (around 7/30/2019).

## 2019-07-26 ENCOUNTER — TELEPHONE (OUTPATIENT)
Dept: DERMATOLOGY | Facility: CLINIC | Age: 69
End: 2019-07-26

## 2019-07-26 NOTE — TELEPHONE ENCOUNTER
L/M on pt.'s cell, Dr. Bernal is not in the office on the day pt. Is requesting.  ----- Message from Karma Yang sent at 7/26/2019  2:35 PM CDT -----  Contact: PETER STRATTON [[id number]]  Name of Who is Calling: PETER STRATTON [[id number]]      What is the request in detail: patient would like to schedule 1 month follow up for 08/02. Please call     Can the clinic reply by [facility]: no    [facility] Back [facility] [phone]

## 2019-07-30 ENCOUNTER — PROCEDURE VISIT (OUTPATIENT)
Dept: DERMATOLOGY | Facility: CLINIC | Age: 69
End: 2019-07-30
Payer: MEDICARE

## 2019-07-30 DIAGNOSIS — L82.1 DERMATOSIS PAPULOSA NIGRA: ICD-10-CM

## 2019-07-30 DIAGNOSIS — Z41.1 ELECTIVE PROCEDURE FOR UNACCEPTABLE COSMETIC APPEARANCE: Primary | ICD-10-CM

## 2019-07-30 PROCEDURE — 17110 DESTRUCTION B9 LES UP TO 14: CPT | Mod: CSM,S$GLB,, | Performed by: DERMATOLOGY

## 2019-07-30 PROCEDURE — 99499 NO LOS: ICD-10-PCS | Mod: CSM,S$GLB,, | Performed by: DERMATOLOGY

## 2019-07-30 PROCEDURE — 17110 PR DESTRUCTION BENIGN LESIONS UP TO 14: ICD-10-PCS | Mod: CSM,S$GLB,, | Performed by: DERMATOLOGY

## 2019-07-30 PROCEDURE — 99499 UNLISTED E&M SERVICE: CPT | Mod: CSM,S$GLB,, | Performed by: DERMATOLOGY

## 2019-07-30 NOTE — PROGRESS NOTES
Patient here today for cosmetic destruction of dermatosis papulosa nigra on the face. Discussed with the patient that this procedure is not covered by insurance as it is considered cosmetic. He would like to pursue treatment. He understands that he will be responsible for the bill and agrees to pay.    Procedure note for destruction via hyfrecation and curettage:  Verbal and written consent obtained. Warned about risk of scarring and hypo- or hyperpigmentation with treatment, as well as risk of recurrence and/or development of more lesions.  Bilateral cheeks and temples are cleaned with alcohol. 14 lesion(s) are then lightly hyfrecated (on low 2.0) to destroy the lesions.   Patient tolerated the procedure well. No complications.   Areas were dressed with Aquaphor. Wound care was discussed.    Follow up as needed.

## 2019-10-18 ENCOUNTER — OFFICE VISIT (OUTPATIENT)
Dept: SPORTS MEDICINE | Facility: CLINIC | Age: 69
End: 2019-10-18
Payer: MEDICARE

## 2019-10-18 ENCOUNTER — HOSPITAL ENCOUNTER (OUTPATIENT)
Dept: RADIOLOGY | Facility: HOSPITAL | Age: 69
Discharge: HOME | End: 2019-10-18
Attending: ORTHOPAEDIC SURGERY
Payer: MEDICARE

## 2019-10-18 ENCOUNTER — PATIENT OUTREACH (OUTPATIENT)
Dept: ADMINISTRATIVE | Facility: OTHER | Age: 69
End: 2019-10-18

## 2019-10-18 ENCOUNTER — TELEPHONE (OUTPATIENT)
Dept: SPORTS MEDICINE | Facility: CLINIC | Age: 69
End: 2019-10-18

## 2019-10-18 VITALS — DIASTOLIC BLOOD PRESSURE: 73 MMHG | SYSTOLIC BLOOD PRESSURE: 140 MMHG | HEART RATE: 77 BPM

## 2019-10-18 DIAGNOSIS — M25.519 SHOULDER PAIN, UNSPECIFIED CHRONICITY, UNSPECIFIED LATERALITY: ICD-10-CM

## 2019-10-18 DIAGNOSIS — G25.89 SCAPULAR DYSKINESIS: ICD-10-CM

## 2019-10-18 DIAGNOSIS — M25.512 LEFT SHOULDER PAIN, UNSPECIFIED CHRONICITY: Primary | ICD-10-CM

## 2019-10-18 DIAGNOSIS — M25.512 LEFT SHOULDER PAIN, UNSPECIFIED CHRONICITY: ICD-10-CM

## 2019-10-18 DIAGNOSIS — M25.512 ACUTE PAIN OF LEFT SHOULDER: ICD-10-CM

## 2019-10-18 DIAGNOSIS — M25.812 SHOULDER IMPINGEMENT, LEFT: ICD-10-CM

## 2019-10-18 PROCEDURE — 1101F PR PT FALLS ASSESS DOC 0-1 FALLS W/OUT INJ PAST YR: ICD-10-PCS | Mod: CPTII,S$GLB,, | Performed by: ORTHOPAEDIC SURGERY

## 2019-10-18 PROCEDURE — 99999 PR PBB SHADOW E&M-EST. PATIENT-LVL III: ICD-10-PCS | Mod: PBBFAC,,, | Performed by: ORTHOPAEDIC SURGERY

## 2019-10-18 PROCEDURE — 73030 X-RAY EXAM OF SHOULDER: CPT | Mod: 26,LT,, | Performed by: RADIOLOGY

## 2019-10-18 PROCEDURE — 99213 PR OFFICE/OUTPT VISIT, EST, LEVL III, 20-29 MIN: ICD-10-PCS | Mod: S$GLB,,, | Performed by: ORTHOPAEDIC SURGERY

## 2019-10-18 PROCEDURE — 3077F SYST BP >= 140 MM HG: CPT | Mod: CPTII,S$GLB,, | Performed by: ORTHOPAEDIC SURGERY

## 2019-10-18 PROCEDURE — 73030 X-RAY EXAM OF SHOULDER: CPT | Mod: TC,LT

## 2019-10-18 PROCEDURE — 99213 OFFICE O/P EST LOW 20 MIN: CPT | Mod: S$GLB,,, | Performed by: ORTHOPAEDIC SURGERY

## 2019-10-18 PROCEDURE — 3078F DIAST BP <80 MM HG: CPT | Mod: CPTII,S$GLB,, | Performed by: ORTHOPAEDIC SURGERY

## 2019-10-18 PROCEDURE — 3077F PR MOST RECENT SYSTOLIC BLOOD PRESSURE >= 140 MM HG: ICD-10-PCS | Mod: CPTII,S$GLB,, | Performed by: ORTHOPAEDIC SURGERY

## 2019-10-18 PROCEDURE — 3078F PR MOST RECENT DIASTOLIC BLOOD PRESSURE < 80 MM HG: ICD-10-PCS | Mod: CPTII,S$GLB,, | Performed by: ORTHOPAEDIC SURGERY

## 2019-10-18 PROCEDURE — 73030 XR SHOULDER COMPLETE 2 OR MORE VIEWS LEFT: ICD-10-PCS | Mod: 26,LT,, | Performed by: RADIOLOGY

## 2019-10-18 PROCEDURE — 99999 PR PBB SHADOW E&M-EST. PATIENT-LVL III: CPT | Mod: PBBFAC,,, | Performed by: ORTHOPAEDIC SURGERY

## 2019-10-18 PROCEDURE — 1101F PT FALLS ASSESS-DOCD LE1/YR: CPT | Mod: CPTII,S$GLB,, | Performed by: ORTHOPAEDIC SURGERY

## 2019-10-18 NOTE — LETTER
October 18, 2019      Elizabeth Benitez MD  [address]  [facility] [address]           [facility]  [address]  [facility] [address]  Phone: [phone]          Patient: Tony Han   MR Number: [id number]   YOB: 1950   Date of Visit: 10/18/2019       Dear Dr. Elizabeth Benitez:    Thank you for referring Tony Han to me for evaluation. Attached you will find relevant portions of my assessment and plan of care.    If you have questions, please do not hesitate to call me. I look forward to following Tony Han along with you.    Sincerely,    Jay Milligan MA    Enclosure  CC:  No Recipients    If you would like to receive this communication electronically, please contact [email or web address] or [phone] to request more information on [employer] access.    For providers and/or their staff who would like to refer a patient to [facility], please contact us through our one-stop-shop provider referral line, [facility], at [phone].    If you feel you have received this communication in error or would no longer like to receive these types of communications, please e-mail [email or web address]

## 2019-10-18 NOTE — TELEPHONE ENCOUNTER
----- Message from Maxi Lee sent at 10/18/2019  3:22 PM CDT -----  Contact: self   Pt stated he was told to take an NSAID but does not remember which one he is supposed to take.     [phone]

## 2019-10-18 NOTE — PROGRESS NOTES
CC: left shoulder pain, patient is a retired teacher,      69 y.o. Male RHD reports that the pain is severe and not responding to any conservative care.      Patient notes that his pain has been present for about 2 weeks when he was picking up a heavy box    He describes his pain as being in his shoulder joint and in his scapula     He reports that the pain is worse with overhead activity. It also bothers him at night.    Is affecting ADLs.     He has been taking 400mg ibuprofen with minimal relief  He also notes taking Vicodin for his back pain that helped his shoulder minimally       He has been previously treated for his left shoulder for impingement and scapular dyskinesia, 6/26/17  He notes he got great relief following the injection but he feels this problem is different from his previous issue     Review of Systems   Constitution: Negative. Negative for chills, fever and night sweats.   HENT: Negative for congestion and headaches.    Eyes: Negative for blurred vision, left vision loss and right vision loss.   Cardiovascular: Negative for chest pain and syncope.   Respiratory: Negative for cough and shortness of breath.    Endocrine: Negative for polydipsia, polyphagia and polyuria.   Hematologic/Lymphatic: Negative for bleeding problem. Does not bruise/bleed easily.   Skin: Negative for dry skin, itching and rash.   Musculoskeletal: Negative for falls and muscle weakness.   Gastrointestinal: Negative for abdominal pain and bowel incontinence.   Genitourinary: Negative for bladder incontinence and nocturia.   Neurological: Negative for disturbances in coordination, loss of balance and seizures.   Psychiatric/Behavioral: Negative for depression. The patient does not have insomnia.    Allergic/Immunologic: Negative for hives and persistent infections.     PAST MEDICAL HISTORY:   Past Medical History:   Diagnosis Date    Angina pectoris     Back pain     Cluster headaches     1999    Colon polyps     5 polyps  in Dec 2010    Coronary artery disease 11/2011    moderate nonobstructive cad on left heart cath [facility] 11/2011    Diabetes mellitus     GERD (gastroesophageal reflux disease)     history of peptic ulcer disease    Hyperlipidemia     Hypertension     Lumbar disc disease     MRI 2008 - L5-S1    Nephrolithiasis     July 2013     PAST SURGICAL HISTORY:   Past Surgical History:   Procedure Laterality Date    CHOLECYSTECTOMY      COLONOSCOPY N/A 12/7/2016    Procedure: COLONOSCOPY;  Surgeon: Lance Crocker MD;  Location: [facility] ([address] [place] [address]);  Service: Endoscopy;  Laterality: N/A;    LITHOTRIPSY      ORIF left humerus       FAMILY HISTORY:   Family History   Problem Relation Age of Onset    Heart disease Mother     No Known Problems Father     Heart disease Brother     Cancer Sister         ovarian    Heart failure Brother     Kidney disease Brother     Alzheimer's disease Brother     No Known Problems Brother     Diabetes Son     Blindness Son     Diabetes Son     Hypertension Son     Diabetes Son     Hypertension Son     Diabetes Son     Hypertension Son     Sleep apnea Son     Celiac disease Neg Hx     Cirrhosis Neg Hx     Colon cancer Neg Hx     Colon polyps Neg Hx     Crohn's disease Neg Hx     Esophageal cancer Neg Hx     Inflammatory bowel disease Neg Hx     Liver cancer Neg Hx     Rectal cancer Neg Hx     Stomach cancer Neg Hx     Ulcerative colitis Neg Hx     Melanoma Neg Hx      SOCIAL HISTORY:   Social History     Socioeconomic History    Marital status:      Spouse name: Not on file    Number of children: Not on file    Years of education: Not on file    Highest education level: Not on file   Occupational History    Not on file   Social Needs    Financial resource strain: Not on file    Food insecurity:     Worry: Not on file     Inability: Not on file    Transportation needs:     Medical: Not on file     Non-medical: Not on file   Tobacco Use     Smoking status: Current Every Day Smoker     Packs/day: 1.50     Years: 50.00     Pack years: 75.00     Types: Cigarettes    Smokeless tobacco: Never Used   Substance and Sexual Activity    Alcohol use: No    Drug use: Yes     Types: Hydrocodone    Sexual activity: Yes     Partners: Female     Birth control/protection: None   Lifestyle    Physical activity:     Days per week: Not on file     Minutes per session: Not on file    Stress: Not on file   Relationships    Social connections:     Talks on phone: Not on file     Gets together: Not on file     Attends [employer] service: Not on file     Active member of club or organization: Not on file     Attends meetings of clubs or organizations: Not on file     Relationship status: Not on file   Other Topics Concern    Not on file   Social History Narrative    , 5 children - healthy.  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       MEDICATIONS:   Current Outpatient Medications:     amLODIPine (NORVASC) 10 MG tablet, TAKE 1 TABLET(10 MG) BY MOUTH EVERY DAY, Disp: 90 tablet, Rfl: 4    aspirin 81 MG Chew, Take 1 tablet (81 mg total) by mouth once daily., Disp: 180 tablet, Rfl: 4    blood sugar diagnostic (CONTOUR TEST STRIPS) Strp, CHECK BLOOD SUGAR TWICE DAILY, Disp: 100 strip, Rfl: 11    blood sugar diagnostic Strp, True Metrics test strips - check blood sugar daily, Disp: 100 strip, Rfl: 4    diazePAM (VALIUM) 5 MG tablet, Take 1 tablet (5 mg total) by mouth nightly as needed for Anxiety., Disp: 30 tablet, Rfl: 0    finasteride (PROSCAR) 5 mg tablet, TAKE 1 TABLET(5 MG) BY MOUTH EVERY DAY, Disp: 90 tablet, Rfl: 3    FLUZONE HIGH-DOSE 2018-19, PF, 180 mcg/0.5 mL vaccine, ADM 0.5ML IM UTD, Disp: , Rfl: 0    hydrocodone-acetaminophen 7.5-325mg (NORCO) 7.5-325 mg per tablet, TK 1 T PO TID, Disp: , Rfl: 0    lancets Misc, Malu Contour lancets - use one lancet once daily, Disp: 100 each, Rfl: 11    lancets Misc, True Matrix  Lancets -c heck blood sugar daily, Disp: 100 each, Rfl: 3    losartan (COZAAR) 25 MG tablet, Take 1 tablet (25 mg total) by mouth once daily., Disp: 90 tablet, Rfl: 4    metFORMIN (GLUCOPHAGE-XR) 500 MG 24 hr tablet, TAKE 2 TABLETS BY MOUTH TWICE DAILY., Disp: 360 tablet, Rfl: 4    metoprolol succinate (TOPROL-XL) 25 MG 24 hr tablet, TAKE 1 TABLET(25 MG) BY MOUTH EVERY DAY, Disp: 90 tablet, Rfl: 4    MULTIVITS-MINERALS/FA/LYCOPENE (ONE-A-DAY MEN'S ORAL), Take 1 tablet by mouth once daily., Disp: , Rfl:     simvastatin (ZOCOR) 20 MG tablet, TAKE 1 TABLET(20 MG) BY MOUTH EVERY EVENING, Disp: 90 tablet, Rfl: 4    tamsulosin (FLOMAX) 0.4 mg Cap, Take 1 capsule (0.4 mg total) by mouth once daily., Disp: 90 capsule, Rfl: 3    vitamin D 1000 units Tab, Take 1,000 Units by mouth once daily. Take 1 tablet daily, Disp: , Rfl:   ALLERGIES:   Review of patient's allergies indicates:   Allergen Reactions    Aspirin Nausea And Vomiting     If over 81mg       VITAL SIGNS: BP (!) 162/73   Pulse 87      PHYSICAL EXAMINATION:  General:  The patient is alert and oriented x 3.  Mood is pleasant.  Observation of ears, eyes and nose reveal no gross abnormalities.  No labored breathing observed.  Gait is coordinated. Patient can toe walk and heel walk without difficulty.      left Shoulder / Upper Extremity Exam    OBSERVATION:     Swelling  none  Deformity  none   Discoloration  none   Scapular winging none   Scars   none  Atrophy  none    TENDERNESS / CREPITUS (T/C):          T/C      T/C   Clavicle   -/-  SUPRAspinatus    -/-     AC Jt.    -/-  INFRAspinatus  -/-    SC Jt.    -/-  Deltoid    -/-      G. Tuberosity  -/-  LH BICEP groove  +/-   Acromion:  -/-  Midline Neck   -/-     Scapular Spine -/-  Trapezium   -/-   SMA Scapula  -/-  GH jt. line - post  -/-     Scapulothoracic  -/-         ROM: (* = with pain)  Right shoulder   Left shoulder        AROM (PROM)   AROM (PROM)   FE    165° (175°)     165° (175°)     ER at 0°     45°  (65°)    35°  (65°)   ER at 90° ABD  90°  (90°)    90°  (90°)   IR at 90°  ABD   NA  (40°)     NA  (40°)      IR (spine level)   T10     T10    STRENGTH: (* = with pain) RIGHT SHOULDER  LEFT SHOULDER   SCAPTION at 0  5/5    5/5   SCAPTION at 30  5/5    5/5    IR    5/5    5/5   ER    5/5    5/5   BICEPS   5/5    5/5   Deltoid    5/5    5/5     SIGNS:  Painful side       NEER   +    OKEVINS  neg    BANDA   Neg    SPEEDS  neg     DROP ARM   neg   BELLY PRESS neg   Superior escape none    LIFT-OFF  neg   X-Body ADD    neg    MOVING VALGUS neg        STABILITY TESTING    RIGHT SHOULDER   LEFT SHOULDER     Translation     Anterior  up face     up face    Posterior  up face    up face    Sulcus   < 10mm    < 10 mm     Signs   Apprehension   neg      neg       Relocation   no change     no change      Jerk test  neg     neg    EXTREMITY NEURO-VASCULAR EXAM    Sensation grossly intact to light touch all dermatomal regions.    DTR 2+ Biceps, Triceps, BR and Negative Joannes sign   Grossly intact motor function at Elbow, Wrist and Hand   Distal pulses radial and ulnar 2+, brisk cap refill, symmetric.      NECK:  Painless FROM and spinous processes non-tender. Negative Spurlings sign.      OTHER FINDINGS:  ++ moderate scapular dyskinesia    XRAYS:  Shoulder trauma series left,  were obtained and reviewed  No convincing fracture or dislocation is noted. The osseous structures appear well mineralized and well aligned    Mild DJD of the shoulder        ASSESSMENT:   left:  1. Shoulder pain, impingement   2.  Scapular dyskinesia    PLAN:    [facility]    PT for scapular stabilization: Scapular dyskinesia   Scapular stabilization - Devon protocol, 1-3x/week x 6-8 weeks with HEP    Send portal message in 3-4 weeks to update us on his status, should he note improve we will consider CSI (diabetic)    All questions were answered, pt will contact us for questions or concerns in the interim.

## 2019-10-23 ENCOUNTER — LAB VISIT (OUTPATIENT)
Dept: LAB | Facility: OTHER | Age: 69
End: 2019-10-23
Attending: INTERNAL MEDICINE
Payer: MEDICARE

## 2019-10-23 DIAGNOSIS — E13.9 DIABETES MELLITUS DUE TO ABNORMAL INSULIN: ICD-10-CM

## 2019-10-23 LAB
ALBUMIN SERPL BCP-MCNC: 4.4 G/DL (ref 3.5–5.2)
ALP SERPL-CCNC: 66 U/L (ref 55–135)
ALT SERPL W/O P-5'-P-CCNC: 55 U/L (ref 10–44)
ANION GAP SERPL CALC-SCNC: 10 MMOL/L (ref 8–16)
AST SERPL-CCNC: 37 U/L (ref 10–40)
BASOPHILS # BLD AUTO: 0.03 K/UL (ref 0–0.2)
BASOPHILS NFR BLD: 0.5 % (ref 0–1.9)
BILIRUB SERPL-MCNC: 0.2 MG/DL (ref 0.1–1)
BUN SERPL-MCNC: 11 MG/DL (ref 8–23)
CALCIUM SERPL-MCNC: 9.8 MG/DL (ref 8.7–10.5)
CHLORIDE SERPL-SCNC: 105 MMOL/L (ref 95–110)
CO2 SERPL-SCNC: 25 MMOL/L (ref 23–29)
CREAT SERPL-MCNC: 1 MG/DL (ref 0.5–1.4)
DIFFERENTIAL METHOD: ABNORMAL
EOSINOPHIL # BLD AUTO: 0.3 K/UL (ref 0–0.5)
EOSINOPHIL NFR BLD: 5.2 % (ref 0–8)
ERYTHROCYTE [DISTWIDTH] IN BLOOD BY AUTOMATED COUNT: 13.3 % (ref 11.5–14.5)
EST. GFR  (AFRICAN AMERICAN): >60 ML/MIN/1.73 M^2
EST. GFR  (NON AFRICAN AMERICAN): >60 ML/MIN/1.73 M^2
ESTIMATED AVG GLUCOSE: 148 MG/DL (ref 68–131)
GLUCOSE SERPL-MCNC: 151 MG/DL (ref 70–110)
HBA1C MFR BLD HPLC: 6.8 % (ref 4–5.6)
HCT VFR BLD AUTO: 40.2 % (ref 40–54)
HGB BLD-MCNC: 13.2 G/DL (ref 14–18)
IMM GRANULOCYTES # BLD AUTO: 0.01 K/UL (ref 0–0.04)
IMM GRANULOCYTES NFR BLD AUTO: 0.2 % (ref 0–0.5)
LYMPHOCYTES # BLD AUTO: 3.1 K/UL (ref 1–4.8)
LYMPHOCYTES NFR BLD: 50 % (ref 18–48)
MCH RBC QN AUTO: 32.3 PG (ref 27–31)
MCHC RBC AUTO-ENTMCNC: 32.8 G/DL (ref 32–36)
MCV RBC AUTO: 98 FL (ref 82–98)
MONOCYTES # BLD AUTO: 0.4 K/UL (ref 0.3–1)
MONOCYTES NFR BLD: 6 % (ref 4–15)
NEUTROPHILS # BLD AUTO: 2.3 K/UL (ref 1.8–7.7)
NEUTROPHILS NFR BLD: 38.1 % (ref 38–73)
NRBC BLD-RTO: 0 /100 WBC
PLATELET # BLD AUTO: 325 K/UL (ref 150–350)
PMV BLD AUTO: 8.8 FL (ref 9.2–12.9)
POTASSIUM SERPL-SCNC: 4.5 MMOL/L (ref 3.5–5.1)
PROT SERPL-MCNC: 7.3 G/DL (ref 6–8.4)
RBC # BLD AUTO: 4.09 M/UL (ref 4.6–6.2)
SODIUM SERPL-SCNC: 140 MMOL/L (ref 136–145)
WBC # BLD AUTO: 6.12 K/UL (ref 3.9–12.7)

## 2019-10-23 PROCEDURE — 85025 COMPLETE CBC W/AUTO DIFF WBC: CPT

## 2019-10-23 PROCEDURE — 80053 COMPREHEN METABOLIC PANEL: CPT

## 2019-10-23 PROCEDURE — 36415 COLL VENOUS BLD VENIPUNCTURE: CPT

## 2019-10-23 PROCEDURE — 83036 HEMOGLOBIN GLYCOSYLATED A1C: CPT

## 2019-10-30 ENCOUNTER — OFFICE VISIT (OUTPATIENT)
Dept: INTERNAL MEDICINE | Facility: CLINIC | Age: 69
End: 2019-10-30
Payer: MEDICARE

## 2019-10-30 VITALS
SYSTOLIC BLOOD PRESSURE: 132 MMHG | TEMPERATURE: 99 F | HEART RATE: 89 BPM | BODY MASS INDEX: 28.87 KG/M2 | OXYGEN SATURATION: 99 % | HEIGHT: 69 IN | WEIGHT: 194.88 LBS | DIASTOLIC BLOOD PRESSURE: 60 MMHG

## 2019-10-30 DIAGNOSIS — Z00.00 ROUTINE GENERAL MEDICAL EXAMINATION AT A HEALTH CARE FACILITY: Primary | ICD-10-CM

## 2019-10-30 DIAGNOSIS — E13.9 DIABETES MELLITUS DUE TO ABNORMAL INSULIN: ICD-10-CM

## 2019-10-30 DIAGNOSIS — I10 ESSENTIAL HYPERTENSION: ICD-10-CM

## 2019-10-30 PROCEDURE — 99999 PR PBB SHADOW E&M-EST. PATIENT-LVL III: CPT | Mod: PBBFAC,,, | Performed by: INTERNAL MEDICINE

## 2019-10-30 PROCEDURE — 99999 PR PBB SHADOW E&M-EST. PATIENT-LVL III: ICD-10-PCS | Mod: PBBFAC,,, | Performed by: INTERNAL MEDICINE

## 2019-10-30 PROCEDURE — 3078F DIAST BP <80 MM HG: CPT | Mod: CPTII,S$GLB,, | Performed by: INTERNAL MEDICINE

## 2019-10-30 PROCEDURE — 3044F HG A1C LEVEL LT 7.0%: CPT | Mod: CPTII,S$GLB,, | Performed by: INTERNAL MEDICINE

## 2019-10-30 PROCEDURE — 3075F PR MOST RECENT SYSTOLIC BLOOD PRESS GE 130-139MM HG: ICD-10-PCS | Mod: CPTII,S$GLB,, | Performed by: INTERNAL MEDICINE

## 2019-10-30 PROCEDURE — 99213 PR OFFICE/OUTPT VISIT, EST, LEVL III, 20-29 MIN: ICD-10-PCS | Mod: S$GLB,,, | Performed by: INTERNAL MEDICINE

## 2019-10-30 PROCEDURE — 3078F PR MOST RECENT DIASTOLIC BLOOD PRESSURE < 80 MM HG: ICD-10-PCS | Mod: CPTII,S$GLB,, | Performed by: INTERNAL MEDICINE

## 2019-10-30 PROCEDURE — 3044F PR MOST RECENT HEMOGLOBIN A1C LEVEL <7.0%: ICD-10-PCS | Mod: CPTII,S$GLB,, | Performed by: INTERNAL MEDICINE

## 2019-10-30 PROCEDURE — 3075F SYST BP GE 130 - 139MM HG: CPT | Mod: CPTII,S$GLB,, | Performed by: INTERNAL MEDICINE

## 2019-10-30 PROCEDURE — 99213 OFFICE O/P EST LOW 20 MIN: CPT | Mod: S$GLB,,, | Performed by: INTERNAL MEDICINE

## 2019-10-30 RX ORDER — LANCETS
EACH MISCELLANEOUS
Qty: 100 EACH | Refills: 3 | Status: SHIPPED | OUTPATIENT
Start: 2019-10-30 | End: 2020-01-13

## 2019-10-30 NOTE — PROGRESS NOTES
Annual exam and Follow up of hypertension, diabetes, recurrent UTI, hyperlipidemia.      HISTORY OF PRESENT ILLNESS: This is a 68-year-old man who presents for follow up of above.      HE is now in physical therapy for his left shoulder pain. He is doing it at [facility] physical therapy. He saw Dr John in sports medicine on 10/18/19.      Weight is stable.      Right jaw pain is better.  He is has some right jaw popping at times. Occasional pain. .      HE has recurrent UTI.   He had lithotripsy on 3/23/17. No dysuria, hematuria.   He saw Dr cerna on 1/19. He still has a stone in the left kidney. He is on finasteride and tamsulosin for his prostate.      He has not had any alcohol and has not had a recurrence of pancreatitis. No nausea, vomiting, constipation, diarrhea, melana, bloody stools.     He is taking amlodipine 10 mg once daily, Toprol-XL 25 mg once daily and losartan 25 mg daily. No chest pain, shortness of breath. He is now taking metformin  mg 2 tablets twice daily. HE checks his blood sugar twice daily due to fluctuating blood sugars.  No polydipsia. He has a history of hyperlipidemia, currently on Zocor 20 mg daily. No joint pain or muscle pain. NO shortness breath, headaches, vision, sinus congestion.      Back pain is the same.  He has pain with prolonged sitting or standing. He is followed by Dr Carrasquillo. He is taking hydrocodone apap 7.5/325 3-4 times a day depending upon his activity.  He used to see Dr Mcdaniel for his back. HE retired. He had a MRI 1/18/17 through DR Pedroza who he did not like.          He smokes 1.5 packs cigarette per day.  He smokes when he gets stressed. He is no longer drinking alcohol so her turns to cigarettes for his mood.      PAST MEDICAL HISTORY:    1. Hypertension.    2. Coronary artery disease with left heart catheterization in November 2011 with 40 to 50% narrowing in the mid LAD.    3. Diabetes mellitus.    4. Hyperlipidemia.    5. History of reflux.    6.  "Colon polyps.    7. History of cluster headaches.    8. History of glaucoma.    9. Lumbar disk disease at L5 to S1.    10. History of stomach ulcers.    11. Nephrolithiasis.       PAST SURGICAL HISTORY: Cholecystectomy in May 2013, ORIF of the left humerus.      SOCIAL HISTORY: Currently smokes a pack of cigarettes daily, has smoked his whole life. 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 He is a retired schoolteacher.      FAMILY HISTORY: Updated on [facility].      PHYSICAL EXAMINATION:    /60 (BP Location: Right arm, Patient Position: Sitting, BP Method: Large (Manual))   Pulse 89   Temp 98.7 °F (37.1 °C) (Oral)   Ht 5' 9" (1.753 m)   Wt 88.4 kg (194 lb 14.2 oz)   SpO2 99%   BMI 28.78 kg/m²     GENERAL: He is alert, oriented, no apparent distress. Affect within normal limits.    HEENT: Conjunctivae anicteric. Tympanic membranes clear.  . .    NECK: Supple. No cervical lymphadenopathy, no thyroid enlargement.    Respiratory: Effort normal. Lungs are clear to auscultation.    HEART: Regular rate and rhythm without murmurs, gallops or rubs. No lower extremity edema.          Labs 10/23/19 reviewed - A1C 6.8, CBC, CMP acceptable      ASSESSMENT AND PLAN:      Annual exam - discussed diet, exercise and safety issues.           1. Recurrent UTI -asx. Saw urology 10/23/17. S/p ESWL  3. Hypertension - Continue current medications  3. Diabetes mellitus. Doing well.    4. Hyperlipidemia. on Zocor.  Work on diet.  Eating chocolate covered peanuts.   5. History of reflux and peptic ulcer disease -asx. Off pantoprazole  6. Tubular adenomas - colonoscopy 12/2016 - one polyp - due 2021  7. History of glaucoma - not on treatment - pressure in eyes ok - followed Nile Avina and retinal specialist at [facility]  8. History of nephrolithiasis -asymptomatic.    9. Irregular heart beat sensation - holter revealed PVC  10. Pancreatitis - resolved. Do not suspect malabsorption as cause of weight loss as he is " not having diarrhea.   11. 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 Anxiety and depression -valium 5 mg nightly prn anxiety  13. Tobacco abuse - CT chest - declined..   14. I will see him back in 4 months, sooner if problems arise.          Colonoscopy 12/7/16 - one hyperplastic polyp due in 2021  Influenza 10/10/19  Pneumovax 7/2014  Zostavax 5/16  Prevnar  12/14/16  PSA 7/17    Answers for HPI/ROS submitted by the patient on 10/28/2019   Diabetes problem  MedicAlert ID: No  Disease duration: 7 years  foot paresthesias: Yes  Symptom course: stable  sweats: Yes  blackouts: No  hospitalization: No  nocturnal hypoglycemia: No  required assistance: No  required glucagon: No  CVA: No  heart disease: No  impotence: No  nephropathy: No  peripheral neuropathy: No  PVD: No  retinopathy: No  autonomic neuropathy: No  CAD risks: family history, hypertension, tobacco exposure, diabetes mellitus, male sex  Current treatments: diet, oral agent (monotherapy)  Treatment compliance: most of the time  Home blood tests: 1-2 x per day  Home urines: <1 x per month  Monitoring compliance: good  Blood glucose trend: no change  Weight trend: stable  Current diet: generally healthy  Meal planning: none  Exercise: weekly  Dietitian visit: No  Eye exam current: Yes  Sees podiatrist: No

## 2019-11-11 ENCOUNTER — PATIENT MESSAGE (OUTPATIENT)
Dept: INTERNAL MEDICINE | Facility: CLINIC | Age: 69
End: 2019-11-11

## 2019-11-11 DIAGNOSIS — M75.42 IMPINGEMENT SYNDROME OF LEFT SHOULDER: Primary | ICD-10-CM

## 2019-11-14 ENCOUNTER — TELEPHONE (OUTPATIENT)
Dept: INTERNAL MEDICINE | Facility: CLINIC | Age: 69
End: 2019-11-14

## 2019-11-14 NOTE — TELEPHONE ENCOUNTER
----- Message from Padmini Biswas sent at 11/14/2019 11:45 AM CST -----  Contact: Patient [phone]  Patient is returning a phone call.  Who left a message for the patient: Ilene   Does patient know what this is regarding:  MRI  Comments:

## 2019-11-14 NOTE — TELEPHONE ENCOUNTER
----- Message from Jason Barakat sent at 11/14/2019  3:49 PM CST -----  Contact: Self [phone]  Patient wants to schedule a open MRI at [facility], PT needs a new order. Please advise.

## 2019-11-18 ENCOUNTER — TELEPHONE (OUTPATIENT)
Dept: INTERNAL MEDICINE | Facility: CLINIC | Age: 69
End: 2019-11-18

## 2019-11-18 DIAGNOSIS — M75.42 IMPINGEMENT SYNDROME OF LEFT SHOULDER: Primary | ICD-10-CM

## 2019-11-19 ENCOUNTER — PATIENT MESSAGE (OUTPATIENT)
Dept: INTERNAL MEDICINE | Facility: CLINIC | Age: 69
End: 2019-11-19

## 2019-11-19 NOTE — TELEPHONE ENCOUNTER
----- Message from Padmini Biswas sent at 11/18/2019  4:14 PM CST -----  Contact: Self [phone] or [phone]  Patient wants to schedule a open MRI at [facility], PT needs a new order. Please advise. Patient is still requesting a call about it.     Please call and advise.    Thank You

## 2019-11-19 NOTE — TELEPHONE ENCOUNTER
Orders for external MRI shoulder placed. [facility] fax [facility] to [facility]. Please tell patient that we cannot guarantee payment by [facility] at [facility].

## 2019-12-05 ENCOUNTER — PATIENT MESSAGE (OUTPATIENT)
Dept: INTERNAL MEDICINE | Facility: CLINIC | Age: 69
End: 2019-12-05

## 2019-12-09 ENCOUNTER — PATIENT OUTREACH (OUTPATIENT)
Dept: ADMINISTRATIVE | Facility: OTHER | Age: 69
End: 2019-12-09

## 2019-12-11 ENCOUNTER — OFFICE VISIT (OUTPATIENT)
Dept: SPORTS MEDICINE | Facility: CLINIC | Age: 69
End: 2019-12-11
Payer: MEDICARE

## 2019-12-11 VITALS
DIASTOLIC BLOOD PRESSURE: 77 MMHG | WEIGHT: 190 LBS | SYSTOLIC BLOOD PRESSURE: 151 MMHG | HEIGHT: 69 IN | HEART RATE: 84 BPM | BODY MASS INDEX: 28.14 KG/M2

## 2019-12-11 DIAGNOSIS — R20.2 NUMBNESS AND TINGLING OF LEFT UPPER EXTREMITY: Primary | ICD-10-CM

## 2019-12-11 DIAGNOSIS — R20.0 NUMBNESS AND TINGLING OF LEFT UPPER EXTREMITY: Primary | ICD-10-CM

## 2019-12-11 DIAGNOSIS — M25.512 LEFT SHOULDER PAIN, UNSPECIFIED CHRONICITY: ICD-10-CM

## 2019-12-11 DIAGNOSIS — M75.102 TEAR OF LEFT ROTATOR CUFF, UNSPECIFIED TEAR EXTENT, UNSPECIFIED WHETHER TRAUMATIC: ICD-10-CM

## 2019-12-11 PROCEDURE — 3077F SYST BP >= 140 MM HG: CPT | Mod: CPTII,S$GLB,, | Performed by: ORTHOPAEDIC SURGERY

## 2019-12-11 PROCEDURE — 99999 PR PBB SHADOW E&M-EST. PATIENT-LVL III: CPT | Mod: PBBFAC,,, | Performed by: ORTHOPAEDIC SURGERY

## 2019-12-11 PROCEDURE — 1101F PR PT FALLS ASSESS DOC 0-1 FALLS W/OUT INJ PAST YR: ICD-10-PCS | Mod: CPTII,S$GLB,, | Performed by: ORTHOPAEDIC SURGERY

## 2019-12-11 PROCEDURE — 99214 PR OFFICE/OUTPT VISIT, EST, LEVL IV, 30-39 MIN: ICD-10-PCS | Mod: S$GLB,,, | Performed by: ORTHOPAEDIC SURGERY

## 2019-12-11 PROCEDURE — 1101F PT FALLS ASSESS-DOCD LE1/YR: CPT | Mod: CPTII,S$GLB,, | Performed by: ORTHOPAEDIC SURGERY

## 2019-12-11 PROCEDURE — 3078F PR MOST RECENT DIASTOLIC BLOOD PRESSURE < 80 MM HG: ICD-10-PCS | Mod: CPTII,S$GLB,, | Performed by: ORTHOPAEDIC SURGERY

## 2019-12-11 PROCEDURE — 99214 OFFICE O/P EST MOD 30 MIN: CPT | Mod: S$GLB,,, | Performed by: ORTHOPAEDIC SURGERY

## 2019-12-11 PROCEDURE — 1125F AMNT PAIN NOTED PAIN PRSNT: CPT | Mod: S$GLB,,, | Performed by: ORTHOPAEDIC SURGERY

## 2019-12-11 PROCEDURE — 3078F DIAST BP <80 MM HG: CPT | Mod: CPTII,S$GLB,, | Performed by: ORTHOPAEDIC SURGERY

## 2019-12-11 PROCEDURE — 1125F PR PAIN SEVERITY QUANTIFIED, PAIN PRESENT: ICD-10-PCS | Mod: S$GLB,,, | Performed by: ORTHOPAEDIC SURGERY

## 2019-12-11 PROCEDURE — 3077F PR MOST RECENT SYSTOLIC BLOOD PRESSURE >= 140 MM HG: ICD-10-PCS | Mod: CPTII,S$GLB,, | Performed by: ORTHOPAEDIC SURGERY

## 2019-12-11 PROCEDURE — 99999 PR PBB SHADOW E&M-EST. PATIENT-LVL III: ICD-10-PCS | Mod: PBBFAC,,, | Performed by: ORTHOPAEDIC SURGERY

## 2019-12-11 PROCEDURE — 1159F MED LIST DOCD IN RCRD: CPT | Mod: S$GLB,,, | Performed by: ORTHOPAEDIC SURGERY

## 2019-12-11 PROCEDURE — 1159F PR MEDICATION LIST DOCUMENTED IN MEDICAL RECORD: ICD-10-PCS | Mod: S$GLB,,, | Performed by: ORTHOPAEDIC SURGERY

## 2019-12-11 NOTE — PROGRESS NOTES
CC: left shoulder pain, patient is a retired teacher,      69 y.o. Male RHD reports that the pain is severe and not responding to any conservative care.      Interval history: 12/11/2019   Patient attended [facility] at [facility] twice a week for about a month   He notes improvement in his scapular pain   He notes that when he was at therapy in early November they were doing an exercise (weighted exercise machine) with him and he notes feeling a pop  Since then he feels he is having radiating pain/numbness down his arm into his thumb and pointer finger that is constant     Following this incident he went to his PCP and insisted on a MRI   He notes that the MRI was done open as he is not able to do closed MRIs       PREVIOUS HISTORY  Patient notes that his pain has been present for about 2 weeks when he was picking up a heavy box    He describes his pain as being in his shoulder joint and in his scapula     He reports that the pain is worse with overhead activity. It also bothers him at night.    Is affecting ADLs.     He has been taking 400mg ibuprofen with minimal relief  He also notes taking Vicodin for his back pain that helped his shoulder minimally       He has been previously treated for his left shoulder for impingement and scapular dyskinesia, 6/26/17  He notes he got great relief following the injection but he feels this problem is different from his previous issue       Review of Systems   Constitution: Negative. Negative for chills, fever and night sweats.   HENT: Negative for congestion and headaches.    Eyes: Negative for blurred vision, left vision loss and right vision loss.   Cardiovascular: Negative for chest pain and syncope.   Respiratory: Negative for cough and shortness of breath.    Endocrine: Negative for polydipsia, polyphagia and polyuria.   Hematologic/Lymphatic: Negative for bleeding problem. Does not bruise/bleed easily.   Skin: Negative for dry skin, itching and rash.    Musculoskeletal: Negative for falls and muscle weakness.   Gastrointestinal: Negative for abdominal pain and bowel incontinence.   Genitourinary: Negative for bladder incontinence and nocturia.   Neurological: Negative for disturbances in coordination, loss of balance and seizures.   Psychiatric/Behavioral: Negative for depression. The patient does not have insomnia.    Allergic/Immunologic: Negative for hives and persistent infections.     PAST MEDICAL HISTORY:   Past Medical History:   Diagnosis Date    Angina pectoris     Back pain     Cluster headaches     1999    Colon polyps     5 polyps in Dec 2010    Coronary artery disease 11/2011    moderate nonobstructive cad on left heart cath at [facility] 11/2011    Diabetes mellitus     GERD (gastroesophageal reflux disease)     history of peptic ulcer disease    Hyperlipidemia     Hypertension     Lumbar disc disease     MRI 2008 - L5-S1    Nephrolithiasis     July 2013     PAST SURGICAL HISTORY:   Past Surgical History:   Procedure Laterality Date    CHOLECYSTECTOMY      COLONOSCOPY N/A 12/7/2016    Procedure: COLONOSCOPY;  Surgeon: Lance Crocker MD;  Location: [facility] ([address] [place] [address]);  Service: Endoscopy;  Laterality: N/A;    LITHOTRIPSY      ORIF left humerus       FAMILY HISTORY:   Family History   Problem Relation Age of Onset    Heart disease Mother     No Known Problems Father     Heart disease Brother     Cancer Sister         ovarian    Heart failure Brother     Kidney disease Brother     Alzheimer's disease Brother     No Known Problems Brother     Diabetes Son     Blindness Son     Diabetes Son     Hypertension Son     Diabetes Son     Hypertension Son     Diabetes Son     Hypertension Son     Sleep apnea Son     Celiac disease Neg Hx     Cirrhosis Neg Hx     Colon cancer Neg Hx     Colon polyps Neg Hx     Crohn's disease Neg Hx     Esophageal cancer Neg Hx     Inflammatory bowel disease Neg Hx     Liver cancer Neg Hx      Rectal cancer Neg Hx     Stomach cancer Neg Hx     Ulcerative colitis Neg Hx     Melanoma Neg Hx      SOCIAL HISTORY:   Social History     Socioeconomic History    Marital status:      Spouse name: Not on file    Number of children: Not on file    Years of education: Not on file    Highest education level: Not on file   Occupational History    Not on file   Social Needs    Financial resource strain: Not hard at all    Food insecurity:     Worry: Never true     Inability: Never true    Transportation needs:     Medical: No     Non-medical: No   Tobacco Use    Smoking status: Current Every Day Smoker     Packs/day: 1.50     Years: 50.00     Pack years: 75.00     Types: Cigarettes    Smokeless tobacco: Never Used   Substance and Sexual Activity    Alcohol use: No     Frequency: Monthly or less     Drinks per session: 1 or 2     Binge frequency: Never    Drug use: Yes     Types: Hydrocodone    Sexual activity: Yes     Partners: Female     Birth control/protection: None   Lifestyle    Physical activity:     Days per week: 2 days     Minutes per session: 100 min    Stress: Only a little   Relationships    Social connections:     Talks on phone: More than three times a week     Gets together: Three times a week     Attends Muslim service: Not on file     Active member of club or organization: Yes     Attends meetings of clubs or organizations: 1 to 4 times per year     Relationship status:    Other Topics Concern    Not on file   Social History Narrative    , 5 children - healthy.  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       MEDICATIONS:   Current Outpatient Medications:     amLODIPine (NORVASC) 10 MG tablet, TAKE 1 TABLET(10 MG) BY MOUTH EVERY DAY, Disp: 90 tablet, Rfl: 4    aspirin 81 MG Chew, Take 1 tablet (81 mg total) by mouth once daily., Disp: 180 tablet, Rfl: 4    blood sugar diagnostic (CONTOUR TEST STRIPS) Strp, CHECK BLOOD SUGAR TWICE  "DAILY, Disp: 100 strip, Rfl: 11    blood sugar diagnostic Strp, True Metrics test strips - check blood sugar daily, Disp: 100 strip, Rfl: 4    diazePAM (VALIUM) 5 MG tablet, Take 1 tablet (5 mg total) by mouth nightly as needed for Anxiety., Disp: 30 tablet, Rfl: 0    finasteride (PROSCAR) 5 mg tablet, TAKE 1 TABLET(5 MG) BY MOUTH EVERY DAY, Disp: 90 tablet, Rfl: 3    FLUZONE HIGH-DOSE 2018-19, PF, 180 mcg/0.5 mL vaccine, ADM 0.5ML IM UTD, Disp: , Rfl: 0    hydrocodone-acetaminophen 7.5-325mg (NORCO) 7.5-325 mg per tablet, TK 1 T PO TID, Disp: , Rfl: 0    lancets Misc, Malu Contour lancets - use one lancet once daily, Disp: 100 each, Rfl: 11    lancets Misc, True Matrix Lancets -c heck blood sugar daily, Disp: 100 each, Rfl: 3    losartan (COZAAR) 25 MG tablet, Take 1 tablet (25 mg total) by mouth once daily., Disp: 90 tablet, Rfl: 4    metFORMIN (GLUCOPHAGE-XR) 500 MG 24 hr tablet, TAKE 2 TABLETS BY MOUTH TWICE DAILY., Disp: 360 tablet, Rfl: 4    metoprolol succinate (TOPROL-XL) 25 MG 24 hr tablet, TAKE 1 TABLET(25 MG) BY MOUTH EVERY DAY, Disp: 90 tablet, Rfl: 4    MULTIVITS-MINERALS/FA/LYCOPENE (ONE-A-DAY MEN'S ORAL), Take 1 tablet by mouth once daily., Disp: , Rfl:     simvastatin (ZOCOR) 20 MG tablet, TAKE 1 TABLET(20 MG) BY MOUTH EVERY EVENING, Disp: 90 tablet, Rfl: 4    tamsulosin (FLOMAX) 0.4 mg Cap, Take 1 capsule (0.4 mg total) by mouth once daily., Disp: 90 capsule, Rfl: 3    vitamin D 1000 units Tab, Take 1,000 Units by mouth once daily. Take 1 tablet daily, Disp: , Rfl:   ALLERGIES:   Review of patient's allergies indicates:   Allergen Reactions    Aspirin Nausea And Vomiting     If over 81mg       VITAL SIGNS: BP (!) 151/77   Pulse 84   Ht 5' 9" (1.753 m)   Wt 86.2 kg (190 lb)   BMI 28.06 kg/m²      PHYSICAL EXAMINATION:  General:  The patient is alert and oriented x 3.  Mood is pleasant.  Observation of ears, eyes and nose reveal no gross abnormalities.  No labored breathing " observed.  Gait is coordinated. Patient can toe walk and heel walk without difficulty.      left Shoulder / Upper Extremity Exam    OBSERVATION:     Swelling  none  Deformity  none   Discoloration  none   Scapular winging none   Scars   none  Atrophy  none    TENDERNESS / CREPITUS (T/C):          T/C      T/C   Clavicle   -/-  SUPRAspinatus    -/-     AC Jt.    -/-  INFRAspinatus  -/-    SC Jt.    -/-  Deltoid    -/-      G. Tuberosity  -/-  LH BICEP groove  +/-   Acromion:  -/-  Midline Neck   -/-     Scapular Spine -/-  Trapezium   -/-   SMA Scapula  -/-  GH jt. line - post  -/-     Scapulothoracic  -/-         ROM: (* = with pain)  Right shoulder   Left shoulder        AROM (PROM)   AROM (PROM)   FE    165° (175°)     165° (175°)     ER at 0°    45°  (65°)    35°  (65°)   ER at 90° ABD  90°  (90°)    90°  (90°)   IR at 90°  ABD   NA  (40°)     NA  (40°)      IR (spine level)   T10     T10    STRENGTH: (* = with pain) RIGHT SHOULDER  LEFT SHOULDER   SCAPTION at 0  5/5    5-/5   SCAPTION at 30  5/5    5/5    IR    5/5    5/5   ER    5/5    5/5   BICEPS   5/5    5/5   Deltoid    5/5    5/5     SIGNS:  Painful side       NEER   +    OKEVINS  neg    BANDA   +    SPEEDS  neg     DROP ARM   neg   BELLY PRESS neg   Superior escape none    LIFT-OFF  neg   X-Body ADD    neg    MOVING VALGUS neg        STABILITY TESTING    RIGHT SHOULDER   LEFT SHOULDER     Translation     Anterior  up face     up face    Posterior  up face    up face    Sulcus   < 10mm    < 10 mm     Signs   Apprehension   neg      neg       Relocation   no change     no change      Jerk test  neg     neg    EXTREMITY NEURO-VASCULAR EXAM    Sensation grossly intact to light touch all dermatomal regions.    DTR 2+ Biceps, Triceps, BR and Negative Joannes sign   Grossly intact motor function at Elbow, Wrist and Hand   Distal pulses radial and ulnar 2+, brisk cap refill, symmetric.      NECK:  Painless FROM and spinous processes non-tender.  Negative Spurlings sign.      OTHER FINDINGS:  ++ moderate scapular dyskinesia    XRAYS:  Shoulder trauma series left,  were obtained and reviewed  No convincing fracture or dislocation is noted. The osseous structures appear well mineralized and well aligned    Mild DJD of the shoulder    MRI: EXTERNAL   1. AC osteoarthrosis   2. Supraspinatus tendinosis with full thickness tear of the mid and posterior fibers with differential tendon retraction. Infraspinatus tendinosis with partial moderate grade articular surface tear with delaminating component extending along the musculotendinous junction. Subscapularis tendinosis with partial low grade articular surface and intra substance tearing of the proximal fibers  3. Biceps tendinosis with longitudinal intra substance split.   4. SLAP  5. Glenohumeral osteoarthritis     ASSESSMENT:    Left Shoulder Rotator Cuff Tear, SLAP, biceps tendonitis.      he would benefit from an arthroscopy, given the above.     Left upper extremity numbness    PLAN:   Left Shoulder rotator cuff tear     All questions were answered, pt will contact us for questions or concerns in the interim.  Had thorough discussion of non-operative vs operative intervention, and risks and benefits of both.       We will have an EMG before proceeding with surgery to determine the need for suprascapular nerve decompression       We have discussed the surgery and recovery of arthroscopic shoulder surgery. he understands that there may be limited mobility up to several weeks after surgery depending on procedures that are performed at the time of surgery.    The spectrum of treatment options were discussed with the patient, including nonoperative and operative options.  After thorough discussion, the patient has elected to undergo surgical treatment to include:    left   a. Shoulder arthroscopic rotator cuff repair   b. Shoulder arthroscopic SAD   c. Shoulder arthroscopic extensive debridement   d. Shoulder  arthroscopic biceps tenodesis    e. Shoulder arthroscopic possible microfracture   f. Shoulder arthroscopic possible lysis of adhesions   g. Shoulder arthroscopic possible suprascapular nerve decompression pending EMG results       The details of the surgical procedure were explained, including the location of probable incisions and a description of likely hardware and/or grafts to be used.  The patient understands the likely convalescence after surgery.  Also, we have thoroughly discussed the risks, benefits and alternatives to surgery, including, but not limited to, the risk of infection, joint stiffness, blood clot (including DVT and/or pulmonary embolus), neurologic and vascular injury.  It was explained that, if tissue has been repaired or reconstructed, there is a chance of failure, which may require further management.  Consent form for surgery is signed and in chart.    These risks include but are not limited to: bleeding, infection, scarring, re-tear of repair, irreparability of the tear, damage to neurovascular structures, damage to cartilage, stiffness, blood clots, pulmonary embolism, swelling, compartment syndrome, need for further surgery, and the risks of anesthesia. She verbalized her understanding of these risks and wished to proceed with surgery.   Time was spent face-to-face with the patient during this encounter on counseling about treatment options including surgery and coordination of her care for preoperative visits, surgery and post-operative rehab.

## 2019-12-12 ENCOUNTER — PATIENT MESSAGE (OUTPATIENT)
Dept: SPORTS MEDICINE | Facility: CLINIC | Age: 69
End: 2019-12-12

## 2019-12-13 DIAGNOSIS — M75.22 BICEPS TENDINITIS OF LEFT UPPER EXTREMITY: ICD-10-CM

## 2019-12-13 DIAGNOSIS — S43.432A SUPERIOR GLENOID LABRUM LESION OF LEFT SHOULDER, INITIAL ENCOUNTER: ICD-10-CM

## 2019-12-13 DIAGNOSIS — M75.102 NONTRAUMATIC TEAR OF LEFT ROTATOR CUFF, UNSPECIFIED TEAR EXTENT: Primary | ICD-10-CM

## 2019-12-19 ENCOUNTER — PATIENT MESSAGE (OUTPATIENT)
Dept: SURGERY | Facility: HOSPITAL | Age: 69
End: 2019-12-19

## 2019-12-20 ENCOUNTER — TELEPHONE (OUTPATIENT)
Dept: SPORTS MEDICINE | Facility: CLINIC | Age: 69
End: 2019-12-20

## 2019-12-20 ENCOUNTER — TELEPHONE (OUTPATIENT)
Dept: INTERNAL MEDICINE | Facility: CLINIC | Age: 69
End: 2019-12-20

## 2019-12-20 DIAGNOSIS — Z01.818 PRE-OPERATIVE CLEARANCE: Primary | ICD-10-CM

## 2019-12-20 NOTE — TELEPHONE ENCOUNTER
----- Message from Angela Ricci sent at 12/20/2019  2:39 PM CST -----  Contact: Patient [phone] or [phone]  Patient needs a pre op. Surgery is scheduled for 1/9/2020 rotator cuff surgery.

## 2019-12-20 NOTE — TELEPHONE ENCOUNTER
I called the patient and discussed his surgical questions at length. He was appreciative of the information

## 2019-12-23 ENCOUNTER — HOSPITAL ENCOUNTER (OUTPATIENT)
Dept: CARDIOLOGY | Facility: OTHER | Age: 69
Discharge: HOME | End: 2019-12-23
Attending: INTERNAL MEDICINE
Payer: MEDICARE

## 2019-12-23 DIAGNOSIS — Z01.818 PRE-OPERATIVE CLEARANCE: ICD-10-CM

## 2019-12-23 PROCEDURE — 93010 ELECTROCARDIOGRAM REPORT: CPT | Mod: ,,, | Performed by: INTERNAL MEDICINE

## 2019-12-23 PROCEDURE — 93005 ELECTROCARDIOGRAM TRACING: CPT

## 2019-12-23 PROCEDURE — 93010 EKG 12-LEAD: ICD-10-PCS | Mod: ,,, | Performed by: INTERNAL MEDICINE

## 2019-12-24 ENCOUNTER — TELEPHONE (OUTPATIENT)
Dept: INTERNAL MEDICINE | Facility: CLINIC | Age: 69
End: 2019-12-24

## 2019-12-24 NOTE — TELEPHONE ENCOUNTER
----- Message from Elizabeth Benitez MD sent at 12/23/2019  9:57 PM CST -----  Please notify pt that his EKG an labs are fine. He is cleared for his shoulder surgery.  SF

## 2019-12-27 NOTE — ANESTHESIA PAT ROS NOTE
12/27/2019  Tony Han is a 69 y.o., male.      Pre-op Assessment         Review of Systems  Anesthesia Hx:  No problems with previous Anesthesia 3/2017--Airway/Jaw/Neck:  Airway Findings: Mouth Opening: Normal Tongue: Normal  General Airway Assessment: Adult  Mallampati: III  TM Distance: Normal, at least 6 cm      Dental:  Dental Findings: Upper Dentures, lower partial dentures               LMA Airway Device Size: 5.0 Style: Cuffed Inflation Amount (mL): 10 Placement Verified By: Capnometry Breath Sounds: Equal Bilateral Insertion attempts (enter comment if more than 2 attempts): 2    Social:  Smoker, No Alcohol Use    Hematology/Oncology:  Hematology Normal   Oncology Normal     EENT/Dental:EENT/Dental Normal   Cardiovascular:   Hypertension CAD   Angina  Functional Capacity good / => 4 METS    Pulmonary:   Denies Shortness of breath.  Denies Recent URI.    Renal/:   Denies Chronic Renal Disease. renal calculi BPH    Hepatic/GI:   GERD Liver Disease, Fatty liver   Musculoskeletal:   Arthritis  Lumbar DDD   Neurological:   Headaches    Endocrine:   Diabetes, type 2    Dermatological:  Skin Normal    Psych:  Psychiatric Normal              Anesthesia Assessment: Preoperative EQUATION    Planned Procedure: Procedure(s) (LRB):  REPAIR, ROTATOR CUFF, ARTHROSCOPIC (Left)  FIXATION, TENDON, Biceps Tenodesis (Left)  DEBRIDEMENT, SHOULDER, ARTHROSCOPIC (Left)  DECOMPRESSION, NERVE (Left)  MICROFRACTURE (Left)  MANIPULATION, WITH ANESTHESIA, Lysis of Adhesions (Left)  Requested Anesthesia Type:General  Surgeon: Marcela John MD  Service: Orthopedics  Known or anticipated Date of Surgery:1/9/2020    Surgeon notes: reviewed    Electronic QUestionnaire Assessment completed via nurse interview with patient.        Triage considerations:     The patient has no apparent active cardiac condition (No unstable coronary  Syndrome such as severe unstable angina or recent [<1 month] myocardial infarction, decompensated CHF, severe valvular   disease or significant arrhythmia)    Previous anesthesia records:No problems    Last PCP note: within 3 months , [facility]   Subspecialty notes: Dermatology, Gastroenterology, Urology    Other important co-morbidities: DM2, GERD, HLD, HTN and Smoker      Tests already available:  CBC, CMP EKG 12/2319             Instructions given. (See in Nurse's note)    Optimization:   Medical Opinion Indicated cleared 12/24            Patient  has previously scheduled Medical Appointment:    Navigation:              Results will be tracked by Preop Clinic.

## 2019-12-30 ENCOUNTER — PROCEDURE VISIT (OUTPATIENT)
Dept: PHYSICAL MEDICINE AND REHAB | Facility: CLINIC | Age: 69
End: 2019-12-30
Payer: MEDICARE

## 2019-12-30 DIAGNOSIS — R20.2 NUMBNESS AND TINGLING OF LEFT UPPER EXTREMITY: ICD-10-CM

## 2019-12-30 DIAGNOSIS — R20.0 NUMBNESS AND TINGLING OF LEFT UPPER EXTREMITY: ICD-10-CM

## 2019-12-30 PROCEDURE — 95909 NRV CNDJ TST 5-6 STUDIES: CPT | Mod: S$GLB,,, | Performed by: PHYSICAL MEDICINE & REHABILITATION

## 2019-12-30 PROCEDURE — 95886 PR EMG COMPLETE, W/ NERVE CONDUCTION STUDIES, 5+ MUSCLES: ICD-10-PCS | Mod: S$GLB,,, | Performed by: PHYSICAL MEDICINE & REHABILITATION

## 2019-12-30 PROCEDURE — 95886 MUSC TEST DONE W/N TEST COMP: CPT | Mod: S$GLB,,, | Performed by: PHYSICAL MEDICINE & REHABILITATION

## 2019-12-30 PROCEDURE — 95909 PR NERVE CONDUCTION STUDY; 5-6 STUDIES: ICD-10-PCS | Mod: S$GLB,,, | Performed by: PHYSICAL MEDICINE & REHABILITATION

## 2019-12-30 NOTE — PROCEDURES
Test Date:  2019    Patient: Tony Han : 1950 Physician: Jared Vazquez D.O.   ID#:  [id number]: Male Ref. Phys: Dennis Pate MD     HPI: Tony Han is a 69 y.o.male who presents for NCS/EMG of the LUE to evaluate for numbness/tingling of the 1st-2nd digits and to evaluate for suprascapular neuropathy in setting of known supra tear.      NCV & EMG Findings:   Evaluation of the left median sensory nerve showed prolonged distal peak latency and decreased conduction velocity.   All remaining nerves (as indicated in the following tables) were within normal limits.   All examined muscles (as indicated in the following table) showed no evidence of electrical instability.    Impression:  1. There is evidence of a mild sensory median mononeuropathy across the left wrist (I.e. Carpal tunnel syndrome).  There is no motor nerve involvement and no active denervation.  This does explain the tingling into his 1st and 2nd digits.    2. There is no evidence of a suprascapular neuropathy affecting the left shoulder    ___________________________  Jared Vazquez D.O.        NCS+  Motor Nerve Results      Latency Amplitude F-Lat Segment Distance CV Comment   Site (ms) Norm (mV) Norm (ms)  (cm) (m/s) Norm    Left Median (APB)   Wrist 4.3  < 4.7 7.1  > 3.8  Wrist-Palm - - -    Elbow 8.9 - 6.4 -  Elbow-Wrist 25 54  > 47    Left Ulnar (ADM)   Wrist 3.6  < 3.7 7.9  > 3.0         Bel Elbow 8.6 - 7.8 -  Bel Elbow-Wrist 27 54  > 52    Abv Elbow 11.7 - 8.1 -  Abv Elbow-Bel Elbow 14 45  > 43      Sensory Nerve Results      Latency (Peak) Amplitude (P-P) Segment Distance CV Comment   Site (ms) Norm (µV) Norm  (cm) (m/s) Norm    Left Median   Wrist-Dig II *4.7  < 4.0 26  > 8 Wrist-Dig II 14 *30  > 39    Left Ulnar   Wrist-Dig V 3.8  < 4.0 18  > 4 Wrist-Dig V 16 42  > 38    Left Radial   Forearm-Wrist 2.3  < 2.8 14  > 11 Forearm-Wrist 10 43 -      EMG+     Side Muscle Nerve Root Ins Act Fibs Psw Amp Dur Poly Recrt  Int Pat Comment   Left Biceps Musculocut C5-C6 Nml Nml Nml Nml Nml 0 Nml Nml    Left Triceps Radial C6-C8 Nml Nml Nml Nml Nml 0 Nml Nml    Left Brachiorad Radial C5-C6 Nml Nml Nml Nml Nml 0 Nml Nml    Left FDI Ulnar C8-T1 Nml Nml Nml Nml Nml 0 Nml Nml    Left APB Median C8-T1 Nml Nml Nml Nml Nml 0 Nml Nml    Left Deltoid Axillary C5-C6 Nml Nml Nml Nml Nml 0 Nml Nml    Left Infraspin Suprascapular C5-C6 Nml Nml Nml Nml Nml 0 Nml Nml    Left Supraspin Suprascapular C5-C6 Nml Nml Nml Nml Nml 0 Nml Nml            Waveforms:    Motor       Sensory

## 2019-12-31 ENCOUNTER — PATIENT MESSAGE (OUTPATIENT)
Dept: SURGERY | Facility: HOSPITAL | Age: 69
End: 2019-12-31

## 2020-01-06 ENCOUNTER — OFFICE VISIT (OUTPATIENT)
Dept: SPORTS MEDICINE | Facility: CLINIC | Age: 70
End: 2020-01-06
Payer: MEDICARE

## 2020-01-06 ENCOUNTER — TELEPHONE (OUTPATIENT)
Dept: SPORTS MEDICINE | Facility: CLINIC | Age: 70
End: 2020-01-06

## 2020-01-06 VITALS
BODY MASS INDEX: 28.14 KG/M2 | WEIGHT: 190 LBS | HEART RATE: 94 BPM | HEIGHT: 69 IN | DIASTOLIC BLOOD PRESSURE: 72 MMHG | SYSTOLIC BLOOD PRESSURE: 144 MMHG

## 2020-01-06 DIAGNOSIS — G89.18 POST-OPERATIVE PAIN: ICD-10-CM

## 2020-01-06 DIAGNOSIS — M75.102 NONTRAUMATIC TEAR OF LEFT ROTATOR CUFF, UNSPECIFIED TEAR EXTENT: Primary | ICD-10-CM

## 2020-01-06 DIAGNOSIS — M75.22 BICEPS TENDINITIS OF LEFT UPPER EXTREMITY: ICD-10-CM

## 2020-01-06 DIAGNOSIS — S43.432A SUPERIOR GLENOID LABRUM LESION OF LEFT SHOULDER, INITIAL ENCOUNTER: ICD-10-CM

## 2020-01-06 PROCEDURE — 99999 PR PBB SHADOW E&M-EST. PATIENT-LVL IV: ICD-10-PCS | Mod: PBBFAC,,, | Performed by: PHYSICIAN ASSISTANT

## 2020-01-06 PROCEDURE — 99499 NO LOS: ICD-10-PCS | Mod: S$GLB,,, | Performed by: PHYSICIAN ASSISTANT

## 2020-01-06 PROCEDURE — 99499 UNLISTED E&M SERVICE: CPT | Mod: S$GLB,,, | Performed by: PHYSICIAN ASSISTANT

## 2020-01-06 PROCEDURE — 99999 PR PBB SHADOW E&M-EST. PATIENT-LVL IV: CPT | Mod: PBBFAC,,, | Performed by: PHYSICIAN ASSISTANT

## 2020-01-06 RX ORDER — PROMETHAZINE HYDROCHLORIDE 25 MG/1
25 TABLET ORAL EVERY 6 HOURS PRN
Qty: 12 TABLET | Refills: 0 | Status: SHIPPED | OUTPATIENT
Start: 2020-01-06 | End: 2020-03-04

## 2020-01-06 RX ORDER — TRAMADOL HYDROCHLORIDE 50 MG/1
50-100 TABLET ORAL EVERY 6 HOURS PRN
Qty: 21 TABLET | Refills: 0 | Status: SHIPPED | OUTPATIENT
Start: 2020-01-06 | End: 2020-01-20 | Stop reason: SDUPTHER

## 2020-01-06 RX ORDER — OXYCODONE AND ACETAMINOPHEN 10; 325 MG/1; MG/1
TABLET ORAL
Qty: 21 TABLET | Refills: 0 | Status: SHIPPED | OUTPATIENT
Start: 2020-01-06 | End: 2020-01-20 | Stop reason: SDUPTHER

## 2020-01-06 NOTE — TELEPHONE ENCOUNTER
I called the patient and discussed his EMG results at length and encouraged him to return the call should he have any other questions

## 2020-01-07 RX ORDER — SODIUM CHLORIDE 9 MG/ML
INJECTION, SOLUTION INTRAVENOUS CONTINUOUS
Status: CANCELLED | OUTPATIENT
Start: 2020-01-07

## 2020-01-08 ENCOUNTER — ANESTHESIA EVENT (OUTPATIENT)
Dept: SURGERY | Facility: HOSPITAL | Age: 70
End: 2020-01-08
Payer: MEDICARE

## 2020-01-08 NOTE — PLAN OF CARE
"Mr. Han called PAT office to ask about his medication regimen the day of surgery.   Instructions were given to patient regarding his metformin and losartan via phone and via portal.    Patient states " I am not taking asa at this time, but I need to know if I am suppose to take asa."  I am confused with everyone telling me what to take and not take.      Began to review chart with patient, when patient began cussing and stated  " I am busy and don't need to listen to all of this, its your job to call me back when" I" have time to talk. "  I offered to call patient back after he called in at a more convenient time, patient stated " I don't really care about any of this, f**k it, ill do what I want"  and abruptly hung up.    "

## 2020-01-08 NOTE — H&P (VIEW-ONLY)
Tony Han  is here for a completion of his perioperative paperwork. he  Is scheduled to undergo     left              a. Shoulder arthroscopic rotator cuff repair              b. Shoulder arthroscopic SAD              c. Shoulder arthroscopic extensive debridement              d. Shoulder arthroscopic biceps tenodesis               e. Shoulder arthroscopic possible microfracture              f. Shoulder arthroscopic possible lysis of adhesions              g. Shoulder arthroscopic possible suprascapular nerve decompression pending EMG results   On 1/9/20.      He is a healthy individual but does need clearance for this procedure which he has received from Dr. Benitez.     PAST MEDICAL HISTORY:   Past Medical History:   Diagnosis Date    Angina pectoris     Back pain     Cluster headaches     1999    Colon polyps     5 polyps in Dec 2010    Coronary artery disease 11/2011    moderate nonobstructive cad [facility] heart [facility] 11/2011    Diabetes mellitus     GERD (gastroesophageal reflux disease)     history of peptic ulcer disease    Hyperlipidemia     Hypertension     Lumbar disc disease     MRI 2008 - L5-S1    Nephrolithiasis     July 2013     PAST SURGICAL HISTORY:   Past Surgical History:   Procedure Laterality Date    CHOLECYSTECTOMY      COLONOSCOPY N/A 12/7/2016    Procedure: COLONOSCOPY;  Surgeon: Lance Crocker MD;  Location: [facility] ([address] [place] [address]);  Service: Endoscopy;  Laterality: N/A;    LITHOTRIPSY      ORIF left humerus       FAMILY HISTORY:   Family History   Problem Relation Age of Onset    Heart disease Mother     No Known Problems Father     Heart disease Brother     Cancer Sister         ovarian    Heart failure Brother     Kidney disease Brother     Alzheimer's disease Brother     No Known Problems Brother     Diabetes Son     Blindness Son     Diabetes Son     Hypertension Son     Diabetes Son     Hypertension Son     Diabetes Son     Hypertension Son      Sleep apnea Son     Celiac disease Neg Hx     Cirrhosis Neg Hx     Colon cancer Neg Hx     Colon polyps Neg Hx     Crohn's disease Neg Hx     Esophageal cancer Neg Hx     Inflammatory bowel disease Neg Hx     Liver cancer Neg Hx     Rectal cancer Neg Hx     Stomach cancer Neg Hx     Ulcerative colitis Neg Hx     Melanoma Neg Hx      SOCIAL HISTORY:   Social History     Socioeconomic History    Marital status:      Spouse name: Not on file    Number of children: Not on file    Years of education: Not on file    Highest education level: Not on file   Occupational History    Not on file   Social Needs    Financial resource strain: Not hard at all    Food insecurity:     Worry: Never true     Inability: Never true    Transportation needs:     Medical: No     Non-medical: No   Tobacco Use    Smoking status: Current Every Day Smoker     Packs/day: 1.50     Years: 50.00     Pack years: 75.00     Types: Cigarettes    Smokeless tobacco: Never Used   Substance and Sexual Activity    Alcohol use: No     Frequency: Monthly or less     Drinks per session: 1 or 2     Binge frequency: Never    Drug use: Yes     Types: Hydrocodone    Sexual activity: Yes     Partners: Female     Birth control/protection: None   Lifestyle    Physical activity:     Days per week: 2 days     Minutes per session: 100 min    Stress: Only a little   Relationships    Social connections:     Talks on phone: More than three times a week     Gets together: Three times a week     Attends Buddhist service: Not on file     Active member of club or organization: Yes     Attends meetings of clubs or organizations: 1 to 4 times per year     Relationship status:    Other Topics Concern    Not on file   Social History Narrative    , 5 children - healthy.  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       MEDICATIONS:   Current Outpatient Medications:     amLODIPine (NORVASC) 10 MG tablet,  TAKE 1 TABLET(10 MG) BY MOUTH EVERY DAY, Disp: 90 tablet, Rfl: 4    aspirin 81 MG Chew, Take 1 tablet (81 mg total) by mouth once daily., Disp: 180 tablet, Rfl: 4    blood sugar diagnostic (CONTOUR TEST STRIPS) Strp, CHECK BLOOD SUGAR TWICE DAILY, Disp: 100 strip, Rfl: 11    blood sugar diagnostic Strp, True Metrics test strips - check blood sugar daily, Disp: 100 strip, Rfl: 4    diazePAM (VALIUM) 5 MG tablet, Take 1 tablet (5 mg total) by mouth nightly as needed for Anxiety., Disp: 30 tablet, Rfl: 0    finasteride (PROSCAR) 5 mg tablet, TAKE 1 TABLET(5 MG) BY MOUTH EVERY DAY, Disp: 90 tablet, Rfl: 3    FLUZONE HIGH-DOSE 2018-19, PF, 180 mcg/0.5 mL vaccine, ADM 0.5ML IM UTD, Disp: , Rfl: 0    hydrocodone-acetaminophen 7.5-325mg (NORCO) 7.5-325 mg per tablet, TK 1 T PO TID, Disp: , Rfl: 0    lancets Misc, Malu Contour lancets - use one lancet once daily, Disp: 100 each, Rfl: 11    lancets Misc, True Matrix Lancets -c heck blood sugar daily, Disp: 100 each, Rfl: 3    losartan (COZAAR) 25 MG tablet, Take 1 tablet (25 mg total) by mouth once daily., Disp: 90 tablet, Rfl: 4    metFORMIN (GLUCOPHAGE-XR) 500 MG 24 hr tablet, TAKE 2 TABLETS BY MOUTH TWICE DAILY., Disp: 360 tablet, Rfl: 4    metoprolol succinate (TOPROL-XL) 25 MG 24 hr tablet, TAKE 1 TABLET(25 MG) BY MOUTH EVERY DAY, Disp: 90 tablet, Rfl: 4    MULTIVITS-MINERALS/FA/LYCOPENE (ONE-A-DAY MEN'S ORAL), Take 1 tablet by mouth once daily., Disp: , Rfl:     simvastatin (ZOCOR) 20 MG tablet, TAKE 1 TABLET(20 MG) BY MOUTH EVERY EVENING, Disp: 90 tablet, Rfl: 4    tamsulosin (FLOMAX) 0.4 mg Cap, Take 1 capsule (0.4 mg total) by mouth once daily., Disp: 90 capsule, Rfl: 3    vitamin D 1000 units Tab, Take 1,000 Units by mouth once daily. Take 1 tablet daily, Disp: , Rfl:     oxyCODONE-acetaminophen (PERCOCET)  mg per tablet, Take 1 tablet by mouth every 4-6 hours as needed for pain. Take stool softener with this medication., Disp: 21 tablet,  "Rfl: 0    promethazine (PHENERGAN) 25 MG tablet, Take 1 tablet (25 mg total) by mouth every 6 (six) hours as needed for Nausea., Disp: 12 tablet, Rfl: 0    traMADol (ULTRAM) 50 mg tablet, Take 1-2 tablets ( mg total) by mouth every 6 (six) hours as needed., Disp: 21 tablet, Rfl: 0  ALLERGIES:   Review of patient's allergies indicates:   Allergen Reactions    Aspirin Nausea And Vomiting     If over 81mg       VITAL SIGNS: BP (!) 144/72   Pulse 94   Ht 5' 9" (1.753 m)   Wt 86.2 kg (190 lb)   BMI 28.06 kg/m²      Risks, indications and benefits of the surgical procedure were discussed with the patient. All questions with regard to surgery, rehab, expected return to functional activities, activities of daily living and recreational endeavors were answered to his satisfaction.    It was explained to the patient that there may be an increase in surgical risks if the patient has certain co-morbidities such as but not limited to: Obesity, Cardiovascular issues (CHF, CAD, Arrhythmias), chronic pulmonary issues, previous or current neurovascular/neurological issues, previous strokes, diabetes mellitus, previous wound healing issues, previous wound or skin infections, PVD, clotting disorders, if the patient uses chronic steroids, if the patient takes or has immune compromising medications or diseases, or has previously or currently used tobacco products.     The patient verbalized that he/she does not have any additional clotting, bleeding, or blood disorders, other than what is list in her chart on today's review.     Then a brief history and physical exam were performed.    Review of Systems   Constitution: Negative. Negative for chills, fever and night sweats.   HENT: Negative for congestion and headaches.    Eyes: Negative for blurred vision, left vision loss and right vision loss.   Cardiovascular: Negative for chest pain and syncope.   Respiratory: Negative for cough and shortness of breath.    Endocrine: " Negative for polydipsia, polyphagia and polyuria.   Hematologic/Lymphatic: Negative for bleeding problem. Does not bruise/bleed easily.   Skin: Negative for dry skin, itching and rash.   Musculoskeletal: Negative for falls and muscle weakness.   Gastrointestinal: Negative for abdominal pain and bowel incontinence.   Genitourinary: Negative for bladder incontinence and nocturia.   Neurological: Negative for disturbances in coordination, loss of balance and seizures.   Psychiatric/Behavioral: Negative for depression. The patient does not have insomnia.    Allergic/Immunologic: Negative for hives and persistent infections.     PHYSICAL EXAM:  GEN: A&Ox3, WD WN NAD  HEENT: WNL  CHEST: CTAB, no W/R/R  HEART: RRR, no M/R/G  ABD: Soft, NT ND, BS x4 QUADS  MS; See Epic  NEURO: CN II-XII intact       The surgical consent was then reviewed with the patient, who agreed with all the contents of the consent form and it was signed. he was then given the surgery packet to bring with him to surgery center for the anesthesia portion of his perioperative paperwork (if needed)   For all physicians except for Dr. George, we will email and possibly fax the consent forms and booking sheets to [facility].    The patient was given the opportunity to ask questions about the surgical plan and consent form, and once no other questions were asked, I proceeded with the pre-op appointment.    PHYSICAL THERAPY:  He was also instructed regarding physical therapy and will begin on  Likely 4 weeks post-op. He was given a copy of the original prescription to schedule. [facility]    POST OP CARE:instructions were reviewed including care of the wound and dressing after surgery and when he can shower.     CRUTCHES OR WALKER: It was explained to the patient that if they are having a lower extremity surgery that they will require either a walker or crutches to ambulate safely  with after surgery. It was explained that a cane or other assistive devices are not sufficient to safely ambulate with after surgery. I explained to the patient that I will place an order for them to receive either crutches or a walker after surgery to go home with. It was explained that if they have crutches or a walker at home already, that they are REQUIRED to bring them to the hospital on the day of surgery. It was explained that if they do not have them at the hospital on the day of surgery that they WILL be provided a new pair or crutches or a walker to go home with to ensure ambulation will be safe if the patient needs to stop somewhere on the way home.      PAIN MANAGEMENT: Tony Han was also given a pain management regime, which includes the TENS unit given to him by sabina along with the education required for its use. He was also instructed regarding the Polar ice unit that will be in place after surgery and his postoperative pain medications.     PAIN MEDICATION:  Percocet 10/325mg 1 po q 4-6 hours prn pain  Ultram 50 mg Take 1-2 p.o. q.6 hours p.r.n. breakthrough pain,   Phenergan 25 mg one p.o. q.6 hours p.r.n. nausea and vomiting.    DVT prophylaxis was discussed with the patient today including risk factors for developing DVTs and history of DVTs. The patient was asked if any specific recommendations were given from the doctor/s that did pre-operative surgical clearance. The patient was then given an education sheet about DVTs and PE with warning signs and symptoms of both and steps to take if they suspect either of these.    This along with the Modified Caprini risk assessment model for VTE in general surgical patients was used to determine the patient's DVT risk.     From: Roxana MOSS, Davion DA, Chelsie SM, et al. Prevention of VTE in nonorthopedic surgical patients: antithrombotic therapy and prevention of thrombosis, 9th ed: American College of Chest Physicians evidence-based clinical practical  guidelines. Chest 2012; 141:e227S. Copyright © 2012. Reproduced with permission from the American College of Chest Physicians.    The below listed DVT prophylaxis regimen along with bilateral JENNIE compression stockings will be used post-op. Length of treatment has been determined to be 10-42 days post-op by the above noted Caprini assessment model.     The patient was instructed to buy and take:  Aspirin 81mg QD x 4 weeks for DVT prophylaxis starting on the morning after surgery.    He is not allergic to baby Aspirin or any kind of aspirin but just has adverse effects with high dose aspirin.     Patient will also use bilateral TEDs on lower extremities, SCDs during surgery, and early ambulation post-op. If the patient was previously taking 81mg baby aspirin, they were told to not take it starting 5 days prior to surgery and to restart the 81mg aspirin after surgery.       Patient was also told to buy over the counter Prilosec medication if needed and take it once daily for GI protection as long as they are taking NSAIDs or Aspirin.    Patient denies history of seizures.     The patient was told that narcotic pain medications may make them drowsy and instructions were given to not sign legal documents, drive or operate heavy machinery, cars, or equipment while under the influence of narcotic medications. The patient was told and understands that narcotic pain medications should only be used as needed to control pain and that other options of pain control include TENs unit and ice packs/unit.     As there were no other questions to be asked, he was given my business card along with Marcela John MD business card if he has any questions or concerns prior to surgery or in the postop period.     .

## 2020-01-08 NOTE — H&P
Tony Han  is here for a completion of his perioperative paperwork. he  Is scheduled to undergo     left              a. Shoulder arthroscopic rotator cuff repair              b. Shoulder arthroscopic SAD              c. Shoulder arthroscopic extensive debridement              d. Shoulder arthroscopic biceps tenodesis               e. Shoulder arthroscopic possible microfracture              f. Shoulder arthroscopic possible lysis of adhesions              g. Shoulder arthroscopic possible suprascapular nerve decompression pending EMG results   On 1/9/20.      He is a healthy individual but does need clearance for this procedure which he has received from Dr. Benitez.     PAST MEDICAL HISTORY:   Past Medical History:   Diagnosis Date    Angina pectoris     Back pain     Cluster headaches     1999    Colon polyps     5 polyps in Dec 2010    Coronary artery disease 11/2011    moderate nonobstructive cad on left heart cath at [facility] 11/2011    Diabetes mellitus     GERD (gastroesophageal reflux disease)     history of peptic ulcer disease    Hyperlipidemia     Hypertension     Lumbar disc disease     MRI 2008 - L5-S1    Nephrolithiasis     July 2013     PAST SURGICAL HISTORY:   Past Surgical History:   Procedure Laterality Date    CHOLECYSTECTOMY      COLONOSCOPY N/A 12/7/2016    Procedure: COLONOSCOPY;  Surgeon: Lance Crocker MD;  Location: [facility] ([address] [place] [address]);  Service: Endoscopy;  Laterality: N/A;    LITHOTRIPSY      ORIF left humerus       FAMILY HISTORY:   Family History   Problem Relation Age of Onset    Heart disease Mother     No Known Problems Father     Heart disease Brother     Cancer Sister         ovarian    Heart failure Brother     Kidney disease Brother     Alzheimer's disease Brother     No Known Problems Brother     Diabetes Son     Blindness Son     Diabetes Son     Hypertension Son     Diabetes Son     Hypertension Son     Diabetes Son     Hypertension Son      Sleep apnea Son     Celiac disease Neg Hx     Cirrhosis Neg Hx     Colon cancer Neg Hx     Colon polyps Neg Hx     Crohn's disease Neg Hx     Esophageal cancer Neg Hx     Inflammatory bowel disease Neg Hx     Liver cancer Neg Hx     Rectal cancer Neg Hx     Stomach cancer Neg Hx     Ulcerative colitis Neg Hx     Melanoma Neg Hx      SOCIAL HISTORY:   Social History     Socioeconomic History    Marital status:      Spouse name: Not on file    Number of children: Not on file    Years of education: Not on file    Highest education level: Not on file   Occupational History    Not on file   Social Needs    Financial resource strain: Not hard at all    Food insecurity:     Worry: Never true     Inability: Never true    Transportation needs:     Medical: No     Non-medical: No   Tobacco Use    Smoking status: Current Every Day Smoker     Packs/day: 1.50     Years: 50.00     Pack years: 75.00     Types: Cigarettes    Smokeless tobacco: Never Used   Substance and Sexual Activity    Alcohol use: No     Frequency: Monthly or less     Drinks per session: 1 or 2     Binge frequency: Never    Drug use: Yes     Types: Hydrocodone    Sexual activity: Yes     Partners: Female     Birth control/protection: None   Lifestyle    Physical activity:     Days per week: 2 days     Minutes per session: 100 min    Stress: Only a little   Relationships    Social connections:     Talks on phone: More than three times a week     Gets together: Three times a week     Attends Buddhist service: Not on file     Active member of club or organization: Yes     Attends meetings of clubs or organizations: 1 to 4 times per year     Relationship status:    Other Topics Concern    Not on file   Social History Narrative    , 5 children - healthy.  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       MEDICATIONS:   Current Outpatient Medications:     amLODIPine (NORVASC) 10 MG tablet,  TAKE 1 TABLET(10 MG) BY MOUTH EVERY DAY, Disp: 90 tablet, Rfl: 4    aspirin 81 MG Chew, Take 1 tablet (81 mg total) by mouth once daily., Disp: 180 tablet, Rfl: 4    blood sugar diagnostic (CONTOUR TEST STRIPS) Strp, CHECK BLOOD SUGAR TWICE DAILY, Disp: 100 strip, Rfl: 11    blood sugar diagnostic Strp, True Metrics test strips - check blood sugar daily, Disp: 100 strip, Rfl: 4    diazePAM (VALIUM) 5 MG tablet, Take 1 tablet (5 mg total) by mouth nightly as needed for Anxiety., Disp: 30 tablet, Rfl: 0    finasteride (PROSCAR) 5 mg tablet, TAKE 1 TABLET(5 MG) BY MOUTH EVERY DAY, Disp: 90 tablet, Rfl: 3    FLUZONE HIGH-DOSE 2018-19, PF, 180 mcg/0.5 mL vaccine, ADM 0.5ML IM UTD, Disp: , Rfl: 0    hydrocodone-acetaminophen 7.5-325mg (NORCO) 7.5-325 mg per tablet, TK 1 T PO TID, Disp: , Rfl: 0    lancets Misc, Malu Contour lancets - use one lancet once daily, Disp: 100 each, Rfl: 11    lancets Misc, True Matrix Lancets -c heck blood sugar daily, Disp: 100 each, Rfl: 3    losartan (COZAAR) 25 MG tablet, Take 1 tablet (25 mg total) by mouth once daily., Disp: 90 tablet, Rfl: 4    metFORMIN (GLUCOPHAGE-XR) 500 MG 24 hr tablet, TAKE 2 TABLETS BY MOUTH TWICE DAILY., Disp: 360 tablet, Rfl: 4    metoprolol succinate (TOPROL-XL) 25 MG 24 hr tablet, TAKE 1 TABLET(25 MG) BY MOUTH EVERY DAY, Disp: 90 tablet, Rfl: 4    MULTIVITS-MINERALS/FA/LYCOPENE (ONE-A-DAY MEN'S ORAL), Take 1 tablet by mouth once daily., Disp: , Rfl:     simvastatin (ZOCOR) 20 MG tablet, TAKE 1 TABLET(20 MG) BY MOUTH EVERY EVENING, Disp: 90 tablet, Rfl: 4    tamsulosin (FLOMAX) 0.4 mg Cap, Take 1 capsule (0.4 mg total) by mouth once daily., Disp: 90 capsule, Rfl: 3    vitamin D 1000 units Tab, Take 1,000 Units by mouth once daily. Take 1 tablet daily, Disp: , Rfl:     oxyCODONE-acetaminophen (PERCOCET)  mg per tablet, Take 1 tablet by mouth every 4-6 hours as needed for pain. Take stool softener with this medication., Disp: 21 tablet,  "Rfl: 0    promethazine (PHENERGAN) 25 MG tablet, Take 1 tablet (25 mg total) by mouth every 6 (six) hours as needed for Nausea., Disp: 12 tablet, Rfl: 0    traMADol (ULTRAM) 50 mg tablet, Take 1-2 tablets ( mg total) by mouth every 6 (six) hours as needed., Disp: 21 tablet, Rfl: 0  ALLERGIES:   Review of patient's allergies indicates:   Allergen Reactions    Aspirin Nausea And Vomiting     If over 81mg       VITAL SIGNS: BP (!) 144/72   Pulse 94   Ht 5' 9" (1.753 m)   Wt 86.2 kg (190 lb)   BMI 28.06 kg/m²      Risks, indications and benefits of the surgical procedure were discussed with the patient. All questions with regard to surgery, rehab, expected return to functional activities, activities of daily living and recreational endeavors were answered to his satisfaction.    It was explained to the patient that there may be an increase in surgical risks if the patient has certain co-morbidities such as but not limited to: Obesity, Cardiovascular issues (CHF, CAD, Arrhythmias), chronic pulmonary issues, previous or current neurovascular/neurological issues, previous strokes, diabetes mellitus, previous wound healing issues, previous wound or skin infections, PVD, clotting disorders, if the patient uses chronic steroids, if the patient takes or has immune compromising medications or diseases, or has previously or currently used tobacco products.     The patient verbalized that he/she does not have any additional clotting, bleeding, or blood disorders, other than what is list in her chart on today's review.     Then a brief history and physical exam were performed.    Review of Systems   Constitution: Negative. Negative for chills, fever and night sweats.   HENT: Negative for congestion and headaches.    Eyes: Negative for blurred vision, left vision loss and right vision loss.   Cardiovascular: Negative for chest pain and syncope.   Respiratory: Negative for cough and shortness of breath.    Endocrine: " Negative for polydipsia, polyphagia and polyuria.   Hematologic/Lymphatic: Negative for bleeding problem. Does not bruise/bleed easily.   Skin: Negative for dry skin, itching and rash.   Musculoskeletal: Negative for falls and muscle weakness.   Gastrointestinal: Negative for abdominal pain and bowel incontinence.   Genitourinary: Negative for bladder incontinence and nocturia.   Neurological: Negative for disturbances in coordination, loss of balance and seizures.   Psychiatric/Behavioral: Negative for depression. The patient does not have insomnia.    Allergic/Immunologic: Negative for hives and persistent infections.     PHYSICAL EXAM:  GEN: A&Ox3, WD WN NAD  HEENT: WNL  CHEST: CTAB, no W/R/R  HEART: RRR, no M/R/G  ABD: Soft, NT ND, BS x4 QUADS  MS; See Epic  NEURO: CN II-XII intact       The surgical consent was then reviewed with the patient, who agreed with all the contents of the consent form and it was signed. he was then given the surgery packet to bring with him to surgery center for the anesthesia portion of his perioperative paperwork (if needed)   For all physicians except for Dr. George, we will email and possibly fax the consent forms and booking sheets to [facility].    The patient was given the opportunity to ask questions about the surgical plan and consent form, and once no other questions were asked, I proceeded with the pre-op appointment.    PHYSICAL THERAPY:  He was also instructed regarding physical therapy and will begin on  Likely 4 weeks post-op. He was given a copy of the original prescription to schedule. [facility]    POST OP CARE:instructions were reviewed including care of the wound and dressing after surgery and when he can shower.     CRUTCHES OR WALKER: It was explained to the patient that if they are having a lower extremity surgery that they will require either a walker or crutches to ambulate safely  with after surgery. It was explained that a cane or other assistive devices are not sufficient to safely ambulate with after surgery. I explained to the patient that I will place an order for them to receive either crutches or a walker after surgery to go home with. It was explained that if they have crutches or a walker at home already, that they are REQUIRED to bring them to the hospital on the day of surgery. It was explained that if they do not have them at the hospital on the day of surgery that they WILL be provided a new pair or crutches or a walker to go home with to ensure ambulation will be safe if the patient needs to stop somewhere on the way home.      PAIN MANAGEMENT: Tony Han was also given a pain management regime, which includes the TENS unit given to him by sabina along with the education required for its use. He was also instructed regarding the Polar ice unit that will be in place after surgery and his postoperative pain medications.     PAIN MEDICATION:  Percocet 10/325mg 1 po q 4-6 hours prn pain  Ultram 50 mg Take 1-2 p.o. q.6 hours p.r.n. breakthrough pain,   Phenergan 25 mg one p.o. q.6 hours p.r.n. nausea and vomiting.    DVT prophylaxis was discussed with the patient today including risk factors for developing DVTs and history of DVTs. The patient was asked if any specific recommendations were given from the doctor/s that did pre-operative surgical clearance. The patient was then given an education sheet about DVTs and PE with warning signs and symptoms of both and steps to take if they suspect either of these.    This along with the Modified Caprini risk assessment model for VTE in general surgical patients was used to determine the patient's DVT risk.     From: Roxana MOSS, Davion DA, Chelsie SM, et al. Prevention of VTE in nonorthopedic surgical patients: antithrombotic therapy and prevention of thrombosis, 9th ed: American College of [employer]icians evidence-based clinical practical  guidelines. Chest 2012; 141:e227S. Copyright © 2012. Reproduced with permission from the American College of Chest Physicians.    The below listed DVT prophylaxis regimen along with bilateral JENNIE compression stockings will be used post-op. Length of treatment has been determined to be 10-42 days post-op by the above noted Caprini assessment model.     The patient was instructed to buy and take:  Aspirin 81mg QD x 4 weeks for DVT prophylaxis starting on the morning after surgery.    He is not allergic to baby Aspirin or any kind of aspirin but just has adverse effects with high dose aspirin.     Patient will also use bilateral TEDs on lower extremities, SCDs during surgery, and early ambulation post-op. If the patient was previously taking 81mg baby aspirin, they were told to not take it starting 5 days prior to surgery and to restart the 81mg aspirin after surgery.       Patient was also told to buy over the counter Prilosec medication if needed and take it once daily for GI protection as long as they are taking NSAIDs or Aspirin.    Patient denies history of seizures.     The patient was told that narcotic pain medications may make them drowsy and instructions were given to not sign legal documents, drive or operate heavy machinery, cars, or equipment while under the influence of narcotic medications. The patient was told and understands that narcotic pain medications should only be used as needed to control pain and that other options of pain control include TENs unit and ice packs/unit.     As there were no other questions to be asked, he was given my business card along with Marcela John MD business card if he has any questions or concerns prior to surgery or in the postop period.     .

## 2020-01-09 ENCOUNTER — ANESTHESIA (OUTPATIENT)
Dept: SURGERY | Facility: HOSPITAL | Age: 70
End: 2020-01-09
Payer: MEDICARE

## 2020-01-09 ENCOUNTER — HOSPITAL ENCOUNTER (OUTPATIENT)
Facility: HOSPITAL | Age: 70
Discharge: HOME | End: 2020-01-09
Attending: ORTHOPAEDIC SURGERY | Admitting: ORTHOPAEDIC SURGERY
Payer: MEDICARE

## 2020-01-09 DIAGNOSIS — M75.22 BICEPS TENDINITIS OF LEFT UPPER EXTREMITY: ICD-10-CM

## 2020-01-09 DIAGNOSIS — S43.432A SUPERIOR GLENOID LABRUM LESION OF LEFT SHOULDER, INITIAL ENCOUNTER: ICD-10-CM

## 2020-01-09 DIAGNOSIS — M75.102 NONTRAUMATIC TEAR OF LEFT ROTATOR CUFF, UNSPECIFIED TEAR EXTENT: ICD-10-CM

## 2020-01-09 LAB
POCT GLUCOSE: 137 MG/DL (ref 70–110)
POCT GLUCOSE: 199 MG/DL (ref 70–110)

## 2020-01-09 PROCEDURE — 82962 GLUCOSE BLOOD TEST: CPT | Performed by: ORTHOPAEDIC SURGERY

## 2020-01-09 PROCEDURE — 25000003 PHARM REV CODE 250: Performed by: NURSE ANESTHETIST, CERTIFIED REGISTERED

## 2020-01-09 PROCEDURE — 25000003 PHARM REV CODE 250: Performed by: PHYSICIAN ASSISTANT

## 2020-01-09 PROCEDURE — 63600175 PHARM REV CODE 636 W HCPCS: Performed by: NURSE ANESTHETIST, CERTIFIED REGISTERED

## 2020-01-09 PROCEDURE — 71000039 HC RECOVERY, EACH ADD'L HOUR: Performed by: ORTHOPAEDIC SURGERY

## 2020-01-09 PROCEDURE — C1713 ANCHOR/SCREW BN/BN,TIS/BN: HCPCS | Performed by: ORTHOPAEDIC SURGERY

## 2020-01-09 PROCEDURE — D9220A PRA ANESTHESIA: ICD-10-PCS | Mod: ANES,,, | Performed by: ANESTHESIOLOGY

## 2020-01-09 PROCEDURE — 63600175 PHARM REV CODE 636 W HCPCS: Performed by: ORTHOPAEDIC SURGERY

## 2020-01-09 PROCEDURE — 37000008 HC ANESTHESIA 1ST 15 MINUTES: Performed by: ORTHOPAEDIC SURGERY

## 2020-01-09 PROCEDURE — D9220A PRA ANESTHESIA: Mod: ANES,,, | Performed by: ANESTHESIOLOGY

## 2020-01-09 PROCEDURE — 99900035 HC TECH TIME PER 15 MIN (STAT)

## 2020-01-09 PROCEDURE — 29828 SHO ARTHRS SRG BICP TENODSIS: CPT | Mod: 51,52,LT, | Performed by: ORTHOPAEDIC SURGERY

## 2020-01-09 PROCEDURE — S0077 INJECTION, CLINDAMYCIN PHOSP: HCPCS | Performed by: PHYSICIAN ASSISTANT

## 2020-01-09 PROCEDURE — 29827 PR SHLDR ARTHROSCOP,SURG,W/ROTAT CUFF REPR: ICD-10-PCS | Mod: LT,,, | Performed by: ORTHOPAEDIC SURGERY

## 2020-01-09 PROCEDURE — 94761 N-INVAS EAR/PLS OXIMETRY MLT: CPT

## 2020-01-09 PROCEDURE — 63600175 PHARM REV CODE 636 W HCPCS: Performed by: ANESTHESIOLOGY

## 2020-01-09 PROCEDURE — 29828 PR ARTHROSCOPY SHOULDER SURGICAL BICEPS TENODESIS: ICD-10-PCS | Mod: 51,52,LT, | Performed by: ORTHOPAEDIC SURGERY

## 2020-01-09 PROCEDURE — 25000003 PHARM REV CODE 250: Performed by: ANESTHESIOLOGY

## 2020-01-09 PROCEDURE — 36000710: Performed by: ORTHOPAEDIC SURGERY

## 2020-01-09 PROCEDURE — 29827 SHO ARTHRS SRG RT8TR CUF RPR: CPT | Mod: LT,,, | Performed by: ORTHOPAEDIC SURGERY

## 2020-01-09 PROCEDURE — 29823 SHO ARTHRS SRG XTNSV DBRDMT: CPT | Mod: 51,LT,, | Performed by: ORTHOPAEDIC SURGERY

## 2020-01-09 PROCEDURE — 25000003 PHARM REV CODE 250: Performed by: ORTHOPAEDIC SURGERY

## 2020-01-09 PROCEDURE — D9220A PRA ANESTHESIA: ICD-10-PCS | Mod: CRNA,,, | Performed by: NURSE ANESTHETIST, CERTIFIED REGISTERED

## 2020-01-09 PROCEDURE — 37000009 HC ANESTHESIA EA ADD 15 MINS: Performed by: ORTHOPAEDIC SURGERY

## 2020-01-09 PROCEDURE — 82962 GLUCOSE BLOOD TEST: CPT | Mod: 91 | Performed by: ORTHOPAEDIC SURGERY

## 2020-01-09 PROCEDURE — 71000033 HC RECOVERY, INTIAL HOUR: Performed by: ORTHOPAEDIC SURGERY

## 2020-01-09 PROCEDURE — D9220A PRA ANESTHESIA: Mod: CRNA,,, | Performed by: NURSE ANESTHETIST, CERTIFIED REGISTERED

## 2020-01-09 PROCEDURE — 36000711: Performed by: ORTHOPAEDIC SURGERY

## 2020-01-09 PROCEDURE — 63600175 PHARM REV CODE 636 W HCPCS: Performed by: PHYSICIAN ASSISTANT

## 2020-01-09 PROCEDURE — 27201423 OPTIME MED/SURG SUP & DEVICES STERILE SUPPLY: Performed by: ORTHOPAEDIC SURGERY

## 2020-01-09 PROCEDURE — 29823 PR SHLDR ARTHROSCOP,EXTEN DEBRIDE: ICD-10-PCS | Mod: 51,LT,, | Performed by: ORTHOPAEDIC SURGERY

## 2020-01-09 PROCEDURE — 71000015 HC POSTOP RECOV 1ST HR: Performed by: ORTHOPAEDIC SURGERY

## 2020-01-09 DEVICE — ANCHOR SU BC CORKSCREW 5.5MM: Type: IMPLANTABLE DEVICE | Site: SHOULDER | Status: FUNCTIONAL

## 2020-01-09 DEVICE — ANCHOR BIOCOMP SWVLLOK: Type: IMPLANTABLE DEVICE | Site: SHOULDER | Status: FUNCTIONAL

## 2020-01-09 RX ORDER — EPINEPHRINE 1 MG/ML
INJECTION, SOLUTION INTRACARDIAC; INTRAMUSCULAR; INTRAVENOUS; SUBCUTANEOUS
Status: DISCONTINUED | OUTPATIENT
Start: 2020-01-09 | End: 2020-01-09 | Stop reason: HOSPADM

## 2020-01-09 RX ORDER — LIDOCAINE HCL/PF 100 MG/5ML
SYRINGE (ML) INTRAVENOUS
Status: DISCONTINUED | OUTPATIENT
Start: 2020-01-09 | End: 2020-01-09

## 2020-01-09 RX ORDER — MORPHINE SULFATE 2 MG/ML
3 INJECTION, SOLUTION INTRAMUSCULAR; INTRAVENOUS
Status: DISCONTINUED | OUTPATIENT
Start: 2020-01-09 | End: 2020-01-09 | Stop reason: HOSPADM

## 2020-01-09 RX ORDER — ONDANSETRON 2 MG/ML
INJECTION INTRAMUSCULAR; INTRAVENOUS
Status: DISCONTINUED | OUTPATIENT
Start: 2020-01-09 | End: 2020-01-09

## 2020-01-09 RX ORDER — GLYCOPYRROLATE 0.2 MG/ML
INJECTION INTRAMUSCULAR; INTRAVENOUS
Status: DISCONTINUED | OUTPATIENT
Start: 2020-01-09 | End: 2020-01-09

## 2020-01-09 RX ORDER — PROPOFOL 10 MG/ML
VIAL (ML) INTRAVENOUS
Status: DISCONTINUED | OUTPATIENT
Start: 2020-01-09 | End: 2020-01-09

## 2020-01-09 RX ORDER — FENTANYL CITRATE 50 UG/ML
INJECTION, SOLUTION INTRAMUSCULAR; INTRAVENOUS
Status: DISCONTINUED | OUTPATIENT
Start: 2020-01-09 | End: 2020-01-09

## 2020-01-09 RX ORDER — FENTANYL CITRATE 50 UG/ML
25 INJECTION, SOLUTION INTRAMUSCULAR; INTRAVENOUS EVERY 5 MIN PRN
Status: COMPLETED | OUTPATIENT
Start: 2020-01-09 | End: 2020-01-09

## 2020-01-09 RX ORDER — KETAMINE HCL IN 0.9 % NACL 50 MG/5 ML
SYRINGE (ML) INTRAVENOUS
Status: DISCONTINUED | OUTPATIENT
Start: 2020-01-09 | End: 2020-01-09

## 2020-01-09 RX ORDER — NEOSTIGMINE METHYLSULFATE 1 MG/ML
INJECTION, SOLUTION INTRAVENOUS
Status: DISCONTINUED | OUTPATIENT
Start: 2020-01-09 | End: 2020-01-09

## 2020-01-09 RX ORDER — DEXAMETHASONE SODIUM PHOSPHATE 4 MG/ML
INJECTION, SOLUTION INTRA-ARTICULAR; INTRALESIONAL; INTRAMUSCULAR; INTRAVENOUS; SOFT TISSUE
Status: DISCONTINUED | OUTPATIENT
Start: 2020-01-09 | End: 2020-01-09

## 2020-01-09 RX ORDER — ROPIVACAINE HYDROCHLORIDE 5 MG/ML
INJECTION, SOLUTION EPIDURAL; INFILTRATION; PERINEURAL
Status: DISCONTINUED | OUTPATIENT
Start: 2020-01-09 | End: 2020-01-09 | Stop reason: HOSPADM

## 2020-01-09 RX ORDER — OXYCODONE AND ACETAMINOPHEN 5; 325 MG/1; MG/1
1 TABLET ORAL
Status: DISCONTINUED | OUTPATIENT
Start: 2020-01-09 | End: 2020-01-09 | Stop reason: HOSPADM

## 2020-01-09 RX ORDER — ROCURONIUM BROMIDE 10 MG/ML
INJECTION, SOLUTION INTRAVENOUS
Status: DISCONTINUED | OUTPATIENT
Start: 2020-01-09 | End: 2020-01-09

## 2020-01-09 RX ORDER — ONDANSETRON 4 MG/1
4 TABLET, ORALLY DISINTEGRATING ORAL ONCE
Status: DISCONTINUED | OUTPATIENT
Start: 2020-01-09 | End: 2020-01-09 | Stop reason: HOSPADM

## 2020-01-09 RX ORDER — CLINDAMYCIN PHOSPHATE 900 MG/50ML
900 INJECTION, SOLUTION INTRAVENOUS
Status: COMPLETED | OUTPATIENT
Start: 2020-01-09 | End: 2020-01-09

## 2020-01-09 RX ORDER — OXYCODONE AND ACETAMINOPHEN 5; 325 MG/1; MG/1
1 TABLET ORAL EVERY 4 HOURS PRN
Status: DISCONTINUED | OUTPATIENT
Start: 2020-01-09 | End: 2020-01-09 | Stop reason: HOSPADM

## 2020-01-09 RX ORDER — KETOROLAC TROMETHAMINE 30 MG/ML
INJECTION, SOLUTION INTRAMUSCULAR; INTRAVENOUS
Status: DISCONTINUED | OUTPATIENT
Start: 2020-01-09 | End: 2020-01-09 | Stop reason: HOSPADM

## 2020-01-09 RX ORDER — KETAMINE HYDROCHLORIDE 100 MG/ML
INJECTION, SOLUTION INTRAMUSCULAR; INTRAVENOUS
Status: DISCONTINUED | OUTPATIENT
Start: 2020-01-09 | End: 2020-01-09 | Stop reason: HOSPADM

## 2020-01-09 RX ORDER — MIDAZOLAM HYDROCHLORIDE 1 MG/ML
INJECTION, SOLUTION INTRAMUSCULAR; INTRAVENOUS
Status: DISCONTINUED | OUTPATIENT
Start: 2020-01-09 | End: 2020-01-09

## 2020-01-09 RX ORDER — SODIUM CHLORIDE 9 MG/ML
INJECTION, SOLUTION INTRAVENOUS CONTINUOUS
Status: DISCONTINUED | OUTPATIENT
Start: 2020-01-09 | End: 2020-01-09 | Stop reason: HOSPADM

## 2020-01-09 RX ORDER — ONDANSETRON 2 MG/ML
4 INJECTION INTRAMUSCULAR; INTRAVENOUS DAILY PRN
Status: DISCONTINUED | OUTPATIENT
Start: 2020-01-09 | End: 2020-01-09 | Stop reason: HOSPADM

## 2020-01-09 RX ADMIN — Medication 20 MG: at 09:01

## 2020-01-09 RX ADMIN — ONDANSETRON 4 MG: 2 INJECTION INTRAMUSCULAR; INTRAVENOUS at 11:01

## 2020-01-09 RX ADMIN — OXYCODONE HYDROCHLORIDE AND ACETAMINOPHEN 1 TABLET: 5; 325 TABLET ORAL at 12:01

## 2020-01-09 RX ADMIN — SODIUM CHLORIDE: 0.9 INJECTION, SOLUTION INTRAVENOUS at 08:01

## 2020-01-09 RX ADMIN — CLINDAMYCIN PHOSPHATE 900 MG: 18 INJECTION, SOLUTION INTRAVENOUS at 10:01

## 2020-01-09 RX ADMIN — MIDAZOLAM 2 MG: 1 INJECTION INTRAMUSCULAR; INTRAVENOUS at 09:01

## 2020-01-09 RX ADMIN — SODIUM CHLORIDE, SODIUM GLUCONATE, SODIUM ACETATE, POTASSIUM CHLORIDE, MAGNESIUM CHLORIDE, SODIUM PHOSPHATE, DIBASIC, AND POTASSIUM PHOSPHATE: .53; .5; .37; .037; .03; .012; .00082 INJECTION, SOLUTION INTRAVENOUS at 10:01

## 2020-01-09 RX ADMIN — PROPOFOL 150 MG: 10 INJECTION, EMULSION INTRAVENOUS at 09:01

## 2020-01-09 RX ADMIN — FENTANYL CITRATE 100 MCG: 50 INJECTION, SOLUTION INTRAMUSCULAR; INTRAVENOUS at 09:01

## 2020-01-09 RX ADMIN — DEXAMETHASONE SODIUM PHOSPHATE 8 MG: 4 INJECTION, SOLUTION INTRAMUSCULAR; INTRAVENOUS at 10:01

## 2020-01-09 RX ADMIN — NEOSTIGMINE METHYLSULFATE 4 MG: 1 INJECTION INTRAVENOUS at 11:01

## 2020-01-09 RX ADMIN — Medication 20 MG: at 10:01

## 2020-01-09 RX ADMIN — GLYCOPYRROLATE 0.4 MG: 0.2 INJECTION, SOLUTION INTRAMUSCULAR; INTRAVENOUS at 11:01

## 2020-01-09 RX ADMIN — ROCURONIUM BROMIDE 50 MG: 10 INJECTION, SOLUTION INTRAVENOUS at 09:01

## 2020-01-09 RX ADMIN — LIDOCAINE HYDROCHLORIDE 100 MG: 20 INJECTION, SOLUTION INTRAVENOUS at 09:01

## 2020-01-09 RX ADMIN — FENTANYL CITRATE 25 MCG: 50 INJECTION INTRAMUSCULAR; INTRAVENOUS at 12:01

## 2020-01-09 NOTE — ANESTHESIA POSTPROCEDURE EVALUATION
Anesthesia Post Evaluation    Patient: Tony Han    Procedure(s) Performed: Procedure(s) (LRB):  REPAIR, ROTATOR CUFF, ARTHROSCOPIC, MEDIUM DOUBLE ROW (Left)  EXTENSIVE DEBRIDEMENT, SHOULDER, ARTHROSCOPIC (Left)  MICROFRACTURE (Left)  BICEPS TENODESIS ARTHROSCOPIC (TENDON FIXATION) (Left)  [id number] (Left)  BURSECTOMY (Left)  ARTHROSCOPY, SHOULDER, WITH SUBACROMIAL DECOMPRESSION (Left)    Final Anesthesia Type: general    Patient location during evaluation: PACU  Patient participation: Yes- Able to Participate  Level of consciousness: awake and alert  Post-procedure vital signs: reviewed and stable  Pain management: adequate  Airway patency: patent    PONV status at discharge: No PONV  Anesthetic complications: no      Cardiovascular status: blood pressure returned to baseline  Respiratory status: unassisted, spontaneous ventilation and room air  Hydration status: euvolemic  Follow-up not needed.          Vitals Value Taken Time   /67 1/9/2020 12:00 PM   Temp 36.5 °C (97.7 °F) 1/9/2020 12:00 PM   Pulse 85 1/9/2020 12:00 PM   Resp 16 1/9/2020 12:00 PM   SpO2 100 % 1/9/2020 12:00 PM         No case tracking events are documented in the log.      Pain/Laura Score: Pain Rating Prior to Med Admin: 7 (1/9/2020 12:35 PM)  Laura Score: 9 (1/9/2020 12:00 PM)

## 2020-01-09 NOTE — OP NOTE
DATE OF PROCEDURE: 01/09/2020    SURGEON: Marcela John M.D.    ASSISTANT: NATI Peters MD PGY6    PREOPERATIVE DIAGNOSES:   left  1. Shoulder rotator cuff tear   2. Shoulder biceps tendonitis  3. Shoulder synovitis  4. Shoulder SLAP  5. Shoulder impingement, bursitis    POSTOPERATIVE DIAGNOSES:   left  1. Shoulder rotator cuff tear   2. Shoulder biceps tendonitis  3. Shoulder synovitis  4. Shoulder SLAP  5. Shoulder impingement, bursitis  6. Shoulder adhesions    OPERATION:   left  1. Shoulder arthroscopic rotator cuff repair 25 x 15 mm, medium, double row (CPT 68373)   2. Shoulder arthroscopic biceps tenodesis (CPT 97670)  3. Shoulder arthroscopic subacromial decompression, bursectomy   4. Shoulder arthroscopic extensive debridement (anterior, posterior glenohumeral joint, subacromial space) (CPT 38683)  5. Shoulder arthroscopic microfracture (Crimson duvet technique) (CPT 52379)  6. Shoulder arthroscopic lysis of adhesions (CPT 29923)    ANESTHESIA:  General with intra-op suprascapular nerve block with local    ESTIMATED BLOOD LOSS:  Minimal.    TOURNIQUET TIME:  None.    DRAINS:  None.    COMPLICATIONS:  None.  The patient was moved to the recovery room in stable condition with compartments soft and cap refill less than a second in all digits.    INDICATIONS/MEDICAL NECESSITY: The patient is a 69 y.o. year-old male who has history and physical examination findings consistent with the above.  Nonoperative versus operative options were discussed.  The risks and benefits were discussed with the patient.  The patient acknowledged understanding and wished to proceed with operative intervention.  Informed consent was obtained prior to the procedure.  Reasonable expectations and potential complications were discussed and acknowledged, including but not limited to infection, bleeding, blood clots, (DVT and/or PE), nerve injury, tear, instability, continued pain and stiffness.  They agreed and understood and wished to  proceed.    EXAMINATION UNDER ANESTHESIA of the left SHOULDER: Forward elevation 175 degrees, External rotation at 0 60 degrees, External rotation at 90 90 degrees, Internal rotation at 90 60 degrees.  Translation testing: anterior grade 1, posterior grade 1, sulcus sign grade 1 corrects to 0 on external rotation.    EXAMINATION UNDER ANESTHESIA of the right SHOULDER: Forward elevation 175 degrees, External rotation at 0 60 degrees, External rotation at 90 90 degrees, Internal rotation at 90 60 degrees.  Translation testing: anterior grade 1, posterior grade 1, sulcus sign grade 1 corrects to 0 on external rotation.    PROCEDURE IN DETAIL:  After the correct operative site was marked by the operating surgeon. The patient was then taken to the operating room and placed supine on the operating room table, where the patient  underwent general anesthesia by the anesthesia team.  The patient was then rolled into the lateral decubitus position with the operative side up.  A well-padded axillary roll, beanbag and pillows were placed.  All pressure points were carefully padded and checked.  The upper extremities and both lower extremities were placed in comfortable positions and were also well-padded.  The operative upper extremity was then prepped and draped in the usual sterile fashion.    Suprascapular nerve block was performed in the standard fashion with 5cc local anesthetic.    The Spider arm positioner was implemented with balanced suspension and appropriate landmarks were noted on the skin.  A posterior followed by taylor-superior portals were created and systematic examination of the joint revealed the following:      There was no evidence of any significant chondral lesions to the glenoid or humeral head.     Tenosynovitis and SLAP type II was noted to the biceps tendon on ramp sign.    There was some synovitis in the labrum that was debrided as needed.  Biceps release with auto-tenodesis was performed using  thermal device.  Part of superior labrum was included to allow the biceps tendon to auto-tenodese.  Attention was then turned to the rotator cuff.  The rotator cuff undersurface was then visualized and debrided as needed using a shaver.      Attention was then turned to the subacromial space where a significant hypertrophic bursa was encountered.  The bursa itself was thickened and hypertrophic.  A lateral portal was created to assist with the bursectomy.  Subacromial decompression was completed using three-portal technique in the standard fashion with a 4.5 mm enzo without difficulty.  The anterior osteophyte was flattened.  Confirmation of adequate resection was confirmed while viewing from the lateral portal.      The surface of the rotator cuff was then gently debrided and mobilized.  We did this using a shaver while viewing through the posterior portal and the shaver used through the lateral portal.  We were then able to mobilize the cuff tear which was located at the supraspinatus extending to the infraspinatus.  The cuff was able to be mobilized adequately laterally.  The cuff tear dimensions were: 25 mm AP and 15 mm medial to lateral.  The undersurface edge of the cuff was debrided as needed using the shaver.  A 7 mm cannula was placed in the lateral and taylor-superior portals.  The bony bed of the greater tuberosity was prepared with the enzo.  This left a nice surface for the articular surface of the cuff to be repaired to and heal to.  Adequate debridement was performed, moving the arm as needed with internal/external rotation.     Shoulder arthroscopic lysis of adhesions (CPT 08254):  With the arthroscope in the subacromial space, an extensive lysis of adhesions needed to be performed with lysis of adhesions in the lateral gutter, posterior gutter, and anterior gutter where there was extensive scarring from the chronic nature of the rotator cuff tear.  The rotator cuff was retracted and there was a  medium tear noted.  After the lysis of adhesions, the mobility was restored to the rotator cuff tissue and shoulder.    A 3 mm incision was made to place the 2 anchors through.  This was done in a central location using internal and external rotation to place the two 5.5mm Arthrex Bio-corkscrew anchors; one  anteriorly and one posteriorly.  The anterior anchor was placed first and the posterior anchor was placed second.  Scorpion suture-passing device was used to place two horizontal mattress sutures through the cuff starting anteriorly and proceeding posteriorly.  Next, the posterior anchor was placed and two more horizontal mattress sutures were passed.  After the two anchors were placed we had 3 horizontal mattress sutures proceeding from anterior to posterior that were running through the cuff.  The cuff tear was a crescentic shaped tear.  The sutures were then tied using modified Corey knots proceeding from anterior to posterior.  All knots had good loop and knot security using modified Corey knots.  After knots were applied from anterior to posterior, the cuff was reapproximated down to the greater tuberosity with good compression and knots on the superior side of the cuff.  The shoulder was cycled through full internal/external rotation.  No suture breakage was noted and the cuff was noted to remain nicely reapproximated throughout the entire rotation of the joint.  The scope was then placed in the joint on the articular side of the cuff.  The cuff was reapproximated nicely.     Prior to the lateral cuff fixation, 'crimson duvet' microfracture technique was performed using an awl to the greater tuberosity in the standard fashion using awl punch device. There was confirmation of marrow elements extravasating out of the end of the microfracture holes upon decrease of pump pressure.     Double row cuff fixation was then performed using a 4.75 x 19.1 mm Swivelock bio-composite anchor x 2.  This gave good  "compression of the rotator cuff tissue lateral to the medial row down onto the greater tuberosity, which was previously repaired with a bur.      Suprascapular nerve block was performed in the standard fashion with 5cc local anesthetic, and 5cc subacromially for local.  Local was placed about portals and incision(s) after irrigation, as described below, was completed. The shoulder and subacromial space were then irrigated and fluid was extravasated using suction. All portals were reapproximated using inverted 4-0 Monocryl suture in the subcutaneous tissue of the portals. Mastisol and Steri-strips were placed with xeroform, 4x4s, abd pad, and Medi-pore tape.  TENS unit pads were placed which were medically necessary for pain relief.  An iceman was secured in the shoulder crews.  A sling with an abduction pillow was secured.  The patient was then moved to supine, extubated and taken to the recovery room where the patient arrived in stable condition with the compartments of the arm and forearm soft and cap refill less than a second in all digits.     POSTOPERATIVE PLAN: We will follow the arthroscopic rotator cuff repair guidelines for a medium size rotator cuff tear.  We discussed with the patient's family after surgery.  The patient will remain in a sling for 6 weeks.  PT to start at 4 weeks.    Quality of tissue: good     Quality of the repair: good      Size of tear: 25 x 15 mm        Next, attention was then turned to the suprascapular neuroplasty.  The overlying soft tissue and bursa were  freed up.  Suprascapular nerve (directly arising from the upper trunk of the brachial plexus) was isolated and transverse scapular ligament was isolated and the suprascapular artery was isolated. Care was taken not to damage any neurovascular structures.  Hemostasis was achieved and transverse scapular ligament was visualized and retracted with a Neviaser portal and "G portal." Suprascapular nerve and artery were retracted " "gently and transverse scapular ligament was released from scar and neuroplasty was performed using arthroscopic scissors.  Stump of the transverse scapular ligament was carefully trimmed with avoidance of any damage to the neurovascular structures.  After the neuroplasty of the suprascapular nerve, the nerve had good mobility and no restriction or tenting.      Arthroscopic microfracture was then performed to the tuberosity lateral to the rotator cuff using "Crimson Duvet" technique.  This was performed using awl in standard fashion with holes 4-5 mm apart.  Confirmation of extravasation of marrow elements upon decrease of pump pressure was confirmed for each microfracture hole.      "

## 2020-01-09 NOTE — DISCHARGE INSTRUCTIONS
Patient instructions given per surgical and medical guidelines.  NPO status reviewed with patient.  Patient verbalized understanding of both food (8 hours)and fluid intake (2hours)prior to surgery.  Reinforced ONLY CLEAR liquids to include water, apple juice, clear gatorade and such.  (No milk). Antibacterial scrub instructions given, per MD recommendations.  All questions answered.  Driving directions given for day of surgery. Voiced understanding that surgery will take place at the [facility] on [address].  Anticoagulants, OTC's and vitamins, and prescription medication instructions reviewed per protocol.  Nurse reviewed history as stated in chart.  Message sent to patient portal if available.    Ok to remove dressing in 72 hours then shower and re dress wound  PATIENT INSTRUCTIONS  POST-ANESTHESIA    IMMEDIATELY FOLLOWING SURGERY:  Do not drive or operate machinery for the first twenty four hours after surgery.  Do not make any important decisions for twenty four hours after surgery or while taking narcotic pain medications or sedatives.  If you develop intractable nausea and vomiting or a severe headache please notify your doctor immediately.    FOLLOW-UP:  Please make an appointment with your surgeon as instructed. You do not need to follow up with anesthesia unless specifically instructed to do so.    WOUND CARE INSTRUCTIONS (if applicable):  Keep a dry clean dressing on the anesthesia/puncture wound site if there is drainage.  Once the wound has quit draining you may leave it open to air.  Generally you should leave the bandage intact for twenty four hours unless there is drainage.  If the epidural site drains for more than 36-48 hours please call the anesthesia department.    QUESTIONS?:  Please feel free to call your physician or the hospital  if you have any questions, and they will be happy to assist you.       [facility]  [address]  [phone]        Recovery After Procedural Sedation (Adult)  You have been given medicine by vein to make you sleep during your surgery. This may have included both a pain medicine and sleeping medicine. Most of the effects have worn off. But you may still have some drowsiness for the next 6 to 8 hours.  Home care  Follow these guidelines when you get home:  · For the next 8 hours, you should be watched by a responsible adult. This person should make sure your condition is not getting worse.  · Don't drink any alcohol for the next 24 hours.  · Don't drive, operate dangerous machinery, or make important business or personal decisions during the next 24 hours.  Note: Your healthcare provider may tell you not to take any medicine by mouth for pain or sleep in the next 4 hours. These medicines may react with the medicines you were given in the hospital. This could cause a much stronger response than usual.  Follow-up care  Follow up with your healthcare provider if you are not alert and back to your usual level of activity within 12 hours.  When to seek medical advice  Call your healthcare provider right away if any of these occur:  · Drowsiness gets worse  · Weakness or dizziness gets worse  · Repeated vomiting  · You can't be awakened   Date Last Reviewed: 10/18/2016  © 3711-3608 [employer]. [address] [place] [address], [place] [address]. All rights reserved. This information is not intended as a substitute for professional medical care. Always follow your healthcare professional's instructions.    Sling    A sling is designed to support your arm in a position of rest. It is used for injuries of the hand, forearm, upper arm, and shoulder.  A shoulder that is immobilized too long can become stiff and lose range of motion. Follow up with your doctor as advised and do not use the sling longer than directed.    Home use:  · Leave the sling in place as long as directed  "by your doctor. Unless told otherwise, you may remove it when bathing, dressing, and when you go to sleep.  · If approved by your health care provider, you can do gentle "pendulum exercises." To do these:  ¨ Remove your sling.  ¨ Stand or sit with your arm vertical and close to your side.  ¨ Relax your shoulder muscles and gently swing the arm forward and back, side to side, and in small circles.  ¨ Do this for about 5 minutes once or twice a day.  There should be only minimal pain with this exercise. If you experience more than minimal discomfort, stop and call your health care provider.  · The sling is adjustable. If it becomes loose, adjust it so that your forearm is horizontal (level with the ground). Your hand should be level with the elbow.  Date Last Reviewed: 9/28/2015 © 2000-2017 [employer]. [address] [place] [address]. All rights reserved. This information is not intended as a substitute for professional medical care. Always follow your healthcare professional's instructions.        "

## 2020-01-09 NOTE — PLAN OF CARE
1250 Discharge instructions given and gone over with patient and spouse. Patient and spouse know that Dr John instructions are priority. All questions answered, Patient/ spouse verbalize understanding. AVS given to patient/spouse

## 2020-01-09 NOTE — ANESTHESIA PREPROCEDURE EVALUATION
01/09/2020  Tony Han is a 69 y.o., male.    Anesthesia Evaluation    I have reviewed the Patient Summary Reports.    I have reviewed the Nursing Notes.   I have reviewed the Medications.     Review of Systems  Anesthesia Hx:  No problems with previous Anesthesia  History of prior surgery of interest to airway management or planning: Denies Family Hx of Anesthesia complications.   Denies Personal Hx of Anesthesia complications.   Social:  Non-Smoker    Hematology/Oncology:  Hematology Normal   Oncology Normal     EENT/Dental:EENT/Dental Normal   Cardiovascular:   Hypertension CAD      Pulmonary:  Pulmonary Normal    Renal/:  Renal/ Normal     Hepatic/GI:   GERD    Musculoskeletal:  Musculoskeletal Normal    Neurological:  Neurology Normal    Endocrine:   Diabetes, well controlled    Psych:  Psychiatric Normal           Physical Exam  General:  Well nourished    Airway/Jaw/Neck:  Airway Findings: Mouth Opening: Normal Tongue: Normal  General Airway Assessment: Adult  Mallampati: II  TM Distance: Normal, at least 6 cm  Jaw/Neck Findings:  Neck ROM: Normal ROM      Dental:  Dental Findings: In tact, Upper Dentures   Chest/Lungs:  Chest/Lungs Findings: Clear to auscultation, Normal Respiratory Rate     Heart/Vascular:  Heart Findings: Rate: Normal  Rhythm: Regular Rhythm  Sounds: Normal        Mental Status:  Mental Status Findings:  Cooperative, Alert and Oriented         Anesthesia Plan  Type of Anesthesia, risks & benefits discussed:  Anesthesia Type:  general  Patient's Preference:   Intra-op Monitoring Plan: standard ASA monitors  Intra-op Monitoring Plan Comments:   Post Op Pain Control Plan: multimodal analgesia  Post Op Pain Control Plan Comments:   Induction:   IV  Beta Blocker:  Patient is not currently on a Beta-Blocker (No further documentation required).       Informed Consent: Patient  understands risks and agrees with Anesthesia plan.  Questions answered. Anesthesia consent signed with patient.  ASA Score: 3     Day of Surgery Review of History & Physical:    H&P update referred to the surgeon.         Ready For Surgery From Anesthesia Perspective.

## 2020-01-09 NOTE — ANESTHESIA PROCEDURE NOTES
Intubation  Performed by: Tami Morales CRNA  Authorized by: Tami Morales CRNA     Intubation:     Induction:  Intravenous    Intubated:  Postinduction    Mask Ventilation:  Easy mask    Attempts:  1    Attempted By:  Staff anesthesiologist    Blade:  Jj 2    Laryngeal View Grade: Grade I - full view of chords      Difficult Airway Encountered?: No      Complications:  None    Airway Device:  Oral endotracheal tube    Airway Device Size:  7.5    Style/Cuff Inflation:  Cuffed    Inflation Amount (mL):  6    Tube secured:  22    Secured at:  The lips    Placement Verified By:  Capnometry    Complicating Factors:  None    Findings Post-Intubation:  BS equal bilateral

## 2020-01-09 NOTE — H&P
The patient has been examined and the H&P has been reviewed:     I concur with the findings and no changes have occurred since H&P was written.     Anesthesia/Surgery risks, benefits and alternative options discussed and understood by patient/family.     Dru Pate M.D. PGY3  Orthopaedic Surgery

## 2020-01-09 NOTE — TRANSFER OF CARE
"Anesthesia Transfer of Care Note    Patient: Tony Han    Procedure(s) Performed: Procedure(s) (LRB):  REPAIR, ROTATOR CUFF, ARTHROSCOPIC, MEDIUM DOUBLE ROW (Left)  EXTENSIVE DEBRIDEMENT, SHOULDER, ARTHROSCOPIC (Left)  MICROFRACTURE (Left)  BICEPS TENODESIS ARTHROSCOPIC (TENDON FIXATION) (Left)  [id number] (Left)    Patient location: PACU    Anesthesia Type: general    Transport from OR: Transported from OR on 100% O2 by closed face mask with adequate spontaneous ventilation    Post pain: adequate analgesia    Post assessment: no apparent anesthetic complications    Post vital signs: stable    Level of consciousness: sedated    Nausea/Vomiting: no nausea/vomiting    Complications: none    Transfer of care protocol was followed      Last vitals:   Visit Vitals  BP (!) 157/71 (BP Location: Right arm, Patient Position: Lying)   Pulse 69   Temp (P) 36.5 °C (97.7 °F) (Temporal)   Resp 17   Ht 5' 9" (1.753 m)   Wt 86.6 kg (191 lb)   SpO2 100%   BMI 28.21 kg/m²     "

## 2020-01-09 NOTE — BRIEF OP NOTE
[facility]  Brief Operative Note    Surgery Date: 1/9/2020     Surgeon(s) and Role:     * Marcela John MD - Primary    Assisting Surgeon: None    Pre-op Diagnosis:  Nontraumatic tear of left rotator cuff, unspecified tear extent [M75.102]  Biceps tendinitis of left upper extremity [M75.22]  Superior glenoid labrum lesion of left shoulder, initial encounter [S43.432A]    Post-op Diagnosis:  Post-Op Diagnosis Codes:     * Nontraumatic tear of left rotator cuff, unspecified tear extent [M75.102]     * Biceps tendinitis of left upper extremity [M75.22]     * Superior glenoid labrum lesion of left shoulder, initial encounter [S43.432A]    Procedure(s) (LRB):  REPAIR, ROTATOR CUFF, ARTHROSCOPIC (Left)  FIXATION, TENDON, Biceps Tenodesis (Left)  DEBRIDEMENT, SHOULDER, ARTHROSCOPIC (Left)  DECOMPRESSION, NERVE (Left)  MICROFRACTURE (Left)  MANIPULATION, WITH ANESTHESIA, Lysis of Adhesions (Left)    Anesthesia: General    Description of the findings of the procedure(s): see above    Estimated Blood Loss: * No values recorded between 1/9/2020 10:28 AM and 1/9/2020 11:35 AM *         Specimens:   Specimen (12h ago, onward)    None            Discharge Note    OUTCOME: Patient tolerated treatment/procedure well without complication and is now ready for discharge.    DISPOSITION: Home or Self Care    FINAL DIAGNOSIS:  Nontraumatic tear of left rotator cuff    FOLLOWUP: In clinic

## 2020-01-10 ENCOUNTER — TELEPHONE (OUTPATIENT)
Dept: SPORTS MEDICINE | Facility: CLINIC | Age: 70
End: 2020-01-10

## 2020-01-10 VITALS
RESPIRATION RATE: 21 BRPM | WEIGHT: 191 LBS | TEMPERATURE: 98 F | SYSTOLIC BLOOD PRESSURE: 138 MMHG | HEART RATE: 70 BPM | HEIGHT: 69 IN | DIASTOLIC BLOOD PRESSURE: 64 MMHG | BODY MASS INDEX: 28.29 KG/M2 | OXYGEN SATURATION: 97 %

## 2020-01-12 ENCOUNTER — PATIENT MESSAGE (OUTPATIENT)
Dept: SPORTS MEDICINE | Facility: CLINIC | Age: 70
End: 2020-01-12

## 2020-01-13 ENCOUNTER — TELEPHONE (OUTPATIENT)
Dept: SPORTS MEDICINE | Facility: CLINIC | Age: 70
End: 2020-01-13

## 2020-01-13 ENCOUNTER — TELEPHONE (OUTPATIENT)
Dept: INTERNAL MEDICINE | Facility: CLINIC | Age: 70
End: 2020-01-13

## 2020-01-13 DIAGNOSIS — N40.1 BPH WITH OBSTRUCTION/LOWER URINARY TRACT SYMPTOMS: ICD-10-CM

## 2020-01-13 DIAGNOSIS — N13.8 BPH WITH OBSTRUCTION/LOWER URINARY TRACT SYMPTOMS: ICD-10-CM

## 2020-01-13 RX ORDER — TAMSULOSIN HYDROCHLORIDE 0.4 MG/1
CAPSULE ORAL
Qty: 90 CAPSULE | Refills: 3 | Status: SHIPPED | OUTPATIENT
Start: 2020-01-13 | End: 2020-12-28 | Stop reason: SDUPTHER

## 2020-01-13 RX ORDER — LANCETS
EACH MISCELLANEOUS
Qty: 200 EACH | Refills: 4 | Status: SHIPPED | OUTPATIENT
Start: 2020-01-13 | End: 2021-04-27 | Stop reason: SDUPTHER

## 2020-01-13 NOTE — TELEPHONE ENCOUNTER
Pt needs refills on his true metrics diabetic test strips and lancets. Pt states that he was directed to check his blood sugar twice a day but rx says once a day. Pt would like refills to go to [facility]. Please advise

## 2020-01-13 NOTE — TELEPHONE ENCOUNTER
I called and spoke with the patient and explained his post operative exercises. We also discussed his upper extremity swelling and that this is normal and he should continue to keep his hand and fingers moving and this will improve with time.     Regarding his lower extremity swelling, he was encouraged to get up and move around, continue the JENNIE hose and this should improve with time. He understood and was encouraged to call back with any further questions

## 2020-01-19 ENCOUNTER — PATIENT MESSAGE (OUTPATIENT)
Dept: SPORTS MEDICINE | Facility: CLINIC | Age: 70
End: 2020-01-19

## 2020-01-20 ENCOUNTER — TELEPHONE (OUTPATIENT)
Dept: SPORTS MEDICINE | Facility: CLINIC | Age: 70
End: 2020-01-20

## 2020-01-20 DIAGNOSIS — M75.102 NONTRAUMATIC TEAR OF LEFT ROTATOR CUFF, UNSPECIFIED TEAR EXTENT: ICD-10-CM

## 2020-01-20 DIAGNOSIS — M75.22 BICEPS TENDINITIS OF LEFT UPPER EXTREMITY: ICD-10-CM

## 2020-01-20 DIAGNOSIS — G89.18 POST-OPERATIVE PAIN: ICD-10-CM

## 2020-01-20 DIAGNOSIS — S43.432A SUPERIOR GLENOID LABRUM LESION OF LEFT SHOULDER, INITIAL ENCOUNTER: ICD-10-CM

## 2020-01-20 RX ORDER — OXYCODONE AND ACETAMINOPHEN 10; 325 MG/1; MG/1
TABLET ORAL
Qty: 21 TABLET | Refills: 0 | Status: SHIPPED | OUTPATIENT
Start: 2020-01-20 | End: 2020-02-26 | Stop reason: SDUPTHER

## 2020-01-20 RX ORDER — TRAMADOL HYDROCHLORIDE 50 MG/1
50-100 TABLET ORAL EVERY 6 HOURS PRN
Qty: 21 TABLET | Refills: 0 | Status: SHIPPED | OUTPATIENT
Start: 2020-01-20 | End: 2020-03-04

## 2020-01-20 NOTE — TELEPHONE ENCOUNTER
----- Message from Tiana Klein MA sent at 1/20/2020 12:26 PM CST -----  Contact: SELF  Refill.  ----- Message -----  From: Guadalupe Herzog  Sent: 1/20/2020  12:20 PM CST  To: Dora Medrano Staff    Rx Refill/Request     Is this a Refill or New Rx:  Refill    Rx Name and Strength:  traMADol (ULTRAM) 50 mg tablet                                          oxyCODONE-acetaminophen (PERCOCET)  mg per tablet    Preferred Pharmacy with phone number: [id number] [phone] (Phone) [phone] (Fax)    Communication Preference: [phone]    Additional Information: Pt states that his pain level is 9 ask for a call have other post op questions

## 2020-01-24 ENCOUNTER — OFFICE VISIT (OUTPATIENT)
Dept: SPORTS MEDICINE | Facility: CLINIC | Age: 70
End: 2020-01-24
Payer: MEDICARE

## 2020-01-24 VITALS
DIASTOLIC BLOOD PRESSURE: 76 MMHG | SYSTOLIC BLOOD PRESSURE: 153 MMHG | BODY MASS INDEX: 28.14 KG/M2 | HEIGHT: 69 IN | WEIGHT: 190 LBS | HEART RATE: 81 BPM

## 2020-01-24 DIAGNOSIS — Z98.890 S/P ROTATOR CUFF SURGERY: Primary | ICD-10-CM

## 2020-01-24 PROCEDURE — 99024 PR POST-OP FOLLOW-UP VISIT: ICD-10-PCS | Mod: S$GLB,,, | Performed by: PHYSICIAN ASSISTANT

## 2020-01-24 PROCEDURE — 99024 POSTOP FOLLOW-UP VISIT: CPT | Mod: S$GLB,,, | Performed by: PHYSICIAN ASSISTANT

## 2020-01-24 PROCEDURE — 99999 PR PBB SHADOW E&M-EST. PATIENT-LVL IV: CPT | Mod: PBBFAC,,, | Performed by: PHYSICIAN ASSISTANT

## 2020-01-24 PROCEDURE — 99999 PR PBB SHADOW E&M-EST. PATIENT-LVL IV: ICD-10-PCS | Mod: PBBFAC,,, | Performed by: PHYSICIAN ASSISTANT

## 2020-01-27 ENCOUNTER — TELEPHONE (OUTPATIENT)
Dept: INTERNAL MEDICINE | Facility: CLINIC | Age: 70
End: 2020-01-27

## 2020-01-27 RX ORDER — PIOGLITAZONEHYDROCHLORIDE 15 MG/1
15 TABLET ORAL DAILY
Qty: 90 TABLET | Refills: 3 | Status: SHIPPED | OUTPATIENT
Start: 2020-01-27 | End: 2021-02-24 | Stop reason: SDUPTHER

## 2020-01-27 NOTE — TELEPHONE ENCOUNTER
If his sugars come down, he could get off the medicaiton in the next 2-3 months  THe sugars are likely high from the surgery

## 2020-01-27 NOTE — PROGRESS NOTES
Chief Complaint: 2 week shoulder surgery f/u    HISTORY OF PRESENT ILLNESS:   Pt is here today for first post-operative followup of shoulder arthroscopy.  he is doing well.  We have reviewed patient's findings and discussed plan of care and future treatment options.      Tolerating pain well.   Wearing sling which is in place.    DATE OF PROCEDURE: 01/09/2020     SURGEON: Marcela John M.D.     OPERATION:   left  1. Shoulder arthroscopic rotator cuff repair 25 x 15 mm, medium, double row (CPT 06146)   2. Shoulder arthroscopic biceps tenodesis (CPT 15563)  3. Shoulder arthroscopic subacromial decompression, bursectomy   4. Shoulder arthroscopic extensive debridement (anterior, posterior glenohumeral joint, subacromial space) (CPT 00654)  5. Shoulder arthroscopic microfracture (Crimson duvet technique) (CPT 18724)  6. Shoulder arthroscopic lysis of adhesions (CPT 35390)                                                                                  PHYSICAL EXAMINATION:     Incision sites healed well  No evidence of any erythema, infection or induration  elbow Range of motion full   Minimal effusion  2+ Radial pulses  No swelling                                                                               ASSESSMENT:                                                                                                                                               1. Status post above, doing well.                                                                                                                               PLAN:                                                                                                                                                     1. PT to start in 2 weeks; wean narcotics  2. Emphasized scapular function.  3. I have discussed return to activity in detail.  4. Patient will see us back in 4 weeks.                                      5. All questions were answered and the patient should  contact us if he  has any questions or concerns in the interim.

## 2020-01-27 NOTE — TELEPHONE ENCOUNTER
Is he taking metformin XR 2 tablets twice daily?  IF so, he should add pioglitazone 15 mg once daily - rx sent to pharmacy

## 2020-01-27 NOTE — TELEPHONE ENCOUNTER
----- Message from Radha Huang sent at 1/27/2020  1:44 PM CST -----  Contact: Patient [phone]  Stated that his glucose is running 154-160 since his rotator cuff surgery on 01/09/2020.    Please call and advise.    Thank You

## 2020-01-27 NOTE — TELEPHONE ENCOUNTER
Spoke with pt he is taking Metformin XR he will start new rx, however he would like to know how long he has to take the additional medication.

## 2020-01-28 NOTE — TELEPHONE ENCOUNTER
----- Message from Adi Stoddard sent at 1/28/2020  9:47 AM CST -----  Contact: Patient [phone]  Patient is returning a phone call.  Who left a message for the patient: Dr hdez   Does patient know what this is regarding:  Not sure of call   Comments:     Please call an advise  Thank you

## 2020-01-31 ENCOUNTER — PATIENT MESSAGE (OUTPATIENT)
Dept: INTERNAL MEDICINE | Facility: CLINIC | Age: 70
End: 2020-01-31

## 2020-01-31 ENCOUNTER — PATIENT MESSAGE (OUTPATIENT)
Dept: SPORTS MEDICINE | Facility: CLINIC | Age: 70
End: 2020-01-31

## 2020-01-31 DIAGNOSIS — M25.551 RIGHT HIP PAIN: Primary | ICD-10-CM

## 2020-02-01 ENCOUNTER — HOSPITAL ENCOUNTER (EMERGENCY)
Facility: OTHER | Age: 70
Discharge: HOME | End: 2020-02-01
Attending: EMERGENCY MEDICINE
Payer: MEDICARE

## 2020-02-01 VITALS
BODY MASS INDEX: 28.14 KG/M2 | WEIGHT: 190 LBS | TEMPERATURE: 98 F | HEART RATE: 79 BPM | OXYGEN SATURATION: 98 % | SYSTOLIC BLOOD PRESSURE: 164 MMHG | RESPIRATION RATE: 15 BRPM | DIASTOLIC BLOOD PRESSURE: 76 MMHG | HEIGHT: 69 IN

## 2020-02-01 DIAGNOSIS — M25.551 PAIN OF RIGHT HIP JOINT: Primary | ICD-10-CM

## 2020-02-01 DIAGNOSIS — Z87.898 HISTORY OF PARESTHESIA: ICD-10-CM

## 2020-02-01 DIAGNOSIS — M79.606 LEG PAIN: ICD-10-CM

## 2020-02-01 PROCEDURE — 99283 EMERGENCY DEPT VISIT LOW MDM: CPT | Mod: 25

## 2020-02-01 NOTE — DISCHARGE INSTRUCTIONS
Positional changes every 2 hr while sitting or sleeping, heating pad for 20 30 min every few hours while awake.  Continue medicines and keep the follow-up for usual to surgery.  Return for any fever chills persistent pains or persistent paresthesias

## 2020-02-01 NOTE — ED NOTES
Pt w/ pain to R upper lateral leg that radiates to down to knee x 9 days, denies specific injury or trauma. States hes been sleeping in chair from having L shoulder surgery. State ana is tingling, painful. Pt AAOx4 and appropriate at this time. Respirations even and unlabored. No acute distress noted.

## 2020-02-01 NOTE — ED NOTES
MD verbal order for pt to be discharged at this time and she will discontinue vascular US at this time.

## 2020-02-01 NOTE — ED PROVIDER NOTES
"Encounter Date: 2/1/2020    SCRIBE #1 NOTE: IJoi, am scribing for, and in the presence of, Dr. Quiros.       History     Chief Complaint   Patient presents with    Leg Pain     Pt reports right hip pain radiating down the thigh for the past several days.      Time seen by provider: 12:27 PM    This is a 69 y.o. male who presents with complaint of right hip pain that began 9 days ago. Patient reports "pressure point" pain to right lateral hip that has since began radiating down leg just proximal to knee. He states pain began after sleeping in a chair due to surgery on left shoulder. He denies any recent falls or trauma. Patient reports tingling sensation with touch of right upper leg, but denies nausea, vomiting, cough, chest pain, SOB, back pain, weakness or pain to lower right leg. He denies any other complaints at this time.    The history is provided by the patient.     Review of patient's allergies indicates:   Allergen Reactions    Aspirin Nausea And Vomiting     If over 81mg     Past Medical History:   Diagnosis Date    Angina pectoris     Back pain     Cluster headaches     1999    Colon polyps     5 polyps in Dec 2010    Coronary artery disease 11/2011    moderate nonobstructive cad on left heart [facility] 11/2011    Diabetes mellitus     GERD (gastroesophageal reflux disease)     history of peptic ulcer disease    Hyperlipidemia     Hypertension     Lumbar disc disease     MRI 2008 - L5-S1    Nephrolithiasis     July 2013     Past Surgical History:   Procedure Laterality Date    ARTHROSCOPIC DEBRIDEMENT OF SHOULDER Left 1/9/2020    Procedure: EXTENSIVE DEBRIDEMENT, SHOULDER, ARTHROSCOPIC;  Surgeon: Marcela John MD;  Location: [facility] OR;  Service: Orthopedics;  Laterality: Left;    ARTHROSCOPIC REPAIR OF ROTATOR CUFF OF SHOULDER Left 1/9/2020    Procedure: REPAIR, ROTATOR CUFF, ARTHROSCOPIC, MEDIUM DOUBLE ROW;  Surgeon: Marcela John MD;  Location: [facility] OR;  Service: Orthopedics;  " Laterality: Left;    ARTHROSCOPY OF SHOULDER WITH DECOMPRESSION OF SUBACROMIAL SPACE Left 1/9/2020    Procedure: ARTHROSCOPY, SHOULDER, WITH SUBACROMIAL DECOMPRESSION;  Surgeon: Marcela John MD;  Location: [facility] OR;  Service: Orthopedics;  Laterality: Left;    CHOLECYSTECTOMY      COLONOSCOPY N/A 12/7/2016    Procedure: COLONOSCOPY;  Surgeon: Lance Crocker MD;  Location: [address]);  Service: Endoscopy;  Laterality: N/A;    EXCISION OF BURSA Left 1/9/2020    Procedure: BURSECTOMY;  Surgeon: Marcela John MD;  Location: [facility] OR;  Service: Orthopedics;  Laterality: Left;    LITHOTRIPSY      ORIF left humerus       Family History   Problem Relation Age of Onset    Heart disease Mother     No Known Problems Father     Heart disease Brother     Cancer Sister         ovarian    Heart failure Brother     Kidney disease Brother     Alzheimer's disease Brother     No Known Problems Brother     Diabetes Son     Blindness Son     Diabetes Son     Hypertension Son     Diabetes Son     Hypertension Son     Diabetes Son     Hypertension Son     Sleep apnea Son     Celiac disease Neg Hx     Cirrhosis Neg Hx     Colon cancer Neg Hx     Colon polyps Neg Hx     Crohn's disease Neg Hx     Esophageal cancer Neg Hx     Inflammatory bowel disease Neg Hx     Liver cancer Neg Hx     Rectal cancer Neg Hx     Stomach cancer Neg Hx     Ulcerative colitis Neg Hx     Melanoma Neg Hx      Social History     Tobacco Use    Smoking status: Current Every Day Smoker     Packs/day: 1.50     Years: 50.00     Pack years: 75.00     Types: Cigarettes    Smokeless tobacco: Never Used   Substance Use Topics    Alcohol use: No     Frequency: Monthly or less     Drinks per session: 1 or 2     Binge frequency: Never    Drug use: Yes     Types: Hydrocodone     Review of Systems   Constitutional: Negative for chills and fever.   HENT: Negative for congestion, sore throat and trouble swallowing.    Eyes: Negative  for photophobia and visual disturbance.   Respiratory: Negative for cough and shortness of breath.    Cardiovascular: Negative for chest pain.   Gastrointestinal: Negative for abdominal pain, nausea and vomiting.   Genitourinary: Negative for dysuria and hematuria.   Musculoskeletal: Positive for arthralgias (right hip). Negative for back pain and neck pain.        Positive for right leg pain.   Skin: Negative for rash and wound.   Neurological: Negative for weakness and headaches.   Psychiatric/Behavioral: Negative.        Physical Exam     Initial Vitals [02/01/20 1141]   BP Pulse Resp Temp SpO2   (!) 160/67 90 18 97.7 °F (36.5 °C) 99 %      MAP       --         Physical Exam    Nursing note and vitals reviewed.  Constitutional: He appears well-developed and well-nourished. No distress.   HENT:   Head: Normocephalic and atraumatic.   Eyes: Conjunctivae and EOM are normal.   Neck: Normal range of motion. Neck supple.   Cardiovascular: Normal rate, regular rhythm and normal heart sounds. Exam reveals no gallop and no friction rub.    No murmur heard.  Pulmonary/Chest: Breath sounds normal. No respiratory distress. He has no wheezes. He has no rhonchi. He has no rales.   Abdominal: Soft. There is no tenderness. There is no rebound and no guarding.   Musculoskeletal: Normal range of motion. He exhibits no edema or tenderness.   Symmetrical LE. No LE edema. No masses or cords.   Neurological: He is alert and oriented to person, place, and time. He has normal strength.   Skin: Skin is dry. No rash and no abscess noted. No erythema.   Psychiatric: He has a normal mood and affect. His behavior is normal. Judgment and thought content normal.         ED Course   Procedures  Labs Reviewed - No data to display       Imaging Results          X-Ray Hip 2 View Right (Final result)  Result time 02/01/20 12:23:01    Final result by Colton Qureshi MD (02/01/20 12:23:01)                 Impression:      1. No acute displaced  fracture or dislocation of the right hip.      Electronically signed by: Colton Qureshi MD  Date:    02/01/2020  Time:    12:23             Narrative:    EXAMINATION:  XR HIP 2 VIEW RIGHT    CLINICAL HISTORY:  Pain in leg, unspecified    TECHNIQUE:  AP view of the pelvis and frog leg lateral view of the right hip were performed.    COMPARISON:  None    FINDINGS:  Two views right hip    The bilateral sacroiliac joints are intact.  The pubic symphysis is intact.  Degenerative changes are noted of the bilateral femoroacetabular joints.  There is vascular calcification.                              X-Rays:   Independently Interpreted Readings:   Other Readings:  Hip x-ray: No fracture or dislocation.    Medical Decision Making:   History:   Old Medical Records: I decided to obtain old medical records.  Independently Interpreted Test(s):   I have ordered and independently interpreted X-rays - see prior notes.  Clinical Tests:   Radiological Study: Ordered and Reviewed    Additional MDM:   Comments: 69-year-old male with report of right hip pain over the past several days, reporting that he has been sleeping in a sitting position very uncomfortably since he has had his shoulder surgery.  His legs are symmetric with no rash erythema swelling bony tenderness or any even soft tissue tenderness on exam today.  He in addition describes some tingling or feeling like cancer at times and he has been sitting for a while, which sounds like paresthesias secondary to transient nerve compression.  He is able to ambulate without a limp and externally rotated leg without pain.  His x-rays show no acute abnormalities.  I did consider arthritis bursitis hip fracture pathologic fracture acute nerve compression neuropathy shingles cellulitis DVT sciatica amongst other diagnosis.  Actually patient has no symptoms at I can demonstrate on physical exam or even any reported symptoms at this time on interview.  In talking with the patient he  says that he has he has a heating pad in the past some relief.  We discussed using gabapentin but he does not want take another medication as he feels he takes a lot of pills.  We discussed moving even the slightest bit change positions every 2 hr while he is seated.  He will have physical therapy started shortly.  His follow-up is in 2-3 weeks.  I recommended frequent changing of positions, heating pad, and follow up with physical therapy with his surgeon..          Scribe Attestation:   Scribe #1: I performed the above scribed service and the documentation accurately describes the services I performed. I attest to the accuracy of the note.    Attending Attestation:           Physician Attestation for Scribe:  Physician Attestation Statement for Scribe #1: I, Dr. Quiros, reviewed documentation, as scribed by Joi Ricci in my presence, and it is both accurate and complete.                 ED Course as of Feb 01 1526   Sat Feb 01, 2020   1204 Tony Han, 69 y.o.  presented to the ED complaining of right hip and leg pain for one week. Patient is located at the greater trochanter of the right hip but there is no trauma reported. He is concerned about possible DVT thought there is no clear evidence of DVT. He has been taking oxycodone but reports that he only gets temporary relief from this. He is ambulatory. He is  3 weeks post OP left rotator cuff repair.     Patient seen and medically screened by myself in the Provider in Triage Sort system due to ED crowding.  Appropriate tests and/or medications ordered.  A medical screening exam has been performed.  The care will be assumed by myself or another provider when treatment space becomes available.  I am not assuming care of this patient at this time. 12:04 PM. MARLENY KAM        [AM]      ED Course User Index  [AM] Loretta Rosales PA-C                Clinical Impression:     1. Pain of right hip joint    2. Leg pain    3. History of paresthesia                               Marisol Quiros MD  02/01/20 2001

## 2020-02-03 ENCOUNTER — OFFICE VISIT (OUTPATIENT)
Dept: UROLOGY | Facility: CLINIC | Age: 70
End: 2020-02-03
Attending: UROLOGY
Payer: MEDICARE

## 2020-02-03 VITALS
SYSTOLIC BLOOD PRESSURE: 139 MMHG | BODY MASS INDEX: 28.15 KG/M2 | HEART RATE: 80 BPM | WEIGHT: 190.06 LBS | DIASTOLIC BLOOD PRESSURE: 74 MMHG | HEIGHT: 69 IN

## 2020-02-03 DIAGNOSIS — N13.8 BPH WITH OBSTRUCTION/LOWER URINARY TRACT SYMPTOMS: Primary | ICD-10-CM

## 2020-02-03 DIAGNOSIS — N40.1 BPH WITH OBSTRUCTION/LOWER URINARY TRACT SYMPTOMS: Primary | ICD-10-CM

## 2020-02-03 PROCEDURE — 1126F PR PAIN SEVERITY QUANTIFIED, NO PAIN PRESENT: ICD-10-PCS | Mod: S$GLB,,, | Performed by: UROLOGY

## 2020-02-03 PROCEDURE — 1159F MED LIST DOCD IN RCRD: CPT | Mod: S$GLB,,, | Performed by: UROLOGY

## 2020-02-03 PROCEDURE — 3078F DIAST BP <80 MM HG: CPT | Mod: CPTII,S$GLB,, | Performed by: UROLOGY

## 2020-02-03 PROCEDURE — 1101F PT FALLS ASSESS-DOCD LE1/YR: CPT | Mod: CPTII,S$GLB,, | Performed by: UROLOGY

## 2020-02-03 PROCEDURE — 3075F SYST BP GE 130 - 139MM HG: CPT | Mod: CPTII,S$GLB,, | Performed by: UROLOGY

## 2020-02-03 PROCEDURE — 3078F PR MOST RECENT DIASTOLIC BLOOD PRESSURE < 80 MM HG: ICD-10-PCS | Mod: CPTII,S$GLB,, | Performed by: UROLOGY

## 2020-02-03 PROCEDURE — 99214 OFFICE O/P EST MOD 30 MIN: CPT | Mod: 25,S$GLB,, | Performed by: UROLOGY

## 2020-02-03 PROCEDURE — 1126F AMNT PAIN NOTED NONE PRSNT: CPT | Mod: S$GLB,,, | Performed by: UROLOGY

## 2020-02-03 PROCEDURE — 81002 POCT URINE DIPSTICK WITHOUT MICROSCOPE: ICD-10-PCS | Mod: S$GLB,,, | Performed by: UROLOGY

## 2020-02-03 PROCEDURE — 3075F PR MOST RECENT SYSTOLIC BLOOD PRESS GE 130-139MM HG: ICD-10-PCS | Mod: CPTII,S$GLB,, | Performed by: UROLOGY

## 2020-02-03 PROCEDURE — 1159F PR MEDICATION LIST DOCUMENTED IN MEDICAL RECORD: ICD-10-PCS | Mod: S$GLB,,, | Performed by: UROLOGY

## 2020-02-03 PROCEDURE — 81002 URINALYSIS NONAUTO W/O SCOPE: CPT | Mod: S$GLB,,, | Performed by: UROLOGY

## 2020-02-03 PROCEDURE — 99214 PR OFFICE/OUTPT VISIT, EST, LEVL IV, 30-39 MIN: ICD-10-PCS | Mod: 25,S$GLB,, | Performed by: UROLOGY

## 2020-02-03 PROCEDURE — 1101F PR PT FALLS ASSESS DOC 0-1 FALLS W/OUT INJ PAST YR: ICD-10-PCS | Mod: CPTII,S$GLB,, | Performed by: UROLOGY

## 2020-02-03 NOTE — PROGRESS NOTES
"Subjective:      Tony Han is a 69 y.o. male who returns today regarding his recurrent UTI symptoms.     He was initially seen in late 2016 for several months of near constant UTI symptoms with multiple courses of abx.      He had workup with primarily finding of left renal stone. He is now s/p left ESWL on 3/23/17.     He was last treated for UTI by ER on 4/9/17. He opted for prophylactic abx at that time and started daily Keflex, which he completed in October 2017.    He has remained UTI free in the interim.    He remains on flomax and finasteride for BPH. He is satisfied with how he urinates and does not desire an outlet procedure. AUA SS 15/3.     Recently had left rotator cuff surgery, no issues with retention    The following portions of the patient's history were reviewed and updated as appropriate: allergies, current medications, past family history, past medical history, past social history, past surgical history and problem list.    Review of Systems  A comprehensive multipoint review of systems was negative except as otherwise stated in the HPI.     Objective:   Vitals: /74 (BP Location: Left arm, Patient Position: Sitting, BP Method: X-Large (Automatic))   Pulse 80   Ht 5' 9" (1.753 m)   Wt 86.2 kg (190 lb 0.6 oz)   BMI 28.06 kg/m²     Physical Exam   General: alert and oriented, no acute distress  Respiratory: Symmetric expansion, non-labored breathing  Neuro: no gross deficits  Psych: normal judgment and insight, normal mood/affect and non-anxious  : prostate 40g, no nodules, nontender    Lab Review   Urinalysis demonstrates positive for trace blood  Lab Results   Component Value Date    WBC 6.89 12/23/2019    HGB 13.5 (L) 12/23/2019    HCT 41.3 12/23/2019     (H) 12/23/2019     12/23/2019     Lab Results   Component Value Date    CREATININE 1.0 12/23/2019    BUN 12 12/23/2019     Lab Results   Component Value Date    PSA 0.49 04/05/2019    PSADIAG 0.60 07/14/2017 "     Imaging   None today     Assessment and Plan:   Recurrent UTI  BPH (benign prostatic hypertrophy) with urinary obstruction  -- Completed 6 mos ppx and has remained UTI free since  -- Continue flomax and finasteride  -- Discussed alternatives to meds for his BPH, including Urolift. Wishes to defer for now.    Nephrolithiasis  -- S/p ESWL  -- Stone still present on KUB and CT, though appearance c/w residual debris  -- Consider further treatment only if UTIs recur (likely will address BPH first)    FU 12 mos

## 2020-02-05 ENCOUNTER — TELEPHONE (OUTPATIENT)
Dept: INTERNAL MEDICINE | Facility: CLINIC | Age: 70
End: 2020-02-05

## 2020-02-05 LAB
BILIRUB SERPL-MCNC: NEGATIVE MG/DL
BLOOD URINE, POC: ABNORMAL
COLOR, POC UA: YELLOW
GLUCOSE UR QL STRIP: NORMAL
KETONES UR QL STRIP: NEGATIVE
LEUKOCYTE ESTERASE URINE, POC: NEGATIVE
NITRITE, POC UA: NEGATIVE
PH, POC UA: 5
PROTEIN, POC: ABNORMAL
SPECIFIC GRAVITY, POC UA: 1.02
UROBILINOGEN, POC UA: NORMAL

## 2020-02-05 NOTE — TELEPHONE ENCOUNTER
Went to [facility] for pain in right leg(emergency) he wanted to make sure he didn't have a clot.  The doctor said it's from lying on one side for so long.  Now he is having a pain on his leg, he is using warm compresses he's also tried tramadol and the tens unit, nothing is working 9.9/10. I can't sleep, it hurts when my clothes touch it. The surgery sight is on the left and I'm lying on the right. Please call him in the a.m. To work him in on tomorrow?  THe Er doctor recommended Nerve pain and I'm already on 18 pills a day, do I need another one.  Can Dr. Benitez order this pill for me now?

## 2020-02-06 RX ORDER — GABAPENTIN 100 MG/1
CAPSULE ORAL
Qty: 90 CAPSULE | Refills: 1 | Status: SHIPPED | OUTPATIENT
Start: 2020-02-06 | End: 2020-09-02

## 2020-02-06 NOTE — TELEPHONE ENCOUNTER
----- Message from Padmini Biswas sent at 2/5/2020  4:41 PM CST -----  Contact: Patient [phone]  Patient is requesting a call from the office. States that he is still having nerve pain.    Please call and advise.    Thank You

## 2020-02-06 NOTE — TELEPHONE ENCOUNTER
Pt was offered an apt for urgent care he refused. Pt states that he was offered an Rx for nerve pain on Saturday however he didn't want it at the time. Pt is requesting a Rx for nerve pain now. He doesn't want to come in to be evaluated nor does he want additional xray's.

## 2020-02-11 ENCOUNTER — PATIENT MESSAGE (OUTPATIENT)
Dept: INTERNAL MEDICINE | Facility: CLINIC | Age: 70
End: 2020-02-11

## 2020-02-11 ENCOUNTER — TELEPHONE (OUTPATIENT)
Dept: INTERNAL MEDICINE | Facility: CLINIC | Age: 70
End: 2020-02-11

## 2020-02-11 NOTE — TELEPHONE ENCOUNTER
----- Message from Radha Huang sent at 2/11/2020  2:20 PM CST -----  Contact: Patient [phone]  Requesting for someone to call him concerning the previous [facility] messages.    Please call and advise.    Thank You

## 2020-02-11 NOTE — TELEPHONE ENCOUNTER
Spoke with pt in lengthy conversation. Pt is wondering if he can get a stronger pill because he had to take 4 gabapentin because of leg pain. Advised pt that PCP can't write anything stronger if he has continuous leg pain he would need to be seen. Pt states that he has been referred to the wrong department for leg pain, as orthopedics is only for bone issues. Pt would like PCP to confirm the referral was placed to the correct department. Please advise.

## 2020-02-12 ENCOUNTER — TELEPHONE (OUTPATIENT)
Dept: SPORTS MEDICINE | Facility: CLINIC | Age: 70
End: 2020-02-12

## 2020-02-12 ENCOUNTER — HOSPITAL ENCOUNTER (OUTPATIENT)
Dept: RADIOLOGY | Facility: HOSPITAL | Age: 70
Discharge: HOME | End: 2020-02-12
Attending: ORTHOPAEDIC SURGERY
Payer: MEDICARE

## 2020-02-12 ENCOUNTER — OFFICE VISIT (OUTPATIENT)
Dept: SPORTS MEDICINE | Facility: CLINIC | Age: 70
End: 2020-02-12
Payer: MEDICARE

## 2020-02-12 VITALS
BODY MASS INDEX: 28.14 KG/M2 | WEIGHT: 190 LBS | DIASTOLIC BLOOD PRESSURE: 74 MMHG | HEART RATE: 93 BPM | SYSTOLIC BLOOD PRESSURE: 144 MMHG | HEIGHT: 69 IN

## 2020-02-12 DIAGNOSIS — M25.551 RIGHT HIP PAIN: Primary | ICD-10-CM

## 2020-02-12 DIAGNOSIS — M25.551 RIGHT HIP PAIN: ICD-10-CM

## 2020-02-12 DIAGNOSIS — M70.61 TROCHANTERIC BURSITIS, RIGHT HIP: ICD-10-CM

## 2020-02-12 DIAGNOSIS — Z98.890 S/P ROTATOR CUFF SURGERY: ICD-10-CM

## 2020-02-12 PROCEDURE — 99024 PR POST-OP FOLLOW-UP VISIT: ICD-10-PCS | Mod: S$GLB,,, | Performed by: ORTHOPAEDIC SURGERY

## 2020-02-12 PROCEDURE — 99999 PR PBB SHADOW E&M-EST. PATIENT-LVL IV: CPT | Mod: PBBFAC,,, | Performed by: ORTHOPAEDIC SURGERY

## 2020-02-12 PROCEDURE — 99999 PR PBB SHADOW E&M-EST. PATIENT-LVL IV: ICD-10-PCS | Mod: PBBFAC,,, | Performed by: ORTHOPAEDIC SURGERY

## 2020-02-12 PROCEDURE — 99024 POSTOP FOLLOW-UP VISIT: CPT | Mod: S$GLB,,, | Performed by: ORTHOPAEDIC SURGERY

## 2020-02-12 RX ORDER — TRAMADOL HYDROCHLORIDE 50 MG/1
TABLET ORAL
Qty: 25 TABLET | Refills: 0 | Status: SHIPPED | OUTPATIENT
Start: 2020-02-12 | End: 2020-03-04

## 2020-02-12 RX ORDER — CYCLOBENZAPRINE HCL 10 MG
10 TABLET ORAL 3 TIMES DAILY PRN
Qty: 15 TABLET | Refills: 0 | Status: SHIPPED | OUTPATIENT
Start: 2020-02-12 | End: 2020-02-22

## 2020-02-12 NOTE — LETTER
February 12, 2020      Elizabeth Benitez MD  [address]  [facility] [address]           [facility]  [address]  [facility] [address]  Phone: [phone]          Patient: Tony Han   MR Number: [id number]   YOB: 1950   Date of Visit: 2/12/2020       Dear Dr. Elizabeth Benitez:    Thank you for referring Tony Han to me for evaluation. Attached you will find relevant portions of my assessment and plan of care.    If you have questions, please do not hesitate to call me. I look forward to following Tony Han along with you.    Sincerely,    Marcela John MD    Enclosure  CC:  No Recipients    If you would like to receive this communication electronically, please contact [email or web address] or [phone] to request more information on [employer] Link access.    For providers and/or their staff who would like to refer a patient to [facility], please contact us through our one-stop-shop provider referral line, [facility], at [phone].    If you feel you have received this communication in error or would no longer like to receive these types of communications, please e-mail [email or web address]

## 2020-02-12 NOTE — TELEPHONE ENCOUNTER
Returned patients call regarding in basket  Message, OTONIEL explaining that this is they way his appointments were  setup for him due to the type of Sx he had per 's orders.

## 2020-02-12 NOTE — PROGRESS NOTES
Chief Complaint: 5 week shoulder surgery f/u    HISTORY OF PRESENT ILLNESS:   Pt is here today for 5 weeks post-operative followup of shoulder arthroscopy.  he  Complains of pain in right lateral hip pain over greater trochanter not relieved with pain meds and gabapentin the past 14 days. States he's been favoring right side and putting pressure on it while sitting.  We have reviewed patient's findings and discussed plan of care and future treatment options.  No complaints of left shoulder pain. PT scheduled to begin 2/20/2020.  .   Wearing sling which is in place.    Right hip bursa irritated from sitting.    DATE OF PROCEDURE: 01/09/2020     SURGEON: Marcela John M.D.     OPERATION:   left  1. Shoulder arthroscopic rotator cuff repair 25 x 15 mm, medium, double row (CPT 04907)   2. Shoulder arthroscopic biceps tenodesis (CPT 94226)  3. Shoulder arthroscopic subacromial decompression, bursectomy   4. Shoulder arthroscopic extensive debridement (anterior, posterior glenohumeral joint, subacromial space) (CPT 63307)  5. Shoulder arthroscopic microfracture (Crimson duvet technique) (CPT 98611)  6. Shoulder arthroscopic lysis of adhesions (CPT 65063)                                                                                  PHYSICAL EXAMINATION:     Incision sites healed well  No evidence of any erythema, infection or induration  elbow Range of motion full   Minimal effusion  2+ Radial pulses  No swelling                                                                               ASSESSMENT:                                                                                                                                               1. Status post above, doing well.     2. Right hip trochanteric bursitis                                                                                                                            PLAN:                                                                                                                                                      1. PT to start in 8 days; wean narcotics  2. Emphasized scapular function.  3. I have discussed return to activity in detail.  4. Patient will see us back in 6 weeks.                                      5. All questions were answered and the patient should contact us if he  has any questions or concerns in the interim.                             Will move up PT to work on IT band, ice                              POSTOPERATIVE PLAN: We will follow the arthroscopic rotator cuff repair guidelines for a medium size rotator cuff tear.  We discussed with the patient's family after surgery.  The patient will remain in a sling for 6 weeks.  PT to start at 4 weeks.     Quality of tissue: good      Quality of the repair: good       Size of tear: 25 x 15 mm

## 2020-02-12 NOTE — TELEPHONE ENCOUNTER
----- Message from Alyssa Lauren MA sent at 2/12/2020  8:55 AM CST -----  Contact: self      ----- Message -----  From: Rachelle Marroquin  Sent: 2/11/2020   2:34 PM CST  To: [facility] Staff    Pt would like a call back regarding physical therapy. Pt wants to know why he has therapy before the Post-op and not after...    Contact [facility]

## 2020-02-18 ENCOUNTER — CLINICAL SUPPORT (OUTPATIENT)
Dept: REHABILITATION | Facility: HOSPITAL | Age: 70
End: 2020-02-18
Payer: MEDICARE

## 2020-02-18 DIAGNOSIS — M25.551 RIGHT HIP PAIN: ICD-10-CM

## 2020-02-18 DIAGNOSIS — M25.511 ACUTE PAIN OF RIGHT SHOULDER: Primary | ICD-10-CM

## 2020-02-18 PROCEDURE — 97110 THERAPEUTIC EXERCISES: CPT

## 2020-02-18 PROCEDURE — 97161 PT EVAL LOW COMPLEX 20 MIN: CPT

## 2020-02-18 NOTE — PLAN OF CARE
[facility]  Physical Therapy Initial Evaluation    Name: Tony Han  [facility] Number: [id number]    Therapy Diagnosis:   Encounter Diagnoses   Name Primary?    Acute pain of right shoulder Yes    Right hip pain      Physician: Clay Rudolph III, *    Physician Orders: PT Eval and Treat   Medical Diagnosis from Referral:   M75.102 (ICD-10-CM) - Nontraumatic tear of left rotator cuff, unspecified tear extent   M75.22 (ICD-10-CM) - Biceps tendinitis of left upper extremity   S43.432A (ICD-10-CM) - Superior glenoid labrum lesion of left shoulder, initial encounter     Evaluation Date: 2/18/2020  Authorization Period Expiration: 1/5/2021  Plan of Care Expiration: 6/9/2020  Visit # / Visits authorized: 1/ 1    Time In: 0910  Time Out: 1000  Total Billable Time: 50 minutes    Precautions: Standard, We will follow the arthroscopic rotator cuff repair guidelines for a medium size rotator cuff tear.  We discussed with the patient's family after surgery.  The patient will remain in a sling for 6 weeks.  PT to start at 4 weeks    OPERATION on 1/9/2020   left  1. Shoulder arthroscopic rotator cuff repair 25 x 15 mm, medium, double row (CPT 40647)   2. Shoulder arthroscopic biceps tenodesis (CPT 50671)  3. Shoulder arthroscopic subacromial decompression, bursectomy   4. Shoulder arthroscopic extensive debridement (anterior, posterior glenohumeral joint, subacromial space) (CPT 71052)  5. Shoulder arthroscopic microfracture (Crimson duvet technique) (CPT 26782)  6. Shoulder arthroscopic lysis of adhesions (CPT 66021)       Subjective   Date of onset: L shoulder sx on 1/9/2020 then L hip started hurting after that  History of current condition - Tony reports: that he is having pain all over.  States that he is not currently having shoulder pain.  Reports that he is scared of PT.  States that he has been to several different PTs and thinks that they may have injured his L arm.  Thinks they had him  lifting too heavy of weights back in November 2019.  However, his R hip hurts all the time.  Reports R hip pain started after L shoulder surgery, possibly because of lying on the R side after surgery.  Was told it was bursitis.  Was prescribed multiple medications but it hasn't really helped.  Reports R hip pain is worse than L shoulder.  Reports L hip pain that radiates down lateral and anterior thigh to the knee.  Denies pain distal to the knee.         Past Medical History:   Diagnosis Date    Angina pectoris     Back pain     Cluster headaches     1999    Colon polyps     5 polyps in Dec 2010    Coronary artery disease 11/2011    moderate nonobstructive cad on left heart cath [facility] 11/2011    Diabetes mellitus     GERD (gastroesophageal reflux disease)     history of peptic ulcer disease    Hyperlipidemia     Hypertension     Lumbar disc disease     MRI 2008 - L5-S1    Nephrolithiasis     July 2013     Tony Han  has a past surgical history that includes Cholecystectomy; ORIF left humerus; Colonoscopy (N/A, 12/7/2016); Lithotripsy; Arthroscopic repair of rotator cuff of shoulder (Left, 1/9/2020); Arthroscopic debridement of shoulder (Left, 1/9/2020); Excision of bursa (Left, 1/9/2020); and Arthroscopy of shoulder with decompression of subacromial space (Left, 1/9/2020).    Tony has a current medication list which includes the following prescription(s): amlodipine, aspirin, blood sugar diagnostic, cyclobenzaprine, diazepam, finasteride, fluzone high-dose 2018-19 (pf), gabapentin, hydrocodone-acetaminophen, lancets, losartan, metformin, metoprolol succinate, multivit-minerals/fa/lycopene, oxycodone-acetaminophen, pioglitazone, promethazine, simvastatin, tamsulosin, tramadol, tramadol, and vitamin d.    Review of patient's allergies indicates:   Allergen Reactions    Aspirin Nausea And Vomiting     If over 81mg        Imaging, X-ray of R hip: No acute displaced fracture or dislocation of the  right hip.      Prior Therapy: PT for L shoulder   Social History: 2 story house lives with their spouse  Occupation: Pt is retired  Prior Level of Function: Difficulty lifting over 10-15 lbs.  Current Level of Function: Unable to lift anything with L UE and pain with walking/sitting.    Shoulder Pain:  Current 0/10, worst 8/10, best 0/10   Location: left shoulder  Description: Sharp  Aggravating Factors: trying to put on deodorant   Easing Factors: ice, TENS unit, nothing and rest     Hip Pain:  Current 4/10, worst 10/10, best 4/10   Location: right hip  Description: Aching, Dull, Sharp and Shooting  Aggravating Factors: hot pack, TENS, lying on R side,   Easing Factors: pain medication and ice    Pts goals: To reduce pain in R hip and improve function of L shoulder.    Objective     Observation: Pt is healthy but appears agitated     Posture: Slouched seated posture, pt in shoulder sling     L SHOULDER:    Passive Range of Motion:   Shoulder Left   Flexion 100   Abduction 90   ER at 20 20   IR NT      Active Range of Motion:   Shoulder Right Left   Flexion 140 NT 2/2 recent sx   Abduction 140 NT 2/2 recent sx   ER at 0 65 NT 2/2 recent sx   IR (behind back) L2 NT 2/2 recent sx     Upper Extremity Strength:  Formal MMT not performed 2/2 POD#22 and increased pain.      Joint Mobility: hypomobile as expected post-op.    Palpation:  no TTP    Sensation: Intact but diminished 2/2 residual nerve block.      R HIP    Hip Range of Motion: Limited in B hips, geetha with ER/IR     Lower Extremity Strength  Right LE  Left LE    Quadriceps: 5/5 Quadriceps: 5/5   Hamstrings: 5/5 Hamstrings: 5/5   Iliopsoas: 5/5 Iliopsoas: 4+/5   Hip extension:  NT Hip extension: NT   PGM: NT PGM: NT     Functional Tests:  Bridge Test: NT      Flexibility:    Hamstrings: R = major tightness ; L = major tightness     Palpation: TTP along Vastus lateralis, ITB, TFL, glut med      CMS Impairment/Limitation/Restriction for FOTO Shoulder  Survey    Therapist reviewed FOTO scores for Tony Han on 2/18/2020.   FOTO documents entered into [employer] - see Media section.    Limitation Score: 57%         TREATMENT   Treatment Time In: 0950  Treatment Time Out: 1000  Total Treatment time separate from Evaluation: 10 minutes    Tony received therapeutic exercises to develop ROM and flexibility for 10 minutes including:  Roller stick to ITB and quad (done by PT) x 5 min  Pendulums (4 way) x 10 each      Home Exercises and Patient Education Provided    Education provided re: POC, goals for therapy, role of therapy for care, exercises/HEP, instructed pt to discontinue sling on 2/20/2020    Written Home Exercises Provided: yes.  Exercises were reviewed and Tony was able to demonstrate them prior to the end of the session.   Pt received a written copy of exercises to perform at home. Tony demonstrated good  understanding of the education provided.     See EMR under patient instructions for exercises given.   Assessment   Tony is a 69 y.o. male referred to outpatient Physical Therapy s/p L RCR on 1/9/2020.  Pt's c/c is R hip pain with prolonged sitting, sleeping, walking, etc.  However, secondary to recent L shoulder sx, will focus on L shoulder. Pt presents with decreased PROM/AROM in L shoulder, decreased L UE strength, and overall decrease in L UE function.  Pt also presents with tightness/tenderness along ITB/glut/quad.  Pt will benefit from physical therapy, specifically AAROM/AROM, L shoulder/scapular strengthening, and mobilizing R hip/ITB, in order to reduce pt's c/o pain, improve impairments, and functional limitations.    Pt prognosis is Good.   Pt will benefit from skilled outpatient Physical Therapy to address the deficits stated above and in the chart below, provide pt/family education, and to maximize pt's level of independence.     Plan of care discussed with patient: Yes  Pt's spiritual, cultural and educational needs considered and patient is  agreeable to the plan of care and goals as stated below:     Anticipated Barriers for therapy: none    Medical Necessity is demonstrated by the following  History  Co-morbidities and personal factors that may impact the plan of care Co-morbidities:   CAD, diabetes and back pain    Personal Factors:   attitudes     moderate   Examination  Body Structures and Functions, activity limitations and participation restrictions that may impact the plan of care Body Regions:   L shoulder, R hip    Body Systems:    gross symmetry  ROM  strength  gross coordinated movement  motor control  motor learning    Participation Restrictions:   Reaching overhead and behind back, sleeping, prolonged walking    Activity limitations:   Learning and applying knowledge  no deficits    General Tasks and Commands  no deficits    Communication  no deficits    Mobility  lifting and carrying objects  walking    Self care  no deficits    Domestic Life  shopping  cooking  doing house work (cleaning house, washing dishes, laundry)    Interactions/Relationships  no deficits    Life Areas  no deficits    Community and Social Life  community life  recreation and leisure         high   Clinical Presentation stable and uncomplicated low   Decision Making/ Complexity Score: low     GOALS: Short Term Goals:  8 weeks  1.Report decreased L shoulder pain < / =  3-4/10  to increase tolerance for ADLs/exercises.  2. Increase PROM to full without c/o pain.   3. Increased strength to 3+/5 MMT grade in L UE to increase tolerance for ADL and work activities.  4. Pt to tolerate HEP to improve ROM and independence with ADL's    Long Term Goals: 16 weeks  1.Report decreased L shoulder pain  < / =  0-1 /10  to increase tolerance for ADLs/exercises.  2.Increase AROM to equivalent to R UE without c/o pain.  3.Increase strength to >/= 4/5 in R shoulder to increase tolerance for ADL and work activities.  4. Pt goal: To reduce pain in R hip and improve function of L  shoulder.  5. Report decreased R hip pain < / = 2/10 to increase tolerance for prolonged walking and sleeping.    Plan   Plan of care Certification: 2/18/2020 to 6/9/2020.    Outpatient Physical Therapy 2 times weekly for 16 weeks to include the following interventions: Electrical Stimulation IFC, Gait Training, Manual Therapy, Moist Heat/ Ice, Neuromuscular Re-ed, Patient Education, Therapeutic Activites and Therapeutic Exercise.     Kayleen Zarate, PT, DPT, OCS

## 2020-02-20 ENCOUNTER — TELEPHONE (OUTPATIENT)
Dept: SPORTS MEDICINE | Facility: CLINIC | Age: 70
End: 2020-02-20

## 2020-02-20 ENCOUNTER — PATIENT MESSAGE (OUTPATIENT)
Dept: SPORTS MEDICINE | Facility: CLINIC | Age: 70
End: 2020-02-20

## 2020-02-20 NOTE — TELEPHONE ENCOUNTER
----- Message from Renee Lees sent at 2/20/2020  2:48 PM CST -----  Contact: self  Pt states no one is returning his phone calls. He's trying to reschedule an appt he made a mistake and cancelled. Please call.      Contact info [phone]

## 2020-02-22 ENCOUNTER — PATIENT MESSAGE (OUTPATIENT)
Dept: SPORTS MEDICINE | Facility: CLINIC | Age: 70
End: 2020-02-22

## 2020-02-25 ENCOUNTER — TELEPHONE (OUTPATIENT)
Dept: SPORTS MEDICINE | Facility: CLINIC | Age: 70
End: 2020-02-25

## 2020-02-26 ENCOUNTER — TELEPHONE (OUTPATIENT)
Dept: INTERNAL MEDICINE | Facility: CLINIC | Age: 70
End: 2020-02-26

## 2020-02-26 ENCOUNTER — HOSPITAL ENCOUNTER (OUTPATIENT)
Dept: RADIOLOGY | Facility: HOSPITAL | Age: 70
Discharge: HOME | End: 2020-02-26
Attending: ORTHOPAEDIC SURGERY
Payer: MEDICARE

## 2020-02-26 ENCOUNTER — TELEPHONE (OUTPATIENT)
Dept: SPORTS MEDICINE | Facility: CLINIC | Age: 70
End: 2020-02-26

## 2020-02-26 ENCOUNTER — OFFICE VISIT (OUTPATIENT)
Dept: SPORTS MEDICINE | Facility: CLINIC | Age: 70
End: 2020-02-26
Payer: MEDICARE

## 2020-02-26 ENCOUNTER — TELEPHONE (OUTPATIENT)
Dept: NEUROLOGY | Facility: CLINIC | Age: 70
End: 2020-02-26

## 2020-02-26 VITALS — HEART RATE: 90 BPM | DIASTOLIC BLOOD PRESSURE: 78 MMHG | SYSTOLIC BLOOD PRESSURE: 157 MMHG

## 2020-02-26 DIAGNOSIS — M79.651 ACUTE PAIN OF RIGHT THIGH: ICD-10-CM

## 2020-02-26 DIAGNOSIS — M75.22 BICEPS TENDINITIS OF LEFT UPPER EXTREMITY: ICD-10-CM

## 2020-02-26 DIAGNOSIS — M75.102 NONTRAUMATIC TEAR OF LEFT ROTATOR CUFF, UNSPECIFIED TEAR EXTENT: ICD-10-CM

## 2020-02-26 DIAGNOSIS — G57.11 MERALGIA PARAESTHETICA, RIGHT: ICD-10-CM

## 2020-02-26 DIAGNOSIS — S43.432A SUPERIOR GLENOID LABRUM LESION OF LEFT SHOULDER, INITIAL ENCOUNTER: ICD-10-CM

## 2020-02-26 DIAGNOSIS — G89.18 POST-OPERATIVE PAIN: ICD-10-CM

## 2020-02-26 DIAGNOSIS — Z98.890 S/P ROTATOR CUFF SURGERY: Primary | ICD-10-CM

## 2020-02-26 PROCEDURE — 99024 POSTOP FOLLOW-UP VISIT: CPT | Mod: S$GLB,,, | Performed by: ORTHOPAEDIC SURGERY

## 2020-02-26 PROCEDURE — 99999 PR PBB SHADOW E&M-EST. PATIENT-LVL III: CPT | Mod: PBBFAC,,, | Performed by: ORTHOPAEDIC SURGERY

## 2020-02-26 PROCEDURE — 73552 X-RAY EXAM OF FEMUR 2/>: CPT | Mod: 26,RT,, | Performed by: RADIOLOGY

## 2020-02-26 PROCEDURE — 73552 X-RAY EXAM OF FEMUR 2/>: CPT | Mod: TC,RT

## 2020-02-26 PROCEDURE — 99999 PR PBB SHADOW E&M-EST. PATIENT-LVL III: ICD-10-PCS | Mod: PBBFAC,,, | Performed by: ORTHOPAEDIC SURGERY

## 2020-02-26 PROCEDURE — 73552 XR FEMUR 2 VIEW RIGHT: ICD-10-PCS | Mod: 26,RT,, | Performed by: RADIOLOGY

## 2020-02-26 PROCEDURE — 99024 PR POST-OP FOLLOW-UP VISIT: ICD-10-PCS | Mod: S$GLB,,, | Performed by: ORTHOPAEDIC SURGERY

## 2020-02-26 RX ORDER — DULOXETIN HYDROCHLORIDE 30 MG/1
30 CAPSULE, DELAYED RELEASE ORAL DAILY
Qty: 30 CAPSULE | Refills: 3 | Status: SHIPPED | OUTPATIENT
Start: 2020-02-26 | End: 2020-03-04

## 2020-02-26 RX ORDER — OXYCODONE AND ACETAMINOPHEN 10; 325 MG/1; MG/1
TABLET ORAL
Qty: 21 TABLET | Refills: 0 | Status: SHIPPED | OUTPATIENT
Start: 2020-02-26 | End: 2020-03-04 | Stop reason: SDUPTHER

## 2020-02-26 RX ORDER — OXYCODONE AND ACETAMINOPHEN 10; 325 MG/1; MG/1
TABLET ORAL
Qty: 21 TABLET | Refills: 0 | Status: SHIPPED | OUTPATIENT
Start: 2020-02-26 | End: 2020-02-26 | Stop reason: SDUPTHER

## 2020-02-26 NOTE — PROGRESS NOTES
Chief Complaint: 7 week shoulder surgery f/u    HISTORY OF PRESENT ILLNESS:   Pt is here today for 7 weeks post-operative followup of shoulder arthroscopy.  he  Complains of pain in right lateral hip pain over greater trochanter not relieved with pain meds and gabapentin the past 14 days. Today he states he has severe pain in anterior thigh that is burning and very sensitive. State the gabapentin has been causing his sugars to rise so he has stopped taking them for about a week now. We have reviewed patient's findings and discussed plan of care and future treatment options.  No complaints of left shoulder pain. Doing PT here 2x week, started last week.  .   Wearing sling which is in place.    Right hip bursa irritated from sitting.    DATE OF PROCEDURE: 01/09/2020     SURGEON: Marcela John M.D.     OPERATION:   left  1. Shoulder arthroscopic rotator cuff repair 25 x 15 mm, medium, double row (CPT 35790)   2. Shoulder arthroscopic biceps tenodesis (CPT 76717)  3. Shoulder arthroscopic subacromial decompression, bursectomy   4. Shoulder arthroscopic extensive debridement (anterior, posterior glenohumeral joint, subacromial space) (CPT 20689)  5. Shoulder arthroscopic microfracture (Crimson duvet technique) (CPT 05218)  6. Shoulder arthroscopic lysis of adhesions (CPT 95150)                                                                                  PHYSICAL EXAMINATION:     Incision sites healed well  No evidence of any erythema, infection or induration  elbow Range of motion full   Minimal effusion  2+ Radial pulses  No swelling    xrays today right femur  Negative for any osteopathology                                                                               ASSESSMENT:                                                                                                                                               1. Status post above, doing well.     2. Right hip trochanteric bursitis   3. Possible meralgia  parasthetica                                                                                                                             PLAN:                                                                                                                                                     1. Continue PT for shoulder, emphasized scapular function  2. Referral to neurology (Dr. Merritt) for possible meralgia parasthetica, continue conservative management for trochanteric bursitis-injection deferred due to hx of diabetes and no recent hbA1c  3. I have discussed return to activity in detail.  4. Patient will see us back in 6 weeks.                                      5. All questions were answered and the patient should contact us if he  has any questions or concerns in the interim.                             Will move up PT to work on IT band, ice                              POSTOPERATIVE PLAN: We will follow the arthroscopic rotator cuff repair guidelines for a medium size rotator cuff tear.  We discussed with the patient's family after surgery.  The patient will remain in a sling for 6 weeks.  PT to start at 4 weeks.     Quality of tissue: good      Quality of the repair: good       Size of tear: 25 x 15 mm

## 2020-02-26 NOTE — TELEPHONE ENCOUNTER
----- Message from Linnette Carbajal sent at 2/26/2020  1:02 PM CST -----  Contact: self  Type:  Appointment Request      Name of Caller:patient referred by Dr Isbell to see Neurology   Patient has Acute pain of right thigh  G57.11 (ICD-10-CM) - Meralgia paraesthetica, right  Patient states need appointment for to see Dr Merritt or any doctor    When is the first available appointment  Symptoms:  Best Call Back Number: [phone]  Additional Information:   Patient states experiencing severe pain   Patient states trouble walking, sitting unable to sleep need appointment for this week if possible

## 2020-02-26 NOTE — TELEPHONE ENCOUNTER
Called patient, regarding thigh pain. No answer. Unsure etiology of pain. If trochanteric bursitis pain, we recommend an injection if nsaids fail to help. Also advise not sleeping or putting direct pressure on that side without enough padding

## 2020-02-26 NOTE — TELEPHONE ENCOUNTER
----- Message from Angela Ricci sent at 2/26/2020  2:19 PM CST -----  Contact: Pao [phone]  Referral to neurology appointment is not available until 3-.   Patient is asking if someone else can be recommended due to constant unbearable pain.  Patient is requesting call back as soon as possible. Medication is not working.  Please call as soon as possible.

## 2020-02-26 NOTE — TELEPHONE ENCOUNTER
Pt is calling because he states that he is not sure what to do about PAIN! Went back into pt chart and re read all the messages from PCP with care plans for pt care. He doesn't want to see orthopedics for the injections, he has stopped taking gabapentin, he was seen by Dr. John today and given 21 oxycodone. Advised pt that PCP WILL NOT prescribe opiates as her message that was read back to him. Pt wants to know what he should do about pain. Advised pt to restart gabapentin as discussed with him on last week x 2. Also asked pt to give me his blood sugar reading he says they have been pretty normal except 130-140 recently. Advised pt that he spoke with doctor on today and advised them that sugars are elevated on gabapentin. I requested the blood sugar log, however pt doesn't have one. Pt then states that he would like to see another Neurosurgeon, because he doesn't want to wait, he would like PCP to give him the name of doctors outside of [facility], pt then advised that PCP is out of the office until Monday, he should follow directions of Dr. John, today, get on waiting list with Neurosurgery, call Orthopedics if the pain is as great as he is explaining to get relief, however he decided that he would restart the gabapentin and wait until Monday, when PCP can tell him what he should do.

## 2020-02-26 NOTE — TELEPHONE ENCOUNTER
Called patient regarding his request for an injection. Explained that an injection isn't warranted at this time for his condition (meralgia parasthetica). A neurologist office was reached to schedule an appointment and someone should contact him tomorrow to set up a date and time. All questions answered

## 2020-02-27 ENCOUNTER — CLINICAL SUPPORT (OUTPATIENT)
Dept: REHABILITATION | Facility: HOSPITAL | Age: 70
End: 2020-02-27
Payer: MEDICARE

## 2020-02-27 DIAGNOSIS — M25.551 RIGHT HIP PAIN: ICD-10-CM

## 2020-02-27 DIAGNOSIS — M25.511 ACUTE PAIN OF RIGHT SHOULDER: ICD-10-CM

## 2020-02-27 PROCEDURE — 97110 THERAPEUTIC EXERCISES: CPT

## 2020-02-27 PROCEDURE — 97140 MANUAL THERAPY 1/> REGIONS: CPT

## 2020-02-27 NOTE — TELEPHONE ENCOUNTER
He is to start on cymbalta (duloxetine) which will help treat his pain.  Rx sent to pharmacy  I will see him next week as scheduled.

## 2020-02-27 NOTE — PROGRESS NOTES
Physical Therapy Daily Treatment Note     Name: Tony Han  Clinic Number: [id number]    Therapy Diagnosis:   Encounter Diagnoses   Name Primary?    Right hip pain     Acute pain of right shoulder      Physician: Clay Rudolph III, *    Visit Date: 2/27/2020    Physician Orders: PT Eval and Treat   Medical Diagnosis from Referral:   M75.102 (ICD-10-CM) - Nontraumatic tear of left rotator cuff, unspecified tear extent   M75.22 (ICD-10-CM) - Biceps tendinitis of left upper extremity   S43.432A (ICD-10-CM) - Superior glenoid labrum lesion of left shoulder, initial encounter      Evaluation Date: 2/18/2020  Authorization Period Expiration: 3/27/2020  Plan of Care Expiration: 6/9/2020  Visit # / Visits authorized: 1/ 4  Visit # total: 2     Time In: 1015  Time Out: 1055  Total Billable Time: 40 minutes     Precautions: Standard, We will follow the arthroscopic rotator cuff repair guidelines for a medium size rotator cuff tear.  We discussed with the patient's family after surgery.  The patient will remain in a sling for 6 weeks.  PT to start at 4 weeks     OPERATION on 1/9/2020   left  1. Shoulder arthroscopic rotator cuff repair 25 x 15 mm, medium, double row (CPT 25855)   2. Shoulder arthroscopic biceps tenodesis (CPT 61623)  3. Shoulder arthroscopic subacromial decompression, bursectomy   4. Shoulder arthroscopic extensive debridement (anterior, posterior glenohumeral joint, subacromial space) (CPT 88044)  5. Shoulder arthroscopic microfracture (Crimson duvet technique) (CPT 93154)  6. Shoulder arthroscopic lysis of adhesions (CPT 39046)      Subjective     Pt reports: that he has no pain in L shoulder.  He was compliant with home exercise program.  Response to previous treatment: increased hip pain after roller stick  Functional change: pt no longer wearing shoulder sling    Pain: 0/10  Location: left shoulder     Objective     Tony received therapeutic exercises to develop strength, ROM and posture for 25  "minutes including:  Pulleys flex/scap x 4 min each  Supine wand flexion 20 x 5"   Prone scap retraction x 30  Scapular retraction 30 x 5"       Tony received the following manual therapy techniques: Joint mobilizations and Soft tissue Mobilization were applied to the: L shoulder for 15 minutes, including:  Passive stretching per protocol         Home Exercises Provided and Patient Education Provided     Education provided:   - proper technique for exercises    Written Home Exercises Provided: Patient instructed to cont prior HEP.  Exercises were reviewed and Tony was able to demonstrate them prior to the end of the session.  Tony demonstrated good  understanding of the education provided.     See EMR under Patient Instructions for exercises provided at IE.    Assessment     Pt tolerated treatment well today with good PROM noted for this phase of therapy.  Initiated AAROM today to further improve functional mobility.  Pt with UT compensation noted during scapular retractions.  VC/TCs required for proper activation.  Progress as tolerated.      Tony is progressing well towards his goals.   Pt prognosis is Good.     Pt will continue to benefit from skilled outpatient physical therapy to address the deficits listed in the problem list box on initial evaluation, provide pt/family education and to maximize pt's level of independence in the home and community environment.     Pt's spiritual, cultural and educational needs considered and pt agreeable to plan of care and goals.    Anticipated barriers to physical therapy: R LE pain    GOALS: Short Term Goals:  8 weeks  1.Report decreased L shoulder pain < / =  3-4/10  to increase tolerance for ADLs/exercises. Progressing, not met  2. Increase PROM to full without c/o pain. Progressing, not met  3. Increased strength to 3+/5 MMT grade in L UE to increase tolerance for ADL and work activities. Progressing, not met  4. Pt to tolerate HEP to improve ROM and independence with " ADL's Progressing, not met     Long Term Goals: 16 weeks  1.Report decreased L shoulder pain  < / =  0-1 /10  to increase tolerance for ADLs/exercises. Progressing, not met  2.Increase AROM to equivalent to R UE without c/o pain. Progressing, not met  3.Increase strength to >/= 4/5 in R shoulder to increase tolerance for ADL and work activities. Progressing, not met  4. Pt goal: To reduce pain in R hip and improve function of L shoulder. Progressing, not met  5. Report decreased R hip pain < / = 2/10 to increase tolerance for prolonged walking and sleeping. Progressing, not met    Plan     Continue with established Plan of Care towards PT goals, per medium RCR protocol.    Kayleen Zarate, PT, DPT, OCS

## 2020-02-28 DIAGNOSIS — N41.0 ACUTE PROSTATITIS: ICD-10-CM

## 2020-02-28 DIAGNOSIS — N30.00 ACUTE CYSTITIS WITHOUT HEMATURIA: ICD-10-CM

## 2020-02-28 RX ORDER — FINASTERIDE 5 MG/1
TABLET, FILM COATED ORAL
Qty: 90 TABLET | Refills: 3 | Status: SHIPPED | OUTPATIENT
Start: 2020-02-28 | End: 2020-12-28 | Stop reason: SDUPTHER

## 2020-02-29 ENCOUNTER — PATIENT MESSAGE (OUTPATIENT)
Dept: SPORTS MEDICINE | Facility: CLINIC | Age: 70
End: 2020-02-29

## 2020-03-02 ENCOUNTER — LAB VISIT (OUTPATIENT)
Dept: LAB | Facility: OTHER | Age: 70
End: 2020-03-02
Attending: INTERNAL MEDICINE
Payer: MEDICARE

## 2020-03-02 ENCOUNTER — TELEPHONE (OUTPATIENT)
Dept: NEUROLOGY | Facility: CLINIC | Age: 70
End: 2020-03-02

## 2020-03-02 DIAGNOSIS — E13.9 DIABETES MELLITUS DUE TO ABNORMAL INSULIN: ICD-10-CM

## 2020-03-02 LAB
ALBUMIN SERPL BCP-MCNC: 4.2 G/DL (ref 3.5–5.2)
ALP SERPL-CCNC: 76 U/L (ref 55–135)
ALT SERPL W/O P-5'-P-CCNC: 44 U/L (ref 10–44)
ANION GAP SERPL CALC-SCNC: 8 MMOL/L (ref 8–16)
AST SERPL-CCNC: 34 U/L (ref 10–40)
BASOPHILS # BLD AUTO: 0.02 K/UL (ref 0–0.2)
BASOPHILS NFR BLD: 0.3 % (ref 0–1.9)
BILIRUB SERPL-MCNC: 0.3 MG/DL (ref 0.1–1)
BUN SERPL-MCNC: 9 MG/DL (ref 8–23)
CALCIUM SERPL-MCNC: 9.8 MG/DL (ref 8.7–10.5)
CHLORIDE SERPL-SCNC: 107 MMOL/L (ref 95–110)
CO2 SERPL-SCNC: 26 MMOL/L (ref 23–29)
CREAT SERPL-MCNC: 0.9 MG/DL (ref 0.5–1.4)
DIFFERENTIAL METHOD: ABNORMAL
EOSINOPHIL # BLD AUTO: 0.3 K/UL (ref 0–0.5)
EOSINOPHIL NFR BLD: 4 % (ref 0–8)
ERYTHROCYTE [DISTWIDTH] IN BLOOD BY AUTOMATED COUNT: 13.5 % (ref 11.5–14.5)
EST. GFR  (AFRICAN AMERICAN): >60 ML/MIN/1.73 M^2
EST. GFR  (NON AFRICAN AMERICAN): >60 ML/MIN/1.73 M^2
GLUCOSE SERPL-MCNC: 131 MG/DL (ref 70–110)
HCT VFR BLD AUTO: 39.5 % (ref 40–54)
HGB BLD-MCNC: 12.6 G/DL (ref 14–18)
IMM GRANULOCYTES # BLD AUTO: 0.02 K/UL (ref 0–0.04)
IMM GRANULOCYTES NFR BLD AUTO: 0.3 % (ref 0–0.5)
LYMPHOCYTES # BLD AUTO: 3 K/UL (ref 1–4.8)
LYMPHOCYTES NFR BLD: 45.3 % (ref 18–48)
MCH RBC QN AUTO: 32.1 PG (ref 27–31)
MCHC RBC AUTO-ENTMCNC: 31.9 G/DL (ref 32–36)
MCV RBC AUTO: 101 FL (ref 82–98)
MONOCYTES # BLD AUTO: 0.4 K/UL (ref 0.3–1)
MONOCYTES NFR BLD: 5.7 % (ref 4–15)
NEUTROPHILS # BLD AUTO: 2.9 K/UL (ref 1.8–7.7)
NEUTROPHILS NFR BLD: 44.4 % (ref 38–73)
NRBC BLD-RTO: 0 /100 WBC
PLATELET # BLD AUTO: 354 K/UL (ref 150–350)
PMV BLD AUTO: 8.8 FL (ref 9.2–12.9)
POTASSIUM SERPL-SCNC: 4.2 MMOL/L (ref 3.5–5.1)
PROT SERPL-MCNC: 7.2 G/DL (ref 6–8.4)
RBC # BLD AUTO: 3.93 M/UL (ref 4.6–6.2)
SODIUM SERPL-SCNC: 141 MMOL/L (ref 136–145)
TSH SERPL DL<=0.005 MIU/L-ACNC: 0.51 UIU/ML (ref 0.4–4)
WBC # BLD AUTO: 6.54 K/UL (ref 3.9–12.7)

## 2020-03-02 PROCEDURE — 85025 COMPLETE CBC W/AUTO DIFF WBC: CPT

## 2020-03-02 PROCEDURE — 83036 HEMOGLOBIN GLYCOSYLATED A1C: CPT

## 2020-03-02 PROCEDURE — 80061 LIPID PANEL: CPT

## 2020-03-02 PROCEDURE — 36415 COLL VENOUS BLD VENIPUNCTURE: CPT

## 2020-03-02 PROCEDURE — 80053 COMPREHEN METABOLIC PANEL: CPT

## 2020-03-02 PROCEDURE — 84443 ASSAY THYROID STIM HORMONE: CPT

## 2020-03-02 NOTE — TELEPHONE ENCOUNTER
----- Message from Bita Dela Cruz sent at 3/2/2020  4:05 PM CST -----  Contact: PT   PT called about constant pain, he wants an appt before the 17th but there's nothing available.       Pt call back # [phone] or #[phone]

## 2020-03-03 ENCOUNTER — TELEPHONE (OUTPATIENT)
Dept: SPORTS MEDICINE | Facility: CLINIC | Age: 70
End: 2020-03-03

## 2020-03-03 LAB
CHOLEST SERPL-MCNC: 119 MG/DL (ref 120–199)
CHOLEST/HDLC SERPL: 3 {RATIO} (ref 2–5)
ESTIMATED AVG GLUCOSE: 166 MG/DL (ref 68–131)
HBA1C MFR BLD HPLC: 7.4 % (ref 4–5.6)
HDLC SERPL-MCNC: 40 MG/DL (ref 40–75)
HDLC SERPL: 33.6 % (ref 20–50)
LDLC SERPL CALC-MCNC: 32.6 MG/DL (ref 63–159)
NONHDLC SERPL-MCNC: 79 MG/DL
TRIGL SERPL-MCNC: 232 MG/DL (ref 30–150)

## 2020-03-03 NOTE — TELEPHONE ENCOUNTER
----- Message from Cris Renae MA sent at 3/3/2020  8:39 AM CST -----  Good Morning,    I spoke with patient yesterday and informed him that Alec is only in the office on Tuesday and Wednesday.  He is a new patient and unfortunately we do not have any appts available to accommodate a new patient. Advised patient that he was placed on the wait list when his appt was made.    Thanks,  Cris  ----- Message -----  From: Migdalia Schneider MA  Sent: 3/2/2020   5:22 PM CST  To: Alec Dennis Staff    I wanted to see if you would be able to offer the patient a sooner appointment, he contacted us requesting to be seen sooner.     Thank you  Migdalia Schneider   Clinical assistant to Dr. Marcela John

## 2020-03-04 ENCOUNTER — OFFICE VISIT (OUTPATIENT)
Dept: INTERNAL MEDICINE | Facility: CLINIC | Age: 70
End: 2020-03-04
Payer: MEDICARE

## 2020-03-04 VITALS
OXYGEN SATURATION: 98 % | WEIGHT: 197.75 LBS | DIASTOLIC BLOOD PRESSURE: 62 MMHG | BODY MASS INDEX: 29.29 KG/M2 | SYSTOLIC BLOOD PRESSURE: 122 MMHG | HEART RATE: 79 BPM | HEIGHT: 69 IN

## 2020-03-04 DIAGNOSIS — G89.18 POST-OPERATIVE PAIN: ICD-10-CM

## 2020-03-04 DIAGNOSIS — I10 ESSENTIAL HYPERTENSION: ICD-10-CM

## 2020-03-04 DIAGNOSIS — E78.5 HYPERLIPIDEMIA, UNSPECIFIED HYPERLIPIDEMIA TYPE: ICD-10-CM

## 2020-03-04 DIAGNOSIS — M70.61 TROCHANTERIC BURSITIS, RIGHT HIP: ICD-10-CM

## 2020-03-04 DIAGNOSIS — M25.551 RIGHT HIP PAIN: Primary | ICD-10-CM

## 2020-03-04 DIAGNOSIS — K21.9 GASTROESOPHAGEAL REFLUX DISEASE WITHOUT ESOPHAGITIS: ICD-10-CM

## 2020-03-04 DIAGNOSIS — E11.9 TYPE 2 DIABETES MELLITUS WITHOUT COMPLICATION, WITHOUT LONG-TERM CURRENT USE OF INSULIN: ICD-10-CM

## 2020-03-04 DIAGNOSIS — M75.22 BICEPS TENDINITIS OF LEFT UPPER EXTREMITY: ICD-10-CM

## 2020-03-04 DIAGNOSIS — S43.432A SUPERIOR GLENOID LABRUM LESION OF LEFT SHOULDER, INITIAL ENCOUNTER: ICD-10-CM

## 2020-03-04 DIAGNOSIS — M75.102 NONTRAUMATIC TEAR OF LEFT ROTATOR CUFF, UNSPECIFIED TEAR EXTENT: ICD-10-CM

## 2020-03-04 PROCEDURE — 1159F PR MEDICATION LIST DOCUMENTED IN MEDICAL RECORD: ICD-10-PCS | Mod: S$GLB,,, | Performed by: INTERNAL MEDICINE

## 2020-03-04 PROCEDURE — 99214 PR OFFICE/OUTPT VISIT, EST, LEVL IV, 30-39 MIN: ICD-10-PCS | Mod: S$GLB,,, | Performed by: INTERNAL MEDICINE

## 2020-03-04 PROCEDURE — 99999 PR PBB SHADOW E&M-EST. PATIENT-LVL III: CPT | Mod: PBBFAC,,, | Performed by: INTERNAL MEDICINE

## 2020-03-04 PROCEDURE — 1159F MED LIST DOCD IN RCRD: CPT | Mod: S$GLB,,, | Performed by: INTERNAL MEDICINE

## 2020-03-04 PROCEDURE — 3051F PR MOST RECENT HEMOGLOBIN A1C LEVEL 7.0 - < 8.0%: ICD-10-PCS | Mod: CPTII,S$GLB,, | Performed by: INTERNAL MEDICINE

## 2020-03-04 PROCEDURE — 1125F AMNT PAIN NOTED PAIN PRSNT: CPT | Mod: S$GLB,,, | Performed by: INTERNAL MEDICINE

## 2020-03-04 PROCEDURE — 3078F PR MOST RECENT DIASTOLIC BLOOD PRESSURE < 80 MM HG: ICD-10-PCS | Mod: CPTII,S$GLB,, | Performed by: INTERNAL MEDICINE

## 2020-03-04 PROCEDURE — 3051F HG A1C>EQUAL 7.0%<8.0%: CPT | Mod: CPTII,S$GLB,, | Performed by: INTERNAL MEDICINE

## 2020-03-04 PROCEDURE — 1125F PR PAIN SEVERITY QUANTIFIED, PAIN PRESENT: ICD-10-PCS | Mod: S$GLB,,, | Performed by: INTERNAL MEDICINE

## 2020-03-04 PROCEDURE — 1101F PT FALLS ASSESS-DOCD LE1/YR: CPT | Mod: CPTII,S$GLB,, | Performed by: INTERNAL MEDICINE

## 2020-03-04 PROCEDURE — 99499 RISK ADDL DX/OHS AUDIT: ICD-10-PCS | Mod: S$GLB,,, | Performed by: INTERNAL MEDICINE

## 2020-03-04 PROCEDURE — 99214 OFFICE O/P EST MOD 30 MIN: CPT | Mod: S$GLB,,, | Performed by: INTERNAL MEDICINE

## 2020-03-04 PROCEDURE — 99999 PR PBB SHADOW E&M-EST. PATIENT-LVL III: ICD-10-PCS | Mod: PBBFAC,,, | Performed by: INTERNAL MEDICINE

## 2020-03-04 PROCEDURE — 1101F PR PT FALLS ASSESS DOC 0-1 FALLS W/OUT INJ PAST YR: ICD-10-PCS | Mod: CPTII,S$GLB,, | Performed by: INTERNAL MEDICINE

## 2020-03-04 PROCEDURE — 3074F SYST BP LT 130 MM HG: CPT | Mod: CPTII,S$GLB,, | Performed by: INTERNAL MEDICINE

## 2020-03-04 PROCEDURE — 3074F PR MOST RECENT SYSTOLIC BLOOD PRESSURE < 130 MM HG: ICD-10-PCS | Mod: CPTII,S$GLB,, | Performed by: INTERNAL MEDICINE

## 2020-03-04 PROCEDURE — 99499 UNLISTED E&M SERVICE: CPT | Mod: S$GLB,,, | Performed by: INTERNAL MEDICINE

## 2020-03-04 PROCEDURE — 3078F DIAST BP <80 MM HG: CPT | Mod: CPTII,S$GLB,, | Performed by: INTERNAL MEDICINE

## 2020-03-04 RX ORDER — OXYCODONE AND ACETAMINOPHEN 10; 325 MG/1; MG/1
TABLET ORAL
Qty: 30 TABLET | Refills: 0 | Status: SHIPPED | OUTPATIENT
Start: 2020-03-04 | End: 2020-03-25 | Stop reason: SDUPTHER

## 2020-03-04 RX ORDER — DIAZEPAM 10 MG/1
TABLET ORAL
Qty: 2 TABLET | Refills: 0 | Status: SHIPPED | OUTPATIENT
Start: 2020-03-04 | End: 2020-09-02

## 2020-03-04 NOTE — PROGRESS NOTES
Follow up of hypertension, diabetes, recurrent UTI, hyperlipidemia.      HISTORY OF PRESENT ILLNESS: This is a 69-year-old man who presents for follow up of above.     At the end of January, he started to have right thigh pain - the area is tender to touch and the area is warm and swollen.  There is a pressure point in the right hip - if this area is touched - he has shooting pain down the lateral right leg. He was sleeping in a recliner laying on his right side due to left shoulder surgery on 1/9/20. No back pain.  He was taking gabapentin 300 mg three times daily with minimal relief. HE added oxycodone apap 10/325. Massaging the area and the tens unit made the pain worse. Sitting certain ways makes the pain better and worse    Left shoulder is getting better - he is in physical therapy. He is working on range of motion        He had lithotripsy on 3/23/17. No dysuria, hematuria.   He saw Dr cerna on 1/19. He still has a stone in the left kidney. He is on finasteride and tamsulosin for his prostate.      He has not had any alcohol and has not had a recurrence of pancreatitis. No nausea, vomiting, constipation, diarrhea, melana, bloody stools.     He is taking amlodipine 10 mg once daily, Toprol-XL 25 mg once daily and losartan 25 mg daily. No chest pain, shortness of breath. He is now taking pioglitazone 15 mg daily and metformin  mg 2 tablets twice daily. HE checks his blood sugar twice daily due to fluctuating blood sugars. Sugars have been higher due to the stress of surgery  No polydipsia. He has a history of hyperlipidemia, currently on Zocor 20 mg daily. No joint pain or muscle pain. NO shortness breath, headaches, vision, sinus congestion.      Back pain is the same.  He has pain with prolonged sitting or standing. He was followed by Dr Carrasquillo. He has not been taking hydrocodone apap 7.5/325 3-4 times a day depending upon his activity.  He used to see Dr Mcdaniel for his back. HE retired. He had a  "MRI 1/18/17 through DR Pedroza who he did not like.          He smokes 1.5 packs cigarette per day.  He smokes when he gets stressed. He is no longer drinking alcohol so her turns to cigarettes for his mood.      PAST MEDICAL HISTORY:    1. Hypertension.    2. Coronary artery disease with left heart catheterization in November 2011 with 40 to 50% narrowing in the mid LAD.    3. Diabetes mellitus.    4. Hyperlipidemia.    5. History of reflux.    6. Colon polyps.    7. History of cluster headaches.    8. History of glaucoma.    9. Lumbar disk disease at L5 to S1.    10. History of stomach ulcers.    11. Nephrolithiasis.       PAST SURGICAL HISTORY: Cholecystectomy in May 2013, ORIF of the left humerus.      SOCIAL HISTORY: Currently smokes a pack of cigarettes daily, has smoked his whole life. 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 He is a retired schoolteacher.       FAMILY HISTORY: Updated on [facility].      PHYSICAL EXAMINATION:     /62   Pulse 79   Ht 5' 9" (1.753 m)   Wt 89.7 kg (197 lb 12 oz)   SpO2 98%   BMI 29.20 kg/m²     GENERAL: He is alert, oriented, no apparent distress. Affect within normal limits.    HEENT: Conjunctivae anicteric. Tympanic membranes clear.  . .    NECK: Supple. No cervical lymphadenopathy, no thyroid enlargement.    Respiratory: Effort normal. Lungs are clear to auscultation.    HEART: Regular rate and rhythm without murmurs, gallops or rubs. No lower extremity edema.    TEnder to palpation over the right trochanteric bursa of         Labs 3/2/20 reviewed - A1C 7.4, CBC - Hb 12.6 - slightly worse.      ASSESSMENT AND PLAN:    1. Right leg pain and right hip pain - MRI of the right hip - wonder if trochanteric bursitis. Valium 10 mg prior.  Refilled oxycodone. See Dr Carrasquillo  2. Left shoulder surgery - doing better  3. . Hypertension - Continue current medications  3. Diabetes mellitus. Slightly higher due to surgery and stress  4. Hyperlipidemia. on Zocor. "  Work on diet. \  5. History of reflux and peptic ulcer disease -asx. Off pantoprazole  6. Tubular adenomas - colonoscopy 12/2016 - one polyp - due 2021  7. History of glaucoma - not on treatment - pressure in eyes ok - followed Nile Avina and retinal specialist at [facility]  8. History of nephrolithiasis -asymptomatic.    9. Irregular heart beat sensation - holter revealed PVC  10. Pancreatitis - resolved. Do not suspect malabsorption as cause of weight loss as he is not having diarrhea.   11. 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 Anxiety and depression -valium 5 mg nightly prn anxiety  13. Tobacco abuse - CT chest - declined..   14. Recurrent UTI -asx. Saw urology 10/23/17. S/p ESWL  I will see him back in 3 months, sooner if problems arise.          Colonoscopy 12/7/16 - one hyperplastic polyp due in 2021  Influenza 10/10/19  Pneumovax 7/2014  Zostavax 5/16  Prevnar  12/14/16  PSA 7/17     Answers for HPI/ROS submitted by the patient on 3/2/2020   Diabetes problem  Diabetes type: type 2  MedicAlert ID: No  Disease duration: 5 years  blurred vision: No  chest pain: No  fatigue: No  foot paresthesias: Yes  foot ulcerations: No  polydipsia: No  polyphagia: No  polyuria: Yes  visual change: No  weakness: No  weight loss: No  Symptom course: worsening  confusion: No  dizziness: No  headaches: No  hunger: No  mood changes: No  nervous/anxious: No  pallor: No  seizures: No  sleepiness: Yes  speech difficulty: No  sweats: No  tremors: No  blackouts: No  hospitalization: No  nocturnal hypoglycemia: No  required assistance: No  required glucagon: No  CVA: No  heart disease: No  impotence: No  nephropathy: No  peripheral neuropathy: No  PVD: No  retinopathy: No  autonomic neuropathy: Yes  CAD risks: family history, hypertension, sedentary lifestyle, stress, tobacco exposure, diabetes mellitus, male sex  Current treatments: diet, oral agent (dual therapy)  Treatment compliance: all of the time  Home blood  tests: 1-2 x per day  Home urines: <1 x per month  Monitoring compliance: excellent  Blood glucose trend: fluctuating minimally  Weight trend: fluctuating minimally  Current diet: generally healthy, high fat/cholesterol, high salt  Meal planning: carbohydrate counting  Exercise: rarely  Dietitian visit: No  Eye exam current: Yes  Sees podiatrist: No

## 2020-03-05 ENCOUNTER — HOSPITAL ENCOUNTER (OUTPATIENT)
Dept: RADIOLOGY | Facility: OTHER | Age: 70
Discharge: HOME | End: 2020-03-05
Attending: INTERNAL MEDICINE
Payer: MEDICARE

## 2020-03-05 ENCOUNTER — PATIENT MESSAGE (OUTPATIENT)
Dept: INTERNAL MEDICINE | Facility: CLINIC | Age: 70
End: 2020-03-05

## 2020-03-05 ENCOUNTER — TELEPHONE (OUTPATIENT)
Dept: INTERNAL MEDICINE | Facility: CLINIC | Age: 70
End: 2020-03-05

## 2020-03-05 DIAGNOSIS — M25.551 RIGHT HIP PAIN: ICD-10-CM

## 2020-03-05 DIAGNOSIS — M25.551 RIGHT HIP PAIN: Primary | ICD-10-CM

## 2020-03-05 NOTE — TELEPHONE ENCOUNTER
----- Message from Nelly Morel sent at 3/5/2020  3:52 PM CST -----  Contact: [phone]  / Self    Have you checked his message on the Portal for an open MRI.  This is Urgent and  Need this taken care of prior to you leaving for the day.

## 2020-03-05 NOTE — TELEPHONE ENCOUNTER
----- Message from Zhao Pereira sent at 3/5/2020 10:11 AM CST -----  Contact: self   Patient is asking for a referral [facility]. Patient state she referral him to the clinic before. Please call and advise.

## 2020-03-09 ENCOUNTER — TELEPHONE (OUTPATIENT)
Dept: INTERNAL MEDICINE | Facility: CLINIC | Age: 70
End: 2020-03-09

## 2020-03-09 DIAGNOSIS — M54.16 LUMBAR RADICULOPATHY, CHRONIC: ICD-10-CM

## 2020-03-09 NOTE — TELEPHONE ENCOUNTER
----- Message from Zhao Pereira sent at 3/9/2020  3:33 PM CDT -----  Contact: Self   Calling to get test results.  Name of test (lab, xray, etc.):   MRI  Date of test:  03/05  Ordering provider: MIGUEL CORREA  Where was the test performed:  [facility]  Would the patient rather a call back or a response via [facility]?:  Call back  Comments:

## 2020-03-10 ENCOUNTER — CLINICAL SUPPORT (OUTPATIENT)
Dept: REHABILITATION | Facility: HOSPITAL | Age: 70
End: 2020-03-10
Payer: MEDICARE

## 2020-03-10 DIAGNOSIS — M25.511 ACUTE PAIN OF RIGHT SHOULDER: ICD-10-CM

## 2020-03-10 DIAGNOSIS — M25.551 RIGHT HIP PAIN: ICD-10-CM

## 2020-03-10 PROCEDURE — 97140 MANUAL THERAPY 1/> REGIONS: CPT

## 2020-03-10 PROCEDURE — 97110 THERAPEUTIC EXERCISES: CPT

## 2020-03-10 NOTE — TELEPHONE ENCOUNTER
----- Message from Felicia Julian sent at 3/10/2020 12:38 PM CDT -----  Contact: self/ [phone]  Patient states that [facility] and he is waiting on his results, please call and advise.

## 2020-03-10 NOTE — TELEPHONE ENCOUNTER
Spoke with patient.   MRI of the right hip - mild amount of fluid in the superior lateral aspect of each hip  Small interosseous cystic change in the superior lateral aspect of the right acetabulum  Findings suggestive of os acetabulae bilaterally  Enlarged central glnnd of the prostate with mass effect on the base of the urinary bladder.     He is still having right thigh pain.    WIll get [facility] of [facility] at [facility].     Please fax orders     [facility]   [address]   FAX Number: [phone]   Office Number: [phone]

## 2020-03-10 NOTE — PROGRESS NOTES
Physical Therapy Daily Treatment Note     Name: Tony Han  Clinic Number: [id number]    Therapy Diagnosis:   Encounter Diagnoses   Name Primary?    Right hip pain     Acute pain of right shoulder      Physician: Clay Rudolph III, *    Visit Date: 3/10/2020    Physician Orders: PT Eval and Treat   Medical Diagnosis from Referral:   M75.102 (ICD-10-CM) - Nontraumatic tear of left rotator cuff, unspecified tear extent   M75.22 (ICD-10-CM) - Biceps tendinitis of left upper extremity   S43.432A (ICD-10-CM) - Superior glenoid labrum lesion of left shoulder, initial encounter      Evaluation Date: 2/18/2020  Authorization Period Expiration: 3/27/2020  Plan of Care Expiration: 6/9/2020  Visit # / Visits authorized: 2/ 4  Visit # total: 3     Time In: 1300  Time Out: 1355  Total Billable Time: 55 minutes     Precautions: Standard, We will follow the arthroscopic rotator cuff repair guidelines for a medium size rotator cuff tear.  We discussed with the patient's family after surgery.  The patient will remain in a sling for 6 weeks.  PT to start at 4 weeks     OPERATION on 1/9/2020   left  1. Shoulder arthroscopic rotator cuff repair 25 x 15 mm, medium, double row (CPT 69772)   2. Shoulder arthroscopic biceps tenodesis (CPT 17487)  3. Shoulder arthroscopic subacromial decompression, bursectomy   4. Shoulder arthroscopic extensive debridement (anterior, posterior glenohumeral joint, subacromial space) (CPT 27654)  5. Shoulder arthroscopic microfracture (Crimson duvet technique) (CPT 32509)  6. Shoulder arthroscopic lysis of adhesions (CPT 83033)      Subjective     Pt reports: that he has no pain in L shoulder.  States that his R hip is really hurting.  Had MRI for hip but is waiting on results.  He was compliant with home exercise program.  Response to previous treatment: increased hip pain after roller stick  Functional change: pt no longer wearing shoulder sling    Pain: 0/10  Location: left shoulder     Objective  "    Tony received therapeutic exercises to develop strength, ROM and posture for 45 minutes including:  Pulleys flex/scap x 4 min each  Supine wand flexion 20 x 5"   Supine wand protraction 30 x 3"   SL flexion 2 x 10  SL ER 2 x 10  Prone scap retraction x 30  YTB rows/ext x 30 each  YTB IR/ER walk outs x 30 each        Tony received the following manual therapy techniques: Joint mobilizations and Soft tissue Mobilization were applied to the: L shoulder for 10 minutes, including:  Passive stretching per protocol         Home Exercises Provided and Patient Education Provided     Education provided:   - proper technique for exercises    Written Home Exercises Provided: Patient instructed to cont prior HEP.  Exercises were reviewed and Tony was able to demonstrate them prior to the end of the session.  Tony demonstrated good  understanding of the education provided.     See EMR under Patient Instructions for exercises provided at .    Assessment     Pt tolerated treatment well today with good PROM noted for this phase of therapy.  Initiated light theraband exercises and sidelying exercises today to further improve strength.  Pt with min c/o pain during passive stretching.  Progress as tolerated.      Tony is progressing well towards his goals.   Pt prognosis is Good.     Pt will continue to benefit from skilled outpatient physical therapy to address the deficits listed in the problem list box on initial evaluation, provide pt/family education and to maximize pt's level of independence in the home and community environment.     Pt's spiritual, cultural and educational needs considered and pt agreeable to plan of care and goals.    Anticipated barriers to physical therapy: R LE pain    GOALS: Short Term Goals:  8 weeks  1.Report decreased L shoulder pain < / =  3-4/10  to increase tolerance for ADLs/exercises. Progressing, not met  2. Increase PROM to full without c/o pain. Progressing, not met  3. Increased " strength to 3+/5 MMT grade in L UE to increase tolerance for ADL and work activities. Progressing, not met  4. Pt to tolerate HEP to improve ROM and independence with ADL's Progressing, not met     Long Term Goals: 16 weeks  1.Report decreased L shoulder pain  < / =  0-1 /10  to increase tolerance for ADLs/exercises. Progressing, not met  2.Increase AROM to equivalent to R UE without c/o pain. Progressing, not met  3.Increase strength to >/= 4/5 in R shoulder to increase tolerance for ADL and work activities. Progressing, not met  4. Pt goal: To reduce pain in R hip and improve function of L shoulder. Progressing, not met  5. Report decreased R hip pain < / = 2/10 to increase tolerance for prolonged walking and sleeping. Progressing, not met    Plan     Continue with established Plan of Care towards PT goals, per medium RCR protocol.    Kayleen Zarate, PT, DPT, OCS

## 2020-03-13 ENCOUNTER — PATIENT MESSAGE (OUTPATIENT)
Dept: INTERNAL MEDICINE | Facility: CLINIC | Age: 70
End: 2020-03-13

## 2020-03-13 ENCOUNTER — CLINICAL SUPPORT (OUTPATIENT)
Dept: REHABILITATION | Facility: HOSPITAL | Age: 70
End: 2020-03-13
Payer: MEDICARE

## 2020-03-13 DIAGNOSIS — M25.551 RIGHT HIP PAIN: ICD-10-CM

## 2020-03-13 DIAGNOSIS — M25.511 ACUTE PAIN OF RIGHT SHOULDER: ICD-10-CM

## 2020-03-13 PROCEDURE — 97140 MANUAL THERAPY 1/> REGIONS: CPT

## 2020-03-13 PROCEDURE — 97110 THERAPEUTIC EXERCISES: CPT

## 2020-03-13 NOTE — PROGRESS NOTES
Physical Therapy Daily Treatment Note     Name: Tony Han  Clinic Number: [id number]    Therapy Diagnosis:   Encounter Diagnoses   Name Primary?    Right hip pain     Acute pain of right shoulder      Physician: Clay Rudolph III, *    Visit Date: 3/13/2020    Physician Orders: PT Eval and Treat   Medical Diagnosis from Referral:   M75.102 (ICD-10-CM) - Nontraumatic tear of left rotator cuff, unspecified tear extent   M75.22 (ICD-10-CM) - Biceps tendinitis of left upper extremity   S43.432A (ICD-10-CM) - Superior glenoid labrum lesion of left shoulder, initial encounter      Evaluation Date: 2/18/2020  Authorization Period Expiration: 3/27/2020  Plan of Care Expiration: 6/9/2020  Visit # / Visits authorized: 3/ 4  Visit # total: 4     Time In: 1300  Time Out: 1355  Total Billable Time: 55 minutes     Precautions: Standard, We will follow the arthroscopic rotator cuff repair guidelines for a medium size rotator cuff tear.  We discussed with the patient's family after surgery.  The patient will remain in a sling for 6 weeks.  PT to start at 4 weeks     OPERATION on 1/9/2020   left  1. Shoulder arthroscopic rotator cuff repair 25 x 15 mm, medium, double row (CPT 07985)   2. Shoulder arthroscopic biceps tenodesis (CPT 36976)  3. Shoulder arthroscopic subacromial decompression, bursectomy   4. Shoulder arthroscopic extensive debridement (anterior, posterior glenohumeral joint, subacromial space) (CPT 46468)  5. Shoulder arthroscopic microfracture (Crimson duvet technique) (CPT 49014)  6. Shoulder arthroscopic lysis of adhesions (CPT 71601)      Subjective     Pt reports: that he has no pain in L shoulder.    He was compliant with home exercise program.  Response to previous treatment: no adverse effects  Functional change: pt no longer wearing shoulder sling    Pain: 0/10  Location: left shoulder     Objective     Tony received therapeutic exercises to develop strength, ROM and posture for 45 minutes  "including:  Pulleys flex/scap x 4 min each  Supine wand flexion 20 x 5"   Supine wand protraction 30 x 3"   SL flexion 3 x 10  SL ER 3 x 10  Prone scap retraction x 30 NP  YTB rows/ext x 30 each  YTB IR/ER walk outs x 30 each  Slot machine x 30        Tony received the following manual therapy techniques: Joint mobilizations and Soft tissue Mobilization were applied to the: L shoulder for 10 minutes, including:  Passive stretching per protocol         Home Exercises Provided and Patient Education Provided     Education provided:   - proper technique for exercises    Written Home Exercises Provided: Patient instructed to cont prior HEP.  Exercises were reviewed and Tony was able to demonstrate them prior to the end of the session.  Tony demonstrated good  understanding of the education provided.     See EMR under Patient Instructions for exercises provided at .    Assessment     Pt tolerated treatment well today with good PROM noted for this phase of therapy.  Improvements noted in strength and technique.  Increased reps for SL exercises today.  Pt with min increased c/o pain but tolerable.  Progress as tolerated.    Tony is progressing well towards his goals.   Pt prognosis is Good.     Pt will continue to benefit from skilled outpatient physical therapy to address the deficits listed in the problem list box on initial evaluation, provide pt/family education and to maximize pt's level of independence in the home and community environment.     Pt's spiritual, cultural and educational needs considered and pt agreeable to plan of care and goals.    Anticipated barriers to physical therapy: R LE pain    GOALS: Short Term Goals:  8 weeks  1.Report decreased L shoulder pain < / =  3-4/10  to increase tolerance for ADLs/exercises. Progressing, not met  2. Increase PROM to full without c/o pain. Progressing, not met  3. Increased strength to 3+/5 MMT grade in L UE to increase tolerance for ADL and work activities. " Progressing, not met  4. Pt to tolerate HEP to improve ROM and independence with ADL's Progressing, not met     Long Term Goals: 16 weeks  1.Report decreased L shoulder pain  < / =  0-1 /10  to increase tolerance for ADLs/exercises. Progressing, not met  2.Increase AROM to equivalent to R UE without c/o pain. Progressing, not met  3.Increase strength to >/= 4/5 in R shoulder to increase tolerance for ADL and work activities. Progressing, not met  4. Pt goal: To reduce pain in R hip and improve function of L shoulder. Progressing, not met  5. Report decreased R hip pain < / = 2/10 to increase tolerance for prolonged walking and sleeping. Progressing, not met    Plan     Continue with established Plan of Care towards PT goals, per medium RCR protocol.    Kayleen Zarate, PT, DPT, OCS

## 2020-03-16 ENCOUNTER — TELEPHONE (OUTPATIENT)
Dept: INTERNAL MEDICINE | Facility: CLINIC | Age: 70
End: 2020-03-16

## 2020-03-16 ENCOUNTER — PATIENT MESSAGE (OUTPATIENT)
Dept: INTERNAL MEDICINE | Facility: CLINIC | Age: 70
End: 2020-03-16

## 2020-03-16 NOTE — TELEPHONE ENCOUNTER
----- Message from Mimi Schaffer sent at 3/16/2020  1:12 PM CDT -----  Contact: patient [phone]  Patient has been trying to get you to fax an order to [facility] for an mri and they told patient that they have not received it yet. Order was scheduled  by mistake to [facility] but should have been faxed last week  To [facility].     Pt is in a lot of pain and needs this mri asap.Please call before end of the day to get this sent as urgent.

## 2020-03-17 ENCOUNTER — OFFICE VISIT (OUTPATIENT)
Dept: NEUROLOGY | Facility: CLINIC | Age: 70
End: 2020-03-17
Payer: MEDICARE

## 2020-03-17 VITALS
SYSTOLIC BLOOD PRESSURE: 129 MMHG | HEIGHT: 69 IN | WEIGHT: 199.75 LBS | HEART RATE: 76 BPM | DIASTOLIC BLOOD PRESSURE: 64 MMHG | BODY MASS INDEX: 29.59 KG/M2

## 2020-03-17 DIAGNOSIS — M25.551 RIGHT HIP PAIN: Primary | ICD-10-CM

## 2020-03-17 DIAGNOSIS — G57.11 MERALGIA PARAESTHETICA, RIGHT: ICD-10-CM

## 2020-03-17 DIAGNOSIS — M79.651 ACUTE PAIN OF RIGHT THIGH: ICD-10-CM

## 2020-03-17 DIAGNOSIS — M70.61: ICD-10-CM

## 2020-03-17 PROCEDURE — 99205 OFFICE O/P NEW HI 60 MIN: CPT | Mod: S$GLB,,, | Performed by: NEUROMUSCULOSKELETAL MEDICINE & OMM

## 2020-03-17 PROCEDURE — 1101F PT FALLS ASSESS-DOCD LE1/YR: CPT | Mod: CPTII,S$GLB,, | Performed by: NEUROMUSCULOSKELETAL MEDICINE & OMM

## 2020-03-17 PROCEDURE — 1159F MED LIST DOCD IN RCRD: CPT | Mod: S$GLB,,, | Performed by: NEUROMUSCULOSKELETAL MEDICINE & OMM

## 2020-03-17 PROCEDURE — 1125F PR PAIN SEVERITY QUANTIFIED, PAIN PRESENT: ICD-10-PCS | Mod: S$GLB,,, | Performed by: NEUROMUSCULOSKELETAL MEDICINE & OMM

## 2020-03-17 PROCEDURE — 99205 PR OFFICE/OUTPT VISIT, NEW, LEVL V, 60-74 MIN: ICD-10-PCS | Mod: S$GLB,,, | Performed by: NEUROMUSCULOSKELETAL MEDICINE & OMM

## 2020-03-17 PROCEDURE — 3078F PR MOST RECENT DIASTOLIC BLOOD PRESSURE < 80 MM HG: ICD-10-PCS | Mod: CPTII,S$GLB,, | Performed by: NEUROMUSCULOSKELETAL MEDICINE & OMM

## 2020-03-17 PROCEDURE — 99999 PR PBB SHADOW E&M-EST. PATIENT-LVL V: CPT | Mod: PBBFAC,,, | Performed by: NEUROMUSCULOSKELETAL MEDICINE & OMM

## 2020-03-17 PROCEDURE — 99999 PR PBB SHADOW E&M-EST. PATIENT-LVL V: ICD-10-PCS | Mod: PBBFAC,,, | Performed by: NEUROMUSCULOSKELETAL MEDICINE & OMM

## 2020-03-17 PROCEDURE — 1101F PR PT FALLS ASSESS DOC 0-1 FALLS W/OUT INJ PAST YR: ICD-10-PCS | Mod: CPTII,S$GLB,, | Performed by: NEUROMUSCULOSKELETAL MEDICINE & OMM

## 2020-03-17 PROCEDURE — 3074F PR MOST RECENT SYSTOLIC BLOOD PRESSURE < 130 MM HG: ICD-10-PCS | Mod: CPTII,S$GLB,, | Performed by: NEUROMUSCULOSKELETAL MEDICINE & OMM

## 2020-03-17 PROCEDURE — 3078F DIAST BP <80 MM HG: CPT | Mod: CPTII,S$GLB,, | Performed by: NEUROMUSCULOSKELETAL MEDICINE & OMM

## 2020-03-17 PROCEDURE — 1125F AMNT PAIN NOTED PAIN PRSNT: CPT | Mod: S$GLB,,, | Performed by: NEUROMUSCULOSKELETAL MEDICINE & OMM

## 2020-03-17 PROCEDURE — 1159F PR MEDICATION LIST DOCUMENTED IN MEDICAL RECORD: ICD-10-PCS | Mod: S$GLB,,, | Performed by: NEUROMUSCULOSKELETAL MEDICINE & OMM

## 2020-03-17 PROCEDURE — 3074F SYST BP LT 130 MM HG: CPT | Mod: CPTII,S$GLB,, | Performed by: NEUROMUSCULOSKELETAL MEDICINE & OMM

## 2020-03-17 RX ORDER — GABAPENTIN 400 MG/1
400 CAPSULE ORAL 4 TIMES DAILY
Qty: 120 CAPSULE | Refills: 4 | Status: SHIPPED | OUTPATIENT
Start: 2020-03-17 | End: 2020-08-01 | Stop reason: SDUPTHER

## 2020-03-17 NOTE — LETTER
March 17, 2020      Galen Aguilar MD  [address] [address]           [place] [facility]  [address]  [place] [address]  Phone: [phone]          Patient: Tony Han   MR Number: [id number]   YOB: 1950   Date of Visit: 3/17/2020       Dear Dr. Galen Aguilar:    Thank you for referring Toyn Han to me for evaluation. Attached you will find relevant portions of my assessment and plan of care.    If you have questions, please do not hesitate to call me. I look forward to following Tony Han along with you.    Sincerely,    Dennis Michael MD    Enclosure  CC:  No Recipients    If you would like to receive this communication electronically, please contact [email or web address] or [phone] to request more information on [facility] [employer] access.    For providers and/or their staff who would like to refer a patient to [facility], please contact us through our one-stop-shop provider referral line, [facility], at [phone].    If you feel you have received this communication in error or would no longer like to receive these types of communications, please e-mail [email or web address]

## 2020-03-17 NOTE — PROGRESS NOTES
Tony Han  1950  Review of patient's allergies indicates:   Allergen Reactions    Aspirin Nausea And Vomiting     If over 81mg     [unfilled]    Past Medical History:   Diagnosis Date    Angina pectoris     Back pain     Cluster headaches     1999    Colon polyps     5 polyps in Dec 2010    Coronary artery disease 11/2011    moderate nonobstructive cad on left heart cath at [facility] 11/2011    Diabetes mellitus     GERD (gastroesophageal reflux disease)     history of peptic ulcer disease    Hyperlipidemia     Hypertension     Lumbar disc disease     MRI 2008 - L5-S1    Nephrolithiasis     July 2013     Social History     Socioeconomic History    Marital status:      Spouse name: Not on file    Number of children: Not on file    Years of education: Not on file    Highest education level: Not on file   Occupational History    Not on file   Social Needs    Financial resource strain: Not hard at all    Food insecurity:     Worry: Never true     Inability: Never true    Transportation needs:     Medical: No     Non-medical: No   Tobacco Use    Smoking status: Current Every Day Smoker     Packs/day: 1.50     Years: 50.00     Pack years: 75.00     Types: Cigarettes    Smokeless tobacco: Never Used   Substance and Sexual Activity    Alcohol use: No     Frequency: Monthly or less     Drinks per session: 1 or 2     Binge frequency: Never    Drug use: Yes     Types: Hydrocodone    Sexual activity: Yes     Partners: Female     Birth control/protection: None   Lifestyle    Physical activity:     Days per week: 2 days     Minutes per session: 100 min    Stress: Only a little   Relationships    Social connections:     Talks on phone: More than three times a week     Gets together: Three times a week     Attends Scientologist service: Not on file     Active member of club or organization: Yes     Attends meetings of clubs or organizations: 1 to 4 times per year     Relationship status:     Other Topics Concern    Not on file   Social History Narrative    , 5 children - healthy.  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     Family History   Problem Relation Age of Onset    Heart disease Mother     No Known Problems Father     Heart disease Brother     Cancer Sister         ovarian    Heart failure Brother     Kidney disease Brother     Alzheimer's disease Brother     No Known Problems Brother     Diabetes Son     Blindness Son     Diabetes Son     Hypertension Son     Diabetes Son     Hypertension Son     Diabetes Son     Hypertension Son     Sleep apnea Son     Celiac disease Neg Hx     Cirrhosis Neg Hx     Colon cancer Neg Hx     Colon polyps Neg Hx     Crohn's disease Neg Hx     Esophageal cancer Neg Hx     Inflammatory bowel disease Neg Hx     Liver cancer Neg Hx     Rectal cancer Neg Hx     Stomach cancer Neg Hx     Ulcerative colitis Neg Hx     Melanoma Neg Hx        Review of systems:  Constitutional-negative  Eyes-negative  ENT, mouth-negative  Cardiovascular-negative  Respiratory-negative  GI-negative  - negative  Musculoskeletal-negative  Skin-negative  Neurologic-negative  Psychiatric-negative  Endocrine-negative  Hematology/lymph nodes-negative  Allergies/immunology-negative  Tony Han  1950  Review of patient's allergies indicates:   Allergen Reactions    Aspirin Nausea And Vomiting     If over 81mg     [unfilled]    Past Medical History:   Diagnosis Date    Angina pectoris     Back pain     Cluster headaches     1999    Colon polyps     5 polyps in Dec 2010    Coronary artery disease 11/2011    moderate nonobstructive cad on [facility] heart cath at [facility] 11/2011    Diabetes mellitus     GERD (gastroesophageal reflux disease)     history of peptic ulcer disease    Hyperlipidemia     Hypertension     Lumbar disc disease     MRI 2008 - L5-S1    Nephrolithiasis     July 2013     Social History      Socioeconomic History    Marital status:      Spouse name: Not on file    Number of children: Not on file    Years of education: Not on file    Highest education level: Not on file   Occupational History    Not on file   Social Needs    Financial resource strain: Not hard at all    Food insecurity:     Worry: Never true     Inability: Never true    Transportation needs:     Medical: No     Non-medical: No   Tobacco Use    Smoking status: Current Every Day Smoker     Packs/day: 1.50     Years: 50.00     Pack years: 75.00     Types: Cigarettes    Smokeless tobacco: Never Used   Substance and Sexual Activity    Alcohol use: No     Frequency: Monthly or less     Drinks per session: 1 or 2     Binge frequency: Never    Drug use: Yes     Types: Hydrocodone    Sexual activity: Yes     Partners: Female     Birth control/protection: None   Lifestyle    Physical activity:     Days per week: 2 days     Minutes per session: 100 min    Stress: Only a little   Relationships    Social connections:     Talks on phone: More than three times a week     Gets together: Three times a week     Attends Roman Catholic service: Not on file     Active member of club or organization: Yes     Attends meetings of clubs or organizations: 1 to 4 times per year     Relationship status:    Other Topics Concern    Not on file   Social History Narrative    , 5 children - healthy.  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     Family History   Problem Relation Age of Onset    Heart disease Mother     No Known Problems Father     Heart disease Brother     Cancer Sister         ovarian    Heart failure Brother     Kidney disease Brother     Alzheimer's disease Brother     No Known Problems Brother     Diabetes Son     Blindness Son     Diabetes Son     Hypertension Son     Diabetes Son     Hypertension Son     Diabetes Son     Hypertension Son     Sleep apnea Son     Celiac  disease Neg Hx     Cirrhosis Neg Hx     Colon cancer Neg Hx     Colon polyps Neg Hx     Crohn's disease Neg Hx     Esophageal cancer Neg Hx     Inflammatory bowel disease Neg Hx     Liver cancer Neg Hx     Rectal cancer Neg Hx     Stomach cancer Neg Hx     Ulcerative colitis Neg Hx     Melanoma Neg Hx        Review of systems:  Constitutional-negative  Eyes-negative  ENT, mouth-negative  Cardiovascular-negative  Respiratory-negative  GI-his note was dictated with Modal Fluency a word recognition program. There are occasionally word recognition errors which are missed on review.  negativeTGU- negative  Musculoskeletal-negative  Skin-negative  Neurologic-negative  Psychiatric-negative  Endocrine-negative  Hematology/lymph nodes-negative  Allergies/immunology-negative  Gen. Appearance: Well-developed with no obvious deformities  Carotid arteries symmetrical pulses  Peripheral vascular shows symmetrical pulses with no obvious edema or tenderness  Social History :  Patient is a retired ; he does some gardening work now  Present history:   This is a 69-year-old  male who presents with a history of pain in the right thigh.  He states that it feels like and bites with burning tingling in the right thigh over the last 2 months.  He has seen Sports Medicine however did not have any improvement with the recommendations.  He has a history of low back pain for which she has been seen pain management the last visit being in November for lumbar disc disease.  Patient has been taking gabapentin 300 mg 3 times a day without help.  He was taking oxycodone which helps some.  He has a history of diabetes for over 10 years.  Patient relates the problem to rotator cuff surgery on the left shoulder in January of 20 20.  He noted after the surgery he had to lie on his right side and sleep on his right side because of the shoulder for least 4 weeks.  Two weeks after surgery is when he noticed the pain in the  right thigh which  intensity he describes as 8/10.    Neurological Exam:  Mental status-alert and oriented to person, place, and time; attention span and concentration is good. Fund of knowledge-patient is aware of current events and able to give detailed history of the current problem.recent and remote memory seems intact. Language function is normal with no evidence of aphasia  Cranial nerves:Visual acuity to hand chart -normal; visual fields to confrontation normal;pupils were equal and reactive to light ;no evidence of ptosis ;  funduscopic examination was normal with sharp disc margins. external ocular movements were full with no nystagmus. Facial sensation to pinprick : normal ; corneal reflexes intact; Facial muscles were symmetrical. Hearing is unimpaired symmetrical finger rub; Tongue movements - normal ; palate movements - normal ;Swallowing unimpaired. Shoulder shrug was intact with good strength Speech was normal  Motor examination: Upper : normal                                      Lower extremities - Normal;muscle tone was normal ; pain to palpation of the lower right quadriceps tendon Area suggestive of tendonitis versus bursitis.  Patient has no pain on external or internal rotation of the hip.                  Right-handed  Sensory examination:   Upper; normal pinprick and soft touch ;   Lower extremities - hyperintensity to soft touch or pinprick over the right lateral thigh  Vibration sense:  Absent @ toes/knee  Deep tendon reflexes: upper extremities :0 symmetrical ;     lower extremities KJ- 0; AJ - 0 Both plantar responses were flexor  Cerebellar examination upper: Normal finger to nose and rapid alternating movements  Gait: Steady with no ataxia;      heel and toe walk normal  Romberg test: negative       Tandem gait: Normal    Involuntary movements: Negative  TMJ - no tenderness  Cervical examination: Full range of motion with no pain Cervical tenderness :negative  Lumbar examination: Low  back tenderness-negative                  Sciatic notchtenderness-negative            Straight leg raising : negative    Impression:  Right meralgia paresthetica; right lateral thigh tendinitis at the hip; history lumbar disc disease rule out lumbar radiculopathy      Recommendations/Plan :  Long discussion with the patient in reference to treating 2 or 3 problems.  For the meralgia paresthetica we discussed using gabapentin at 400 mg 3-4 times per day.  For the tendinitis/bursitis have recommended ibuprofen 800 mg with food and Pepcid AC for 3 days.  Patient has been instructed to avoid squatting or bending over where he is compressing the groin area.  We also discussed the possibility this is directly related to the diabetes.  We will do an EMG of the right leg to rule out lumbar radiculopathy

## 2020-03-19 ENCOUNTER — CLINICAL SUPPORT (OUTPATIENT)
Dept: REHABILITATION | Facility: HOSPITAL | Age: 70
End: 2020-03-19
Payer: MEDICARE

## 2020-03-19 ENCOUNTER — TELEPHONE (OUTPATIENT)
Dept: SPORTS MEDICINE | Facility: CLINIC | Age: 70
End: 2020-03-19

## 2020-03-19 DIAGNOSIS — M25.511 ACUTE PAIN OF RIGHT SHOULDER: ICD-10-CM

## 2020-03-19 DIAGNOSIS — M25.551 RIGHT HIP PAIN: ICD-10-CM

## 2020-03-19 PROCEDURE — 97110 THERAPEUTIC EXERCISES: CPT

## 2020-03-19 PROCEDURE — 97140 MANUAL THERAPY 1/> REGIONS: CPT

## 2020-03-19 NOTE — TELEPHONE ENCOUNTER
I spoke to the patient at physical therapy and he notes that he is doing well and has no issues, complaints, or concerns. We discussed his appointment with his PCP as well as neurology regarding his hip pain. I encouraged him to reach out to his neurologist should he have any concerns regarding this matter.     Patient has been attending physical therapy at the [facility] location, working with Mohamud. Due to COVID concerns he may continue therapy at home, I discussed the importance of doing his home exercise program and the patient verbalized understanding     I informed him that we would be canceling his appointment on Wednesday due to the COVID 19 concerns and that we could call him back and get him rescheduled when we are able to see patients in clinic again. He verbalized understanding.

## 2020-03-19 NOTE — PROGRESS NOTES
Physical Therapy Daily Treatment Note     Name: Tony aHn  Clinic Number: [id number]    Therapy Diagnosis:   Encounter Diagnoses   Name Primary?    Right hip pain     Acute pain of right shoulder      Physician: Clay Rudolph III, *    Visit Date: 3/19/2020    Physician Orders: PT Eval and Treat   Medical Diagnosis from Referral:   M75.102 (ICD-10-CM) - Nontraumatic tear of left rotator cuff, unspecified tear extent   M75.22 (ICD-10-CM) - Biceps tendinitis of left upper extremity   S43.432A (ICD-10-CM) - Superior glenoid labrum lesion of left shoulder, initial encounter      Evaluation Date: 2/18/2020  Authorization Period Expiration: 3/27/2020  Plan of Care Expiration: 6/9/2020  Visit # / Visits authorized: 4/ 4  Visit # total: 4     Time In: 1:04 p.m  Time Out: 2:02 p.m.  Total Billable Time: 55 minutes     Precautions: Standard, We will follow the arthroscopic rotator cuff repair guidelines for a medium size rotator cuff tear.  We discussed with the patient's family after surgery.  The patient will remain in a sling for 6 weeks.  PT to start at 4 weeks     OPERATION on 1/9/2020   left  1. Shoulder arthroscopic rotator cuff repair 25 x 15 mm, medium, double row (CPT 95720)   2. Shoulder arthroscopic biceps tenodesis (CPT 01631)  3. Shoulder arthroscopic subacromial decompression, bursectomy   4. Shoulder arthroscopic extensive debridement (anterior, posterior glenohumeral joint, subacromial space) (CPT 37183)  5. Shoulder arthroscopic microfracture (Crimson duvet technique) (CPT 38919)  6. Shoulder arthroscopic lysis of adhesions (CPT 79399)      Subjective     Pt reports: that he has slight pain in the right shoulder but he has been having significant hip pain.   He was not compliant with home exercise program.  Response to previous treatment: no adverse effects  Functional change: pt no longer wearing shoulder sling    Pain: 1/10  Location: left shoulder     Objective     Tony received therapeutic  "exercises to develop strength, ROM and posture for 45 minutes including:  Pulleys flex/scap x 4 min each  Supine wand flexion 20 x 5"   Supine wand protraction 30 x 3"   Supine wand ER 3 x 10  SL flexion 3 x 10  SL ER 3 x 10  Prone scap retraction x 30 NP  Wall Slide  (Time includes education)      Tony received the following manual therapy techniques: Joint mobilizations and Soft tissue Mobilization were applied to the: L shoulder for 10 minutes, including:  Passive stretching per protocol         Home Exercises Provided and Patient Education Provided     Education provided:   - proper technique for exercises    Written Home Exercises Provided: Patient instructed to cont prior HEP.  Exercises were reviewed and Tony was able to demonstrate them prior to the end of the session.  Tony demonstrated good  understanding of the education provided.     See EMR under Patient Instructions for exercises provided at IE.    Assessment     Patient has not been compliant with his HEP. Discussed the importance in issued a new, more comprehensive HEP. Due to his age range, the patient is at a higher risk for COVID - 19 and would like to continue treatment at home at this time. Reinforced the importance of being compliant if he is to stay home.     Tony is progressing well towards his goals.   Pt prognosis is Good.     Pt will continue to benefit from skilled outpatient physical therapy to address the deficits listed in the problem list box on initial evaluation, provide pt/family education and to maximize pt's level of independence in the home and community environment.     Pt's spiritual, cultural and educational needs considered and pt agreeable to plan of care and goals.    Anticipated barriers to physical therapy: R LE pain    GOALS: Short Term Goals:  8 weeks  1.Report decreased L shoulder pain < / =  3-4/10  to increase tolerance for ADLs/exercises. Progressing, not met  2. Increase PROM to full without c/o pain. " Progressing, not met  3. Increased strength to 3+/5 MMT grade in L UE to increase tolerance for ADL and work activities. Progressing, not met  4. Pt to tolerate HEP to improve ROM and independence with ADL's Progressing, not met     Long Term Goals: 16 weeks  1.Report decreased L shoulder pain  < / =  0-1 /10  to increase tolerance for ADLs/exercises. Progressing, not met  2.Increase AROM to equivalent to R UE without c/o pain. Progressing, not met  3.Increase strength to >/= 4/5 in R shoulder to increase tolerance for ADL and work activities. Progressing, not met  4. Pt goal: To reduce pain in R hip and improve function of L shoulder. Progressing, not met  5. Report decreased R hip pain < / = 2/10 to increase tolerance for prolonged walking and sleeping. Progressing, not met    Plan     Transition to home exercise plan with weekly follow ups due to COVID - 19 outbreak. Resume standard treatment when able.     Mohamud Gupta, PT, DPT

## 2020-03-22 ENCOUNTER — PATIENT MESSAGE (OUTPATIENT)
Dept: NEUROLOGY | Facility: CLINIC | Age: 70
End: 2020-03-22

## 2020-03-23 ENCOUNTER — TELEPHONE (OUTPATIENT)
Dept: NEUROLOGY | Facility: CLINIC | Age: 70
End: 2020-03-23

## 2020-03-23 NOTE — TELEPHONE ENCOUNTER
----- Message from Chencho DOMINGO Murphy sent at 3/23/2020  1:18 PM CDT -----  Contact: pt @ [phone]  Pt states he's following the regime prescribed for his leg, which works for about 1-2 hours but then pain in leg comes back. Pt states he's taking ibuprofen since he left the doctor's office and his glucose level is shooting up high due to the ibuprofen. Pt states he needs some kind of relief bc it hurts when he walks and he can't sleep.

## 2020-03-24 ENCOUNTER — PATIENT MESSAGE (OUTPATIENT)
Dept: INTERNAL MEDICINE | Facility: CLINIC | Age: 70
End: 2020-03-24

## 2020-03-24 DIAGNOSIS — G89.18 POST-OPERATIVE PAIN: ICD-10-CM

## 2020-03-24 DIAGNOSIS — M75.22 BICEPS TENDINITIS OF LEFT UPPER EXTREMITY: ICD-10-CM

## 2020-03-24 DIAGNOSIS — M75.102 NONTRAUMATIC TEAR OF LEFT ROTATOR CUFF, UNSPECIFIED TEAR EXTENT: ICD-10-CM

## 2020-03-24 DIAGNOSIS — S43.432A SUPERIOR GLENOID LABRUM LESION OF LEFT SHOULDER, INITIAL ENCOUNTER: ICD-10-CM

## 2020-03-24 RX ORDER — LEVETIRACETAM 500 MG/1
500 TABLET ORAL NIGHTLY
Qty: 30 TABLET | Refills: 2 | Status: SHIPPED | OUTPATIENT
Start: 2020-03-24 | End: 2020-09-02

## 2020-03-25 ENCOUNTER — PATIENT MESSAGE (OUTPATIENT)
Dept: NEUROLOGY | Facility: CLINIC | Age: 70
End: 2020-03-25

## 2020-03-25 RX ORDER — OXYCODONE AND ACETAMINOPHEN 10; 325 MG/1; MG/1
TABLET ORAL
Qty: 30 TABLET | Refills: 0 | Status: SHIPPED | OUTPATIENT
Start: 2020-03-25 | End: 2023-02-01

## 2020-05-04 ENCOUNTER — TELEPHONE (OUTPATIENT)
Dept: NEUROLOGY | Facility: CLINIC | Age: 70
End: 2020-05-04

## 2020-05-04 NOTE — TELEPHONE ENCOUNTER
----- Message from Erika Capone sent at 5/4/2020  3:36 PM CDT -----  Contact: patient  [phone]-please call above patient at either number need to speak with the nurse concerning scheduling an appointment thanks.

## 2020-05-05 ENCOUNTER — TELEPHONE (OUTPATIENT)
Dept: INTERNAL MEDICINE | Facility: CLINIC | Age: 70
End: 2020-05-05

## 2020-05-05 ENCOUNTER — PROCEDURE VISIT (OUTPATIENT)
Dept: NEUROLOGY | Facility: CLINIC | Age: 70
End: 2020-05-05
Payer: MEDICARE

## 2020-05-05 DIAGNOSIS — G57.11 MERALGIA PARAESTHETICA, RIGHT: ICD-10-CM

## 2020-05-05 DIAGNOSIS — M79.651 ACUTE PAIN OF RIGHT THIGH: ICD-10-CM

## 2020-05-05 RX ORDER — TADALAFIL 20 MG/1
20 TABLET ORAL DAILY PRN
Qty: 30 TABLET | Refills: 0 | Status: SHIPPED | OUTPATIENT
Start: 2020-05-05 | End: 2021-03-08 | Stop reason: SDUPTHER

## 2020-05-05 RX ORDER — TADALAFIL 20 MG/1
20 TABLET ORAL DAILY PRN
Qty: 30 TABLET | Refills: 0 | Status: SHIPPED | OUTPATIENT
Start: 2020-05-05 | End: 2020-05-05 | Stop reason: SDUPTHER

## 2020-05-05 NOTE — TELEPHONE ENCOUNTER
prescriptin for Cialis 20 mg to take as needed to the [facility] - it is much cheeper at [facility].     He has to take cialis with the tamsulosin. Cannot take viagra with the tamsulosin.

## 2020-05-05 NOTE — TELEPHONE ENCOUNTER
Let him know that the cialis is going to be very expensive at [facility]. It will only cost $50 at [facility]

## 2020-05-05 NOTE — TELEPHONE ENCOUNTER
----- Message from Radha Huang sent at 5/5/2020  2:55 PM CDT -----  Contact: Patient [phone]  Please send Rx tadalafiL (CIALIS) 20 MG Tab to    [id number] - [place], [place] - [address]    Thank you

## 2020-05-05 NOTE — TELEPHONE ENCOUNTER
----- Message from Lorraine Roberto sent at 5/5/2020  9:10 AM CDT -----  Contact: self [phone]  Pt states he wants to know if any of his medications will have a drug interaction with an erectile dysfunction medication. Pt states tomorrow is his birthday and he wants to be able to perform. Please call and advise.

## 2020-05-09 ENCOUNTER — PATIENT MESSAGE (OUTPATIENT)
Dept: INTERNAL MEDICINE | Facility: CLINIC | Age: 70
End: 2020-05-09

## 2020-05-09 ENCOUNTER — PATIENT MESSAGE (OUTPATIENT)
Dept: NEUROLOGY | Facility: CLINIC | Age: 70
End: 2020-05-09

## 2020-05-11 ENCOUNTER — TELEPHONE (OUTPATIENT)
Dept: NEUROLOGY | Facility: CLINIC | Age: 70
End: 2020-05-11

## 2020-05-11 NOTE — TELEPHONE ENCOUNTER
Spoke with patient and he states that the gabapentin is working only a little bit for his leg pain. Has an appt with Dr.Eric Carrasquillo, pain management. Informed patient that he can have  fax a release form to [phone] to request [facility]'s records.

## 2020-05-11 NOTE — TELEPHONE ENCOUNTER
----- Message from Irma Anderson sent at 5/11/2020  2:30 PM CDT -----  Contact: pt @ [phone]  Asking to speak with someone in Dr. Michael's office regarding pain he has in his leg since January, says he is really concerned and in a lot of pain. Please call.

## 2020-05-12 ENCOUNTER — DOCUMENTATION ONLY (OUTPATIENT)
Dept: NEUROLOGY | Facility: CLINIC | Age: 70
End: 2020-05-12

## 2020-05-12 NOTE — PROCEDURES
EMG Needle exam performed by me.  Nerve conduction studies were also performed by me without the assistance of the technician    Computer issues lost the original report    Findings show:  EMG needle exam shows no evidence of denervation    Nerve conduction study showed normal motor conductions of the right peroneal and tibial nerves/and tibial F-wave    Sensory nerve conductions were absent including peroneal and sural  H reflexes were mildly delayed    Impression:  Sensory neuropathy consistent with a history of diabetes; these findings would be compatible with clinical picture of meralgia paresthetica

## 2020-05-12 NOTE — PROCEDURES
Computer glitch lost original report findings are seen in procedure notes    Impression:  Sensory neuropathy consistent with a history of diabetes; these findings would be compatible with clinical picture of meralgia paresthetica

## 2020-05-12 NOTE — PROGRESS NOTES
Impression:  Sensory neuropathy consistent with a history of diabetes; these findings would be compatible with clinical picture of meralgia paresthetica

## 2020-05-19 ENCOUNTER — OFFICE VISIT (OUTPATIENT)
Dept: NEUROLOGY | Facility: CLINIC | Age: 70
End: 2020-05-19
Payer: MEDICARE

## 2020-05-19 VITALS
BODY MASS INDEX: 29.46 KG/M2 | DIASTOLIC BLOOD PRESSURE: 79 MMHG | HEIGHT: 69 IN | WEIGHT: 198.88 LBS | HEART RATE: 89 BPM | SYSTOLIC BLOOD PRESSURE: 162 MMHG

## 2020-05-19 DIAGNOSIS — G57.11 MERALGIA PARAESTHETICA, RIGHT: Primary | ICD-10-CM

## 2020-05-19 PROCEDURE — 3078F PR MOST RECENT DIASTOLIC BLOOD PRESSURE < 80 MM HG: ICD-10-PCS | Mod: CPTII,S$GLB,, | Performed by: NEUROMUSCULOSKELETAL MEDICINE & OMM

## 2020-05-19 PROCEDURE — 1125F PR PAIN SEVERITY QUANTIFIED, PAIN PRESENT: ICD-10-PCS | Mod: S$GLB,,, | Performed by: NEUROMUSCULOSKELETAL MEDICINE & OMM

## 2020-05-19 PROCEDURE — 99215 OFFICE O/P EST HI 40 MIN: CPT | Mod: S$GLB,,, | Performed by: NEUROMUSCULOSKELETAL MEDICINE & OMM

## 2020-05-19 PROCEDURE — 1159F MED LIST DOCD IN RCRD: CPT | Mod: S$GLB,,, | Performed by: NEUROMUSCULOSKELETAL MEDICINE & OMM

## 2020-05-19 PROCEDURE — 99999 PR PBB SHADOW E&M-EST. PATIENT-LVL IV: ICD-10-PCS | Mod: PBBFAC,,, | Performed by: NEUROMUSCULOSKELETAL MEDICINE & OMM

## 2020-05-19 PROCEDURE — 1159F PR MEDICATION LIST DOCUMENTED IN MEDICAL RECORD: ICD-10-PCS | Mod: S$GLB,,, | Performed by: NEUROMUSCULOSKELETAL MEDICINE & OMM

## 2020-05-19 PROCEDURE — 99499 RISK ADDL DX/OHS AUDIT: ICD-10-PCS | Mod: S$GLB,,, | Performed by: NEUROMUSCULOSKELETAL MEDICINE & OMM

## 2020-05-19 PROCEDURE — 99999 PR PBB SHADOW E&M-EST. PATIENT-LVL IV: CPT | Mod: PBBFAC,,, | Performed by: NEUROMUSCULOSKELETAL MEDICINE & OMM

## 2020-05-19 PROCEDURE — 1101F PR PT FALLS ASSESS DOC 0-1 FALLS W/OUT INJ PAST YR: ICD-10-PCS | Mod: CPTII,S$GLB,, | Performed by: NEUROMUSCULOSKELETAL MEDICINE & OMM

## 2020-05-19 PROCEDURE — 3078F DIAST BP <80 MM HG: CPT | Mod: CPTII,S$GLB,, | Performed by: NEUROMUSCULOSKELETAL MEDICINE & OMM

## 2020-05-19 PROCEDURE — 3077F SYST BP >= 140 MM HG: CPT | Mod: CPTII,S$GLB,, | Performed by: NEUROMUSCULOSKELETAL MEDICINE & OMM

## 2020-05-19 PROCEDURE — 1125F AMNT PAIN NOTED PAIN PRSNT: CPT | Mod: S$GLB,,, | Performed by: NEUROMUSCULOSKELETAL MEDICINE & OMM

## 2020-05-19 PROCEDURE — 99499 UNLISTED E&M SERVICE: CPT | Mod: S$GLB,,, | Performed by: NEUROMUSCULOSKELETAL MEDICINE & OMM

## 2020-05-19 PROCEDURE — 1101F PT FALLS ASSESS-DOCD LE1/YR: CPT | Mod: CPTII,S$GLB,, | Performed by: NEUROMUSCULOSKELETAL MEDICINE & OMM

## 2020-05-19 PROCEDURE — 3077F PR MOST RECENT SYSTOLIC BLOOD PRESSURE >= 140 MM HG: ICD-10-PCS | Mod: CPTII,S$GLB,, | Performed by: NEUROMUSCULOSKELETAL MEDICINE & OMM

## 2020-05-19 PROCEDURE — 99215 PR OFFICE/OUTPT VISIT, EST, LEVL V, 40-54 MIN: ICD-10-PCS | Mod: S$GLB,,, | Performed by: NEUROMUSCULOSKELETAL MEDICINE & OMM

## 2020-05-19 NOTE — PROGRESS NOTES
GI-his note was dictated with Modal Fluency a word recognition program. There are occasionally word recognition errors which are missed on review.  negativeTGU- negative  Musculoskeletal-negative  Skin-negative  Neurologic-negative  Psychiatric-negative  Endocrine-negative  Hematology/lymph nodes-negative  Allergies/immunology-negative  Gen. Appearance: Well-developed with no obvious deformities  Carotid arteries symmetrical pulses  Peripheral vascular shows symmetrical pulses with no obvious edema or tenderness  Social History :  Patient is a retired ; he does some gardening work now  Present history:  patient presents for follow-up for his EMG results.  EMG shows mild sensory neuropathy consistent with a history of diabetes.  EMG in of the right thigh shows no abnormalities consistent with the diagnosis of meralgia paresthetica.  Patient had an injection in the groin area by pain management.  He said this helped about 50% for 1 day only.  He continues to have burning and numbness with shooting pain into the right outside and top surface of the thigh.  The gabapentin 400 mg q.i.d. seems to help some however the pain is frequently 12/10 in intensity.  He denies any hip pain.  The oxycodone causes significant constipation any can not tolerate this medicine.  He continues to take Keppra 500 mg half tablet twice a day.    Previous note:  3-17-20:  This is a 69-year-old  male who presents with a history of pain in the right thigh.  He states that it feels like and bites with burning tingling in the right thigh over the last 2 months.  He has seen Sports Medicine however did not have any improvement with the recommendations.  He has a history of low back pain for which she has been seen pain management the last visit being in November for lumbar disc disease.  Patient has been taking gabapentin 300 mg 3 times a day without help.  He was taking oxycodone which helps some.  He has a history of diabetes  for over 10 years.  Patient relates the problem to rotator cuff surgery on the left shoulder in January of 20 20.  He noted after the surgery he had to lie on his right side and sleep on his right side because of the shoulder for least 4 weeks.  Two weeks after surgery is when he noticed the pain in the right thigh which  intensity he describes as 8/10.     Neurological Exam:  Mental status-alert and oriented to person, place, and time; attention span and concentration is good. Fund of knowledge-patient is aware of current events and able to give detailed history of the current problem.recent and remote memory seems intact. Language function is normal with no evidence of aphasia  Cranial nerves:Visual acuity to hand chart -normal; visual fields to confrontation normal;pupils were equal and reactive to light ;no evidence of ptosis ;  funduscopic examination was normal with sharp disc margins. external ocular movements were full with no nystagmus. Facial sensation to pinprick : normal ; corneal reflexes intact; Facial muscles were symmetrical. Hearing is unimpaired symmetrical finger rub; Tongue movements - normal ; palate movements - normal ;Swallowing unimpaired. Shoulder shrug was intact with good strength Speech was normal  Motor examination: Upper : normal                                      Lower extremities - Normal;muscle tone was normal ; pain to palpation of the lower right quadriceps tendon Area suggestive of tendonitis versus bursitis.  Patient has no pain on external or internal rotation of the hip.                  Right-handed  Sensory examination:   Upper; normal pinprick and soft touch ;   Lower extremities - hyperintensity to soft touch or pinprick over the right lateral thigh  Vibration sense:  Absent @ toes/knee  Deep tendon reflexes: upper extremities :0 symmetrical ;     lower extremities KJ- 0; AJ - 0 Both plantar responses were flexor  Cerebellar examination upper: Normal finger to nose and  rapid alternating movements  Gait: Steady with no ataxia;      heel and toe walk normal  Romberg test: negative       Tandem gait: Normal    Involuntary movements: Negative  TMJ - no tenderness  Cervical examination: Full range of motion with no pain Cervical tenderness :negative  Lumbar examination: Low back tenderness-negative                  Sciatic notchtenderness-negative            Straight leg raising : negative     Impression:  Right meralgia paresthetica; right lateral thigh tendinitis at the hip; history lumbar disc disease rule out lumbar radiculopathy        Recommendations/Plan :   long discussion with the patient in terms of differential diagnosis morning to right neuralgia paresthetica in view of the EMG of the thigh being normal.  He definitely has a diabetic peripheral neuropathy as documented by abnormal sensory responses.  Would recommend increasing the gabapentin to 2   400 mg tablets at night and  continuing Keppra 500 mg half tablet twice a day with an option to increase to a whole tablet if needed.  Have emphasized the importance of managing the diabetes to control the pain.  Also have emphasized avoiding flexion at the hip which irritates the nerve.  Patient will follow up in 2 months with pain diary

## 2020-05-20 ENCOUNTER — PATIENT OUTREACH (OUTPATIENT)
Dept: ADMINISTRATIVE | Facility: HOSPITAL | Age: 70
End: 2020-05-20

## 2020-06-01 ENCOUNTER — LAB VISIT (OUTPATIENT)
Dept: LAB | Facility: OTHER | Age: 70
End: 2020-06-01
Attending: INTERNAL MEDICINE
Payer: MEDICARE

## 2020-06-01 DIAGNOSIS — E11.9 TYPE 2 DIABETES MELLITUS WITHOUT COMPLICATION, WITHOUT LONG-TERM CURRENT USE OF INSULIN: ICD-10-CM

## 2020-06-01 LAB
ALBUMIN SERPL BCP-MCNC: 4.1 G/DL (ref 3.5–5.2)
ALP SERPL-CCNC: 77 U/L (ref 55–135)
ALT SERPL W/O P-5'-P-CCNC: 39 U/L (ref 10–44)
ANION GAP SERPL CALC-SCNC: 11 MMOL/L (ref 8–16)
AST SERPL-CCNC: 25 U/L (ref 10–40)
BASOPHILS # BLD AUTO: 0.03 K/UL (ref 0–0.2)
BASOPHILS NFR BLD: 0.4 % (ref 0–1.9)
BILIRUB SERPL-MCNC: 0.1 MG/DL (ref 0.1–1)
BUN SERPL-MCNC: 15 MG/DL (ref 8–23)
CALCIUM SERPL-MCNC: 9.2 MG/DL (ref 8.7–10.5)
CHLORIDE SERPL-SCNC: 107 MMOL/L (ref 95–110)
CO2 SERPL-SCNC: 20 MMOL/L (ref 23–29)
CREAT SERPL-MCNC: 1 MG/DL (ref 0.5–1.4)
DIFFERENTIAL METHOD: ABNORMAL
EOSINOPHIL # BLD AUTO: 0.4 K/UL (ref 0–0.5)
EOSINOPHIL NFR BLD: 5.7 % (ref 0–8)
ERYTHROCYTE [DISTWIDTH] IN BLOOD BY AUTOMATED COUNT: 13.3 % (ref 11.5–14.5)
EST. GFR  (AFRICAN AMERICAN): >60 ML/MIN/1.73 M^2
EST. GFR  (NON AFRICAN AMERICAN): >60 ML/MIN/1.73 M^2
GLUCOSE SERPL-MCNC: 136 MG/DL (ref 70–110)
HCT VFR BLD AUTO: 37.4 % (ref 40–54)
HGB BLD-MCNC: 12.1 G/DL (ref 14–18)
IMM GRANULOCYTES # BLD AUTO: 0.01 K/UL (ref 0–0.04)
IMM GRANULOCYTES NFR BLD AUTO: 0.1 % (ref 0–0.5)
LYMPHOCYTES # BLD AUTO: 3.9 K/UL (ref 1–4.8)
LYMPHOCYTES NFR BLD: 55.4 % (ref 18–48)
MCH RBC QN AUTO: 32.7 PG (ref 27–31)
MCHC RBC AUTO-ENTMCNC: 32.4 G/DL (ref 32–36)
MCV RBC AUTO: 101 FL (ref 82–98)
MONOCYTES # BLD AUTO: 0.4 K/UL (ref 0.3–1)
MONOCYTES NFR BLD: 6.2 % (ref 4–15)
NEUTROPHILS # BLD AUTO: 2.3 K/UL (ref 1.8–7.7)
NEUTROPHILS NFR BLD: 32.2 % (ref 38–73)
NRBC BLD-RTO: 0 /100 WBC
PLATELET # BLD AUTO: 285 K/UL (ref 150–350)
PMV BLD AUTO: 8.9 FL (ref 9.2–12.9)
POTASSIUM SERPL-SCNC: 4.3 MMOL/L (ref 3.5–5.1)
PROT SERPL-MCNC: 7.2 G/DL (ref 6–8.4)
RBC # BLD AUTO: 3.7 M/UL (ref 4.6–6.2)
SODIUM SERPL-SCNC: 138 MMOL/L (ref 136–145)
WBC # BLD AUTO: 7.06 K/UL (ref 3.9–12.7)

## 2020-06-01 PROCEDURE — 80053 COMPREHEN METABOLIC PANEL: CPT

## 2020-06-01 PROCEDURE — 85025 COMPLETE CBC W/AUTO DIFF WBC: CPT

## 2020-06-01 PROCEDURE — 83036 HEMOGLOBIN GLYCOSYLATED A1C: CPT

## 2020-06-01 PROCEDURE — 36415 COLL VENOUS BLD VENIPUNCTURE: CPT

## 2020-06-02 ENCOUNTER — TELEPHONE (OUTPATIENT)
Dept: SPORTS MEDICINE | Facility: CLINIC | Age: 70
End: 2020-06-02

## 2020-06-02 DIAGNOSIS — Z98.890 S/P ROTATOR CUFF SURGERY: Primary | ICD-10-CM

## 2020-06-02 LAB
ESTIMATED AVG GLUCOSE: 157 MG/DL (ref 68–131)
HBA1C MFR BLD HPLC: 7.1 % (ref 4–5.6)

## 2020-06-02 NOTE — TELEPHONE ENCOUNTER
----- Message from Tiana Klein MA sent at 6/2/2020  2:36 PM CDT -----  Contact: self  Just his shoulder.  ----- Message -----  From: Migdalia Schneider MA  Sent: 6/2/2020   2:25 PM CDT  To: Tiana Klein MA    Can you confirm if it is for his shoulder, hip, or both?    Migdalia Schneider   Clinical assistant to Dr. Marcela John    ----- Message -----  From: Tiana Klein MA  Sent: 6/2/2020   1:38 PM CDT  To: Migdalia Schneider MA    Can you place new PT orders. Let me know once done and I will fax them over and call patient.    Thanks,  Tiana Klein MA  Medical Assistant to Dr. Marcela John    ----- Message -----  From: Rachelle Marroquin  Sent: 6/2/2020  11:40 AM CDT  To: [facility] Staff    New order places for physical therapy at different location       Contact Info [phone] (home)       [facility]   [address] [facility] [place] [address]  Fax      Pt is asking for a call back for confirmation when the orders are sent out

## 2020-06-03 ENCOUNTER — LAB VISIT (OUTPATIENT)
Dept: LAB | Facility: HOSPITAL | Age: 70
End: 2020-06-03
Attending: INTERNAL MEDICINE
Payer: MEDICARE

## 2020-06-03 ENCOUNTER — OFFICE VISIT (OUTPATIENT)
Dept: INTERNAL MEDICINE | Facility: CLINIC | Age: 70
End: 2020-06-03
Payer: MEDICARE

## 2020-06-03 VITALS
OXYGEN SATURATION: 99 % | HEIGHT: 69 IN | SYSTOLIC BLOOD PRESSURE: 132 MMHG | HEART RATE: 74 BPM | DIASTOLIC BLOOD PRESSURE: 60 MMHG | BODY MASS INDEX: 29.48 KG/M2 | WEIGHT: 199.06 LBS

## 2020-06-03 DIAGNOSIS — E78.5 HYPERLIPIDEMIA, UNSPECIFIED HYPERLIPIDEMIA TYPE: ICD-10-CM

## 2020-06-03 DIAGNOSIS — D51.9 ANEMIA DUE TO VITAMIN B12 DEFICIENCY, UNSPECIFIED B12 DEFICIENCY TYPE: ICD-10-CM

## 2020-06-03 DIAGNOSIS — D64.9 ANEMIA, UNSPECIFIED TYPE: Primary | ICD-10-CM

## 2020-06-03 DIAGNOSIS — E13.9 DIABETES MELLITUS DUE TO ABNORMAL INSULIN: ICD-10-CM

## 2020-06-03 DIAGNOSIS — D64.9 ANEMIA, UNSPECIFIED TYPE: ICD-10-CM

## 2020-06-03 DIAGNOSIS — G57.11 MERALGIA PARAESTHETICA, RIGHT: ICD-10-CM

## 2020-06-03 DIAGNOSIS — K21.9 GASTROESOPHAGEAL REFLUX DISEASE WITHOUT ESOPHAGITIS: ICD-10-CM

## 2020-06-03 DIAGNOSIS — I10 ESSENTIAL HYPERTENSION: ICD-10-CM

## 2020-06-03 DIAGNOSIS — E53.8 VITAMIN B12 DEFICIENCY: ICD-10-CM

## 2020-06-03 LAB
BASOPHILS # BLD AUTO: 0.04 K/UL (ref 0–0.2)
BASOPHILS NFR BLD: 0.7 % (ref 0–1.9)
DIFFERENTIAL METHOD: ABNORMAL
EOSINOPHIL # BLD AUTO: 0.4 K/UL (ref 0–0.5)
EOSINOPHIL NFR BLD: 6.6 % (ref 0–8)
ERYTHROCYTE [DISTWIDTH] IN BLOOD BY AUTOMATED COUNT: 13.3 % (ref 11.5–14.5)
FERRITIN SERPL-MCNC: 37 NG/ML (ref 20–300)
FOLATE SERPL-MCNC: 16.6 NG/ML (ref 4–24)
HAPTOGLOB SERPL-MCNC: 115 MG/DL (ref 30–250)
HCT VFR BLD AUTO: 41.2 % (ref 40–54)
HGB BLD-MCNC: 13 G/DL (ref 14–18)
IMM GRANULOCYTES # BLD AUTO: 0.01 K/UL (ref 0–0.04)
IMM GRANULOCYTES NFR BLD AUTO: 0.2 % (ref 0–0.5)
IRON SERPL-MCNC: 88 UG/DL (ref 45–160)
LDH SERPL L TO P-CCNC: 169 U/L (ref 110–260)
LYMPHOCYTES # BLD AUTO: 2.8 K/UL (ref 1–4.8)
LYMPHOCYTES NFR BLD: 46.4 % (ref 18–48)
MCH RBC QN AUTO: 33 PG (ref 27–31)
MCHC RBC AUTO-ENTMCNC: 31.6 G/DL (ref 32–36)
MCV RBC AUTO: 105 FL (ref 82–98)
MONOCYTES # BLD AUTO: 0.4 K/UL (ref 0.3–1)
MONOCYTES NFR BLD: 5.9 % (ref 4–15)
NEUTROPHILS # BLD AUTO: 2.4 K/UL (ref 1.8–7.7)
NEUTROPHILS NFR BLD: 40.2 % (ref 38–73)
NRBC BLD-RTO: 0 /100 WBC
PLATELET # BLD AUTO: 312 K/UL (ref 150–350)
PMV BLD AUTO: 9.7 FL (ref 9.2–12.9)
RBC # BLD AUTO: 3.94 M/UL (ref 4.6–6.2)
SATURATED IRON: 21 % (ref 20–50)
TOTAL IRON BINDING CAPACITY: 426 UG/DL (ref 250–450)
TRANSFERRIN SERPL-MCNC: 288 MG/DL (ref 200–375)
VIT B12 SERPL-MCNC: 1037 PG/ML (ref 210–950)
WBC # BLD AUTO: 5.93 K/UL (ref 3.9–12.7)

## 2020-06-03 PROCEDURE — 1101F PT FALLS ASSESS-DOCD LE1/YR: CPT | Mod: CPTII,S$GLB,, | Performed by: INTERNAL MEDICINE

## 2020-06-03 PROCEDURE — 99214 OFFICE O/P EST MOD 30 MIN: CPT | Mod: S$GLB,,, | Performed by: INTERNAL MEDICINE

## 2020-06-03 PROCEDURE — 83010 ASSAY OF HAPTOGLOBIN QUANT: CPT

## 2020-06-03 PROCEDURE — 99499 RISK ADDL DX/OHS AUDIT: ICD-10-PCS | Mod: S$GLB,,, | Performed by: INTERNAL MEDICINE

## 2020-06-03 PROCEDURE — 86334 PATHOLOGIST INTERPRETATION IFE: ICD-10-PCS | Mod: 26,,, | Performed by: PATHOLOGY

## 2020-06-03 PROCEDURE — 82607 VITAMIN B-12: CPT

## 2020-06-03 PROCEDURE — 99999 PR PBB SHADOW E&M-EST. PATIENT-LVL III: ICD-10-PCS | Mod: PBBFAC,,, | Performed by: INTERNAL MEDICINE

## 2020-06-03 PROCEDURE — 83615 LACTATE (LD) (LDH) ENZYME: CPT

## 2020-06-03 PROCEDURE — 3078F DIAST BP <80 MM HG: CPT | Mod: CPTII,S$GLB,, | Performed by: INTERNAL MEDICINE

## 2020-06-03 PROCEDURE — 84165 PROTEIN E-PHORESIS SERUM: CPT | Mod: 26,,, | Performed by: PATHOLOGY

## 2020-06-03 PROCEDURE — 36415 COLL VENOUS BLD VENIPUNCTURE: CPT

## 2020-06-03 PROCEDURE — 1159F MED LIST DOCD IN RCRD: CPT | Mod: S$GLB,,, | Performed by: INTERNAL MEDICINE

## 2020-06-03 PROCEDURE — 3078F PR MOST RECENT DIASTOLIC BLOOD PRESSURE < 80 MM HG: ICD-10-PCS | Mod: CPTII,S$GLB,, | Performed by: INTERNAL MEDICINE

## 2020-06-03 PROCEDURE — 86334 IMMUNOFIX E-PHORESIS SERUM: CPT

## 2020-06-03 PROCEDURE — 99999 PR PBB SHADOW E&M-EST. PATIENT-LVL III: CPT | Mod: PBBFAC,,, | Performed by: INTERNAL MEDICINE

## 2020-06-03 PROCEDURE — 84165 PATHOLOGIST INTERPRETATION SPE: ICD-10-PCS | Mod: 26,,, | Performed by: PATHOLOGY

## 2020-06-03 PROCEDURE — 3051F PR MOST RECENT HEMOGLOBIN A1C LEVEL 7.0 - < 8.0%: ICD-10-PCS | Mod: CPTII,S$GLB,, | Performed by: INTERNAL MEDICINE

## 2020-06-03 PROCEDURE — 82746 ASSAY OF FOLIC ACID SERUM: CPT

## 2020-06-03 PROCEDURE — 99214 PR OFFICE/OUTPT VISIT, EST, LEVL IV, 30-39 MIN: ICD-10-PCS | Mod: S$GLB,,, | Performed by: INTERNAL MEDICINE

## 2020-06-03 PROCEDURE — 82728 ASSAY OF FERRITIN: CPT

## 2020-06-03 PROCEDURE — 85025 COMPLETE CBC W/AUTO DIFF WBC: CPT

## 2020-06-03 PROCEDURE — 3051F HG A1C>EQUAL 7.0%<8.0%: CPT | Mod: CPTII,S$GLB,, | Performed by: INTERNAL MEDICINE

## 2020-06-03 PROCEDURE — 83540 ASSAY OF IRON: CPT

## 2020-06-03 PROCEDURE — 86334 IMMUNOFIX E-PHORESIS SERUM: CPT | Mod: 26,,, | Performed by: PATHOLOGY

## 2020-06-03 PROCEDURE — 1101F PR PT FALLS ASSESS DOC 0-1 FALLS W/OUT INJ PAST YR: ICD-10-PCS | Mod: CPTII,S$GLB,, | Performed by: INTERNAL MEDICINE

## 2020-06-03 PROCEDURE — 1159F PR MEDICATION LIST DOCUMENTED IN MEDICAL RECORD: ICD-10-PCS | Mod: S$GLB,,, | Performed by: INTERNAL MEDICINE

## 2020-06-03 PROCEDURE — 3075F PR MOST RECENT SYSTOLIC BLOOD PRESS GE 130-139MM HG: ICD-10-PCS | Mod: CPTII,S$GLB,, | Performed by: INTERNAL MEDICINE

## 2020-06-03 PROCEDURE — 99499 UNLISTED E&M SERVICE: CPT | Mod: S$GLB,,, | Performed by: INTERNAL MEDICINE

## 2020-06-03 PROCEDURE — 84165 PROTEIN E-PHORESIS SERUM: CPT

## 2020-06-03 PROCEDURE — 3075F SYST BP GE 130 - 139MM HG: CPT | Mod: CPTII,S$GLB,, | Performed by: INTERNAL MEDICINE

## 2020-06-03 NOTE — PROGRESS NOTES
Follow up of hypertension, diabetes, recurrent UTI, hyperlipidemia.      HISTORY OF PRESENT ILLNESS: This is a 70-year-old man who presents for follow up of above.     Since our last visit, he has seen Dr Randall in neurology on 3/17/20 who felt -  Right meralgia paresthetica; right lateral thigh tendinitis at the hip; history lumbar disc disease rule out lumbar radiculopathy. EMG was 5/5/20 -  Sensory neuropathy consistent with a history of diabetes; these findings would be compatible with clinical picture of meralgia paresthetica. He saw Dr Randall in clinic again on 5/19/20 who felt he had diabetic peripheral neuropathy. Mr Han did not start Keppra. Mr Han is taking gabapentin 400 mg capsule 2 capsules 4 times daily.  He continues to have significant right leg pain that starts in the right groin and goes down the lateral right thigh and the anterior right thigh.  He has a burning sensation on the right thigh.  Gabapentin eases the pain.      He has also seen Dr Carrasquillo in pain management for his chronic back pain.  Dr Carrasquillo has prescribed oxycodone apap 10/325 1-2 tablets daily for his right leg pain.  He gets groggy when he takes the oxycodone. The oxycodone helps the pain. Standing helps the pain as well.  Dr Carrasquillo has tired 2 lidocaine injections for the right leg pain which helped a little.     Left shoulder is getting better . He is working on range of motion at home. Physical therapy was postponed due to COVID pandemic      He had lithotripsy on 3/23/17. No dysuria, hematuria.   He saw Dr cerna on 1/19. He still has a stone in the left kidney. He is on finasteride and tamsulosin for his prostate.      He has not had any alcohol and has not had a recurrence of pancreatitis. No nausea, vomiting, constipation, diarrhea, melana, bloody stools. He gets constipated from the oxycodone.      He is taking amlodipine 10 mg once daily, Toprol-XL 25 mg once daily and losartan 25 mg daily. No chest pain,  "shortness of breath. He is now taking pioglitazone 15 mg daily and metformin  mg 2 tablets twice daily. HE checks his blood sugar twice daily due to fluctuating blood sugars. Sugars have been higher due to the stress of surgery  No polydipsia. He has a history of hyperlipidemia, currently on Zocor 20 mg daily. No joint pain or muscle pain. NO shortness breath, headaches, vision, sinus congestion.        He smokes 1.5 packs cigarette per day.  He smokes when he gets stressed. He is no longer drinking alcohol so her turns to cigarettes for his mood.      PAST MEDICAL HISTORY:    1. Hypertension.    2. Coronary artery disease with left heart catheterization in November 2011 with 40 to 50% narrowing in the mid LAD.    3. Diabetes mellitus.    4. Hyperlipidemia.    5. History of reflux.    6. Colon polyps.    7. History of cluster headaches.    8. History of glaucoma.    9. Lumbar disk disease at L5 to S1.    10. History of stomach ulcers.    11. Nephrolithiasis.       PAST SURGICAL HISTORY: Cholecystectomy in May 2013, ORIF of the left humerus.      SOCIAL HISTORY: Currently smokes a pack of cigarettes daily, has smoked his whole life. 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 He is a retired schoolteacher.       FAMILY HISTORY: Updated on [facility].      PHYSICAL EXAMINATION:     /60   Pulse 74   Ht 5' 9" (1.753 m)   Wt 90.3 kg (199 lb 1.2 oz)   SpO2 99%   BMI 29.40 kg/m²     GENERAL: He is alert, oriented, no apparent distress. Affect within normal limits.    HEENT: Conjunctivae anicteric. Tympanic membranes clear.  . .    NECK: Supple. No cervical lymphadenopathy, no thyroid enlargement.    Respiratory: Effort normal. Lungs are clear to auscultation.    HEART: Regular rate and rhythm without murmurs, gallops or rubs. No lower extremity edema.      Rectal Heme Negative. Prostate small smooth, no nodules        Labs 6/1/20 reviewed - A1C 7.1, CBC - Hb 12.1 - worse, cmp " acceptable     ASSESSMENT AND PLAN:    1. Right leg pain - Try over the counter lidocaine 4% patch to the right thigh - 2 patches twice daily.  2. Left shoulder surgery - will start physical medicine  3. Hypertension - Continue current medications  3. Diabetes mellitus. better  4. Hyperlipidemia. on Zocor.  Work on diet. \  5. History of reflux and peptic ulcer disease -asx. Off pantoprazole  6. Tubular adenomas - colonoscopy 12/2016 - one polyp - due 2021  7. History of glaucoma - not on treatment - pressure in eyes ok - followed Nile Avina and retinal specialist at [facility]  8. History of nephrolithiasis -asymptomatic.    9. Irregular heart beat sensation - holter revealed PVC  10. Pancreatitis - resolved. Do not suspect malabsorption as cause of weight loss as he is not having diarrhea. He is not drinking alochol  11. 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 Anxiety and depression -valium 5 mg nightly prn anxiety  13. Tobacco abuse - CT chest - declined..   14. Recurrent UTI -asx. Saw urology 10/23/17. S/p ESWL  15. Anemia - labs today.  Stool heme negative today  I will see him back in 3 months, sooner if problems arise.          Colonoscopy 12/7/16 - one hyperplastic polyp due in 2021  Influenza 10/10/19  Pneumovax 7/2014  Zostavax 5/16  Prevnar  12/14/16  PSA 7/17

## 2020-06-04 LAB
ALBUMIN SERPL ELPH-MCNC: 4.38 G/DL (ref 3.35–5.55)
ALPHA1 GLOB SERPL ELPH-MCNC: 0.28 G/DL (ref 0.17–0.41)
ALPHA2 GLOB SERPL ELPH-MCNC: 0.85 G/DL (ref 0.43–0.99)
B-GLOBULIN SERPL ELPH-MCNC: 0.87 G/DL (ref 0.5–1.1)
GAMMA GLOB SERPL ELPH-MCNC: 0.92 G/DL (ref 0.67–1.58)
INTERPRETATION SERPL IFE-IMP: NORMAL
PATHOLOGIST INTERPRETATION IFE: NORMAL
PATHOLOGIST INTERPRETATION SPE: NORMAL
PROT SERPL-MCNC: 7.3 G/DL (ref 6–8.4)

## 2020-06-05 ENCOUNTER — TELEPHONE (OUTPATIENT)
Dept: SPORTS MEDICINE | Facility: CLINIC | Age: 70
End: 2020-06-05

## 2020-06-05 ENCOUNTER — PATIENT MESSAGE (OUTPATIENT)
Dept: INTERNAL MEDICINE | Facility: CLINIC | Age: 70
End: 2020-06-05

## 2020-06-05 NOTE — TELEPHONE ENCOUNTER
----- Message from Rachelle Marroquin sent at 6/5/2020 12:22 PM CDT -----  Contact: self  [facility] order to the [facility]. States they dont have the order         Contact Info

## 2020-07-02 ENCOUNTER — OFFICE VISIT (OUTPATIENT)
Dept: INTERNAL MEDICINE | Facility: CLINIC | Age: 70
End: 2020-07-02
Payer: MEDICARE

## 2020-07-02 VITALS
DIASTOLIC BLOOD PRESSURE: 74 MMHG | HEIGHT: 69 IN | HEART RATE: 74 BPM | OXYGEN SATURATION: 98 % | WEIGHT: 202.81 LBS | BODY MASS INDEX: 30.04 KG/M2 | SYSTOLIC BLOOD PRESSURE: 162 MMHG

## 2020-07-02 DIAGNOSIS — R60.0 BILATERAL LEG EDEMA: Primary | ICD-10-CM

## 2020-07-02 PROCEDURE — 1101F PR PT FALLS ASSESS DOC 0-1 FALLS W/OUT INJ PAST YR: ICD-10-PCS | Mod: CPTII,S$GLB,, | Performed by: FAMILY MEDICINE

## 2020-07-02 PROCEDURE — 1125F PR PAIN SEVERITY QUANTIFIED, PAIN PRESENT: ICD-10-PCS | Mod: S$GLB,,, | Performed by: FAMILY MEDICINE

## 2020-07-02 PROCEDURE — 99999 PR PBB SHADOW E&M-EST. PATIENT-LVL IV: ICD-10-PCS | Mod: PBBFAC,,, | Performed by: FAMILY MEDICINE

## 2020-07-02 PROCEDURE — 3078F PR MOST RECENT DIASTOLIC BLOOD PRESSURE < 80 MM HG: ICD-10-PCS | Mod: CPTII,S$GLB,, | Performed by: FAMILY MEDICINE

## 2020-07-02 PROCEDURE — 3078F DIAST BP <80 MM HG: CPT | Mod: CPTII,S$GLB,, | Performed by: FAMILY MEDICINE

## 2020-07-02 PROCEDURE — 1101F PT FALLS ASSESS-DOCD LE1/YR: CPT | Mod: CPTII,S$GLB,, | Performed by: FAMILY MEDICINE

## 2020-07-02 PROCEDURE — 99999 PR PBB SHADOW E&M-EST. PATIENT-LVL IV: CPT | Mod: PBBFAC,,, | Performed by: FAMILY MEDICINE

## 2020-07-02 PROCEDURE — 3077F SYST BP >= 140 MM HG: CPT | Mod: CPTII,S$GLB,, | Performed by: FAMILY MEDICINE

## 2020-07-02 PROCEDURE — 1125F AMNT PAIN NOTED PAIN PRSNT: CPT | Mod: S$GLB,,, | Performed by: FAMILY MEDICINE

## 2020-07-02 PROCEDURE — 99214 PR OFFICE/OUTPT VISIT, EST, LEVL IV, 30-39 MIN: ICD-10-PCS | Mod: S$GLB,,, | Performed by: FAMILY MEDICINE

## 2020-07-02 PROCEDURE — 3008F BODY MASS INDEX DOCD: CPT | Mod: CPTII,S$GLB,, | Performed by: FAMILY MEDICINE

## 2020-07-02 PROCEDURE — 1159F MED LIST DOCD IN RCRD: CPT | Mod: S$GLB,,, | Performed by: FAMILY MEDICINE

## 2020-07-02 PROCEDURE — 3077F PR MOST RECENT SYSTOLIC BLOOD PRESSURE >= 140 MM HG: ICD-10-PCS | Mod: CPTII,S$GLB,, | Performed by: FAMILY MEDICINE

## 2020-07-02 PROCEDURE — 99214 OFFICE O/P EST MOD 30 MIN: CPT | Mod: S$GLB,,, | Performed by: FAMILY MEDICINE

## 2020-07-02 PROCEDURE — 1159F PR MEDICATION LIST DOCUMENTED IN MEDICAL RECORD: ICD-10-PCS | Mod: S$GLB,,, | Performed by: FAMILY MEDICINE

## 2020-07-02 PROCEDURE — 3008F PR BODY MASS INDEX (BMI) DOCUMENTED: ICD-10-PCS | Mod: CPTII,S$GLB,, | Performed by: FAMILY MEDICINE

## 2020-07-02 RX ORDER — AMLODIPINE BESYLATE 10 MG/1
TABLET ORAL
Qty: 90 TABLET | Refills: 3 | Status: SHIPPED | OUTPATIENT
Start: 2020-07-02 | End: 2021-06-07 | Stop reason: SDUPTHER

## 2020-07-02 RX ORDER — METFORMIN HYDROCHLORIDE 500 MG/1
TABLET, EXTENDED RELEASE ORAL
Qty: 360 TABLET | Refills: 1 | Status: SHIPPED | OUTPATIENT
Start: 2020-07-02 | End: 2020-12-30

## 2020-07-02 RX ORDER — METOPROLOL SUCCINATE 25 MG/1
TABLET, EXTENDED RELEASE ORAL
Qty: 90 TABLET | Refills: 3 | Status: SHIPPED | OUTPATIENT
Start: 2020-07-02 | End: 2021-06-23

## 2020-07-02 NOTE — PROGRESS NOTES
"Subjective:       Patient ID: Tony Han is a 70 y.o. male.    Chief Complaint: Leg Swelling    HPI    70yoM for same day visit. PCP Dr. Benitez.    C/o lower leg swelling. Bilaterally feels swelling from knees to feet. Denies any pain in lower legs, just has noticed "puffiness." "when I put my finger in it, will get a dent." started noticing 2wks ago.   Most recent medication change with Neuro 1.5 months ago. Takes total gabapentin 3200mg daily. Has been taking norvasc x yrs.  Does have pain in right thigh, back which is seeing neuro about. But this pain has not acutely changed and is different / not what is going on with lower legs which prompted today's visit.    Denies cp, palps, sob, cough, calf pain, abd pain.    Review of Systems   Constitutional: Negative for chills, fatigue and fever.   HENT: Negative for congestion.    Respiratory: Negative for cough and shortness of breath.    Cardiovascular: Positive for leg swelling. Negative for chest pain and palpitations.   Gastrointestinal: Negative for abdominal pain, constipation, diarrhea, nausea and vomiting.   Genitourinary: Negative for dysuria and hematuria.   Musculoskeletal: Positive for arthralgias and back pain.   Skin: Negative for rash.   Neurological: Negative for weakness, numbness and headaches.         Past Medical History:   Diagnosis Date    Angina pectoris     Back pain     Cluster headaches     1999    Colon polyps     5 polyps in Dec 2010    Coronary artery disease 11/2011    moderate nonobstructive cad on left heart cath at [facility] 11/2011    Diabetes mellitus     GERD (gastroesophageal reflux disease)     history of peptic ulcer disease    Hyperlipidemia     Hypertension     Lumbar disc disease     MRI 2008 - L5-S1    Nephrolithiasis     July 2013        Prior to Admission medications    Medication Sig Start Date End Date Taking? Authorizing Provider   amLODIPine (NORVASC) 10 MG tablet TAKE 1 TABLET(10 MG) BY MOUTH EVERY DAY " 4/14/19   Elizabeth Benitez MD   aspirin 81 MG Chew Take 1 tablet (81 mg total) by mouth once daily. 9/28/14   Elizabeth Benitez MD   blood sugar diagnostic Strp True Metrics test strips - check blood sugar twice daily - fluctuating blood sugars 1/13/20   Elizabeth Benitez MD   diazePAM (VALIUM) 10 MG Tab One tablet one hour prior the MRI.. May repeat if needed  Patient not taking: Reported on 6/3/2020 3/4/20   Elizabeth Benitez MD   diazePAM (VALIUM) 5 MG tablet Take 1 tablet (5 mg total) by mouth nightly as needed for Anxiety.  Patient not taking: Reported on 6/3/2020 6/19/19   Elizabeth Benitez MD   finasteride (PROSCAR) 5 mg tablet TAKE 1 TABLET(5 MG) BY MOUTH EVERY DAY 2/28/20   Mehdi Dexter MD   gabapentin (NEURONTIN) 100 MG capsule 1-2 capsules three times daily for nerve pain 2/6/20   Elizabeth Benitez MD   gabapentin (NEURONTIN) 400 MG capsule Take 1 capsule (400 mg total) by mouth 4 (four) times daily. 3/17/20 3/17/21  Dennis Michael MD   hydrocodone-acetaminophen 7.5-325mg (NORCO) 7.5-325 mg per tablet TK 1 T PO TID 7/11/17   Historical MD Bibiana   lancets Misc True Matrix Lancets -c heck blood sugar twice daily - fluctuating blood sugars 1/13/20   Elizabeth Benitez MD   levETIRAcetam (KEPPRA) 500 MG Tab Take 1 tablet (500 mg total) by mouth nightly. 3/24/20 4/23/20  Dennis Michael MD   losartan (COZAAR) 25 MG tablet Take 1 tablet (25 mg total) by mouth once daily. 6/19/19   Elizabeth Benitez MD   metFORMIN (GLUCOPHAGE-XR) 500 MG 24 hr tablet TAKE 2 TABLETS BY MOUTH TWICE DAILY. 4/14/19   Elizabeth Benitez MD   metoprolol succinate (TOPROL-XL) 25 MG 24 hr tablet TAKE 1 TABLET(25 MG) BY MOUTH EVERY DAY 4/14/19   Elizabeth Benitez MD   MULTIVITS-MINERALS/FA/LYCOPENE (ONE-A-DAY MEN'S ORAL) Take 1 tablet by mouth once daily.    Historical Provider, MD   oxyCODONE-acetaminophen (PERCOCET)  mg per tablet Take 1 tablet by mouth every 4-6 hours as needed for pain. Take stool  "softener with this medication. 3/25/20   Elizabeth Benitez MD   pioglitazone (ACTOS) 15 MG tablet Take 1 tablet (15 mg total) by mouth once daily. 1/27/20 1/26/21  Elizabeth Benitez MD   simvastatin (ZOCOR) 20 MG tablet TAKE 1 TABLET(20 MG) BY MOUTH EVERY EVENING 5/3/19   Elizabeth Benitez MD   tadalafiL (CIALIS) 20 MG Tab Take 1 tablet (20 mg total) by mouth daily as needed. 5/5/20 5/5/21  Elizabeth Benitez MD   tamsulosin (FLOMAX) 0.4 mg Cap TAKE 1 CAPSULE(0.4 MG) BY MOUTH EVERY DAY 1/13/20   Mehdi Dexter MD   vitamin D 1000 units Tab Take 1,000 Units by mouth once daily. Take 1 tablet daily    Historical Provider, MD        Past medical history, surgical history, and family medical history reviewed and updated as appropriate.    Medications and allergies reviewed.     Objective:          Vitals:    07/02/20 0859   BP: (!) 162/74   BP Location: Right arm   Patient Position: Sitting   BP Method: Medium (Manual)   Pulse: 74   SpO2: 98%   Weight: 92 kg (202 lb 13.2 oz)   Height: 5' 9" (1.753 m)     Body mass index is 29.95 kg/m².  Physical Exam  Vitals signs and nursing note reviewed.   Constitutional:       General: He is not in acute distress.     Appearance: Normal appearance. He is well-developed.   Eyes:      Extraocular Movements: Extraocular movements intact.   Cardiovascular:      Rate and Rhythm: Normal rate and regular rhythm.      Pulses: Normal pulses.           Posterior tibial pulses are 2+ on the right side and 2+ on the left side.      Heart sounds: Normal heart sounds. No murmur.   Pulmonary:      Effort: Pulmonary effort is normal. No respiratory distress.      Breath sounds: Normal breath sounds. No wheezing.   Musculoskeletal: Normal range of motion.         General: Swelling present.      Comments: 1+ pitting edema feet to anterior tibial region bilaterally. No calf pain.    Neurological:      Mental Status: He is alert and oriented to person, place, and time.         Lab Results "   Component Value Date    WBC 5.93 06/03/2020    HGB 13.0 (L) 06/03/2020    HCT 41.2 06/03/2020     06/03/2020    CHOL 119 (L) 03/02/2020    TRIG 232 (H) 03/02/2020    HDL 40 03/02/2020    ALT 39 06/01/2020    AST 25 06/01/2020     06/01/2020    K 4.3 06/01/2020     06/01/2020    CREATININE 1.0 06/01/2020    BUN 15 06/01/2020    CO2 20 (L) 06/01/2020    TSH 0.511 03/02/2020    PSA 0.49 04/05/2019    INR 1.1 04/13/2012    HGBA1C 7.1 (H) 06/01/2020       Assessment:       1. Bilateral leg edema        Plan:   Tony was seen today for leg swelling.    Diagnoses and all orders for this visit:    Unclear cause of edema. Possibly related to adverse effect with most recent titration of gabapentin dose. Pt will consider coming back down to dose prior to most recent visit with neuro and then has follow up with neuro in 2 wks. Could be related to general stasis and recommending leg elevation while at rest and compression stockings when active, which pt himself states has not tried ever but is willing to do so.     Follow up should symptoms persist or worsen. If symptoms become severe, please report immediately to urgent care or emergency room for further evaluation. Patient voiced understanding.      Bilateral leg edema        Health maintenance reviewed with patient.     No follow-ups on file.    Edgardo Cortes MD  Family Medicine  [facility]  7/2/2020

## 2020-07-02 NOTE — PROGRESS NOTES
Refill Authorization Note    is requesting a refill authorization.    Brief assessment and rationale for refill: APPROVE: prr          Medication Therapy Plan: BP elevated same day visit with Dr. Cortes with pt complaining of pain and edema; Amlodipine dose stable; BP WNL at LOV with PCP; Approve 12 more amlodipine and Toprol XL    Medication reconciliation completed: No                         Comments:      Requested Prescriptions   Signed Prescriptions Disp Refills    metFORMIN (GLUCOPHAGE-XR) 500 MG XR 24hr tablet 360 tablet 1     Sig: TAKE 2 TABLETS BY MOUTH TWICE DAILY.       Endocrinology:  Diabetes - Biguanides Passed - 7/2/2020 11:56 AM        Passed - Patient is at least 18 years old        Passed - Office visit in past 12 months or future 90 days.     Recent Outpatient Visits            Today Bilateral leg edema    [facility] - Internal Medicine Edgardo Cortes MD    4 weeks ago Anemia, unspecified type    [facility] - Internal Medicine Elizabeth Benitez MD    1 month ago Meralgia paraesthetica, right    [place] - Neurology Dennis Michael MD    3 months ago Right hip pain    [place] - Neurology Dennis Michael MD    4 months ago Right hip pain    [facility] - Internal Medicine Elizabeth Benitez MD          Future Appointments              In 2 weeks Dennis Michael MD [place] [facility] [place], [place]    In 2 months [facility], [facility], [facility]    In 2 months Elizabeth Benitez MD [facility] Internal Medicine, [facility]                Passed - Cr is 1.3 or below and within 360 days     Creatinine   Date Value Ref Range Status   06/01/2020 1.0 0.5 - 1.4 mg/dL Final   03/02/2020 0.9 0.5 - 1.4 mg/dL Final   12/23/2019 1.0 0.5 - 1.4 mg/dL Final              Passed - HBA1C is 7.9 or below and within 180 days     Hemoglobin A1C   Date Value Ref Range Status   06/01/2020 7.1 (H) 4.0 - 5.6 % Final     Comment:     ADA Screening Guidelines:  5.7-6.4%  Consistent  with prediabetes  >or=6.5%  Consistent with diabetes  High levels of fetal hemoglobin interfere with the HbA1C  assay. Heterozygous hemoglobin variants (HbS, HgC, etc)do  not significantly interfere with this assay.   However, presence of multiple variants may affect accuracy.     03/02/2020 7.4 (H) 4.0 - 5.6 % Final     Comment:     ADA Screening Guidelines:  5.7-6.4%  Consistent with prediabetes  >or=6.5%  Consistent with diabetes  High levels of fetal hemoglobin interfere with the HbA1C  assay. Heterozygous hemoglobin variants (HbS, HgC, etc)do  not significantly interfere with this assay.   However, presence of multiple variants may affect accuracy.     10/23/2019 6.8 (H) 4.0 - 5.6 % Final     Comment:     ADA Screening Guidelines:  5.7-6.4%  Consistent with prediabetes  >or=6.5%  Consistent with diabetes  High levels of fetal hemoglobin interfere with the HbA1C  assay. Heterozygous hemoglobin variants (HbS, HgC, etc)do  not significantly interfere with this assay.   However, presence of multiple variants may affect accuracy.                Passed - eGFR is 30 or above and within 360 days     eGFR if non    Date Value Ref Range Status   06/01/2020 >60 >60 mL/min/1.73 m^2 Final     Comment:     Calculation used to obtain the estimated glomerular filtration  rate (eGFR) is the CKD-EPI equation.      03/02/2020 >60 >60 mL/min/1.73 m^2 Final     Comment:     Calculation used to obtain the estimated glomerular filtration  rate (eGFR) is the CKD-EPI equation.      12/23/2019 >60 >60 mL/min/1.73 m^2 Final     Comment:     Calculation used to obtain the estimated glomerular filtration  rate (eGFR) is the CKD-EPI equation.        eGFR if    Date Value Ref Range Status   06/01/2020 >60 >60 mL/min/1.73 m^2 Final   03/02/2020 >60 >60 mL/min/1.73 m^2 Final   12/23/2019 >60 >60 mL/min/1.73 m^2 Final                metoprolol succinate (TOPROL-XL) 25 MG 24 hr tablet 90 tablet 3     Sig: TAKE 1  TABLET(25 MG) BY MOUTH EVERY DAY       Cardiovascular:  Beta Blockers Failed - 7/2/2020 11:56 AM        Failed - Last BP in normal range within 360 days.     BP Readings from Last 3 Encounters:   07/02/20 (!) 162/74 06/03/20 132/60   05/19/20 (!) 162/79              Passed - Patient is at least 18 years old        Passed - Last Heart Rate in normal range within 360 days.     Pulse Readings from Last 3 Encounters:   07/02/20 74   06/03/20 74   05/19/20 89             Passed - Office visit in past 12 months or future 90 days.     Recent Outpatient Visits            Today Bilateral leg edema    [facility] - Internal Medicine Edgardo Cortes MD    4 weeks ago Anemia, unspecified type    [facility] Internal Medicine Elizabeth Benitez MD    1 month ago Meralgia paraesthetica, right    [place] - Neurology Dennis Michael MD    3 months ago Right hip pain    [place] - [facility] Dennis Michael MD    4 months ago Right hip pain    [facility] Internal Medicine Elizabeth Benitez MD          Future Appointments              In 2 weeks Dennis Michael MD [place] [facility] [place], [place]    In 2 months [facility], [facility]    In 2 months Elizabeth Benitez MD [facility] Internal Medicine, [facility]                  amLODIPine (NORVASC) 10 MG tablet 90 tablet 3     Sig: TAKE 1 TABLET(10 MG) BY MOUTH EVERY DAY       Cardiovascular:  Calcium Channel Blockers Failed - 7/2/2020 11:56 AM        Failed - Last BP in normal range within 360 days.     BP Readings from Last 3 Encounters:   07/02/20 (!) 162/74 06/03/20 132/60   05/19/20 (!) 162/79              Passed - Patient is at least 18 years old        Passed - Office visit in past 12 months or future 90 days.     Recent Outpatient Visits            Today Bilateral leg edema    [facility] Internal Medicine Edgardo Cortes MD    4 weeks ago Anemia, unspecified type    [facility] Internal Medicine Elizabeth Benitez MD    1  month ago Meralgia paraesthetica, right    [place] - Neurology Dennis Michael MD    3 months ago Right hip pain    [place] - Neurology Dennis Michael MD    4 months ago Right hip pain    [facility] Elizabeth Benitez MD          Future Appointments              In 2 weeks MD [place] - [place], [place]    In 2 months LAB, SAME DAY [facility] Lab-[address], [facility], [place] [facility]    In 2 months MD [facility] - Internal Medicine, [facility]                    Appointments  past 12m or future 3m with PCP    Date Provider   Last Visit   6/3/2020 Elizabeth Benitez MD   Next Visit   9/2/2020 Elizabeth Benitez MD   ED visits in past 90 days: 0     Note composed:12:01 PM 07/02/2020

## 2020-07-13 RX ORDER — LOSARTAN POTASSIUM 25 MG/1
TABLET ORAL
Qty: 90 TABLET | Refills: 0 | Status: SHIPPED | OUTPATIENT
Start: 2020-07-13 | End: 2020-08-04

## 2020-07-13 RX ORDER — SIMVASTATIN 20 MG/1
TABLET, FILM COATED ORAL
Qty: 90 TABLET | Refills: 2 | Status: SHIPPED | OUTPATIENT
Start: 2020-07-13 | End: 2021-03-28

## 2020-07-13 NOTE — PROGRESS NOTES
Refill Routing Note   Medication(s) are not appropriate for processing by [facility]:       - Required vitals are abnormal        Medication Therapy Plan: BP-ELEVATED(162/74) AT FAMILY MEDICINE(LEG SWELLING), DEFER TO YOU; APPROVE ZOCOR PER PROTOCOL  Medication reconciliation completed: No      Automatic Epic Generated Protocol Data:    Requested Prescriptions   Pending Prescriptions Disp Refills    simvastatin (ZOCOR) 20 MG tablet [Pharmacy Med Name: SIMVASTATIN 20MG TABLETS] 90 tablet 2     Sig: TAKE 1 TABLET(20 MG) BY MOUTH EVERY EVENING       Cardiovascular:  Antilipid - Statins Passed - 7/11/2020  5:58 AM        Passed - Patient is at least 18 years old        Passed - Office visit in past 12 months or future 90 days.     Recent Outpatient Visits            1 week ago Bilateral leg edema    [facility] - Internal Medicine Edgardo Cortes MD    1 month ago Anemia, unspecified type    [facility] - [facility] Elizabeth Benitez MD    1 month ago Meralgia paraesthetica, right    [place] - Neurology Dennis Michael MD    3 months ago Right hip pain    [place] - Neurology Dennis Michael MD    4 months ago Right hip pain    [facility] - Internal Medicine Elizabeth Benitez MD          Future Appointments              In 1 week Dennis Michael MD [place], [place]    In 1 month LAB, SAME DAY [facility] Lab-[address], [facility]    In 1 month Elizabeth Benitez MD [facility] Internal Medicine, [facility]                Passed - Lipid Panel completed in last 360 days     Lab Results   Component Value Date    CHOL 119 (L) 03/02/2020    HDL 40 03/02/2020    LDLCALC 32.6 (L) 03/02/2020    TRIG 232 (H) 03/02/2020             Passed - ALT is 94 or below and within 360 days     ALT   Date Value Ref Range Status   06/01/2020 39 10 - 44 U/L Final   03/02/2020 44 10 - 44 U/L Final   12/23/2019 46 (H) 10 - 44 U/L Final              Passed - AST is 54 or below and within 360  days     AST   Date Value Ref Range Status   06/01/2020 25 10 - 40 U/L Final   03/02/2020 34 10 - 40 U/L Final   12/23/2019 36 10 - 40 U/L Final                losartan (COZAAR) 25 MG tablet [Pharmacy Med Name: LOSARTAN 25MG TABLETS] 90 tablet 0     Sig: TAKE 1 TABLET(25 MG) BY MOUTH EVERY DAY       Cardiovascular:  Angiotensin Receptor Blockers Failed - 7/11/2020  5:58 AM        Failed - Last BP in normal range within 360 days.     BP Readings from Last 3 Encounters:   07/02/20 (!) 162/74   06/03/20 132/60   05/19/20 (!) 162/79              Passed - Patient is at least 18 years old        Passed - Office visit in past 12 months or future 90 days.     Recent Outpatient Visits            1 week ago Bilateral leg edema    [facility] - Internal Medicine Edgardo Cortes MD    1 month ago Anemia, unspecified type    [facility] - Internal Medicine Elizabeth Benitez MD    1 month ago Meralgia paraesthetica, right    [place] - Neurology Dennis Michael MD    3 months ago Right hip pain    [place] - Neurology Dennis Michael MD    4 months ago Right hip pain    [facility] - Internal Medicine Elizabeth Benitez MD          Future Appointments              In 1 week Dennis Michael MD [place] - [place]rology, [place]    In 1 month LAB, SAME DAY [facility]-[address], [place] [facility]    In 1 month Elizabeth Benitez MD [facility] - Internal Medicine, [facility]                Passed - Cr is 1.3 or below and within 360 days     Creatinine   Date Value Ref Range Status   06/01/2020 1.0 0.5 - 1.4 mg/dL Final   03/02/2020 0.9 0.5 - 1.4 mg/dL Final   12/23/2019 1.0 0.5 - 1.4 mg/dL Final              Passed - K in normal range and within 360 days     Potassium   Date Value Ref Range Status   06/01/2020 4.3 3.5 - 5.1 mmol/L Final   03/02/2020 4.2 3.5 - 5.1 mmol/L Final   12/23/2019 4.7 3.5 - 5.1 mmol/L Final              Passed - eGFR within 360 days     eGFR if non    Date Value Ref Range Status    06/01/2020 >60 >60 mL/min/1.73 m^2 Final     Comment:     Calculation used to obtain the estimated glomerular filtration  rate (eGFR) is the CKD-EPI equation.      03/02/2020 >60 >60 mL/min/1.73 m^2 Final     Comment:     Calculation used to obtain the estimated glomerular filtration  rate (eGFR) is the CKD-EPI equation.      12/23/2019 >60 >60 mL/min/1.73 m^2 Final     Comment:     Calculation used to obtain the estimated glomerular filtration  rate (eGFR) is the CKD-EPI equation.        eGFR if    Date Value Ref Range Status   06/01/2020 >60 >60 mL/min/1.73 m^2 Final   03/02/2020 >60 >60 mL/min/1.73 m^2 Final   12/23/2019 >60 >60 mL/min/1.73 m^2 Final                    Appointments  past 12m or future 3m with PCP    Date Provider   Last Visit   6/3/2020 Elizabeth Benitez MD   Next Visit   9/2/2020 Elizabeth Benitez MD   ED visits in past 90 days: 0     Note composed:10:08 AM 07/13/2020

## 2020-07-21 ENCOUNTER — OFFICE VISIT (OUTPATIENT)
Dept: NEUROLOGY | Facility: CLINIC | Age: 70
End: 2020-07-21
Payer: MEDICARE

## 2020-07-21 VITALS
BODY MASS INDEX: 29.81 KG/M2 | DIASTOLIC BLOOD PRESSURE: 64 MMHG | HEART RATE: 71 BPM | HEIGHT: 69 IN | WEIGHT: 201.25 LBS | SYSTOLIC BLOOD PRESSURE: 142 MMHG

## 2020-07-21 DIAGNOSIS — M54.31 RIGHT SIDED SCIATICA: ICD-10-CM

## 2020-07-21 DIAGNOSIS — M79.651 ACUTE PAIN OF RIGHT THIGH: Primary | ICD-10-CM

## 2020-07-21 DIAGNOSIS — M70.61: ICD-10-CM

## 2020-07-21 DIAGNOSIS — G57.11 MERALGIA PARAESTHETICA, RIGHT: ICD-10-CM

## 2020-07-21 PROCEDURE — 1101F PT FALLS ASSESS-DOCD LE1/YR: CPT | Mod: CPTII,S$GLB,, | Performed by: NEUROMUSCULOSKELETAL MEDICINE & OMM

## 2020-07-21 PROCEDURE — 99999 PR PBB SHADOW E&M-EST. PATIENT-LVL IV: ICD-10-PCS | Mod: PBBFAC,,, | Performed by: NEUROMUSCULOSKELETAL MEDICINE & OMM

## 2020-07-21 PROCEDURE — 1159F MED LIST DOCD IN RCRD: CPT | Mod: S$GLB,,, | Performed by: NEUROMUSCULOSKELETAL MEDICINE & OMM

## 2020-07-21 PROCEDURE — 3008F BODY MASS INDEX DOCD: CPT | Mod: CPTII,S$GLB,, | Performed by: NEUROMUSCULOSKELETAL MEDICINE & OMM

## 2020-07-21 PROCEDURE — 1125F AMNT PAIN NOTED PAIN PRSNT: CPT | Mod: S$GLB,,, | Performed by: NEUROMUSCULOSKELETAL MEDICINE & OMM

## 2020-07-21 PROCEDURE — 1101F PR PT FALLS ASSESS DOC 0-1 FALLS W/OUT INJ PAST YR: ICD-10-PCS | Mod: CPTII,S$GLB,, | Performed by: NEUROMUSCULOSKELETAL MEDICINE & OMM

## 2020-07-21 PROCEDURE — 1159F PR MEDICATION LIST DOCUMENTED IN MEDICAL RECORD: ICD-10-PCS | Mod: S$GLB,,, | Performed by: NEUROMUSCULOSKELETAL MEDICINE & OMM

## 2020-07-21 PROCEDURE — 3078F PR MOST RECENT DIASTOLIC BLOOD PRESSURE < 80 MM HG: ICD-10-PCS | Mod: CPTII,S$GLB,, | Performed by: NEUROMUSCULOSKELETAL MEDICINE & OMM

## 2020-07-21 PROCEDURE — 99214 PR OFFICE/OUTPT VISIT, EST, LEVL IV, 30-39 MIN: ICD-10-PCS | Mod: S$GLB,,, | Performed by: NEUROMUSCULOSKELETAL MEDICINE & OMM

## 2020-07-21 PROCEDURE — 99999 PR PBB SHADOW E&M-EST. PATIENT-LVL IV: CPT | Mod: PBBFAC,,, | Performed by: NEUROMUSCULOSKELETAL MEDICINE & OMM

## 2020-07-21 PROCEDURE — 3078F DIAST BP <80 MM HG: CPT | Mod: CPTII,S$GLB,, | Performed by: NEUROMUSCULOSKELETAL MEDICINE & OMM

## 2020-07-21 PROCEDURE — 99214 OFFICE O/P EST MOD 30 MIN: CPT | Mod: S$GLB,,, | Performed by: NEUROMUSCULOSKELETAL MEDICINE & OMM

## 2020-07-21 PROCEDURE — 3077F PR MOST RECENT SYSTOLIC BLOOD PRESSURE >= 140 MM HG: ICD-10-PCS | Mod: CPTII,S$GLB,, | Performed by: NEUROMUSCULOSKELETAL MEDICINE & OMM

## 2020-07-21 PROCEDURE — 3008F PR BODY MASS INDEX (BMI) DOCUMENTED: ICD-10-PCS | Mod: CPTII,S$GLB,, | Performed by: NEUROMUSCULOSKELETAL MEDICINE & OMM

## 2020-07-21 PROCEDURE — 1125F PR PAIN SEVERITY QUANTIFIED, PAIN PRESENT: ICD-10-PCS | Mod: S$GLB,,, | Performed by: NEUROMUSCULOSKELETAL MEDICINE & OMM

## 2020-07-21 PROCEDURE — 3077F SYST BP >= 140 MM HG: CPT | Mod: CPTII,S$GLB,, | Performed by: NEUROMUSCULOSKELETAL MEDICINE & OMM

## 2020-07-21 NOTE — PROGRESS NOTES
GI-his note was dictated with Modal Fluency a word recognition program. There are occasionally word recognition errors which are missed on review.  negativeTGU- negative  Musculoskeletal-negative  Skin-negative  Neurologic-negative  Psychiatric-negative  Endocrine-negative  Hematology/lymph nodes-negative  Allergies/immunology-negative  Gen. Appearance: Well-developed with no obvious deformities  Carotid arteries symmetrical pulses  Peripheral vascular shows symmetrical pulses with no obvious edema or tenderness  Social History :  Patient is a retired ; he does some gardening work now    Present history; patient presents for follow-up for right leg pain.  He is taking gabapentin 400 mg up to 3200 per day and states that this is not relieving the pain although it does improve it from a 10/10 to a 6-7/10.  He is also taking hydrocodone from Pain Management.  He complains of the pain 24/7 with some variability in intensity.  He states the Keppra 500 mg at night did not help him at all.  He had had injection from pain management and he states this did not help either.  He complains that the pain is worse with sitting which is typical of sciatica pain and better with standing.  The pain tends to go down the right side of the leg brain hand differential of bursitis and/or meralgia paresthetica.  He was scheduled to have an MRI back in March of 2020 however never had this done.  He is scheduled to follow-up with pain management.  He relates his diabetes is in good control and this is not the issue.  Previous note:  5-19-20  Present history:  patient presents for follow-up for his EMG results.  EMG shows mild sensory neuropathy consistent with a history of diabetes.  EMG in of the right thigh shows no abnormalities consistent with the diagnosis of meralgia paresthetica.  Patient had an injection in the groin area by pain management.  He said this helped about 50% for 1 day only.  He continues to have burning and  numbness with shooting pain into the right outside and top surface of the thigh.  The gabapentin 400 mg q.i.d. seems to help some however the pain is frequently 12/10 in intensity.  He denies any hip pain.  The oxycodone causes significant constipation any can not tolerate this medicine.  He continues to take Keppra 500 mg half tablet twice a day.     Previous note:  3-17-20:  This is a 69-year-old  male who presents with a history of pain in the right thigh.  He states that it feels like and bites with burning tingling in the right thigh over the last 2 months.  He has seen Sports Medicine however did not have any improvement with the recommendations.  He has a history of low back pain for which she has been seen pain management the last visit being in November for lumbar disc disease.  Patient has been taking gabapentin 300 mg 3 times a day without help.  He was taking oxycodone which helps some.  He has a history of diabetes for over 10 years.  Patient relates the problem to rotator cuff surgery on the left shoulder in January of 20 20.  He noted after the surgery he had to lie on his right side and sleep on his right side because of the shoulder for least 4 weeks.  Two weeks after surgery is when he noticed the pain in the right thigh which  intensity he describes as 8/10.     Neurological Exam:  Mental status-alert and oriented to person, place, and time; attention span and concentration is good. Fund of knowledge-patient is aware of current events and able to give detailed history of the current problem.recent and remote memory seems intact. Language function is normal with no evidence of aphasia  Cranial nerves:Visual acuity to hand chart -normal; visual fields to confrontation normal;pupils were equal and reactive to light ;no evidence of ptosis ;  funduscopic examination was normal with sharp disc margins. external ocular movements were full with no nystagmus. Facial sensation to pinprick :  normal ; corneal reflexes intact; Facial muscles were symmetrical. Hearing is unimpaired symmetrical finger rub; Tongue movements - normal ; palate movements - normal ;Swallowing unimpaired. Shoulder shrug was intact with good strength Speech was normal  Motor examination: Upper : normal                                      Lower extremities - Normal;muscle tone was normal ; pain to palpation of the lower right quadriceps tendon Area suggestive of tendonitis versus bursitis.  Patient has no pain on external or internal rotation of the hip.                  Right-handed  Sensory examination:   Upper; normal pinprick and soft touch ;   Lower extremities - hyperintensity to soft touch or pinprick over the right lateral thigh  Vibration sense:  Absent @ toes/knee  Deep tendon reflexes: upper extremities :0 symmetrical ;     lower extremities KJ- 0; AJ - 0 Both plantar responses were flexor  Cerebellar examination upper: Normal finger to nose and rapid alternating movements  Gait: Steady with no ataxia;      heel and toe walk normal  Romberg test: negative       Tandem gait: Normal    Involuntary movements: Negative  TMJ - no tenderness  Cervical examination: Full range of motion with no pain Cervical tenderness :negative  Lumbar examination: Low back tenderness-negative                  Sciatic notchtenderness-negative            Straight leg raising : negative     Impression:  Right sciatica; Right meralgia paresthetica; right lateral thigh tendinitis at the hip; history lumbar disc disease rule out lumbar radiculopathy        Recommendations/Plan :  I explained to the patient that he has got predominantly sciatic type pain versus meralgia paresthetica/hip pain.  Suggested he continue follow-up with pain management since I have nothing else to offer and then may have another injection the that may help.  He probably needs to have an MRI of the lumbar spine which was never done.  He does not need to follow up with me  since this is out of my expertise at this point.

## 2020-08-01 RX ORDER — GABAPENTIN 400 MG/1
400 CAPSULE ORAL 4 TIMES DAILY
Qty: 120 CAPSULE | Refills: 4 | Status: SHIPPED | OUTPATIENT
Start: 2020-08-01 | End: 2021-01-02

## 2020-08-01 NOTE — TELEPHONE ENCOUNTER
----- Message from Hay Marks sent at 8/1/2020 11:57 AM CDT -----  Type:  RX Refill Request    Who Called:Patient  Refill or New Rx:Refill  RX Name  gabapentin (NEURONTIN) 400 MG capsule  How is the patient currently taking it? (ex. 1XDay):4xday  Is this a 30 day or 90 day Rx:30 day  Preferred Pharmacy with phone number:[facility] [phone]  Local or Mail Order:Local  Ordering Provider:Emmanuel  Would the patient rather a call back or a response via [facility]? Call back  Best Call Back Number:[phone] or [phone]  Additional Information:

## 2020-08-02 NOTE — TELEPHONE ENCOUNTER
No new care gaps identified.  Powered by [employer]. Reference number: [id number]. 8/02/2020 3:19:53 AM [email or web address]

## 2020-08-04 RX ORDER — LOSARTAN POTASSIUM 25 MG/1
25 TABLET ORAL DAILY
Qty: 90 TABLET | Refills: 0 | Status: SHIPPED | OUTPATIENT
Start: 2020-08-04 | End: 2020-10-13

## 2020-08-04 NOTE — TELEPHONE ENCOUNTER
Refill Authorization Note    is requesting a refill authorization.    Brief assessment and rationale for refill: APPROVE: prr          Medication Therapy Plan: PATRICIA. SYMONES    Medication reconciliation completed: No                         Comments:      Orders Placed This Encounter    losartan (COZAAR) 25 MG tablet      Requested Prescriptions   Signed Prescriptions Disp Refills    losartan (COZAAR) 25 MG tablet 90 tablet 0     Sig: Take 1 tablet (25 mg total) by mouth once daily.       Cardiovascular:  Angiotensin Receptor Blockers Failed - 8/2/2020  3:19 AM        Failed - Last BP in normal range within 360 days.     BP Readings from Last 3 Encounters:   07/21/20 (!) 142/64   07/02/20 (!) 162/74   06/03/20 132/60              Passed - Patient is at least 18 years old        Passed - Office visit in past 12 months or future 90 days.     Recent Outpatient Visits            2 weeks ago Acute pain of right thigh    [place] - Neurology Dennis Michael MD    1 month ago Bilateral leg edema    [facility] - Internal Medicine Edgardo Cortes MD    2 months ago Anemia, unspecified type    [facility] - Internal Medicine Elizabeth Benitez MD    2 months ago Meralgia paraesthetica, right    [place] - Neurology Dennis Michael MD    4 months ago Right hip pain    [place] - Neurology Dennis Michael MD          Future Appointments              In 3 weeks [facility], [facility]    In 4 weeks Elizabeth Benitez MD [facility] - Internal Medicine, [facility]                Passed - Cr is 1.3 or below and within 360 days     Creatinine   Date Value Ref Range Status   06/01/2020 1.0 0.5 - 1.4 mg/dL Final   03/02/2020 0.9 0.5 - 1.4 mg/dL Final   12/23/2019 1.0 0.5 - 1.4 mg/dL Final              Passed - K in normal range and within 360 days     Potassium   Date Value Ref Range Status   06/01/2020 4.3 3.5 - 5.1 mmol/L Final   03/02/2020 4.2 3.5 - 5.1 mmol/L Final   12/23/2019  4.7 3.5 - 5.1 mmol/L Final              Passed - eGFR within 360 days     eGFR if non    Date Value Ref Range Status   06/01/2020 >60 >60 mL/min/1.73 m^2 Final     Comment:     Calculation used to obtain the estimated glomerular filtration  rate (eGFR) is the CKD-EPI equation.      03/02/2020 >60 >60 mL/min/1.73 m^2 Final     Comment:     Calculation used to obtain the estimated glomerular filtration  rate (eGFR) is the CKD-EPI equation.      12/23/2019 >60 >60 mL/min/1.73 m^2 Final     Comment:     Calculation used to obtain the estimated glomerular filtration  rate (eGFR) is the CKD-EPI equation.        eGFR if    Date Value Ref Range Status   06/01/2020 >60 >60 mL/min/1.73 m^2 Final   03/02/2020 >60 >60 mL/min/1.73 m^2 Final   12/23/2019 >60 >60 mL/min/1.73 m^2 Final                  Appointments  past 12m or future 3m with PCP    Date Provider   Last Visit   6/3/2020 Elizabeth Benitez MD   Next Visit   9/2/2020 Elizabeth Benitez MD   ED visits in past 90 days: [unfilled]     Note composed:2:13 PM 08/04/2020

## 2020-08-19 ENCOUNTER — PATIENT OUTREACH (OUTPATIENT)
Dept: ADMINISTRATIVE | Facility: HOSPITAL | Age: 70
End: 2020-08-19

## 2020-08-31 ENCOUNTER — LAB VISIT (OUTPATIENT)
Dept: LAB | Facility: OTHER | Age: 70
End: 2020-08-31
Attending: INTERNAL MEDICINE
Payer: MEDICARE

## 2020-08-31 DIAGNOSIS — E13.9 DIABETES MELLITUS DUE TO ABNORMAL INSULIN: ICD-10-CM

## 2020-08-31 LAB
ALBUMIN SERPL BCP-MCNC: 4.4 G/DL (ref 3.5–5.2)
ALP SERPL-CCNC: 71 U/L (ref 55–135)
ALT SERPL W/O P-5'-P-CCNC: 44 U/L (ref 10–44)
ANION GAP SERPL CALC-SCNC: 13 MMOL/L (ref 8–16)
AST SERPL-CCNC: 30 U/L (ref 10–40)
BASOPHILS # BLD AUTO: 0.03 K/UL (ref 0–0.2)
BASOPHILS NFR BLD: 0.4 % (ref 0–1.9)
BILIRUB SERPL-MCNC: 0.2 MG/DL (ref 0.1–1)
BUN SERPL-MCNC: 10 MG/DL (ref 8–23)
CALCIUM SERPL-MCNC: 9.5 MG/DL (ref 8.7–10.5)
CHLORIDE SERPL-SCNC: 105 MMOL/L (ref 95–110)
CO2 SERPL-SCNC: 22 MMOL/L (ref 23–29)
CREAT SERPL-MCNC: 0.9 MG/DL (ref 0.5–1.4)
DIFFERENTIAL METHOD: ABNORMAL
EOSINOPHIL # BLD AUTO: 0.4 K/UL (ref 0–0.5)
EOSINOPHIL NFR BLD: 6 % (ref 0–8)
ERYTHROCYTE [DISTWIDTH] IN BLOOD BY AUTOMATED COUNT: 13.4 % (ref 11.5–14.5)
EST. GFR  (AFRICAN AMERICAN): >60 ML/MIN/1.73 M^2
EST. GFR  (NON AFRICAN AMERICAN): >60 ML/MIN/1.73 M^2
ESTIMATED AVG GLUCOSE: 160 MG/DL (ref 68–131)
GLUCOSE SERPL-MCNC: 154 MG/DL (ref 70–110)
HBA1C MFR BLD HPLC: 7.2 % (ref 4–5.6)
HCT VFR BLD AUTO: 38.3 % (ref 40–54)
HGB BLD-MCNC: 12.6 G/DL (ref 14–18)
IMM GRANULOCYTES # BLD AUTO: 0.01 K/UL (ref 0–0.04)
IMM GRANULOCYTES NFR BLD AUTO: 0.1 % (ref 0–0.5)
LYMPHOCYTES # BLD AUTO: 3.8 K/UL (ref 1–4.8)
LYMPHOCYTES NFR BLD: 52.3 % (ref 18–48)
MCH RBC QN AUTO: 32.8 PG (ref 27–31)
MCHC RBC AUTO-ENTMCNC: 32.9 G/DL (ref 32–36)
MCV RBC AUTO: 100 FL (ref 82–98)
MONOCYTES # BLD AUTO: 0.3 K/UL (ref 0.3–1)
MONOCYTES NFR BLD: 4.6 % (ref 4–15)
NEUTROPHILS # BLD AUTO: 2.6 K/UL (ref 1.8–7.7)
NEUTROPHILS NFR BLD: 36.6 % (ref 38–73)
NRBC BLD-RTO: 0 /100 WBC
PLATELET # BLD AUTO: 300 K/UL (ref 150–350)
PMV BLD AUTO: 9 FL (ref 9.2–12.9)
POTASSIUM SERPL-SCNC: 4.4 MMOL/L (ref 3.5–5.1)
PROT SERPL-MCNC: 7.3 G/DL (ref 6–8.4)
RBC # BLD AUTO: 3.84 M/UL (ref 4.6–6.2)
SODIUM SERPL-SCNC: 140 MMOL/L (ref 136–145)
WBC # BLD AUTO: 7.19 K/UL (ref 3.9–12.7)

## 2020-08-31 PROCEDURE — 36415 COLL VENOUS BLD VENIPUNCTURE: CPT

## 2020-08-31 PROCEDURE — 83036 HEMOGLOBIN GLYCOSYLATED A1C: CPT

## 2020-08-31 PROCEDURE — 85025 COMPLETE CBC W/AUTO DIFF WBC: CPT

## 2020-08-31 PROCEDURE — 80053 COMPREHEN METABOLIC PANEL: CPT

## 2020-09-02 ENCOUNTER — OFFICE VISIT (OUTPATIENT)
Dept: INTERNAL MEDICINE | Facility: CLINIC | Age: 70
End: 2020-09-02
Payer: MEDICARE

## 2020-09-02 VITALS
SYSTOLIC BLOOD PRESSURE: 132 MMHG | OXYGEN SATURATION: 99 % | HEIGHT: 69 IN | BODY MASS INDEX: 29.22 KG/M2 | WEIGHT: 197.31 LBS | DIASTOLIC BLOOD PRESSURE: 60 MMHG | HEART RATE: 69 BPM

## 2020-09-02 DIAGNOSIS — E13.9 DIABETES MELLITUS DUE TO ABNORMAL INSULIN: ICD-10-CM

## 2020-09-02 DIAGNOSIS — G57.11 MERALGIA PARAESTHETICA, RIGHT: ICD-10-CM

## 2020-09-02 DIAGNOSIS — K21.9 GASTROESOPHAGEAL REFLUX DISEASE WITHOUT ESOPHAGITIS: ICD-10-CM

## 2020-09-02 DIAGNOSIS — E78.5 HYPERLIPIDEMIA, UNSPECIFIED HYPERLIPIDEMIA TYPE: Primary | ICD-10-CM

## 2020-09-02 DIAGNOSIS — I10 ESSENTIAL HYPERTENSION: ICD-10-CM

## 2020-09-02 PROCEDURE — 1101F PT FALLS ASSESS-DOCD LE1/YR: CPT | Mod: CPTII,S$GLB,, | Performed by: INTERNAL MEDICINE

## 2020-09-02 PROCEDURE — 3051F HG A1C>EQUAL 7.0%<8.0%: CPT | Mod: CPTII,S$GLB,, | Performed by: INTERNAL MEDICINE

## 2020-09-02 PROCEDURE — 3008F PR BODY MASS INDEX (BMI) DOCUMENTED: ICD-10-PCS | Mod: CPTII,S$GLB,, | Performed by: INTERNAL MEDICINE

## 2020-09-02 PROCEDURE — 3075F SYST BP GE 130 - 139MM HG: CPT | Mod: CPTII,S$GLB,, | Performed by: INTERNAL MEDICINE

## 2020-09-02 PROCEDURE — 3008F BODY MASS INDEX DOCD: CPT | Mod: CPTII,S$GLB,, | Performed by: INTERNAL MEDICINE

## 2020-09-02 PROCEDURE — 3078F DIAST BP <80 MM HG: CPT | Mod: CPTII,S$GLB,, | Performed by: INTERNAL MEDICINE

## 2020-09-02 PROCEDURE — 99999 PR PBB SHADOW E&M-EST. PATIENT-LVL II: ICD-10-PCS | Mod: PBBFAC,,, | Performed by: INTERNAL MEDICINE

## 2020-09-02 PROCEDURE — 1159F PR MEDICATION LIST DOCUMENTED IN MEDICAL RECORD: ICD-10-PCS | Mod: S$GLB,,, | Performed by: INTERNAL MEDICINE

## 2020-09-02 PROCEDURE — 1159F MED LIST DOCD IN RCRD: CPT | Mod: S$GLB,,, | Performed by: INTERNAL MEDICINE

## 2020-09-02 PROCEDURE — 3075F PR MOST RECENT SYSTOLIC BLOOD PRESS GE 130-139MM HG: ICD-10-PCS | Mod: CPTII,S$GLB,, | Performed by: INTERNAL MEDICINE

## 2020-09-02 PROCEDURE — 3078F PR MOST RECENT DIASTOLIC BLOOD PRESSURE < 80 MM HG: ICD-10-PCS | Mod: CPTII,S$GLB,, | Performed by: INTERNAL MEDICINE

## 2020-09-02 PROCEDURE — 99214 OFFICE O/P EST MOD 30 MIN: CPT | Mod: S$GLB,,, | Performed by: INTERNAL MEDICINE

## 2020-09-02 PROCEDURE — 99214 PR OFFICE/OUTPT VISIT, EST, LEVL IV, 30-39 MIN: ICD-10-PCS | Mod: S$GLB,,, | Performed by: INTERNAL MEDICINE

## 2020-09-02 PROCEDURE — 1101F PR PT FALLS ASSESS DOC 0-1 FALLS W/OUT INJ PAST YR: ICD-10-PCS | Mod: CPTII,S$GLB,, | Performed by: INTERNAL MEDICINE

## 2020-09-02 PROCEDURE — 3051F PR MOST RECENT HEMOGLOBIN A1C LEVEL 7.0 - < 8.0%: ICD-10-PCS | Mod: CPTII,S$GLB,, | Performed by: INTERNAL MEDICINE

## 2020-09-02 PROCEDURE — 99999 PR PBB SHADOW E&M-EST. PATIENT-LVL II: CPT | Mod: PBBFAC,,, | Performed by: INTERNAL MEDICINE

## 2020-09-02 RX ORDER — DIAZEPAM 5 MG/1
5 TABLET ORAL NIGHTLY PRN
Qty: 30 TABLET | Refills: 1 | Status: SHIPPED | OUTPATIENT
Start: 2020-09-02

## 2020-09-02 NOTE — PROGRESS NOTES
Follow up of hypertension, diabetes, recurrent UTI, hyperlipidemia.      HISTORY OF PRESENT ILLNESS: This is a 70-year-old man who presents for follow up of above.     He continues to have back pain and lateral right leg pain.  Currently taking oxycodone apap 10/325 one tablet three times daily and gabapentin 400 mg 1 tablet 4 times daily.  These medications keep pain tolerable. His pain is currently 7/10_ has not taken pain meds yet today.  HE is not sleeping well due to joint issues- shoulder, back and right leg. He cannot get comfortable.      Dr Randall  on 3/17/20 who felt he has -  Right meralgia paresthetica; right lateral thigh tendinitis at the hip; history lumbar disc disease rule out lumbar radiculopathy. EMG was 5/5/20 -  Sensory neuropathy consistent with a history of diabetes; these findings would be compatible with clinical picture of meralgia paresthetica.  He continues to have significant right leg pain that starts in the right groin and goes down the lateral right thigh and the anterior right thigh.  He has a burning sensation on the right thigh.  HE is followed by  Dr Carrasquillo in pain management for his pain.  Foreign has tired 2 lidocaine injections for the right leg pain which helped a little.      Left shoulder is getting better . He is back in physical therapy- it was postponed due to COVID pandemic      He had lithotripsy on 3/23/17. No dysuria, hematuria.   He saw Dr cerna on 1/19. He still has a stone in the left kidney. He is on finasteride and tamsulosin for his prostate.      He has not had any alcohol and has not had a recurrence of pancreatitis. No nausea, vomiting, constipation, diarrhea, melana, bloody stools. He gets constipated from the oxycodone.      He is taking amlodipine 10 mg once daily, Toprol-XL 25 mg once daily and losartan 25 mg daily. No chest pain, shortness of breath. He is now taking pioglitazone 15 mg daily as needed and metformin  mg 2 tablets twice  daily. HE checks his blood sugar twice daily due to fluctuating blood sugars. Sugars have been higher due to the stress of surgery  No polydipsia. He has a history of hyperlipidemia, currently on Zocor 20 mg daily. No joint pain or muscle pain. NO shortness breath, headaches, vision, sinus congestion.        He is smoking more - almost 2 packs per day.  His wife makes him nervous.  He smokes when he gets stressed. He is no longer drinking alcohol so her turns to cigarettes for his mood.      PAST MEDICAL HISTORY:    1. Hypertension.    2. Coronary artery disease with left heart catheterization in November 2011 with 40 to 50% narrowing in the mid LAD.    3. Diabetes mellitus.    4. Hyperlipidemia.    5. History of reflux.    6. Colon polyps.    7. History of cluster headaches.    8. History of glaucoma.    9. Lumbar disk disease at L5 to S1.    10. History of stomach ulcers.    11. Nephrolithiasis.       PAST SURGICAL HISTORY: Cholecystectomy in May 2013, ORIF of the left humerus.      SOCIAL HISTORY: Currently smokes a pack of cigarettes daily, has smoked his whole life. 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 He is a retired schoolteacher.       FAMILY HISTORY: Updated on [facility].      PHYSICAL EXAMINATION:        GENERAL: He is alert, oriented, no apparent distress. Affect within normal limits.    HEENT: Conjunctivae anicteric. Tympanic membranes clear.  . .    NECK: Supple. No cervical lymphadenopathy, no thyroid enlargement.    Respiratory: Effort normal. Lungs are clear to auscultation.    HEART: Regular rate and rhythm without murmurs, gallops or rubs. No lower extremity edema.           Labs 8/31/20 reviewed - A1C 7.2, CBC - Hb 12.6, cmp acceptable     ASSESSMENT AND PLAN:    1. Right leg pain - Try over the counter lidocaine 4% patch to the right thigh - 2 patches twice daily.  2. Left shoulder surgery - in physical medicine  3. Hypertension - Continue current medications  3. Diabetes  mellitus. better  4. Hyperlipidemia. on Zocor.  Work on diet. \  5. History of reflux and peptic ulcer disease -asx. Off pantoprazole  6. Tubular adenomas - colonoscopy 12/2016 - one polyp - due 2021  7. History of glaucoma - not on treatment - pressure in eyes ok - followed Nile Avina and retinal specialist at [facility]  8. History of nephrolithiasis -asymptomatic.    9. Irregular heart beat sensation - holter revealed PVC  10. Pancreatitis - resolved. Do not suspect malabsorption as cause of weight loss as he is not having diarrhea. He is not drinking alochol  11. 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 Anxiety and depression -valium 5 mg nightly prn anxiety  13. Tobacco abuse - CT chest - declined..   14. Recurrent UTI -asx. Saw urology 10/23/17. S/p ESWL  15. Anemia - stable  I will see him back in 3 months, sooner if problems arise.          Colonoscopy 12/7/16 - one hyperplastic polyp due in 2021  Influenza 10/10/19  Pneumovax 7/2014  Zostavax 5/16  Prevnar  12/14/16  PSA 7/17

## 2020-10-13 RX ORDER — LOSARTAN POTASSIUM 25 MG/1
TABLET ORAL
Qty: 90 TABLET | Refills: 3 | Status: SHIPPED | OUTPATIENT
Start: 2020-10-13 | End: 2021-02-09

## 2020-11-06 DIAGNOSIS — E11.9 TYPE 2 DIABETES MELLITUS WITHOUT COMPLICATION, UNSPECIFIED WHETHER LONG TERM INSULIN USE: ICD-10-CM

## 2020-11-16 ENCOUNTER — TELEPHONE (OUTPATIENT)
Dept: INTERNAL MEDICINE | Facility: CLINIC | Age: 70
End: 2020-11-16

## 2020-11-16 ENCOUNTER — PATIENT MESSAGE (OUTPATIENT)
Dept: INTERNAL MEDICINE | Facility: CLINIC | Age: 70
End: 2020-11-16

## 2020-11-25 ENCOUNTER — LAB VISIT (OUTPATIENT)
Dept: LAB | Facility: OTHER | Age: 70
End: 2020-11-25
Attending: INTERNAL MEDICINE
Payer: MEDICARE

## 2020-11-25 DIAGNOSIS — E13.9 DIABETES MELLITUS DUE TO ABNORMAL INSULIN: ICD-10-CM

## 2020-11-25 LAB
ALBUMIN SERPL BCP-MCNC: 4.5 G/DL (ref 3.5–5.2)
ALP SERPL-CCNC: 66 U/L (ref 55–135)
ALT SERPL W/O P-5'-P-CCNC: 46 U/L (ref 10–44)
ANION GAP SERPL CALC-SCNC: 9 MMOL/L (ref 8–16)
AST SERPL-CCNC: 32 U/L (ref 10–40)
BASOPHILS # BLD AUTO: 0.05 K/UL (ref 0–0.2)
BASOPHILS NFR BLD: 0.7 % (ref 0–1.9)
BILIRUB SERPL-MCNC: 0.2 MG/DL (ref 0.1–1)
BUN SERPL-MCNC: 14 MG/DL (ref 8–23)
CALCIUM SERPL-MCNC: 9.1 MG/DL (ref 8.7–10.5)
CHLORIDE SERPL-SCNC: 105 MMOL/L (ref 95–110)
CO2 SERPL-SCNC: 25 MMOL/L (ref 23–29)
CREAT SERPL-MCNC: 1.1 MG/DL (ref 0.5–1.4)
DIFFERENTIAL METHOD: ABNORMAL
EOSINOPHIL # BLD AUTO: 0.3 K/UL (ref 0–0.5)
EOSINOPHIL NFR BLD: 4.9 % (ref 0–8)
ERYTHROCYTE [DISTWIDTH] IN BLOOD BY AUTOMATED COUNT: 13.2 % (ref 11.5–14.5)
EST. GFR  (AFRICAN AMERICAN): >60 ML/MIN/1.73 M^2
EST. GFR  (NON AFRICAN AMERICAN): >60 ML/MIN/1.73 M^2
ESTIMATED AVG GLUCOSE: 154 MG/DL (ref 68–131)
GLUCOSE SERPL-MCNC: 127 MG/DL (ref 70–110)
HBA1C MFR BLD HPLC: 7 % (ref 4–5.6)
HCT VFR BLD AUTO: 39.3 % (ref 40–54)
HGB BLD-MCNC: 12.7 G/DL (ref 14–18)
IMM GRANULOCYTES # BLD AUTO: 0.02 K/UL (ref 0–0.04)
IMM GRANULOCYTES NFR BLD AUTO: 0.3 % (ref 0–0.5)
LYMPHOCYTES # BLD AUTO: 3.1 K/UL (ref 1–4.8)
LYMPHOCYTES NFR BLD: 45.6 % (ref 18–48)
MCH RBC QN AUTO: 32.1 PG (ref 27–31)
MCHC RBC AUTO-ENTMCNC: 32.3 G/DL (ref 32–36)
MCV RBC AUTO: 99 FL (ref 82–98)
MONOCYTES # BLD AUTO: 0.3 K/UL (ref 0.3–1)
MONOCYTES NFR BLD: 4.8 % (ref 4–15)
NEUTROPHILS # BLD AUTO: 3 K/UL (ref 1.8–7.7)
NEUTROPHILS NFR BLD: 43.7 % (ref 38–73)
NRBC BLD-RTO: 0 /100 WBC
PLATELET # BLD AUTO: 316 K/UL (ref 150–350)
PMV BLD AUTO: 8.8 FL (ref 9.2–12.9)
POTASSIUM SERPL-SCNC: 4.3 MMOL/L (ref 3.5–5.1)
PROT SERPL-MCNC: 7.3 G/DL (ref 6–8.4)
RBC # BLD AUTO: 3.96 M/UL (ref 4.6–6.2)
SODIUM SERPL-SCNC: 139 MMOL/L (ref 136–145)
WBC # BLD AUTO: 6.89 K/UL (ref 3.9–12.7)

## 2020-11-25 PROCEDURE — 80053 COMPREHEN METABOLIC PANEL: CPT

## 2020-11-25 PROCEDURE — 83036 HEMOGLOBIN GLYCOSYLATED A1C: CPT

## 2020-11-25 PROCEDURE — 36415 COLL VENOUS BLD VENIPUNCTURE: CPT

## 2020-11-25 PROCEDURE — 85025 COMPLETE CBC W/AUTO DIFF WBC: CPT

## 2020-11-30 ENCOUNTER — PATIENT OUTREACH (OUTPATIENT)
Dept: ADMINISTRATIVE | Facility: HOSPITAL | Age: 70
End: 2020-11-30

## 2020-12-02 ENCOUNTER — OFFICE VISIT (OUTPATIENT)
Dept: INTERNAL MEDICINE | Facility: CLINIC | Age: 70
End: 2020-12-02
Payer: MEDICARE

## 2020-12-02 VITALS
OXYGEN SATURATION: 100 % | DIASTOLIC BLOOD PRESSURE: 62 MMHG | WEIGHT: 194 LBS | HEART RATE: 76 BPM | BODY MASS INDEX: 28.65 KG/M2 | SYSTOLIC BLOOD PRESSURE: 138 MMHG

## 2020-12-02 DIAGNOSIS — E53.8 VITAMIN B12 DEFICIENCY: ICD-10-CM

## 2020-12-02 DIAGNOSIS — E55.9 VITAMIN D DEFICIENCY DISEASE: ICD-10-CM

## 2020-12-02 DIAGNOSIS — Z12.5 SCREENING PSA (PROSTATE SPECIFIC ANTIGEN): ICD-10-CM

## 2020-12-02 DIAGNOSIS — Z00.00 ROUTINE GENERAL MEDICAL EXAMINATION AT A HEALTH CARE FACILITY: Primary | ICD-10-CM

## 2020-12-02 DIAGNOSIS — E13.9 DIABETES MELLITUS DUE TO ABNORMAL INSULIN: ICD-10-CM

## 2020-12-02 PROCEDURE — 99499 UNLISTED E&M SERVICE: CPT | Mod: S$GLB,,, | Performed by: INTERNAL MEDICINE

## 2020-12-02 PROCEDURE — 3288F FALL RISK ASSESSMENT DOCD: CPT | Mod: CPTII,S$GLB,, | Performed by: INTERNAL MEDICINE

## 2020-12-02 PROCEDURE — 1101F PR PT FALLS ASSESS DOC 0-1 FALLS W/OUT INJ PAST YR: ICD-10-PCS | Mod: CPTII,S$GLB,, | Performed by: INTERNAL MEDICINE

## 2020-12-02 PROCEDURE — 99999 PR PBB SHADOW E&M-EST. PATIENT-LVL IV: CPT | Mod: PBBFAC,,, | Performed by: INTERNAL MEDICINE

## 2020-12-02 PROCEDURE — 3075F SYST BP GE 130 - 139MM HG: CPT | Mod: CPTII,S$GLB,, | Performed by: INTERNAL MEDICINE

## 2020-12-02 PROCEDURE — 99214 OFFICE O/P EST MOD 30 MIN: CPT | Mod: S$GLB,,, | Performed by: INTERNAL MEDICINE

## 2020-12-02 PROCEDURE — 1101F PT FALLS ASSESS-DOCD LE1/YR: CPT | Mod: CPTII,S$GLB,, | Performed by: INTERNAL MEDICINE

## 2020-12-02 PROCEDURE — 3051F PR MOST RECENT HEMOGLOBIN A1C LEVEL 7.0 - < 8.0%: ICD-10-PCS | Mod: CPTII,S$GLB,, | Performed by: INTERNAL MEDICINE

## 2020-12-02 PROCEDURE — 3051F HG A1C>EQUAL 7.0%<8.0%: CPT | Mod: CPTII,S$GLB,, | Performed by: INTERNAL MEDICINE

## 2020-12-02 PROCEDURE — 3288F PR FALLS RISK ASSESSMENT DOCUMENTED: ICD-10-PCS | Mod: CPTII,S$GLB,, | Performed by: INTERNAL MEDICINE

## 2020-12-02 PROCEDURE — 99999 PR PBB SHADOW E&M-EST. PATIENT-LVL IV: ICD-10-PCS | Mod: PBBFAC,,, | Performed by: INTERNAL MEDICINE

## 2020-12-02 PROCEDURE — 3075F PR MOST RECENT SYSTOLIC BLOOD PRESS GE 130-139MM HG: ICD-10-PCS | Mod: CPTII,S$GLB,, | Performed by: INTERNAL MEDICINE

## 2020-12-02 PROCEDURE — 1125F PR PAIN SEVERITY QUANTIFIED, PAIN PRESENT: ICD-10-PCS | Mod: S$GLB,,, | Performed by: INTERNAL MEDICINE

## 2020-12-02 PROCEDURE — 3078F PR MOST RECENT DIASTOLIC BLOOD PRESSURE < 80 MM HG: ICD-10-PCS | Mod: CPTII,S$GLB,, | Performed by: INTERNAL MEDICINE

## 2020-12-02 PROCEDURE — 1125F AMNT PAIN NOTED PAIN PRSNT: CPT | Mod: S$GLB,,, | Performed by: INTERNAL MEDICINE

## 2020-12-02 PROCEDURE — 3008F PR BODY MASS INDEX (BMI) DOCUMENTED: ICD-10-PCS | Mod: CPTII,S$GLB,, | Performed by: INTERNAL MEDICINE

## 2020-12-02 PROCEDURE — 3078F DIAST BP <80 MM HG: CPT | Mod: CPTII,S$GLB,, | Performed by: INTERNAL MEDICINE

## 2020-12-02 PROCEDURE — 99499 RISK ADDL DX/OHS AUDIT: ICD-10-PCS | Mod: S$GLB,,, | Performed by: INTERNAL MEDICINE

## 2020-12-02 PROCEDURE — 99214 PR OFFICE/OUTPT VISIT, EST, LEVL IV, 30-39 MIN: ICD-10-PCS | Mod: S$GLB,,, | Performed by: INTERNAL MEDICINE

## 2020-12-02 PROCEDURE — 3008F BODY MASS INDEX DOCD: CPT | Mod: CPTII,S$GLB,, | Performed by: INTERNAL MEDICINE

## 2020-12-02 NOTE — PROGRESS NOTES
Annual exam and Follow up of hypertension, diabetes, recurrent UTI, hyperlipidemia.      HISTORY OF PRESENT ILLNESS: This is a 70-year-old man who presents for follow up of above.      He continues to have back pain and lateral right leg pain.  Currently taking oxycodone apap 10/325 one tablet three times daily and gabapentin 400 mg 1 tablet 4 times daily.  These medications keep pain tolerable. His pain is currently 3/10. He is followed by Dr César Carrasquillo in pain management.   Standing for 20 minutes, the pain is 5/10 in the right leg.   He has worsen pain when he lays down at night. .  HE is not sleeping well due to joint issues- shoulder, back and right leg. He cannot get comfortable.  He has not tried the lidocaine patches.      Dr Randall  on 3/17/20 who felt he has -  Right meralgia paresthetica; right lateral thigh tendinitis at the hip; history lumbar disc disease rule out lumbar radiculopathy. EMG was 5/5/20 -  Sensory neuropathy consistent with a history of diabetes; these findings would be compatible with clinical picture of meralgia paresthetica.  He continues to have significant right leg pain that starts in the right groin and goes down the lateral right thigh and the anterior right thigh.  He has a burning sensation on the right thigh.  HE is followed by  Dr Carrasquillo in pain management for his pain.  Foreign has tired 2 lidocaine injections for the right leg pain which helped a little.      Left shoulder is getting better . He is still doing physical therapy- it was postponed due to COVID pandemic      He had lithotripsy on 3/23/17. No dysuria, hematuria.   He saw Dr cerna on 1/19. He still has a stone in the left kidney. He is on finasteride and tamsulosin for his prostate.      He has not had any alcohol and has not had a recurrence of pancreatitis. No nausea, vomiting, constipation, diarrhea, melana, bloody stools. He gets constipated from the oxycodone.      He is taking amlodipine 10 mg once  daily, Toprol-XL 25 mg once daily and losartan 25 mg daily. No chest pain, shortness of breath. He is now taking pioglitazone 15 mg daily as needed and metformin  mg 2 tablets twice daily. HE checks his blood sugar twice daily due to fluctuating blood sugars. Sugars have been higher due to the stress of surgery  No polydipsia. He has a history of hyperlipidemia, currently on Zocor 20 mg daily. No joint pain or muscle pain. NO shortness breath, headaches, vision, sinus congestion.        He is smoking 1.5 packs per day.  His wife makes him nervous.  He smokes when he gets stressed. He is no longer drinking alcohol so her turns to cigarettes for his mood. He has a strong urge to smoke.  His wife smokes which does not help.      PAST MEDICAL HISTORY:    1. Hypertension.    2. Coronary artery disease with left heart catheterization in November 2011 with 40 to 50% narrowing in the mid LAD.    3. Diabetes mellitus.    4. Hyperlipidemia.    5. History of reflux.    6. Colon polyps.    7. History of cluster headaches.    8. History of glaucoma.    9. Lumbar disk disease at L5 to S1.    10. History of stomach ulcers.    11. Nephrolithiasis.       PAST SURGICAL HISTORY: Cholecystectomy in May 2013, ORIF of the left humerus.      SOCIAL HISTORY: Currently smokes a pack of cigarettes daily, has smoked his whole life. 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 He is a retired schoolteacher.       FAMILY HISTORY: Updated on [facility].      PHYSICAL EXAMINATION:      BP (!) 140/60   Pulse 76   Wt 88 kg (194 lb 0.1 oz)   SpO2 100%   BMI 28.65 kg/m²     GENERAL: He is alert, oriented, no apparent distress. Affect within normal limits.    HEENT: Conjunctivae anicteric. Tympanic membranes clear.  . .    NECK: Supple. No cervical lymphadenopathy, no thyroid enlargement.    Respiratory: Effort normal. Lungs are clear to auscultation.    HEART: Regular rate and rhythm without murmurs, gallops or rubs. No lower  extremity edema    Protective Sensation (w/ 10 gram monofilament):  Right: Decreased  Left: Decreased    Visual Inspection:  Normal -  Bilateral    Pedal Pulses:   Right: Present  Left: Present    Posterior tibialis:   Right:Present  Left: Present    .           Labs 11/25/20 reviewed - A1C 7.0, CBC - Hb 12.7, cmp acceptable     ASSESSMENT AND PLAN:      Annual exam - discussed diet, exercise and safety issues.    1. Right leg pain - Try over the counter lidocaine 4% patch to the right thigh - 2 patches twice daily.  2. Left shoulder surgery - in physical therapy  3. Hypertension - Continue current medications  3. Diabetes mellitus. better  4. Hyperlipidemia. on Zocor.  Work on diet.   5. History of reflux and peptic ulcer disease -asx. Off pantoprazole  6. Tubular adenomas - colonoscopy 12/2016 - one polyp - due 2021  7. History of glaucoma - not on treatment - pressure in eyes ok - followed Nile Avina and retinal specialist at [facility]  8. History of nephrolithiasis -asymptomatic.    9. Irregular heart beat sensation - holter revealed PVC  10. Pancreatitis - resolved. Do not suspect malabsorption as cause of weight loss as he is not having diarrhea. He is not drinking alochol  11. 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 Anxiety and depression -valium 5 mg nightly prn anxiety  13. Tobacco abuse - CT chest - declined..   14. Recurrent UTI -asx. Saw urology 10/23/17. S/p ESWL  15. Anemia - stable  I will see him back in 3 months, sooner if problems arise.          Colonoscopy 12/7/16 - one hyperplastic polyp due in 2021  Influenza 10/10/19  Pneumovax 7/2014  Zostavax 5/16  Prevnar  12/14/16  PSA 7/17

## 2020-12-24 ENCOUNTER — HOSPITAL ENCOUNTER (EMERGENCY)
Facility: OTHER | Age: 70
Discharge: HOME | End: 2020-12-24
Attending: EMERGENCY MEDICINE
Payer: MEDICARE

## 2020-12-24 VITALS
DIASTOLIC BLOOD PRESSURE: 65 MMHG | HEIGHT: 69 IN | OXYGEN SATURATION: 98 % | HEART RATE: 65 BPM | TEMPERATURE: 98 F | SYSTOLIC BLOOD PRESSURE: 136 MMHG | RESPIRATION RATE: 16 BRPM | BODY MASS INDEX: 28.14 KG/M2 | WEIGHT: 190 LBS

## 2020-12-24 DIAGNOSIS — R07.9 CHEST PAIN: ICD-10-CM

## 2020-12-24 LAB
ALBUMIN SERPL BCP-MCNC: 4.3 G/DL (ref 3.5–5.2)
ALP SERPL-CCNC: 68 U/L (ref 55–135)
ALT SERPL W/O P-5'-P-CCNC: 50 U/L (ref 10–44)
ANION GAP SERPL CALC-SCNC: 11 MMOL/L (ref 8–16)
AST SERPL-CCNC: 46 U/L (ref 10–40)
BASOPHILS # BLD AUTO: 0.03 K/UL (ref 0–0.2)
BASOPHILS NFR BLD: 0.4 % (ref 0–1.9)
BILIRUB SERPL-MCNC: 0.2 MG/DL (ref 0.1–1)
BUN SERPL-MCNC: 11 MG/DL (ref 8–23)
CALCIUM SERPL-MCNC: 9.5 MG/DL (ref 8.7–10.5)
CHLORIDE SERPL-SCNC: 104 MMOL/L (ref 95–110)
CO2 SERPL-SCNC: 23 MMOL/L (ref 23–29)
CREAT SERPL-MCNC: 1 MG/DL (ref 0.5–1.4)
DIFFERENTIAL METHOD: ABNORMAL
EOSINOPHIL # BLD AUTO: 0.4 K/UL (ref 0–0.5)
EOSINOPHIL NFR BLD: 4.6 % (ref 0–8)
ERYTHROCYTE [DISTWIDTH] IN BLOOD BY AUTOMATED COUNT: 13.5 % (ref 11.5–14.5)
EST. GFR  (AFRICAN AMERICAN): >60 ML/MIN/1.73 M^2
EST. GFR  (NON AFRICAN AMERICAN): >60 ML/MIN/1.73 M^2
GLUCOSE SERPL-MCNC: 130 MG/DL (ref 70–110)
HCT VFR BLD AUTO: 39 % (ref 40–54)
HGB BLD-MCNC: 12.4 G/DL (ref 14–18)
IMM GRANULOCYTES # BLD AUTO: 0.01 K/UL (ref 0–0.04)
IMM GRANULOCYTES NFR BLD AUTO: 0.1 % (ref 0–0.5)
LYMPHOCYTES # BLD AUTO: 3.5 K/UL (ref 1–4.8)
LYMPHOCYTES NFR BLD: 46.6 % (ref 18–48)
MCH RBC QN AUTO: 32 PG (ref 27–31)
MCHC RBC AUTO-ENTMCNC: 31.8 G/DL (ref 32–36)
MCV RBC AUTO: 101 FL (ref 82–98)
MONOCYTES # BLD AUTO: 0.4 K/UL (ref 0.3–1)
MONOCYTES NFR BLD: 5.8 % (ref 4–15)
NEUTROPHILS # BLD AUTO: 3.2 K/UL (ref 1.8–7.7)
NEUTROPHILS NFR BLD: 42.5 % (ref 38–73)
NRBC BLD-RTO: 0 /100 WBC
PLATELET # BLD AUTO: 293 K/UL (ref 150–350)
PMV BLD AUTO: 8.3 FL (ref 9.2–12.9)
POTASSIUM SERPL-SCNC: 4.5 MMOL/L (ref 3.5–5.1)
PROT SERPL-MCNC: 7.2 G/DL (ref 6–8.4)
RBC # BLD AUTO: 3.88 M/UL (ref 4.6–6.2)
SODIUM SERPL-SCNC: 138 MMOL/L (ref 136–145)
TROPONIN I SERPL DL<=0.01 NG/ML-MCNC: 0.01 NG/ML (ref 0–0.03)
TROPONIN I SERPL DL<=0.01 NG/ML-MCNC: <0.006 NG/ML (ref 0–0.03)
WBC # BLD AUTO: 7.6 K/UL (ref 3.9–12.7)

## 2020-12-24 PROCEDURE — 80053 COMPREHEN METABOLIC PANEL: CPT

## 2020-12-24 PROCEDURE — 93010 ELECTROCARDIOGRAM REPORT: CPT | Mod: ,,, | Performed by: INTERNAL MEDICINE

## 2020-12-24 PROCEDURE — 84484 ASSAY OF TROPONIN QUANT: CPT | Mod: 91

## 2020-12-24 PROCEDURE — 85025 COMPLETE CBC W/AUTO DIFF WBC: CPT

## 2020-12-24 PROCEDURE — 99285 EMERGENCY DEPT VISIT HI MDM: CPT | Mod: 25

## 2020-12-24 PROCEDURE — 84484 ASSAY OF TROPONIN QUANT: CPT

## 2020-12-24 PROCEDURE — 93005 ELECTROCARDIOGRAM TRACING: CPT

## 2020-12-24 PROCEDURE — 93010 EKG 12-LEAD: ICD-10-PCS | Mod: ,,, | Performed by: INTERNAL MEDICINE

## 2020-12-24 PROCEDURE — 36000 PLACE NEEDLE IN VEIN: CPT

## 2020-12-24 PROCEDURE — 93010 ELECTROCARDIOGRAM REPORT: CPT | Mod: 76,,, | Performed by: INTERNAL MEDICINE

## 2020-12-24 NOTE — ED PROVIDER NOTES
Encounter Date: 12/24/2020    SCRIBE #1 NOTE: Bola CROW, am scribing for, and in the presence of, Dr. Jones.       History     Chief Complaint   Patient presents with    Chest Pain     left sided CP x today; off and on, denies at present; denies SOB      Time seen by provider: 1:21 PM    This is a 70 y.o. male, with a hx of HTN, CAD, diabetes, and chronic low back pain, who presents with complaint of intermittent left sided chest pain for the last 2 hours. Pt states when the pain began, he thought it was just a muscle spasm because it was quick twitching sensation that lasted less than a second. He states the sharp pain returned 4 more times after, with no clear precipitating factor. He states the pain is non radiating. Pt reports he had a fairly stressful morning. No h/o similar previous episodes. He did some light activity yesterday but does not suspect any injury to cause this pain. No recent cough or fevers. He does still smoke. He denies left arm numbness, weakness, nausea, or diaphoresis. He states he is compliant with all of his medications.    The history is provided by the patient and medical records.     Review of patient's allergies indicates:   Allergen Reactions    Aspirin Nausea And Vomiting     If over 81mg     Past Medical History:   Diagnosis Date    Angina pectoris     Back pain     Cluster headaches     1999    Colon polyps     5 polyps in Dec 2010    Coronary artery disease 11/2011    moderate nonobstructive cad on left heart cath [facility] 11/2011    Diabetes mellitus     GERD (gastroesophageal reflux disease)     history of peptic ulcer disease    Hyperlipidemia     Hypertension     Lumbar disc disease     MRI 2008 - L5-S1    Nephrolithiasis     July 2013     Past Surgical History:   Procedure Laterality Date    ARTHROSCOPIC DEBRIDEMENT OF SHOULDER Left 1/9/2020    Procedure: EXTENSIVE DEBRIDEMENT, SHOULDER, ARTHROSCOPIC;  Surgeon: Marcela John MD;  Location: [facility]  OR;  Service: Orthopedics;  Laterality: Left;    ARTHROSCOPIC REPAIR OF ROTATOR CUFF OF SHOULDER Left 1/9/2020    Procedure: REPAIR, ROTATOR CUFF, ARTHROSCOPIC, MEDIUM DOUBLE ROW;  Surgeon: Marcela John MD;  Location: [facility] OR;  Service: Orthopedics;  Laterality: Left;    ARTHROSCOPY OF SHOULDER WITH DECOMPRESSION OF SUBACROMIAL SPACE Left 1/9/2020    Procedure: ARTHROSCOPY, SHOULDER, WITH SUBACROMIAL DECOMPRESSION;  Surgeon: Marcela John MD;  Location: [facility] OR;  Service: Orthopedics;  Laterality: Left;    CHOLECYSTECTOMY      COLONOSCOPY N/A 12/7/2016    Procedure: COLONOSCOPY;  Surgeon: Lance Crocker MD;  [facility]  Service: Endoscopy;  Laterality: N/A;    EXCISION OF BURSA Left 1/9/2020    Procedure: BURSECTOMY;  Surgeon: Marcela John MD;  Location: [facility] OR;  Service: Orthopedics;  Laterality: Left;    LITHOTRIPSY      ORIF left humerus       Family History   Problem Relation Age of Onset    Heart disease Mother     No Known Problems Father     Heart disease Brother     Cancer Sister         ovarian    Heart failure Brother     Kidney disease Brother     Alzheimer's disease Brother     No Known Problems Brother     Diabetes Son     Blindness Son     Diabetes Son     Hypertension Son     Diabetes Son     Hypertension Son     Diabetes Son     Hypertension Son     Sleep apnea Son     Celiac disease Neg Hx     Cirrhosis Neg Hx     Colon cancer Neg Hx     Colon polyps Neg Hx     Crohn's disease Neg Hx     Esophageal cancer Neg Hx     Inflammatory bowel disease Neg Hx     Liver cancer Neg Hx     Rectal cancer Neg Hx     Stomach cancer Neg Hx     Ulcerative colitis Neg Hx     Melanoma Neg Hx      Social History     Tobacco Use    Smoking status: Current Every Day Smoker     Packs/day: 1.50     Years: 50.00     Pack years: 75.00     Types: Cigarettes    Smokeless tobacco: Never Used   Substance Use Topics    Alcohol use: No     Frequency: Monthly or less      Drinks per session: 1 or 2     Binge frequency: Never    Drug use: Yes     Types: Hydrocodone     Review of Systems   Constitutional: Negative for chills, diaphoresis and fever.   HENT: Negative for congestion, rhinorrhea and sore throat.    Eyes: Negative for visual disturbance.   Respiratory: Negative for cough and shortness of breath.    Cardiovascular: Positive for chest pain.   Gastrointestinal: Negative for abdominal pain, diarrhea, nausea and vomiting.   Genitourinary: Negative for dysuria.   Musculoskeletal: Negative for back pain.   Skin: Negative for rash.   Neurological: Negative for dizziness, weakness, light-headedness and numbness.   Psychiatric/Behavioral: Negative for confusion.       Physical Exam     Initial Vitals [12/24/20 1259]   BP Pulse Resp Temp SpO2   (!) 170/74 72 19 98 °F (36.7 °C) 98 %      MAP       --         Physical Exam    Nursing note and vitals reviewed.  Constitutional: He appears well-developed and well-nourished. He is not diaphoretic. No distress.   HENT:   Head: Normocephalic and atraumatic.   Eyes: Conjunctivae and EOM are normal. Pupils are equal, round, and reactive to light. No scleral icterus.   Neck: Normal range of motion. Neck supple. No JVD present.   Cardiovascular: Normal rate, regular rhythm, normal heart sounds and intact distal pulses.   No murmur heard.  Pulmonary/Chest: Breath sounds normal. No respiratory distress. He has no wheezes. He has no rhonchi. He has no rales.   Abdominal: Soft. Bowel sounds are normal. He exhibits no distension. There is no abdominal tenderness. There is no rebound and no guarding.   Musculoskeletal: Normal range of motion. No edema.   Neurological: He is alert and oriented to person, place, and time. He has normal strength. No cranial nerve deficit.   Skin: Skin is warm and dry.   Psychiatric: He has a normal mood and affect.         ED Course   Procedures  Labs Reviewed   CBC W/ AUTO DIFFERENTIAL - Abnormal; Notable for the  following components:       Result Value    RBC 3.88 (*)     Hemoglobin 12.4 (*)     Hematocrit 39.0 (*)      (*)     MCH 32.0 (*)     MCHC 31.8 (*)     MPV 8.3 (*)     All other components within normal limits   COMPREHENSIVE METABOLIC PANEL - Abnormal; Notable for the following components:    Glucose 130 (*)     AST 46 (*)     ALT 50 (*)     All other components within normal limits   TROPONIN I   TROPONIN I     EKG Readings: (Independently Interpreted)   Normal Sinus Rhythm at a rate of 71. No ischemia. No changes compared to previous.       Imaging Results          X-Ray Chest 1 View (Final result)  Result time 12/24/20 13:46:33    Final result by Jaime Jacob MD (12/24/20 13:46:33)                 Impression:      No acute abnormality.      Electronically signed by: Jaime Jacob MD  Date:    12/24/2020  Time:    13:46             Narrative:    EXAMINATION:  XR CHEST 1 VIEW    CLINICAL HISTORY:  Chest pain, unspecified    TECHNIQUE:  Single frontal view of the chest was performed.    COMPARISON:  None    FINDINGS:  The lungs are clear, with normal appearance of pulmonary vasculature and no pleural effusion or pneumothorax.    The cardiac silhouette is normal in size. The hilar and mediastinal contours are unremarkable.    Bones are intact.  Small radiopaque density overlying soft tissues of the left axilla                                 Medical Decision Making:   History:   Old Medical Records: I decided to obtain old medical records.  Initial Assessment:       70-year-old male with history of hypertension, diabetes, CAD, chronic low back pain presents for evaluation of chest pain episodes for the past 2 hr.  He describes it as a very brief sharp twitching to his left chest lasting less than a second, with no radiation or associated SOB, diaphoresis, nausea.  No clear precipitant for episodes, and no exertional component.  He denies any activity or injury to cause this pain.  Per chart review  patient has history of CAD and 40% stenosis seen on catheterization in 2011, and angina listed on his problem list, but he denies any regular episodes of chest pain.  He states he occasionally gets left upper chest wall whole cramping sensation with exercising, but not regularly.  No recent illness, cough or fever, or other new symptoms.  Patient has been under lot of stress this morning, due to issues with [employer], his pain management doctor not refilling his pain medications due to missing a recent appointment, and catching a rat in his house.  He is a long-term active smoker.    Initial EKG with no acute ischemia or change from previous.  On exam patient with no reproducible chest wall tenderness, or other concerning exam findings.  Description of chest pain atypical and not consistent with angina or ACS, but given risk factors and history of reported CAD will do ACS workup and monitor in ED.  Patient already took ASA this morning; no active chest pain to warrant nitro.    Initial labs with no acute findings, and negative troponin.  Patient monitored in ED with 1 recurrent brief episode of chest pain, but otherwise at normal baseline.  Chest x-ray also with no acute findings.  2nd troponin also within normal range, and repeat EKG with no evolving changes.  Patient ambulated in the ED and with no chest pain or SOB.  Given reassuring workup and atypical presentation, I do not feel that he needs admission for further emergent testing or intervention, which patient is comfortable with.  He is advised to follow up with PCP or Cardiology within 1 week for reassessment, and return to the ED for any recurrent or worsening episodes, or any other concerns..        Independently Interpreted Test(s):   I have ordered and independently interpreted EKG Reading(s) - see prior notes  Clinical Tests:   Lab Tests: Ordered and Reviewed  Radiological Study: Ordered and Reviewed  Medical Tests: Ordered and Reviewed             Scribe Attestation:   Scribe #1: I performed the above scribed service and the documentation accurately describes the services I performed. I attest to the accuracy of the note.    Attending Attestation:           Physician Attestation for Scribe:  Physician Attestation Statement for Scribe #1: I, Dr. Jones, reviewed documentation, as scribed by Bola Betts in my presence, and it is both accurate and complete.                           Clinical Impression:       ICD-10-CM ICD-9-CM   1. Chest pain  R07.9 786.50   2. Chest pain  R07.9 786.50                          ED Disposition Condition    Discharge Stable        ED Prescriptions     None        Follow-up Information     Follow up With Specialties Details Why Contact Info    Elizabeth Benitez MD Internal Medicine Schedule an appointment as soon as possible for a visit in 3 days  [address]  [place] [address]  [phone]      [facility] Go to  If symptoms worsen [address] [facility] [facility] [address]  [phone]                                       Usama Jones MD  12/24/20 2015

## 2020-12-24 NOTE — ED NOTES
Pt rounding complete.  Pt resting on stretcher with eyes closed, respirations even and unlabored, in no acute distress.  Call light within reach.  Side rails up x2. Pt attached to cardiac monitor, pulse ox, and BP q30. Will continue to monitor.

## 2020-12-24 NOTE — ED TRIAGE NOTES
"Pt to ED c/o L sided CP since this AM. Hx of CAD and angina, states "this CP is different than what I've had before". Pt unable to elaborate how pain is different. Pt describes pain as intermittent and sharp, no exacerbating or relieving factors. Pt denies CP, headache, fever, cough, chills, weakness, dizziness, or contact with sick persons. Respirations even/unlabored, NADN, denies CP at this time.  "

## 2020-12-28 ENCOUNTER — OFFICE VISIT (OUTPATIENT)
Dept: UROLOGY | Facility: CLINIC | Age: 70
End: 2020-12-28
Attending: UROLOGY
Payer: MEDICARE

## 2020-12-28 VITALS
DIASTOLIC BLOOD PRESSURE: 70 MMHG | HEIGHT: 69 IN | WEIGHT: 190.06 LBS | HEART RATE: 73 BPM | BODY MASS INDEX: 28.15 KG/M2 | SYSTOLIC BLOOD PRESSURE: 153 MMHG

## 2020-12-28 DIAGNOSIS — N41.0 ACUTE PROSTATITIS: ICD-10-CM

## 2020-12-28 DIAGNOSIS — N40.1 BENIGN PROSTATIC HYPERPLASIA WITH LOWER URINARY TRACT SYMPTOMS, SYMPTOM DETAILS UNSPECIFIED: Primary | ICD-10-CM

## 2020-12-28 DIAGNOSIS — N40.1 BPH WITH OBSTRUCTION/LOWER URINARY TRACT SYMPTOMS: ICD-10-CM

## 2020-12-28 DIAGNOSIS — N30.00 ACUTE CYSTITIS WITHOUT HEMATURIA: ICD-10-CM

## 2020-12-28 DIAGNOSIS — N13.8 BPH WITH OBSTRUCTION/LOWER URINARY TRACT SYMPTOMS: ICD-10-CM

## 2020-12-28 PROCEDURE — 1126F AMNT PAIN NOTED NONE PRSNT: CPT | Mod: S$GLB,,, | Performed by: UROLOGY

## 2020-12-28 PROCEDURE — 3008F PR BODY MASS INDEX (BMI) DOCUMENTED: ICD-10-PCS | Mod: CPTII,S$GLB,, | Performed by: UROLOGY

## 2020-12-28 PROCEDURE — 99214 OFFICE O/P EST MOD 30 MIN: CPT | Mod: S$GLB,,, | Performed by: UROLOGY

## 2020-12-28 PROCEDURE — 99214 PR OFFICE/OUTPT VISIT, EST, LEVL IV, 30-39 MIN: ICD-10-PCS | Mod: S$GLB,,, | Performed by: UROLOGY

## 2020-12-28 PROCEDURE — 1101F PR PT FALLS ASSESS DOC 0-1 FALLS W/OUT INJ PAST YR: ICD-10-PCS | Mod: CPTII,S$GLB,, | Performed by: UROLOGY

## 2020-12-28 PROCEDURE — 3078F DIAST BP <80 MM HG: CPT | Mod: CPTII,S$GLB,, | Performed by: UROLOGY

## 2020-12-28 PROCEDURE — 3288F FALL RISK ASSESSMENT DOCD: CPT | Mod: CPTII,S$GLB,, | Performed by: UROLOGY

## 2020-12-28 PROCEDURE — 1126F PR PAIN SEVERITY QUANTIFIED, NO PAIN PRESENT: ICD-10-PCS | Mod: S$GLB,,, | Performed by: UROLOGY

## 2020-12-28 PROCEDURE — 1159F PR MEDICATION LIST DOCUMENTED IN MEDICAL RECORD: ICD-10-PCS | Mod: S$GLB,,, | Performed by: UROLOGY

## 2020-12-28 PROCEDURE — 3008F BODY MASS INDEX DOCD: CPT | Mod: CPTII,S$GLB,, | Performed by: UROLOGY

## 2020-12-28 PROCEDURE — 3288F PR FALLS RISK ASSESSMENT DOCUMENTED: ICD-10-PCS | Mod: CPTII,S$GLB,, | Performed by: UROLOGY

## 2020-12-28 PROCEDURE — 3077F PR MOST RECENT SYSTOLIC BLOOD PRESSURE >= 140 MM HG: ICD-10-PCS | Mod: CPTII,S$GLB,, | Performed by: UROLOGY

## 2020-12-28 PROCEDURE — 3078F PR MOST RECENT DIASTOLIC BLOOD PRESSURE < 80 MM HG: ICD-10-PCS | Mod: CPTII,S$GLB,, | Performed by: UROLOGY

## 2020-12-28 PROCEDURE — 1159F MED LIST DOCD IN RCRD: CPT | Mod: S$GLB,,, | Performed by: UROLOGY

## 2020-12-28 PROCEDURE — 1101F PT FALLS ASSESS-DOCD LE1/YR: CPT | Mod: CPTII,S$GLB,, | Performed by: UROLOGY

## 2020-12-28 PROCEDURE — 3077F SYST BP >= 140 MM HG: CPT | Mod: CPTII,S$GLB,, | Performed by: UROLOGY

## 2020-12-28 RX ORDER — TAMSULOSIN HYDROCHLORIDE 0.4 MG/1
0.4 CAPSULE ORAL DAILY
Qty: 90 CAPSULE | Refills: 3 | Status: SHIPPED | OUTPATIENT
Start: 2020-12-28 | End: 2021-09-21

## 2020-12-28 RX ORDER — FINASTERIDE 5 MG/1
5 TABLET, FILM COATED ORAL DAILY
Qty: 90 TABLET | Refills: 3 | Status: SHIPPED | OUTPATIENT
Start: 2020-12-28 | End: 2021-09-21

## 2020-12-28 NOTE — PROGRESS NOTES
"Subjective:      Tony Han is a 70 y.o. male who returns today regarding his BPH.    Patient with a long history of BPH on flomax and finasteride. Main bother symptoms include nocturia, incomplete emptying ,and urgency. Drinks caffiene throughout the day and night via diet coke. Not on diuretics, but takes an SGLT2 inhibitor for DM. PVR bladder scan today is 11 cc. AUASS 14/3. He is not interested in surgical therapy for BPH.    The following portions of the patient's history were reviewed and updated as appropriate: allergies, current medications, past family history, past medical history, past social history, past surgical history and problem list.    Review of Systems  A comprehensive multipoint review of systems was negative except as otherwise stated in the HPI.     Objective:   Vitals: BP (!) 153/70 (BP Location: Left arm, Patient Position: Sitting, BP Method: X-Large (Automatic))   Pulse 73   Ht 5' 9" (1.753 m)   Wt 86.2 kg (190 lb 0.6 oz)   BMI 28.06 kg/m²     Physical Exam   General: alert and oriented, no acute distress  Respiratory: Symmetric expansion, non-labored breathing  Cardiovascular: regular rate and rhythm, no peripheral edema  Abdomen: soft, non distended  Genitourinary: no penile lesions or discharge, no testicular masses, normal scrotum  Rectal: the prostate is 35 gms and without nodules and normal rectal tone  Skin: normal coloration and turgor, no rashes, no suspicious skin lesions noted  Neuro: no gross deficits  Psych: normal judgment and insight, normal mood/affect and non-anxious    Lab Review   Urinalysis demonstrates negative for all components  Lab Results   Component Value Date    WBC 7.60 12/24/2020    HGB 12.4 (L) 12/24/2020    HCT 39.0 (L) 12/24/2020     (H) 12/24/2020     12/24/2020     Lab Results   Component Value Date    CREATININE 1.0 12/24/2020    BUN 11 12/24/2020     Lab Results   Component Value Date    PSA 0.49 04/05/2019    PSADIAG 0.60 07/14/2017 "       Imaging None    Assessment and Plan:   1. BPH with obstruction/lower urinary tract symptoms  - Continue flomax and proscar  - tamsulosin (FLOMAX) 0.4 mg Cap; Take 1 capsule (0.4 mg total) by mouth once daily.  Dispense: 90 capsule; Refill: 3  - finasteride (PROSCAR) 5 mg tablet; Take 1 tablet (5 mg total) by mouth once daily.  Dispense: 90 tablet; Refill: 3  - f/u 1 year

## 2020-12-29 NOTE — TELEPHONE ENCOUNTER
Care Due:                  Date            Visit Type   Department     Provider  --------------------------------------------------------------------------------                                             [facility]  Last Visit: 12-      [facility]         RENATA CORREA                                           [facility]  Next Visit: 03-      Gerri CORREA                                                            Last  Test          Frequency    Reason                     Performed    Due Date  --------------------------------------------------------------------------------    Lipid Panel.  12 months..  simvastatin..............  03- 02-    Powered by [employer]. Reference number: [id number]. 12/29/2020 3:26:43 AM   CST

## 2020-12-30 RX ORDER — METFORMIN HYDROCHLORIDE 500 MG/1
TABLET, EXTENDED RELEASE ORAL
Qty: 360 TABLET | Refills: 1 | Status: SHIPPED | OUTPATIENT
Start: 2020-12-30 | End: 2021-06-07 | Stop reason: SDUPTHER

## 2020-12-30 NOTE — PROGRESS NOTES
Refill Routing Note   Medication(s) are not appropriate for processing by [facility] for the following reason(s):     - Patient has been seen in the Emergency Room/Department since the last PCP visit  [facility] action(s):  Defer     Medication Therapy Plan: CDMR; FOVS; Pt visited ED for chest pain 12/24/20; Defer to you  Medication reconciliation completed: No   Automatic Epic Generated Protocol Data:        Requested Prescriptions   Pending Prescriptions Disp Refills    metFORMIN (GLUCOPHAGE-XR) 500 MG ER 24hr tablet [Pharmacy Med Name: METFORMIN ER 500MG 24HR TABS] 360 tablet 1     Sig: TAKE 2 TABLETS BY MOUTH TWICE DAILY       Endocrinology:  Diabetes - Biguanides Passed - 12/29/2020  3:26 AM        Passed - Patient is at least 18 years old        Passed - Office visit in past 12 months or future 90 days     Recent Outpatient Visits            2 days ago Benign prostatic hyperplasia with lower urinary tract symptoms, symptom details unspecified    [facility] [address] Mehdi Dexter MD    4 weeks ago Routine general medical examination at a health care facility    [facility] Elizabeth Benitez MD    3 months ago Hyperlipidemia, unspecified hyperlipidemia type    [facility] Elizabeth Benitez MD    5 months ago Acute pain of right thigh    [facility] 8thFl Dennis Michael MD    6 months ago Bilateral leg edema    [facility] Edgardo Cortes MD          Future Appointments              In 2 months LAB, [facility] 2nd Fl, [facility]    In 3 months Elizabeth Benitez MD [facility], [facility]                Passed - Cr is 1.4 or below and within 360 days     Creatinine   Date Value Ref Range Status   12/24/2020 1.0 0.5 - 1.4 mg/dL Final   11/25/2020 1.1 0.5 - 1.4 mg/dL Final   08/31/2020 0.9 0.5 - 1.4 mg/dL Final              Passed - HBA1C is 8 or below and within  180 days     Hemoglobin A1C   Date Value Ref Range Status   11/25/2020 7.0 (H) 4.0 - 5.6 % Final     Comment:     ADA Screening Guidelines:  5.7-6.4%  Consistent with prediabetes  >or=6.5%  Consistent with diabetes  High levels of fetal hemoglobin interfere with the HbA1C  assay. Heterozygous hemoglobin variants (HbS, HgC, etc)do  not significantly interfere with this assay.   However, presence of multiple variants may affect accuracy.     08/31/2020 7.2 (H) 4.0 - 5.6 % Final     Comment:     ADA Screening Guidelines:  5.7-6.4%  Consistent with prediabetes  >or=6.5%  Consistent with diabetes  High levels of fetal hemoglobin interfere with the HbA1C  assay. Heterozygous hemoglobin variants (HbS, HgC, etc)do  not significantly interfere with this assay.   However, presence of multiple variants may affect accuracy.     06/01/2020 7.1 (H) 4.0 - 5.6 % Final     Comment:     ADA Screening Guidelines:  5.7-6.4%  Consistent with prediabetes  >or=6.5%  Consistent with diabetes  High levels of fetal hemoglobin interfere with the HbA1C  assay. Heterozygous hemoglobin variants (HbS, HgC, etc)do  not significantly interfere with this assay.   However, presence of multiple variants may affect accuracy.                Passed - eGFR is 30 or above and within 360 days     eGFR if non    Date Value Ref Range Status   12/24/2020 >60 >60 mL/min/1.73 m^2 Final     Comment:     Calculation used to obtain the estimated glomerular filtration  rate (eGFR) is the CKD-EPI equation.      11/25/2020 >60 >60 mL/min/1.73 m^2 Final     Comment:     Calculation used to obtain the estimated glomerular filtration  rate (eGFR) is the CKD-EPI equation.      08/31/2020 >60 >60 mL/min/1.73 m^2 Final     Comment:     Calculation used to obtain the estimated glomerular filtration  rate (eGFR) is the CKD-EPI equation.        eGFR if    Date Value Ref Range Status   12/24/2020 >60 >60 mL/min/1.73 m^2 Final   11/25/2020 >60 >60  mL/min/1.73 m^2 Final   08/31/2020 >60 >60 mL/min/1.73 m^2 Final                    Appointments  past 12m or future 3m with PCP    Date Provider   Last Visit   12/2/2020 Elizabeth Benitez MD   Next Visit   3/31/2021 Elizabeth Benitez MD   ED visits in past 90 days: 1        Note composed:10:01 AM 12/30/2020

## 2021-02-08 ENCOUNTER — PATIENT MESSAGE (OUTPATIENT)
Dept: INTERNAL MEDICINE | Facility: CLINIC | Age: 71
End: 2021-02-08

## 2021-02-09 RX ORDER — LOSARTAN POTASSIUM 25 MG/1
TABLET ORAL
Qty: 90 TABLET | Refills: 3 | Status: SHIPPED | OUTPATIENT
Start: 2021-02-09 | End: 2022-01-10

## 2021-02-12 ENCOUNTER — IMMUNIZATION (OUTPATIENT)
Dept: INTERNAL MEDICINE | Facility: CLINIC | Age: 71
End: 2021-02-12
Payer: MEDICARE

## 2021-02-12 DIAGNOSIS — Z23 NEED FOR VACCINATION: Primary | ICD-10-CM

## 2021-02-12 PROCEDURE — 91300 COVID-19, MRNA, LNP-S, PF, 30 MCG/0.3 ML DOSE VACCINE: CPT | Mod: PBBFAC | Performed by: INTERNAL MEDICINE

## 2021-02-24 RX ORDER — PIOGLITAZONEHYDROCHLORIDE 15 MG/1
15 TABLET ORAL DAILY
Qty: 90 TABLET | Refills: 3 | Status: SHIPPED | OUTPATIENT
Start: 2021-02-24 | End: 2022-03-08

## 2021-03-05 ENCOUNTER — IMMUNIZATION (OUTPATIENT)
Dept: INTERNAL MEDICINE | Facility: CLINIC | Age: 71
End: 2021-03-05
Payer: MEDICARE

## 2021-03-05 DIAGNOSIS — Z23 NEED FOR VACCINATION: Primary | ICD-10-CM

## 2021-03-05 PROCEDURE — 0002A COVID-19, MRNA, LNP-S, PF, 30 MCG/0.3 ML DOSE VACCINE: CPT | Mod: PBBFAC | Performed by: INTERNAL MEDICINE

## 2021-03-05 PROCEDURE — 91300 COVID-19, MRNA, LNP-S, PF, 30 MCG/0.3 ML DOSE VACCINE: CPT | Mod: PBBFAC | Performed by: INTERNAL MEDICINE

## 2021-03-08 ENCOUNTER — PATIENT MESSAGE (OUTPATIENT)
Dept: INTERNAL MEDICINE | Facility: CLINIC | Age: 71
End: 2021-03-08

## 2021-03-08 RX ORDER — TADALAFIL 20 MG/1
20 TABLET ORAL DAILY PRN
Qty: 30 TABLET | Refills: 0 | Status: SHIPPED | OUTPATIENT
Start: 2021-03-08 | End: 2022-04-20

## 2021-03-17 ENCOUNTER — PATIENT OUTREACH (OUTPATIENT)
Dept: ADMINISTRATIVE | Facility: HOSPITAL | Age: 71
End: 2021-03-17

## 2021-03-24 ENCOUNTER — LAB VISIT (OUTPATIENT)
Dept: LAB | Facility: OTHER | Age: 71
End: 2021-03-24
Payer: MEDICARE

## 2021-03-24 DIAGNOSIS — E53.8 VITAMIN B12 DEFICIENCY: ICD-10-CM

## 2021-03-24 DIAGNOSIS — E55.9 VITAMIN D DEFICIENCY DISEASE: ICD-10-CM

## 2021-03-24 DIAGNOSIS — Z12.5 SCREENING PSA (PROSTATE SPECIFIC ANTIGEN): ICD-10-CM

## 2021-03-24 DIAGNOSIS — E13.9 DIABETES MELLITUS DUE TO ABNORMAL INSULIN: ICD-10-CM

## 2021-03-24 LAB
25(OH)D3+25(OH)D2 SERPL-MCNC: 29 NG/ML (ref 30–96)
ALBUMIN SERPL BCP-MCNC: 4.2 G/DL (ref 3.5–5.2)
ALP SERPL-CCNC: 63 U/L (ref 55–135)
ALT SERPL W/O P-5'-P-CCNC: 32 U/L (ref 10–44)
ANION GAP SERPL CALC-SCNC: 10 MMOL/L (ref 8–16)
AST SERPL-CCNC: 27 U/L (ref 10–40)
BASOPHILS # BLD AUTO: 0.03 K/UL (ref 0–0.2)
BASOPHILS NFR BLD: 0.5 % (ref 0–1.9)
BILIRUB SERPL-MCNC: 0.2 MG/DL (ref 0.1–1)
BUN SERPL-MCNC: 12 MG/DL (ref 8–23)
CALCIUM SERPL-MCNC: 9.5 MG/DL (ref 8.7–10.5)
CHLORIDE SERPL-SCNC: 105 MMOL/L (ref 95–110)
CHOLEST SERPL-MCNC: 109 MG/DL (ref 120–199)
CHOLEST/HDLC SERPL: 2.9 {RATIO} (ref 2–5)
CO2 SERPL-SCNC: 24 MMOL/L (ref 23–29)
COMPLEXED PSA SERPL-MCNC: 0.59 NG/ML (ref 0–4)
CREAT SERPL-MCNC: 1 MG/DL (ref 0.5–1.4)
DIFFERENTIAL METHOD: ABNORMAL
EOSINOPHIL # BLD AUTO: 0.4 K/UL (ref 0–0.5)
EOSINOPHIL NFR BLD: 6.3 % (ref 0–8)
ERYTHROCYTE [DISTWIDTH] IN BLOOD BY AUTOMATED COUNT: 13.7 % (ref 11.5–14.5)
EST. GFR  (AFRICAN AMERICAN): >60 ML/MIN/1.73 M^2
EST. GFR  (NON AFRICAN AMERICAN): >60 ML/MIN/1.73 M^2
ESTIMATED AVG GLUCOSE: 160 MG/DL (ref 68–131)
GLUCOSE SERPL-MCNC: 129 MG/DL (ref 70–110)
HBA1C MFR BLD: 7.2 % (ref 4–5.6)
HCT VFR BLD AUTO: 37.1 % (ref 40–54)
HDLC SERPL-MCNC: 37 MG/DL (ref 40–75)
HDLC SERPL: 33.9 % (ref 20–50)
HGB BLD-MCNC: 12.1 G/DL (ref 14–18)
IMM GRANULOCYTES # BLD AUTO: 0.01 K/UL (ref 0–0.04)
IMM GRANULOCYTES NFR BLD AUTO: 0.2 % (ref 0–0.5)
LDLC SERPL CALC-MCNC: 35.2 MG/DL (ref 63–159)
LYMPHOCYTES # BLD AUTO: 2.8 K/UL (ref 1–4.8)
LYMPHOCYTES NFR BLD: 49.5 % (ref 18–48)
MCH RBC QN AUTO: 32.8 PG (ref 27–31)
MCHC RBC AUTO-ENTMCNC: 32.6 G/DL (ref 32–36)
MCV RBC AUTO: 101 FL (ref 82–98)
MONOCYTES # BLD AUTO: 0.3 K/UL (ref 0.3–1)
MONOCYTES NFR BLD: 6.1 % (ref 4–15)
NEUTROPHILS # BLD AUTO: 2.1 K/UL (ref 1.8–7.7)
NEUTROPHILS NFR BLD: 37.4 % (ref 38–73)
NONHDLC SERPL-MCNC: 72 MG/DL
NRBC BLD-RTO: 0 /100 WBC
PLATELET # BLD AUTO: 292 K/UL (ref 150–350)
PMV BLD AUTO: 8.7 FL (ref 9.2–12.9)
POTASSIUM SERPL-SCNC: 4.5 MMOL/L (ref 3.5–5.1)
PROT SERPL-MCNC: 7.3 G/DL (ref 6–8.4)
RBC # BLD AUTO: 3.69 M/UL (ref 4.6–6.2)
SODIUM SERPL-SCNC: 139 MMOL/L (ref 136–145)
TRIGL SERPL-MCNC: 184 MG/DL (ref 30–150)
TSH SERPL DL<=0.005 MIU/L-ACNC: 0.82 UIU/ML (ref 0.4–4)
VIT B12 SERPL-MCNC: 1830 PG/ML (ref 210–950)
WBC # BLD AUTO: 5.55 K/UL (ref 3.9–12.7)

## 2021-03-24 PROCEDURE — 84443 ASSAY THYROID STIM HORMONE: CPT | Performed by: INTERNAL MEDICINE

## 2021-03-24 PROCEDURE — 80061 LIPID PANEL: CPT | Performed by: INTERNAL MEDICINE

## 2021-03-24 PROCEDURE — 83036 HEMOGLOBIN GLYCOSYLATED A1C: CPT | Performed by: INTERNAL MEDICINE

## 2021-03-24 PROCEDURE — 85025 COMPLETE CBC W/AUTO DIFF WBC: CPT | Performed by: INTERNAL MEDICINE

## 2021-03-24 PROCEDURE — 36415 COLL VENOUS BLD VENIPUNCTURE: CPT | Performed by: INTERNAL MEDICINE

## 2021-03-24 PROCEDURE — 82607 VITAMIN B-12: CPT | Performed by: INTERNAL MEDICINE

## 2021-03-24 PROCEDURE — 80053 COMPREHEN METABOLIC PANEL: CPT | Performed by: INTERNAL MEDICINE

## 2021-03-24 PROCEDURE — 82306 VITAMIN D 25 HYDROXY: CPT | Performed by: INTERNAL MEDICINE

## 2021-03-24 PROCEDURE — 84153 ASSAY OF PSA TOTAL: CPT | Performed by: INTERNAL MEDICINE

## 2021-03-28 RX ORDER — SIMVASTATIN 20 MG/1
TABLET, FILM COATED ORAL
Qty: 90 TABLET | Refills: 3 | Status: SHIPPED | OUTPATIENT
Start: 2021-03-28 | End: 2022-03-08

## 2021-03-31 ENCOUNTER — OFFICE VISIT (OUTPATIENT)
Dept: INTERNAL MEDICINE | Facility: CLINIC | Age: 71
End: 2021-03-31
Payer: MEDICARE

## 2021-03-31 VITALS
HEART RATE: 76 BPM | SYSTOLIC BLOOD PRESSURE: 128 MMHG | HEIGHT: 69 IN | DIASTOLIC BLOOD PRESSURE: 68 MMHG | BODY MASS INDEX: 29.33 KG/M2 | OXYGEN SATURATION: 97 % | WEIGHT: 198 LBS

## 2021-03-31 DIAGNOSIS — I70.0 ATHEROSCLEROSIS OF ABDOMINAL AORTA: ICD-10-CM

## 2021-03-31 DIAGNOSIS — I10 ESSENTIAL HYPERTENSION: ICD-10-CM

## 2021-03-31 DIAGNOSIS — K21.9 GASTROESOPHAGEAL REFLUX DISEASE WITHOUT ESOPHAGITIS: ICD-10-CM

## 2021-03-31 DIAGNOSIS — M25.551 RIGHT HIP PAIN: ICD-10-CM

## 2021-03-31 DIAGNOSIS — E78.5 HYPERLIPIDEMIA, UNSPECIFIED HYPERLIPIDEMIA TYPE: ICD-10-CM

## 2021-03-31 DIAGNOSIS — E13.9 DIABETES MELLITUS DUE TO ABNORMAL INSULIN: Primary | ICD-10-CM

## 2021-03-31 PROCEDURE — 3008F BODY MASS INDEX DOCD: CPT | Mod: CPTII,S$GLB,, | Performed by: INTERNAL MEDICINE

## 2021-03-31 PROCEDURE — 1159F PR MEDICATION LIST DOCUMENTED IN MEDICAL RECORD: ICD-10-PCS | Mod: S$GLB,,, | Performed by: INTERNAL MEDICINE

## 2021-03-31 PROCEDURE — 1125F AMNT PAIN NOTED PAIN PRSNT: CPT | Mod: S$GLB,,, | Performed by: INTERNAL MEDICINE

## 2021-03-31 PROCEDURE — 3078F DIAST BP <80 MM HG: CPT | Mod: CPTII,S$GLB,, | Performed by: INTERNAL MEDICINE

## 2021-03-31 PROCEDURE — 1125F PR PAIN SEVERITY QUANTIFIED, PAIN PRESENT: ICD-10-PCS | Mod: S$GLB,,, | Performed by: INTERNAL MEDICINE

## 2021-03-31 PROCEDURE — 3078F PR MOST RECENT DIASTOLIC BLOOD PRESSURE < 80 MM HG: ICD-10-PCS | Mod: CPTII,S$GLB,, | Performed by: INTERNAL MEDICINE

## 2021-03-31 PROCEDURE — 3288F FALL RISK ASSESSMENT DOCD: CPT | Mod: CPTII,S$GLB,, | Performed by: INTERNAL MEDICINE

## 2021-03-31 PROCEDURE — 3288F PR FALLS RISK ASSESSMENT DOCUMENTED: ICD-10-PCS | Mod: CPTII,S$GLB,, | Performed by: INTERNAL MEDICINE

## 2021-03-31 PROCEDURE — 1101F PR PT FALLS ASSESS DOC 0-1 FALLS W/OUT INJ PAST YR: ICD-10-PCS | Mod: CPTII,S$GLB,, | Performed by: INTERNAL MEDICINE

## 2021-03-31 PROCEDURE — 99214 PR OFFICE/OUTPT VISIT, EST, LEVL IV, 30-39 MIN: ICD-10-PCS | Mod: S$GLB,,, | Performed by: INTERNAL MEDICINE

## 2021-03-31 PROCEDURE — 3008F PR BODY MASS INDEX (BMI) DOCUMENTED: ICD-10-PCS | Mod: CPTII,S$GLB,, | Performed by: INTERNAL MEDICINE

## 2021-03-31 PROCEDURE — 99999 PR PBB SHADOW E&M-EST. PATIENT-LVL III: ICD-10-PCS | Mod: PBBFAC,,, | Performed by: INTERNAL MEDICINE

## 2021-03-31 PROCEDURE — 1101F PT FALLS ASSESS-DOCD LE1/YR: CPT | Mod: CPTII,S$GLB,, | Performed by: INTERNAL MEDICINE

## 2021-03-31 PROCEDURE — 3051F PR MOST RECENT HEMOGLOBIN A1C LEVEL 7.0 - < 8.0%: ICD-10-PCS | Mod: CPTII,S$GLB,, | Performed by: INTERNAL MEDICINE

## 2021-03-31 PROCEDURE — 99499 UNLISTED E&M SERVICE: CPT | Mod: S$GLB,,, | Performed by: INTERNAL MEDICINE

## 2021-03-31 PROCEDURE — 3074F PR MOST RECENT SYSTOLIC BLOOD PRESSURE < 130 MM HG: ICD-10-PCS | Mod: CPTII,S$GLB,, | Performed by: INTERNAL MEDICINE

## 2021-03-31 PROCEDURE — 1159F MED LIST DOCD IN RCRD: CPT | Mod: S$GLB,,, | Performed by: INTERNAL MEDICINE

## 2021-03-31 PROCEDURE — 99214 OFFICE O/P EST MOD 30 MIN: CPT | Mod: S$GLB,,, | Performed by: INTERNAL MEDICINE

## 2021-03-31 PROCEDURE — 99499 RISK ADDL DX/OHS AUDIT: ICD-10-PCS | Mod: S$GLB,,, | Performed by: INTERNAL MEDICINE

## 2021-03-31 PROCEDURE — 99999 PR PBB SHADOW E&M-EST. PATIENT-LVL III: CPT | Mod: PBBFAC,,, | Performed by: INTERNAL MEDICINE

## 2021-03-31 PROCEDURE — 3074F SYST BP LT 130 MM HG: CPT | Mod: CPTII,S$GLB,, | Performed by: INTERNAL MEDICINE

## 2021-03-31 PROCEDURE — 3051F HG A1C>EQUAL 7.0%<8.0%: CPT | Mod: CPTII,S$GLB,, | Performed by: INTERNAL MEDICINE

## 2021-04-05 ENCOUNTER — PATIENT MESSAGE (OUTPATIENT)
Dept: ADMINISTRATIVE | Facility: HOSPITAL | Age: 71
End: 2021-04-05

## 2021-04-14 DIAGNOSIS — E11.9 TYPE 2 DIABETES MELLITUS WITHOUT COMPLICATION: ICD-10-CM

## 2021-04-27 RX ORDER — LANCETS
EACH MISCELLANEOUS
Qty: 200 EACH | Refills: 4 | Status: SHIPPED | OUTPATIENT
Start: 2021-04-27 | End: 2022-05-24 | Stop reason: SDUPTHER

## 2021-06-08 RX ORDER — METFORMIN HYDROCHLORIDE 500 MG/1
1000 TABLET, EXTENDED RELEASE ORAL 2 TIMES DAILY
Qty: 360 TABLET | Refills: 0 | Status: SHIPPED | OUTPATIENT
Start: 2021-06-08 | End: 2021-06-23

## 2021-06-08 RX ORDER — AMLODIPINE BESYLATE 10 MG/1
10 TABLET ORAL DAILY
Qty: 90 TABLET | Refills: 0 | Status: SHIPPED | OUTPATIENT
Start: 2021-06-08 | End: 2021-06-23

## 2021-06-23 RX ORDER — AMLODIPINE BESYLATE 10 MG/1
TABLET ORAL
Qty: 90 TABLET | Refills: 3 | Status: SHIPPED | OUTPATIENT
Start: 2021-06-23 | End: 2022-08-29

## 2021-06-23 RX ORDER — METOPROLOL SUCCINATE 25 MG/1
TABLET, EXTENDED RELEASE ORAL
Qty: 90 TABLET | Refills: 3 | Status: SHIPPED | OUTPATIENT
Start: 2021-06-23 | End: 2022-04-20 | Stop reason: SDUPTHER

## 2021-06-23 RX ORDER — METFORMIN HYDROCHLORIDE 500 MG/1
TABLET, EXTENDED RELEASE ORAL
Qty: 360 TABLET | Refills: 3 | Status: SHIPPED | OUTPATIENT
Start: 2021-06-23 | End: 2022-08-29

## 2021-07-07 ENCOUNTER — PATIENT MESSAGE (OUTPATIENT)
Dept: ADMINISTRATIVE | Facility: HOSPITAL | Age: 71
End: 2021-07-07

## 2021-07-28 ENCOUNTER — LAB VISIT (OUTPATIENT)
Dept: LAB | Facility: OTHER | Age: 71
End: 2021-07-28
Payer: MEDICARE

## 2021-07-28 DIAGNOSIS — E13.9 DIABETES MELLITUS DUE TO ABNORMAL INSULIN: ICD-10-CM

## 2021-07-28 LAB
ALBUMIN SERPL BCP-MCNC: 4.2 G/DL (ref 3.5–5.2)
ALP SERPL-CCNC: 71 U/L (ref 55–135)
ALT SERPL W/O P-5'-P-CCNC: 80 U/L (ref 10–44)
ANION GAP SERPL CALC-SCNC: 11 MMOL/L (ref 8–16)
AST SERPL-CCNC: 50 U/L (ref 10–40)
BASOPHILS # BLD AUTO: 0.03 K/UL (ref 0–0.2)
BASOPHILS NFR BLD: 0.5 % (ref 0–1.9)
BILIRUB SERPL-MCNC: 0.3 MG/DL (ref 0.1–1)
BUN SERPL-MCNC: 12 MG/DL (ref 8–23)
CALCIUM SERPL-MCNC: 9.8 MG/DL (ref 8.7–10.5)
CHLORIDE SERPL-SCNC: 107 MMOL/L (ref 95–110)
CO2 SERPL-SCNC: 22 MMOL/L (ref 23–29)
CREAT SERPL-MCNC: 1 MG/DL (ref 0.5–1.4)
DIFFERENTIAL METHOD: ABNORMAL
EOSINOPHIL # BLD AUTO: 0.3 K/UL (ref 0–0.5)
EOSINOPHIL NFR BLD: 4.2 % (ref 0–8)
ERYTHROCYTE [DISTWIDTH] IN BLOOD BY AUTOMATED COUNT: 13.2 % (ref 11.5–14.5)
EST. GFR  (AFRICAN AMERICAN): >60 ML/MIN/1.73 M^2
EST. GFR  (NON AFRICAN AMERICAN): >60 ML/MIN/1.73 M^2
GLUCOSE SERPL-MCNC: 114 MG/DL (ref 70–110)
HCT VFR BLD AUTO: 37.7 % (ref 40–54)
HGB BLD-MCNC: 12.5 G/DL (ref 14–18)
IMM GRANULOCYTES # BLD AUTO: 0.01 K/UL (ref 0–0.04)
IMM GRANULOCYTES NFR BLD AUTO: 0.2 % (ref 0–0.5)
LYMPHOCYTES # BLD AUTO: 3.4 K/UL (ref 1–4.8)
LYMPHOCYTES NFR BLD: 53 % (ref 18–48)
MCH RBC QN AUTO: 33.3 PG (ref 27–31)
MCHC RBC AUTO-ENTMCNC: 33.2 G/DL (ref 32–36)
MCV RBC AUTO: 101 FL (ref 82–98)
MONOCYTES # BLD AUTO: 0.3 K/UL (ref 0.3–1)
MONOCYTES NFR BLD: 5 % (ref 4–15)
NEUTROPHILS # BLD AUTO: 2.4 K/UL (ref 1.8–7.7)
NEUTROPHILS NFR BLD: 37.1 % (ref 38–73)
NRBC BLD-RTO: 0 /100 WBC
PLATELET # BLD AUTO: 278 K/UL (ref 150–450)
PMV BLD AUTO: 8.7 FL (ref 9.2–12.9)
POTASSIUM SERPL-SCNC: 4.3 MMOL/L (ref 3.5–5.1)
PROT SERPL-MCNC: 7.5 G/DL (ref 6–8.4)
RBC # BLD AUTO: 3.75 M/UL (ref 4.6–6.2)
SODIUM SERPL-SCNC: 140 MMOL/L (ref 136–145)
WBC # BLD AUTO: 6.4 K/UL (ref 3.9–12.7)

## 2021-07-28 PROCEDURE — 85025 COMPLETE CBC W/AUTO DIFF WBC: CPT | Performed by: INTERNAL MEDICINE

## 2021-07-28 PROCEDURE — 80053 COMPREHEN METABOLIC PANEL: CPT | Performed by: INTERNAL MEDICINE

## 2021-07-28 PROCEDURE — 36415 COLL VENOUS BLD VENIPUNCTURE: CPT | Performed by: INTERNAL MEDICINE

## 2021-07-28 PROCEDURE — 83036 HEMOGLOBIN GLYCOSYLATED A1C: CPT | Performed by: INTERNAL MEDICINE

## 2021-07-29 LAB
ESTIMATED AVG GLUCOSE: 148 MG/DL (ref 68–131)
HBA1C MFR BLD: 6.8 % (ref 4–5.6)

## 2021-08-04 ENCOUNTER — OFFICE VISIT (OUTPATIENT)
Dept: INTERNAL MEDICINE | Facility: CLINIC | Age: 71
End: 2021-08-04
Payer: MEDICARE

## 2021-08-04 VITALS
HEART RATE: 68 BPM | BODY MASS INDEX: 28.47 KG/M2 | OXYGEN SATURATION: 98 % | WEIGHT: 192.25 LBS | HEIGHT: 69 IN | SYSTOLIC BLOOD PRESSURE: 122 MMHG | DIASTOLIC BLOOD PRESSURE: 54 MMHG

## 2021-08-04 DIAGNOSIS — E78.5 HYPERLIPIDEMIA, UNSPECIFIED HYPERLIPIDEMIA TYPE: ICD-10-CM

## 2021-08-04 DIAGNOSIS — E53.8 VITAMIN B12 DEFICIENCY: ICD-10-CM

## 2021-08-04 DIAGNOSIS — E13.9 DIABETES MELLITUS DUE TO ABNORMAL INSULIN: Primary | ICD-10-CM

## 2021-08-04 DIAGNOSIS — K21.9 GASTROESOPHAGEAL REFLUX DISEASE WITHOUT ESOPHAGITIS: ICD-10-CM

## 2021-08-04 DIAGNOSIS — I10 ESSENTIAL HYPERTENSION: ICD-10-CM

## 2021-08-04 DIAGNOSIS — M25.551 RIGHT HIP PAIN: ICD-10-CM

## 2021-08-04 DIAGNOSIS — M54.16 LUMBAR RADICULOPATHY, CHRONIC: ICD-10-CM

## 2021-08-04 DIAGNOSIS — E55.9 VITAMIN D DEFICIENCY DISEASE: ICD-10-CM

## 2021-08-04 PROCEDURE — 3044F PR MOST RECENT HEMOGLOBIN A1C LEVEL <7.0%: ICD-10-PCS | Mod: CPTII,S$GLB,, | Performed by: INTERNAL MEDICINE

## 2021-08-04 PROCEDURE — 3008F BODY MASS INDEX DOCD: CPT | Mod: CPTII,S$GLB,, | Performed by: INTERNAL MEDICINE

## 2021-08-04 PROCEDURE — 1101F PT FALLS ASSESS-DOCD LE1/YR: CPT | Mod: CPTII,S$GLB,, | Performed by: INTERNAL MEDICINE

## 2021-08-04 PROCEDURE — 1126F PR PAIN SEVERITY QUANTIFIED, NO PAIN PRESENT: ICD-10-PCS | Mod: CPTII,S$GLB,, | Performed by: INTERNAL MEDICINE

## 2021-08-04 PROCEDURE — 99214 OFFICE O/P EST MOD 30 MIN: CPT | Mod: S$GLB,,, | Performed by: INTERNAL MEDICINE

## 2021-08-04 PROCEDURE — 3288F PR FALLS RISK ASSESSMENT DOCUMENTED: ICD-10-PCS | Mod: CPTII,S$GLB,, | Performed by: INTERNAL MEDICINE

## 2021-08-04 PROCEDURE — 3008F PR BODY MASS INDEX (BMI) DOCUMENTED: ICD-10-PCS | Mod: CPTII,S$GLB,, | Performed by: INTERNAL MEDICINE

## 2021-08-04 PROCEDURE — 3074F PR MOST RECENT SYSTOLIC BLOOD PRESSURE < 130 MM HG: ICD-10-PCS | Mod: CPTII,S$GLB,, | Performed by: INTERNAL MEDICINE

## 2021-08-04 PROCEDURE — 1159F PR MEDICATION LIST DOCUMENTED IN MEDICAL RECORD: ICD-10-PCS | Mod: CPTII,S$GLB,, | Performed by: INTERNAL MEDICINE

## 2021-08-04 PROCEDURE — 99499 RISK ADDL DX/OHS AUDIT: ICD-10-PCS | Mod: S$GLB,,, | Performed by: INTERNAL MEDICINE

## 2021-08-04 PROCEDURE — 3288F FALL RISK ASSESSMENT DOCD: CPT | Mod: CPTII,S$GLB,, | Performed by: INTERNAL MEDICINE

## 2021-08-04 PROCEDURE — 3078F DIAST BP <80 MM HG: CPT | Mod: CPTII,S$GLB,, | Performed by: INTERNAL MEDICINE

## 2021-08-04 PROCEDURE — 99214 PR OFFICE/OUTPT VISIT, EST, LEVL IV, 30-39 MIN: ICD-10-PCS | Mod: S$GLB,,, | Performed by: INTERNAL MEDICINE

## 2021-08-04 PROCEDURE — 99499 UNLISTED E&M SERVICE: CPT | Mod: S$GLB,,, | Performed by: INTERNAL MEDICINE

## 2021-08-04 PROCEDURE — 3044F HG A1C LEVEL LT 7.0%: CPT | Mod: CPTII,S$GLB,, | Performed by: INTERNAL MEDICINE

## 2021-08-04 PROCEDURE — 3074F SYST BP LT 130 MM HG: CPT | Mod: CPTII,S$GLB,, | Performed by: INTERNAL MEDICINE

## 2021-08-04 PROCEDURE — 1126F AMNT PAIN NOTED NONE PRSNT: CPT | Mod: CPTII,S$GLB,, | Performed by: INTERNAL MEDICINE

## 2021-08-04 PROCEDURE — 1101F PR PT FALLS ASSESS DOC 0-1 FALLS W/OUT INJ PAST YR: ICD-10-PCS | Mod: CPTII,S$GLB,, | Performed by: INTERNAL MEDICINE

## 2021-08-04 PROCEDURE — 99999 PR PBB SHADOW E&M-EST. PATIENT-LVL IV: ICD-10-PCS | Mod: PBBFAC,,, | Performed by: INTERNAL MEDICINE

## 2021-08-04 PROCEDURE — 1159F MED LIST DOCD IN RCRD: CPT | Mod: CPTII,S$GLB,, | Performed by: INTERNAL MEDICINE

## 2021-08-04 PROCEDURE — 3078F PR MOST RECENT DIASTOLIC BLOOD PRESSURE < 80 MM HG: ICD-10-PCS | Mod: CPTII,S$GLB,, | Performed by: INTERNAL MEDICINE

## 2021-08-04 PROCEDURE — 99999 PR PBB SHADOW E&M-EST. PATIENT-LVL IV: CPT | Mod: PBBFAC,,, | Performed by: INTERNAL MEDICINE

## 2021-08-10 ENCOUNTER — HOSPITAL ENCOUNTER (EMERGENCY)
Facility: OTHER | Age: 71
Discharge: HOME | End: 2021-08-10
Attending: EMERGENCY MEDICINE
Payer: MEDICARE

## 2021-08-10 VITALS
WEIGHT: 186 LBS | TEMPERATURE: 98 F | DIASTOLIC BLOOD PRESSURE: 71 MMHG | HEART RATE: 61 BPM | OXYGEN SATURATION: 100 % | BODY MASS INDEX: 27.47 KG/M2 | RESPIRATION RATE: 18 BRPM | SYSTOLIC BLOOD PRESSURE: 138 MMHG

## 2021-08-10 DIAGNOSIS — R93.5 ABNORMAL CT OF THE ABDOMEN: ICD-10-CM

## 2021-08-10 DIAGNOSIS — R31.9 HEMATURIA, UNSPECIFIED TYPE: Primary | ICD-10-CM

## 2021-08-10 LAB
ALBUMIN SERPL BCP-MCNC: 4.3 G/DL (ref 3.5–5.2)
ALP SERPL-CCNC: 71 U/L (ref 55–135)
ALT SERPL W/O P-5'-P-CCNC: 44 U/L (ref 10–44)
ANION GAP SERPL CALC-SCNC: 12 MMOL/L (ref 8–16)
AST SERPL-CCNC: 41 U/L (ref 10–40)
BACTERIA #/AREA URNS HPF: ABNORMAL /HPF
BASOPHILS # BLD AUTO: 0.05 K/UL (ref 0–0.2)
BASOPHILS NFR BLD: 0.8 % (ref 0–1.9)
BILIRUB SERPL-MCNC: 0.3 MG/DL (ref 0.1–1)
BILIRUB UR QL STRIP: NEGATIVE
BUN SERPL-MCNC: 12 MG/DL (ref 8–23)
CALCIUM SERPL-MCNC: 9.9 MG/DL (ref 8.7–10.5)
CHLORIDE SERPL-SCNC: 105 MMOL/L (ref 95–110)
CLARITY UR: ABNORMAL
CO2 SERPL-SCNC: 22 MMOL/L (ref 23–29)
COLOR UR: YELLOW
CREAT SERPL-MCNC: 1.1 MG/DL (ref 0.5–1.4)
DIFFERENTIAL METHOD: ABNORMAL
EOSINOPHIL # BLD AUTO: 0.2 K/UL (ref 0–0.5)
EOSINOPHIL NFR BLD: 3.6 % (ref 0–8)
ERYTHROCYTE [DISTWIDTH] IN BLOOD BY AUTOMATED COUNT: 13.4 % (ref 11.5–14.5)
EST. GFR  (AFRICAN AMERICAN): >60 ML/MIN/1.73 M^2
EST. GFR  (NON AFRICAN AMERICAN): >60 ML/MIN/1.73 M^2
GLUCOSE SERPL-MCNC: 115 MG/DL (ref 70–110)
GLUCOSE UR QL STRIP: NEGATIVE
HCT VFR BLD AUTO: 39 % (ref 40–54)
HGB BLD-MCNC: 13 G/DL (ref 14–18)
HGB UR QL STRIP: ABNORMAL
IMM GRANULOCYTES # BLD AUTO: 0.01 K/UL (ref 0–0.04)
IMM GRANULOCYTES NFR BLD AUTO: 0.2 % (ref 0–0.5)
KETONES UR QL STRIP: NEGATIVE
LEUKOCYTE ESTERASE UR QL STRIP: ABNORMAL
LYMPHOCYTES # BLD AUTO: 3.2 K/UL (ref 1–4.8)
LYMPHOCYTES NFR BLD: 49.9 % (ref 18–48)
MCH RBC QN AUTO: 33.2 PG (ref 27–31)
MCHC RBC AUTO-ENTMCNC: 33.3 G/DL (ref 32–36)
MCV RBC AUTO: 100 FL (ref 82–98)
MICROSCOPIC COMMENT: ABNORMAL
MONOCYTES # BLD AUTO: 0.4 K/UL (ref 0.3–1)
MONOCYTES NFR BLD: 6.5 % (ref 4–15)
NEUTROPHILS # BLD AUTO: 2.5 K/UL (ref 1.8–7.7)
NEUTROPHILS NFR BLD: 39 % (ref 38–73)
NITRITE UR QL STRIP: NEGATIVE
NRBC BLD-RTO: 0 /100 WBC
PH UR STRIP: 5 [PH] (ref 5–8)
PLATELET # BLD AUTO: 307 K/UL (ref 150–450)
PMV BLD AUTO: 8.7 FL (ref 9.2–12.9)
POTASSIUM SERPL-SCNC: 4.7 MMOL/L (ref 3.5–5.1)
PROT SERPL-MCNC: 7.6 G/DL (ref 6–8.4)
PROT UR QL STRIP: NEGATIVE
RBC # BLD AUTO: 3.91 M/UL (ref 4.6–6.2)
RBC #/AREA URNS HPF: 50 /HPF (ref 0–4)
SODIUM SERPL-SCNC: 139 MMOL/L (ref 136–145)
SP GR UR STRIP: 1.02 (ref 1–1.03)
SQUAMOUS #/AREA URNS HPF: 0 /HPF
URN SPEC COLLECT METH UR: ABNORMAL
UROBILINOGEN UR STRIP-ACNC: NEGATIVE EU/DL
WBC # BLD AUTO: 6.35 K/UL (ref 3.9–12.7)
WBC #/AREA URNS HPF: 12 /HPF (ref 0–5)

## 2021-08-10 PROCEDURE — 81000 URINALYSIS NONAUTO W/SCOPE: CPT | Performed by: EMERGENCY MEDICINE

## 2021-08-10 PROCEDURE — 87086 URINE CULTURE/COLONY COUNT: CPT | Performed by: EMERGENCY MEDICINE

## 2021-08-10 PROCEDURE — 99284 EMERGENCY DEPT VISIT MOD MDM: CPT | Mod: 25

## 2021-08-10 PROCEDURE — 80053 COMPREHEN METABOLIC PANEL: CPT | Performed by: PHYSICIAN ASSISTANT

## 2021-08-10 PROCEDURE — 85025 COMPLETE CBC W/AUTO DIFF WBC: CPT | Performed by: PHYSICIAN ASSISTANT

## 2021-08-10 PROCEDURE — 25000003 PHARM REV CODE 250: Performed by: PHYSICIAN ASSISTANT

## 2021-08-10 RX ORDER — CIPROFLOXACIN 500 MG/1
500 TABLET ORAL 2 TIMES DAILY
Qty: 19 TABLET | Refills: 0 | Status: SHIPPED | OUTPATIENT
Start: 2021-08-10 | End: 2021-08-20

## 2021-08-10 RX ORDER — CIPROFLOXACIN 500 MG/1
500 TABLET ORAL
Status: COMPLETED | OUTPATIENT
Start: 2021-08-10 | End: 2021-08-10

## 2021-08-10 RX ADMIN — CIPROFLOXACIN 500 MG: 500 TABLET, FILM COATED ORAL at 05:08

## 2021-08-12 ENCOUNTER — OFFICE VISIT (OUTPATIENT)
Dept: UROLOGY | Facility: CLINIC | Age: 71
End: 2021-08-12
Attending: UROLOGY
Payer: MEDICARE

## 2021-08-12 VITALS
BODY MASS INDEX: 28.93 KG/M2 | HEIGHT: 69 IN | WEIGHT: 195.31 LBS | DIASTOLIC BLOOD PRESSURE: 70 MMHG | SYSTOLIC BLOOD PRESSURE: 135 MMHG

## 2021-08-12 DIAGNOSIS — R31.0 HEMATURIA, GROSS: Primary | ICD-10-CM

## 2021-08-12 DIAGNOSIS — R31.9 HEMATURIA, UNSPECIFIED TYPE: ICD-10-CM

## 2021-08-12 LAB — BACTERIA UR CULT: NO GROWTH

## 2021-08-12 PROCEDURE — 3075F PR MOST RECENT SYSTOLIC BLOOD PRESS GE 130-139MM HG: ICD-10-PCS | Mod: CPTII,S$GLB,, | Performed by: UROLOGY

## 2021-08-12 PROCEDURE — 1126F AMNT PAIN NOTED NONE PRSNT: CPT | Mod: CPTII,S$GLB,, | Performed by: UROLOGY

## 2021-08-12 PROCEDURE — 3078F DIAST BP <80 MM HG: CPT | Mod: CPTII,S$GLB,, | Performed by: UROLOGY

## 2021-08-12 PROCEDURE — 3288F PR FALLS RISK ASSESSMENT DOCUMENTED: ICD-10-PCS | Mod: CPTII,S$GLB,, | Performed by: UROLOGY

## 2021-08-12 PROCEDURE — 1101F PT FALLS ASSESS-DOCD LE1/YR: CPT | Mod: CPTII,S$GLB,, | Performed by: UROLOGY

## 2021-08-12 PROCEDURE — 1101F PR PT FALLS ASSESS DOC 0-1 FALLS W/OUT INJ PAST YR: ICD-10-PCS | Mod: CPTII,S$GLB,, | Performed by: UROLOGY

## 2021-08-12 PROCEDURE — 3008F PR BODY MASS INDEX (BMI) DOCUMENTED: ICD-10-PCS | Mod: CPTII,S$GLB,, | Performed by: UROLOGY

## 2021-08-12 PROCEDURE — 3044F HG A1C LEVEL LT 7.0%: CPT | Mod: CPTII,S$GLB,, | Performed by: UROLOGY

## 2021-08-12 PROCEDURE — 3044F PR MOST RECENT HEMOGLOBIN A1C LEVEL <7.0%: ICD-10-PCS | Mod: CPTII,S$GLB,, | Performed by: UROLOGY

## 2021-08-12 PROCEDURE — 3078F PR MOST RECENT DIASTOLIC BLOOD PRESSURE < 80 MM HG: ICD-10-PCS | Mod: CPTII,S$GLB,, | Performed by: UROLOGY

## 2021-08-12 PROCEDURE — 3075F SYST BP GE 130 - 139MM HG: CPT | Mod: CPTII,S$GLB,, | Performed by: UROLOGY

## 2021-08-12 PROCEDURE — 99213 OFFICE O/P EST LOW 20 MIN: CPT | Mod: S$GLB,,, | Performed by: UROLOGY

## 2021-08-12 PROCEDURE — 99213 PR OFFICE/OUTPT VISIT, EST, LEVL III, 20-29 MIN: ICD-10-PCS | Mod: S$GLB,,, | Performed by: UROLOGY

## 2021-08-12 PROCEDURE — 1159F MED LIST DOCD IN RCRD: CPT | Mod: CPTII,S$GLB,, | Performed by: UROLOGY

## 2021-08-12 PROCEDURE — 1126F PR PAIN SEVERITY QUANTIFIED, NO PAIN PRESENT: ICD-10-PCS | Mod: CPTII,S$GLB,, | Performed by: UROLOGY

## 2021-08-12 PROCEDURE — 3288F FALL RISK ASSESSMENT DOCD: CPT | Mod: CPTII,S$GLB,, | Performed by: UROLOGY

## 2021-08-12 PROCEDURE — 3008F BODY MASS INDEX DOCD: CPT | Mod: CPTII,S$GLB,, | Performed by: UROLOGY

## 2021-08-12 PROCEDURE — 1159F PR MEDICATION LIST DOCUMENTED IN MEDICAL RECORD: ICD-10-PCS | Mod: CPTII,S$GLB,, | Performed by: UROLOGY

## 2021-08-13 ENCOUNTER — PATIENT MESSAGE (OUTPATIENT)
Dept: INTERNAL MEDICINE | Facility: CLINIC | Age: 71
End: 2021-08-13

## 2021-08-16 ENCOUNTER — PATIENT MESSAGE (OUTPATIENT)
Dept: INTERNAL MEDICINE | Facility: CLINIC | Age: 71
End: 2021-08-16

## 2021-08-16 DIAGNOSIS — R42 DIZZINESS: Primary | ICD-10-CM

## 2021-08-16 DIAGNOSIS — R00.0 RAPID HEART BEAT: ICD-10-CM

## 2021-08-19 ENCOUNTER — TELEPHONE (OUTPATIENT)
Dept: CARDIOLOGY | Facility: CLINIC | Age: 71
End: 2021-08-19

## 2021-08-19 DIAGNOSIS — R00.2 PALPITATIONS: Primary | ICD-10-CM

## 2021-08-20 ENCOUNTER — PATIENT OUTREACH (OUTPATIENT)
Dept: ADMINISTRATIVE | Facility: OTHER | Age: 71
End: 2021-08-20

## 2021-08-20 ENCOUNTER — OFFICE VISIT (OUTPATIENT)
Dept: CARDIOLOGY | Facility: CLINIC | Age: 71
End: 2021-08-20
Payer: MEDICARE

## 2021-08-20 VITALS
HEIGHT: 69 IN | BODY MASS INDEX: 27.95 KG/M2 | DIASTOLIC BLOOD PRESSURE: 68 MMHG | HEART RATE: 80 BPM | SYSTOLIC BLOOD PRESSURE: 146 MMHG | WEIGHT: 188.69 LBS

## 2021-08-20 DIAGNOSIS — R53.83 FATIGUE, UNSPECIFIED TYPE: ICD-10-CM

## 2021-08-20 DIAGNOSIS — E11.9 TYPE 2 DIABETES MELLITUS WITHOUT COMPLICATION, WITH LONG-TERM CURRENT USE OF INSULIN: ICD-10-CM

## 2021-08-20 DIAGNOSIS — R00.0 RAPID HEART BEAT: ICD-10-CM

## 2021-08-20 DIAGNOSIS — I25.10 CORONARY ARTERY DISEASE INVOLVING NATIVE CORONARY ARTERY OF NATIVE HEART WITHOUT ANGINA PECTORIS: ICD-10-CM

## 2021-08-20 DIAGNOSIS — Z79.4 TYPE 2 DIABETES MELLITUS WITHOUT COMPLICATION, WITH LONG-TERM CURRENT USE OF INSULIN: ICD-10-CM

## 2021-08-20 DIAGNOSIS — E78.5 HYPERLIPIDEMIA, UNSPECIFIED HYPERLIPIDEMIA TYPE: ICD-10-CM

## 2021-08-20 DIAGNOSIS — R06.09 DOE (DYSPNEA ON EXERTION): Primary | ICD-10-CM

## 2021-08-20 DIAGNOSIS — R00.2 PALPITATIONS: ICD-10-CM

## 2021-08-20 DIAGNOSIS — G47.33 OSA (OBSTRUCTIVE SLEEP APNEA): ICD-10-CM

## 2021-08-20 DIAGNOSIS — R42 DIZZINESS: ICD-10-CM

## 2021-08-20 PROCEDURE — 3078F DIAST BP <80 MM HG: CPT | Mod: CPTII,S$GLB,, | Performed by: INTERNAL MEDICINE

## 2021-08-20 PROCEDURE — 99999 PR PBB SHADOW E&M-EST. PATIENT-LVL V: CPT | Mod: PBBFAC,,, | Performed by: INTERNAL MEDICINE

## 2021-08-20 PROCEDURE — 3077F PR MOST RECENT SYSTOLIC BLOOD PRESSURE >= 140 MM HG: ICD-10-PCS | Mod: CPTII,S$GLB,, | Performed by: INTERNAL MEDICINE

## 2021-08-20 PROCEDURE — 99999 PR PBB SHADOW E&M-EST. PATIENT-LVL V: ICD-10-PCS | Mod: PBBFAC,,, | Performed by: INTERNAL MEDICINE

## 2021-08-20 PROCEDURE — 1101F PR PT FALLS ASSESS DOC 0-1 FALLS W/OUT INJ PAST YR: ICD-10-PCS | Mod: CPTII,S$GLB,, | Performed by: INTERNAL MEDICINE

## 2021-08-20 PROCEDURE — 1126F PR PAIN SEVERITY QUANTIFIED, NO PAIN PRESENT: ICD-10-PCS | Mod: CPTII,S$GLB,, | Performed by: INTERNAL MEDICINE

## 2021-08-20 PROCEDURE — 1159F PR MEDICATION LIST DOCUMENTED IN MEDICAL RECORD: ICD-10-PCS | Mod: CPTII,S$GLB,, | Performed by: INTERNAL MEDICINE

## 2021-08-20 PROCEDURE — 3288F FALL RISK ASSESSMENT DOCD: CPT | Mod: CPTII,S$GLB,, | Performed by: INTERNAL MEDICINE

## 2021-08-20 PROCEDURE — 1101F PT FALLS ASSESS-DOCD LE1/YR: CPT | Mod: CPTII,S$GLB,, | Performed by: INTERNAL MEDICINE

## 2021-08-20 PROCEDURE — 93005 ELECTROCARDIOGRAM TRACING: CPT | Mod: S$GLB,,, | Performed by: INTERNAL MEDICINE

## 2021-08-20 PROCEDURE — 3044F PR MOST RECENT HEMOGLOBIN A1C LEVEL <7.0%: ICD-10-PCS | Mod: CPTII,S$GLB,, | Performed by: INTERNAL MEDICINE

## 2021-08-20 PROCEDURE — 3044F HG A1C LEVEL LT 7.0%: CPT | Mod: CPTII,S$GLB,, | Performed by: INTERNAL MEDICINE

## 2021-08-20 PROCEDURE — 99204 PR OFFICE/OUTPT VISIT, NEW, LEVL IV, 45-59 MIN: ICD-10-PCS | Mod: S$GLB,,, | Performed by: INTERNAL MEDICINE

## 2021-08-20 PROCEDURE — 99204 OFFICE O/P NEW MOD 45 MIN: CPT | Mod: S$GLB,,, | Performed by: INTERNAL MEDICINE

## 2021-08-20 PROCEDURE — 3077F SYST BP >= 140 MM HG: CPT | Mod: CPTII,S$GLB,, | Performed by: INTERNAL MEDICINE

## 2021-08-20 PROCEDURE — 1159F MED LIST DOCD IN RCRD: CPT | Mod: CPTII,S$GLB,, | Performed by: INTERNAL MEDICINE

## 2021-08-20 PROCEDURE — 3008F PR BODY MASS INDEX (BMI) DOCUMENTED: ICD-10-PCS | Mod: CPTII,S$GLB,, | Performed by: INTERNAL MEDICINE

## 2021-08-20 PROCEDURE — 93010 EKG 12-LEAD: ICD-10-PCS | Mod: S$GLB,,, | Performed by: INTERNAL MEDICINE

## 2021-08-20 PROCEDURE — 3288F PR FALLS RISK ASSESSMENT DOCUMENTED: ICD-10-PCS | Mod: CPTII,S$GLB,, | Performed by: INTERNAL MEDICINE

## 2021-08-20 PROCEDURE — 3008F BODY MASS INDEX DOCD: CPT | Mod: CPTII,S$GLB,, | Performed by: INTERNAL MEDICINE

## 2021-08-20 PROCEDURE — 3078F PR MOST RECENT DIASTOLIC BLOOD PRESSURE < 80 MM HG: ICD-10-PCS | Mod: CPTII,S$GLB,, | Performed by: INTERNAL MEDICINE

## 2021-08-20 PROCEDURE — 1126F AMNT PAIN NOTED NONE PRSNT: CPT | Mod: CPTII,S$GLB,, | Performed by: INTERNAL MEDICINE

## 2021-08-20 PROCEDURE — 93005 EKG 12-LEAD: ICD-10-PCS | Mod: S$GLB,,, | Performed by: INTERNAL MEDICINE

## 2021-08-20 PROCEDURE — 93010 ELECTROCARDIOGRAM REPORT: CPT | Mod: S$GLB,,, | Performed by: INTERNAL MEDICINE

## 2021-08-22 ENCOUNTER — PATIENT MESSAGE (OUTPATIENT)
Dept: INTERNAL MEDICINE | Facility: CLINIC | Age: 71
End: 2021-08-22

## 2021-08-23 ENCOUNTER — OFFICE VISIT (OUTPATIENT)
Dept: INTERNAL MEDICINE | Facility: CLINIC | Age: 71
End: 2021-08-23
Payer: MEDICARE

## 2021-08-23 ENCOUNTER — LAB VISIT (OUTPATIENT)
Dept: LAB | Facility: HOSPITAL | Age: 71
End: 2021-08-23
Attending: INTERNAL MEDICINE
Payer: MEDICARE

## 2021-08-23 VITALS
WEIGHT: 183.63 LBS | DIASTOLIC BLOOD PRESSURE: 60 MMHG | HEART RATE: 88 BPM | BODY MASS INDEX: 27.2 KG/M2 | SYSTOLIC BLOOD PRESSURE: 138 MMHG | OXYGEN SATURATION: 97 % | HEIGHT: 69 IN

## 2021-08-23 DIAGNOSIS — N39.0 URINARY TRACT INFECTION WITHOUT HEMATURIA, SITE UNSPECIFIED: ICD-10-CM

## 2021-08-23 DIAGNOSIS — R63.4 WEIGHT LOSS: Primary | ICD-10-CM

## 2021-08-23 DIAGNOSIS — R63.4 WEIGHT LOSS: ICD-10-CM

## 2021-08-23 DIAGNOSIS — N28.1 KIDNEY CYSTS: ICD-10-CM

## 2021-08-23 DIAGNOSIS — R05.9 COUGH: ICD-10-CM

## 2021-08-23 LAB
ANION GAP SERPL CALC-SCNC: 16 MMOL/L (ref 8–16)
BASOPHILS # BLD AUTO: 0.03 K/UL (ref 0–0.2)
BASOPHILS NFR BLD: 0.5 % (ref 0–1.9)
BUN SERPL-MCNC: 14 MG/DL (ref 8–23)
CALCIUM SERPL-MCNC: 10 MG/DL (ref 8.7–10.5)
CHLORIDE SERPL-SCNC: 108 MMOL/L (ref 95–110)
CO2 SERPL-SCNC: 16 MMOL/L (ref 23–29)
CREAT SERPL-MCNC: 1 MG/DL (ref 0.5–1.4)
CRP SERPL-MCNC: 1.2 MG/L (ref 0–8.2)
DIFFERENTIAL METHOD: ABNORMAL
EOSINOPHIL # BLD AUTO: 0.1 K/UL (ref 0–0.5)
EOSINOPHIL NFR BLD: 1.5 % (ref 0–8)
ERYTHROCYTE [DISTWIDTH] IN BLOOD BY AUTOMATED COUNT: 13.3 % (ref 11.5–14.5)
ERYTHROCYTE [SEDIMENTATION RATE] IN BLOOD BY WESTERGREN METHOD: 17 MM/HR (ref 0–23)
EST. GFR  (AFRICAN AMERICAN): >60 ML/MIN/1.73 M^2
EST. GFR  (NON AFRICAN AMERICAN): >60 ML/MIN/1.73 M^2
GLUCOSE SERPL-MCNC: 99 MG/DL (ref 70–110)
HCT VFR BLD AUTO: 38.9 % (ref 40–54)
HGB BLD-MCNC: 12.8 G/DL (ref 14–18)
IMM GRANULOCYTES # BLD AUTO: 0.01 K/UL (ref 0–0.04)
IMM GRANULOCYTES NFR BLD AUTO: 0.2 % (ref 0–0.5)
LIPASE SERPL-CCNC: 11 U/L (ref 4–60)
LYMPHOCYTES # BLD AUTO: 2.8 K/UL (ref 1–4.8)
LYMPHOCYTES NFR BLD: 43.4 % (ref 18–48)
MCH RBC QN AUTO: 32.7 PG (ref 27–31)
MCHC RBC AUTO-ENTMCNC: 32.9 G/DL (ref 32–36)
MCV RBC AUTO: 100 FL (ref 82–98)
MONOCYTES # BLD AUTO: 0.3 K/UL (ref 0.3–1)
MONOCYTES NFR BLD: 5.2 % (ref 4–15)
NEUTROPHILS # BLD AUTO: 3.2 K/UL (ref 1.8–7.7)
NEUTROPHILS NFR BLD: 49.2 % (ref 38–73)
NRBC BLD-RTO: 0 /100 WBC
PLATELET # BLD AUTO: 313 K/UL (ref 150–450)
PMV BLD AUTO: 9.3 FL (ref 9.2–12.9)
POTASSIUM SERPL-SCNC: 4.3 MMOL/L (ref 3.5–5.1)
PREALB SERPL-MCNC: 22 MG/DL (ref 20–43)
RBC # BLD AUTO: 3.91 M/UL (ref 4.6–6.2)
SODIUM SERPL-SCNC: 140 MMOL/L (ref 136–145)
TSH SERPL DL<=0.005 MIU/L-ACNC: 0.79 UIU/ML (ref 0.4–4)
WBC # BLD AUTO: 6.5 K/UL (ref 3.9–12.7)

## 2021-08-23 PROCEDURE — 1101F PR PT FALLS ASSESS DOC 0-1 FALLS W/OUT INJ PAST YR: ICD-10-PCS | Mod: CPTII,S$GLB,, | Performed by: INTERNAL MEDICINE

## 2021-08-23 PROCEDURE — 1159F MED LIST DOCD IN RCRD: CPT | Mod: CPTII,S$GLB,, | Performed by: INTERNAL MEDICINE

## 2021-08-23 PROCEDURE — 3075F SYST BP GE 130 - 139MM HG: CPT | Mod: CPTII,S$GLB,, | Performed by: INTERNAL MEDICINE

## 2021-08-23 PROCEDURE — 1126F AMNT PAIN NOTED NONE PRSNT: CPT | Mod: CPTII,S$GLB,, | Performed by: INTERNAL MEDICINE

## 2021-08-23 PROCEDURE — 84134 ASSAY OF PREALBUMIN: CPT | Performed by: INTERNAL MEDICINE

## 2021-08-23 PROCEDURE — 80048 BASIC METABOLIC PNL TOTAL CA: CPT | Performed by: INTERNAL MEDICINE

## 2021-08-23 PROCEDURE — 85652 RBC SED RATE AUTOMATED: CPT | Performed by: INTERNAL MEDICINE

## 2021-08-23 PROCEDURE — 85025 COMPLETE CBC W/AUTO DIFF WBC: CPT | Performed by: INTERNAL MEDICINE

## 2021-08-23 PROCEDURE — 3288F FALL RISK ASSESSMENT DOCD: CPT | Mod: CPTII,S$GLB,, | Performed by: INTERNAL MEDICINE

## 2021-08-23 PROCEDURE — 3075F PR MOST RECENT SYSTOLIC BLOOD PRESS GE 130-139MM HG: ICD-10-PCS | Mod: CPTII,S$GLB,, | Performed by: INTERNAL MEDICINE

## 2021-08-23 PROCEDURE — 1159F PR MEDICATION LIST DOCUMENTED IN MEDICAL RECORD: ICD-10-PCS | Mod: CPTII,S$GLB,, | Performed by: INTERNAL MEDICINE

## 2021-08-23 PROCEDURE — 1160F PR REVIEW ALL MEDS BY PRESCRIBER/CLIN PHARMACIST DOCUMENTED: ICD-10-PCS | Mod: CPTII,S$GLB,, | Performed by: INTERNAL MEDICINE

## 2021-08-23 PROCEDURE — 99214 OFFICE O/P EST MOD 30 MIN: CPT | Mod: S$GLB,,, | Performed by: INTERNAL MEDICINE

## 2021-08-23 PROCEDURE — 3008F PR BODY MASS INDEX (BMI) DOCUMENTED: ICD-10-PCS | Mod: CPTII,S$GLB,, | Performed by: INTERNAL MEDICINE

## 2021-08-23 PROCEDURE — 81001 URINALYSIS AUTO W/SCOPE: CPT | Performed by: INTERNAL MEDICINE

## 2021-08-23 PROCEDURE — 3288F PR FALLS RISK ASSESSMENT DOCUMENTED: ICD-10-PCS | Mod: CPTII,S$GLB,, | Performed by: INTERNAL MEDICINE

## 2021-08-23 PROCEDURE — 3078F DIAST BP <80 MM HG: CPT | Mod: CPTII,S$GLB,, | Performed by: INTERNAL MEDICINE

## 2021-08-23 PROCEDURE — 1160F RVW MEDS BY RX/DR IN RCRD: CPT | Mod: CPTII,S$GLB,, | Performed by: INTERNAL MEDICINE

## 2021-08-23 PROCEDURE — 87086 URINE CULTURE/COLONY COUNT: CPT | Performed by: INTERNAL MEDICINE

## 2021-08-23 PROCEDURE — 83690 ASSAY OF LIPASE: CPT | Performed by: INTERNAL MEDICINE

## 2021-08-23 PROCEDURE — 3044F HG A1C LEVEL LT 7.0%: CPT | Mod: CPTII,S$GLB,, | Performed by: INTERNAL MEDICINE

## 2021-08-23 PROCEDURE — 1101F PT FALLS ASSESS-DOCD LE1/YR: CPT | Mod: CPTII,S$GLB,, | Performed by: INTERNAL MEDICINE

## 2021-08-23 PROCEDURE — 3078F PR MOST RECENT DIASTOLIC BLOOD PRESSURE < 80 MM HG: ICD-10-PCS | Mod: CPTII,S$GLB,, | Performed by: INTERNAL MEDICINE

## 2021-08-23 PROCEDURE — 99999 PR PBB SHADOW E&M-EST. PATIENT-LVL V: CPT | Mod: PBBFAC,,, | Performed by: INTERNAL MEDICINE

## 2021-08-23 PROCEDURE — 84443 ASSAY THYROID STIM HORMONE: CPT | Performed by: INTERNAL MEDICINE

## 2021-08-23 PROCEDURE — 3044F PR MOST RECENT HEMOGLOBIN A1C LEVEL <7.0%: ICD-10-PCS | Mod: CPTII,S$GLB,, | Performed by: INTERNAL MEDICINE

## 2021-08-23 PROCEDURE — 99999 PR PBB SHADOW E&M-EST. PATIENT-LVL V: ICD-10-PCS | Mod: PBBFAC,,, | Performed by: INTERNAL MEDICINE

## 2021-08-23 PROCEDURE — 86140 C-REACTIVE PROTEIN: CPT | Performed by: INTERNAL MEDICINE

## 2021-08-23 PROCEDURE — 1126F PR PAIN SEVERITY QUANTIFIED, NO PAIN PRESENT: ICD-10-PCS | Mod: CPTII,S$GLB,, | Performed by: INTERNAL MEDICINE

## 2021-08-23 PROCEDURE — 99214 PR OFFICE/OUTPT VISIT, EST, LEVL IV, 30-39 MIN: ICD-10-PCS | Mod: S$GLB,,, | Performed by: INTERNAL MEDICINE

## 2021-08-23 PROCEDURE — 3008F BODY MASS INDEX DOCD: CPT | Mod: CPTII,S$GLB,, | Performed by: INTERNAL MEDICINE

## 2021-08-23 PROCEDURE — 36415 COLL VENOUS BLD VENIPUNCTURE: CPT | Performed by: INTERNAL MEDICINE

## 2021-08-24 LAB
BACTERIA #/AREA URNS AUTO: ABNORMAL /HPF
BILIRUB UR QL STRIP: NEGATIVE
CAOX CRY UR QL COMP ASSIST: ABNORMAL
CLARITY UR REFRACT.AUTO: ABNORMAL
COLOR UR AUTO: YELLOW
GLUCOSE UR QL STRIP: NEGATIVE
HGB UR QL STRIP: NEGATIVE
HYALINE CASTS UR QL AUTO: 6 /LPF
KETONES UR QL STRIP: ABNORMAL
LEUKOCYTE ESTERASE UR QL STRIP: NEGATIVE
MICROSCOPIC COMMENT: ABNORMAL
NITRITE UR QL STRIP: NEGATIVE
PH UR STRIP: 5 [PH] (ref 5–8)
PROT UR QL STRIP: ABNORMAL
RBC #/AREA URNS AUTO: 0 /HPF (ref 0–4)
SP GR UR STRIP: 1.02 (ref 1–1.03)
SQUAMOUS #/AREA URNS AUTO: 1 /HPF
URN SPEC COLLECT METH UR: ABNORMAL
WBC #/AREA URNS AUTO: 1 /HPF (ref 0–5)

## 2021-08-25 ENCOUNTER — HOSPITAL ENCOUNTER (OUTPATIENT)
Dept: RADIOLOGY | Facility: HOSPITAL | Age: 71
Discharge: HOME | End: 2021-08-25
Attending: INTERNAL MEDICINE
Payer: MEDICARE

## 2021-08-25 DIAGNOSIS — R05.9 COUGH: ICD-10-CM

## 2021-08-25 DIAGNOSIS — N28.1 KIDNEY CYSTS: ICD-10-CM

## 2021-08-25 LAB — BACTERIA UR CULT: NO GROWTH

## 2021-08-25 PROCEDURE — 71250 CT THORAX DX C-: CPT | Mod: 26,,, | Performed by: INTERNAL MEDICINE

## 2021-08-25 PROCEDURE — 76770 US EXAM ABDO BACK WALL COMP: CPT | Mod: 26,,, | Performed by: INTERNAL MEDICINE

## 2021-08-25 PROCEDURE — 76770 US EXAM ABDO BACK WALL COMP: CPT | Mod: TC

## 2021-08-25 PROCEDURE — 71250 CT THORAX DX C-: CPT | Mod: TC

## 2021-08-25 PROCEDURE — 71250 CT CHEST WITHOUT CONTRAST: ICD-10-PCS | Mod: 26,,, | Performed by: INTERNAL MEDICINE

## 2021-08-25 PROCEDURE — 76770 US RETROPERITONEAL COMPLETE: ICD-10-PCS | Mod: 26,,, | Performed by: INTERNAL MEDICINE

## 2021-08-27 ENCOUNTER — TELEPHONE (OUTPATIENT)
Dept: INTERNAL MEDICINE | Facility: CLINIC | Age: 71
End: 2021-08-27

## 2021-08-27 RX ORDER — DOXYCYCLINE HYCLATE 100 MG
100 TABLET ORAL 2 TIMES DAILY
Qty: 28 TABLET | Refills: 0 | Status: SHIPPED | OUTPATIENT
Start: 2021-08-27 | End: 2021-09-10

## 2021-09-03 ENCOUNTER — TELEPHONE (OUTPATIENT)
Dept: ADMINISTRATIVE | Facility: OTHER | Age: 71
End: 2021-09-03

## 2021-09-10 ENCOUNTER — TELEPHONE (OUTPATIENT)
Dept: INTERNAL MEDICINE | Facility: CLINIC | Age: 71
End: 2021-09-10

## 2021-09-13 ENCOUNTER — PROCEDURE VISIT (OUTPATIENT)
Dept: UROLOGY | Facility: CLINIC | Age: 71
End: 2021-09-13
Attending: UROLOGY
Payer: MEDICARE

## 2021-09-13 VITALS
DIASTOLIC BLOOD PRESSURE: 73 MMHG | HEART RATE: 79 BPM | BODY MASS INDEX: 27.2 KG/M2 | HEIGHT: 69 IN | WEIGHT: 183.63 LBS | SYSTOLIC BLOOD PRESSURE: 143 MMHG

## 2021-09-13 DIAGNOSIS — R31.0 HEMATURIA, GROSS: ICD-10-CM

## 2021-09-13 LAB
BILIRUB SERPL-MCNC: ABNORMAL MG/DL
BLOOD URINE, POC: ABNORMAL
CLARITY, POC UA: CLEAR
COLOR, POC UA: YELLOW
GLUCOSE UR QL STRIP: NORMAL
KETONES UR QL STRIP: ABNORMAL
LEUKOCYTE ESTERASE URINE, POC: ABNORMAL
NITRITE, POC UA: ABNORMAL
PH, POC UA: 5
PROTEIN, POC: ABNORMAL
SPECIFIC GRAVITY, POC UA: 1.01
UROBILINOGEN, POC UA: NORMAL

## 2021-09-13 PROCEDURE — 81002 POCT URINE DIPSTICK WITHOUT MICROSCOPE: ICD-10-PCS | Mod: S$GLB,,, | Performed by: UROLOGY

## 2021-09-13 PROCEDURE — 52000 CYSTOURETHROSCOPY: CPT | Mod: S$GLB,,, | Performed by: UROLOGY

## 2021-09-13 PROCEDURE — 81002 URINALYSIS NONAUTO W/O SCOPE: CPT | Mod: S$GLB,,, | Performed by: UROLOGY

## 2021-09-13 PROCEDURE — 52000 CYSTOSCOPY: ICD-10-PCS | Mod: S$GLB,,, | Performed by: UROLOGY

## 2021-09-13 RX ORDER — LIDOCAINE HYDROCHLORIDE 20 MG/ML
JELLY TOPICAL ONCE
Status: COMPLETED | OUTPATIENT
Start: 2021-09-13 | End: 2021-09-13

## 2021-09-13 RX ORDER — CIPROFLOXACIN 500 MG/1
500 TABLET ORAL
Status: COMPLETED | OUTPATIENT
Start: 2021-09-13 | End: 2021-09-13

## 2021-09-13 RX ADMIN — CIPROFLOXACIN 500 MG: 500 TABLET ORAL at 11:09

## 2021-09-13 RX ADMIN — LIDOCAINE HYDROCHLORIDE 5 ML: 20 JELLY TOPICAL at 11:09

## 2021-09-14 ENCOUNTER — OFFICE VISIT (OUTPATIENT)
Dept: INTERNAL MEDICINE | Facility: CLINIC | Age: 71
End: 2021-09-14
Payer: MEDICARE

## 2021-09-14 VITALS
WEIGHT: 182.31 LBS | HEIGHT: 69 IN | HEART RATE: 75 BPM | OXYGEN SATURATION: 99 % | DIASTOLIC BLOOD PRESSURE: 60 MMHG | BODY MASS INDEX: 27 KG/M2 | SYSTOLIC BLOOD PRESSURE: 122 MMHG

## 2021-09-14 DIAGNOSIS — I10 ESSENTIAL HYPERTENSION: ICD-10-CM

## 2021-09-14 DIAGNOSIS — K21.9 GASTROESOPHAGEAL REFLUX DISEASE WITHOUT ESOPHAGITIS: ICD-10-CM

## 2021-09-14 DIAGNOSIS — Z72.0 TOBACCO ABUSE: ICD-10-CM

## 2021-09-14 DIAGNOSIS — R63.4 WEIGHT LOSS: Primary | ICD-10-CM

## 2021-09-14 DIAGNOSIS — E78.5 HYPERLIPIDEMIA, UNSPECIFIED HYPERLIPIDEMIA TYPE: ICD-10-CM

## 2021-09-14 PROCEDURE — 1101F PT FALLS ASSESS-DOCD LE1/YR: CPT | Mod: CPTII,S$GLB,, | Performed by: INTERNAL MEDICINE

## 2021-09-14 PROCEDURE — 99214 OFFICE O/P EST MOD 30 MIN: CPT | Mod: S$GLB,,, | Performed by: INTERNAL MEDICINE

## 2021-09-14 PROCEDURE — 3044F PR MOST RECENT HEMOGLOBIN A1C LEVEL <7.0%: ICD-10-PCS | Mod: CPTII,S$GLB,, | Performed by: INTERNAL MEDICINE

## 2021-09-14 PROCEDURE — 99999 PR PBB SHADOW E&M-EST. PATIENT-LVL III: CPT | Mod: PBBFAC,,, | Performed by: INTERNAL MEDICINE

## 2021-09-14 PROCEDURE — 3288F FALL RISK ASSESSMENT DOCD: CPT | Mod: CPTII,S$GLB,, | Performed by: INTERNAL MEDICINE

## 2021-09-14 PROCEDURE — 3074F SYST BP LT 130 MM HG: CPT | Mod: CPTII,S$GLB,, | Performed by: INTERNAL MEDICINE

## 2021-09-14 PROCEDURE — 99999 PR PBB SHADOW E&M-EST. PATIENT-LVL III: ICD-10-PCS | Mod: PBBFAC,,, | Performed by: INTERNAL MEDICINE

## 2021-09-14 PROCEDURE — 3066F PR DOCUMENTATION OF TREATMENT FOR NEPHROPATHY: ICD-10-PCS | Mod: CPTII,S$GLB,, | Performed by: INTERNAL MEDICINE

## 2021-09-14 PROCEDURE — 1125F PR PAIN SEVERITY QUANTIFIED, PAIN PRESENT: ICD-10-PCS | Mod: CPTII,S$GLB,, | Performed by: INTERNAL MEDICINE

## 2021-09-14 PROCEDURE — 3008F BODY MASS INDEX DOCD: CPT | Mod: CPTII,S$GLB,, | Performed by: INTERNAL MEDICINE

## 2021-09-14 PROCEDURE — 1125F AMNT PAIN NOTED PAIN PRSNT: CPT | Mod: CPTII,S$GLB,, | Performed by: INTERNAL MEDICINE

## 2021-09-14 PROCEDURE — 4010F ACE/ARB THERAPY RXD/TAKEN: CPT | Mod: CPTII,S$GLB,, | Performed by: INTERNAL MEDICINE

## 2021-09-14 PROCEDURE — 3288F PR FALLS RISK ASSESSMENT DOCUMENTED: ICD-10-PCS | Mod: CPTII,S$GLB,, | Performed by: INTERNAL MEDICINE

## 2021-09-14 PROCEDURE — 3078F DIAST BP <80 MM HG: CPT | Mod: CPTII,S$GLB,, | Performed by: INTERNAL MEDICINE

## 2021-09-14 PROCEDURE — 99214 PR OFFICE/OUTPT VISIT, EST, LEVL IV, 30-39 MIN: ICD-10-PCS | Mod: S$GLB,,, | Performed by: INTERNAL MEDICINE

## 2021-09-14 PROCEDURE — 4010F PR ACE/ARB THEARPY RXD/TAKEN: ICD-10-PCS | Mod: CPTII,S$GLB,, | Performed by: INTERNAL MEDICINE

## 2021-09-14 PROCEDURE — 3074F PR MOST RECENT SYSTOLIC BLOOD PRESSURE < 130 MM HG: ICD-10-PCS | Mod: CPTII,S$GLB,, | Performed by: INTERNAL MEDICINE

## 2021-09-14 PROCEDURE — 3078F PR MOST RECENT DIASTOLIC BLOOD PRESSURE < 80 MM HG: ICD-10-PCS | Mod: CPTII,S$GLB,, | Performed by: INTERNAL MEDICINE

## 2021-09-14 PROCEDURE — 3060F PR POS MICROALBUMINURIA RESULT DOCUMENTED/REVIEW: ICD-10-PCS | Mod: CPTII,S$GLB,, | Performed by: INTERNAL MEDICINE

## 2021-09-14 PROCEDURE — 3008F PR BODY MASS INDEX (BMI) DOCUMENTED: ICD-10-PCS | Mod: CPTII,S$GLB,, | Performed by: INTERNAL MEDICINE

## 2021-09-14 PROCEDURE — 3060F POS MICROALBUMINURIA REV: CPT | Mod: CPTII,S$GLB,, | Performed by: INTERNAL MEDICINE

## 2021-09-14 PROCEDURE — 3044F HG A1C LEVEL LT 7.0%: CPT | Mod: CPTII,S$GLB,, | Performed by: INTERNAL MEDICINE

## 2021-09-14 PROCEDURE — 3066F NEPHROPATHY DOC TX: CPT | Mod: CPTII,S$GLB,, | Performed by: INTERNAL MEDICINE

## 2021-09-14 PROCEDURE — 1101F PR PT FALLS ASSESS DOC 0-1 FALLS W/OUT INJ PAST YR: ICD-10-PCS | Mod: CPTII,S$GLB,, | Performed by: INTERNAL MEDICINE

## 2021-09-14 RX ORDER — MIRTAZAPINE 15 MG/1
7.5 TABLET, FILM COATED ORAL NIGHTLY
Qty: 15 TABLET | Refills: 1 | Status: SHIPPED | OUTPATIENT
Start: 2021-09-14 | End: 2022-04-20

## 2021-09-15 ENCOUNTER — PATIENT MESSAGE (OUTPATIENT)
Dept: INTERNAL MEDICINE | Facility: CLINIC | Age: 71
End: 2021-09-15

## 2021-09-28 ENCOUNTER — OFFICE VISIT (OUTPATIENT)
Dept: INTERNAL MEDICINE | Facility: CLINIC | Age: 71
End: 2021-09-28
Payer: MEDICARE

## 2021-09-28 VITALS
OXYGEN SATURATION: 99 % | DIASTOLIC BLOOD PRESSURE: 66 MMHG | BODY MASS INDEX: 27.43 KG/M2 | SYSTOLIC BLOOD PRESSURE: 130 MMHG | HEART RATE: 69 BPM | HEIGHT: 69 IN | WEIGHT: 185.19 LBS

## 2021-09-28 DIAGNOSIS — E78.5 HYPERLIPIDEMIA, UNSPECIFIED HYPERLIPIDEMIA TYPE: ICD-10-CM

## 2021-09-28 DIAGNOSIS — K21.9 GASTROESOPHAGEAL REFLUX DISEASE WITHOUT ESOPHAGITIS: ICD-10-CM

## 2021-09-28 DIAGNOSIS — I10 ESSENTIAL HYPERTENSION: ICD-10-CM

## 2021-09-28 DIAGNOSIS — R63.4 WEIGHT LOSS: Primary | ICD-10-CM

## 2021-09-28 DIAGNOSIS — E13.9 DIABETES MELLITUS DUE TO ABNORMAL INSULIN: ICD-10-CM

## 2021-09-28 PROCEDURE — 3060F PR POS MICROALBUMINURIA RESULT DOCUMENTED/REVIEW: ICD-10-PCS | Mod: CPTII,S$GLB,, | Performed by: INTERNAL MEDICINE

## 2021-09-28 PROCEDURE — 3078F PR MOST RECENT DIASTOLIC BLOOD PRESSURE < 80 MM HG: ICD-10-PCS | Mod: CPTII,S$GLB,, | Performed by: INTERNAL MEDICINE

## 2021-09-28 PROCEDURE — 1101F PT FALLS ASSESS-DOCD LE1/YR: CPT | Mod: CPTII,S$GLB,, | Performed by: INTERNAL MEDICINE

## 2021-09-28 PROCEDURE — 3078F DIAST BP <80 MM HG: CPT | Mod: CPTII,S$GLB,, | Performed by: INTERNAL MEDICINE

## 2021-09-28 PROCEDURE — 3066F NEPHROPATHY DOC TX: CPT | Mod: CPTII,S$GLB,, | Performed by: INTERNAL MEDICINE

## 2021-09-28 PROCEDURE — 99214 PR OFFICE/OUTPT VISIT, EST, LEVL IV, 30-39 MIN: ICD-10-PCS | Mod: S$GLB,,, | Performed by: INTERNAL MEDICINE

## 2021-09-28 PROCEDURE — 1159F MED LIST DOCD IN RCRD: CPT | Mod: CPTII,S$GLB,, | Performed by: INTERNAL MEDICINE

## 2021-09-28 PROCEDURE — 3044F HG A1C LEVEL LT 7.0%: CPT | Mod: CPTII,S$GLB,, | Performed by: INTERNAL MEDICINE

## 2021-09-28 PROCEDURE — 99999 PR PBB SHADOW E&M-EST. PATIENT-LVL IV: ICD-10-PCS | Mod: PBBFAC,,, | Performed by: INTERNAL MEDICINE

## 2021-09-28 PROCEDURE — 4010F ACE/ARB THERAPY RXD/TAKEN: CPT | Mod: CPTII,S$GLB,, | Performed by: INTERNAL MEDICINE

## 2021-09-28 PROCEDURE — 3008F BODY MASS INDEX DOCD: CPT | Mod: CPTII,S$GLB,, | Performed by: INTERNAL MEDICINE

## 2021-09-28 PROCEDURE — 1159F PR MEDICATION LIST DOCUMENTED IN MEDICAL RECORD: ICD-10-PCS | Mod: CPTII,S$GLB,, | Performed by: INTERNAL MEDICINE

## 2021-09-28 PROCEDURE — 4010F PR ACE/ARB THEARPY RXD/TAKEN: ICD-10-PCS | Mod: CPTII,S$GLB,, | Performed by: INTERNAL MEDICINE

## 2021-09-28 PROCEDURE — 3075F PR MOST RECENT SYSTOLIC BLOOD PRESS GE 130-139MM HG: ICD-10-PCS | Mod: CPTII,S$GLB,, | Performed by: INTERNAL MEDICINE

## 2021-09-28 PROCEDURE — 3066F PR DOCUMENTATION OF TREATMENT FOR NEPHROPATHY: ICD-10-PCS | Mod: CPTII,S$GLB,, | Performed by: INTERNAL MEDICINE

## 2021-09-28 PROCEDURE — 3288F FALL RISK ASSESSMENT DOCD: CPT | Mod: CPTII,S$GLB,, | Performed by: INTERNAL MEDICINE

## 2021-09-28 PROCEDURE — 3008F PR BODY MASS INDEX (BMI) DOCUMENTED: ICD-10-PCS | Mod: CPTII,S$GLB,, | Performed by: INTERNAL MEDICINE

## 2021-09-28 PROCEDURE — 99999 PR PBB SHADOW E&M-EST. PATIENT-LVL IV: CPT | Mod: PBBFAC,,, | Performed by: INTERNAL MEDICINE

## 2021-09-28 PROCEDURE — 1125F AMNT PAIN NOTED PAIN PRSNT: CPT | Mod: CPTII,S$GLB,, | Performed by: INTERNAL MEDICINE

## 2021-09-28 PROCEDURE — 3044F PR MOST RECENT HEMOGLOBIN A1C LEVEL <7.0%: ICD-10-PCS | Mod: CPTII,S$GLB,, | Performed by: INTERNAL MEDICINE

## 2021-09-28 PROCEDURE — 3075F SYST BP GE 130 - 139MM HG: CPT | Mod: CPTII,S$GLB,, | Performed by: INTERNAL MEDICINE

## 2021-09-28 PROCEDURE — 3060F POS MICROALBUMINURIA REV: CPT | Mod: CPTII,S$GLB,, | Performed by: INTERNAL MEDICINE

## 2021-09-28 PROCEDURE — 1101F PR PT FALLS ASSESS DOC 0-1 FALLS W/OUT INJ PAST YR: ICD-10-PCS | Mod: CPTII,S$GLB,, | Performed by: INTERNAL MEDICINE

## 2021-09-28 PROCEDURE — 99214 OFFICE O/P EST MOD 30 MIN: CPT | Mod: S$GLB,,, | Performed by: INTERNAL MEDICINE

## 2021-09-28 PROCEDURE — 1125F PR PAIN SEVERITY QUANTIFIED, PAIN PRESENT: ICD-10-PCS | Mod: CPTII,S$GLB,, | Performed by: INTERNAL MEDICINE

## 2021-09-28 PROCEDURE — 3288F PR FALLS RISK ASSESSMENT DOCUMENTED: ICD-10-PCS | Mod: CPTII,S$GLB,, | Performed by: INTERNAL MEDICINE

## 2021-09-30 ENCOUNTER — CLINICAL SUPPORT (OUTPATIENT)
Dept: CARDIOLOGY | Facility: HOSPITAL | Age: 71
End: 2021-09-30
Attending: INTERNAL MEDICINE
Payer: MEDICARE

## 2021-09-30 DIAGNOSIS — R00.0 RAPID HEART BEAT: ICD-10-CM

## 2021-09-30 PROCEDURE — 93227 HOLTER MONITOR - 48 HOUR (CUPID ONLY): ICD-10-PCS | Mod: ,,, | Performed by: INTERNAL MEDICINE

## 2021-09-30 PROCEDURE — 93225 XTRNL ECG REC<48 HRS REC: CPT

## 2021-09-30 PROCEDURE — 93227 XTRNL ECG REC<48 HR R&I: CPT | Mod: ,,, | Performed by: INTERNAL MEDICINE

## 2021-10-05 LAB
OHS CV EVENT MONITOR DAY: 0
OHS CV HOLTER LENGTH DECIMAL HOURS: 48
OHS CV HOLTER LENGTH HOURS: 48
OHS CV HOLTER LENGTH MINUTES: 0
OHS CV HOLTER SINUS AVERAGE HR: 71
OHS CV HOLTER SINUS MAX HR: 122
OHS CV HOLTER SINUS MIN HR: 50

## 2021-10-11 ENCOUNTER — PES CALL (OUTPATIENT)
Dept: ADMINISTRATIVE | Facility: CLINIC | Age: 71
End: 2021-10-11

## 2021-10-12 ENCOUNTER — OFFICE VISIT (OUTPATIENT)
Dept: INTERNAL MEDICINE | Facility: CLINIC | Age: 71
End: 2021-10-12
Payer: MEDICARE

## 2021-10-12 VITALS
BODY MASS INDEX: 27.07 KG/M2 | OXYGEN SATURATION: 95 % | HEART RATE: 83 BPM | HEIGHT: 69 IN | WEIGHT: 182.75 LBS | SYSTOLIC BLOOD PRESSURE: 136 MMHG | DIASTOLIC BLOOD PRESSURE: 70 MMHG

## 2021-10-12 DIAGNOSIS — E11.9 TYPE 2 DIABETES MELLITUS WITHOUT COMPLICATION, WITHOUT LONG-TERM CURRENT USE OF INSULIN: ICD-10-CM

## 2021-10-12 DIAGNOSIS — K21.9 GASTROESOPHAGEAL REFLUX DISEASE WITHOUT ESOPHAGITIS: ICD-10-CM

## 2021-10-12 DIAGNOSIS — K63.5 POLYP OF COLON, UNSPECIFIED PART OF COLON, UNSPECIFIED TYPE: Primary | ICD-10-CM

## 2021-10-12 DIAGNOSIS — E78.5 HYPERLIPIDEMIA, UNSPECIFIED HYPERLIPIDEMIA TYPE: ICD-10-CM

## 2021-10-12 DIAGNOSIS — I10 ESSENTIAL HYPERTENSION: ICD-10-CM

## 2021-10-12 PROCEDURE — 3078F DIAST BP <80 MM HG: CPT | Mod: CPTII,S$GLB,, | Performed by: INTERNAL MEDICINE

## 2021-10-12 PROCEDURE — 3288F FALL RISK ASSESSMENT DOCD: CPT | Mod: CPTII,S$GLB,, | Performed by: INTERNAL MEDICINE

## 2021-10-12 PROCEDURE — 99214 OFFICE O/P EST MOD 30 MIN: CPT | Mod: S$GLB,,, | Performed by: INTERNAL MEDICINE

## 2021-10-12 PROCEDURE — 3075F PR MOST RECENT SYSTOLIC BLOOD PRESS GE 130-139MM HG: ICD-10-PCS | Mod: CPTII,S$GLB,, | Performed by: INTERNAL MEDICINE

## 2021-10-12 PROCEDURE — 1159F MED LIST DOCD IN RCRD: CPT | Mod: CPTII,S$GLB,, | Performed by: INTERNAL MEDICINE

## 2021-10-12 PROCEDURE — 4010F PR ACE/ARB THEARPY RXD/TAKEN: ICD-10-PCS | Mod: CPTII,S$GLB,, | Performed by: INTERNAL MEDICINE

## 2021-10-12 PROCEDURE — 1159F PR MEDICATION LIST DOCUMENTED IN MEDICAL RECORD: ICD-10-PCS | Mod: CPTII,S$GLB,, | Performed by: INTERNAL MEDICINE

## 2021-10-12 PROCEDURE — 3066F PR DOCUMENTATION OF TREATMENT FOR NEPHROPATHY: ICD-10-PCS | Mod: CPTII,S$GLB,, | Performed by: INTERNAL MEDICINE

## 2021-10-12 PROCEDURE — 3075F SYST BP GE 130 - 139MM HG: CPT | Mod: CPTII,S$GLB,, | Performed by: INTERNAL MEDICINE

## 2021-10-12 PROCEDURE — 99999 PR PBB SHADOW E&M-EST. PATIENT-LVL IV: CPT | Mod: PBBFAC,,, | Performed by: INTERNAL MEDICINE

## 2021-10-12 PROCEDURE — 3060F PR POS MICROALBUMINURIA RESULT DOCUMENTED/REVIEW: ICD-10-PCS | Mod: CPTII,S$GLB,, | Performed by: INTERNAL MEDICINE

## 2021-10-12 PROCEDURE — 3066F NEPHROPATHY DOC TX: CPT | Mod: CPTII,S$GLB,, | Performed by: INTERNAL MEDICINE

## 2021-10-12 PROCEDURE — 1125F AMNT PAIN NOTED PAIN PRSNT: CPT | Mod: CPTII,S$GLB,, | Performed by: INTERNAL MEDICINE

## 2021-10-12 PROCEDURE — 99214 PR OFFICE/OUTPT VISIT, EST, LEVL IV, 30-39 MIN: ICD-10-PCS | Mod: S$GLB,,, | Performed by: INTERNAL MEDICINE

## 2021-10-12 PROCEDURE — 1101F PR PT FALLS ASSESS DOC 0-1 FALLS W/OUT INJ PAST YR: ICD-10-PCS | Mod: CPTII,S$GLB,, | Performed by: INTERNAL MEDICINE

## 2021-10-12 PROCEDURE — 3288F PR FALLS RISK ASSESSMENT DOCUMENTED: ICD-10-PCS | Mod: CPTII,S$GLB,, | Performed by: INTERNAL MEDICINE

## 2021-10-12 PROCEDURE — 99499 RISK ADDL DX/OHS AUDIT: ICD-10-PCS | Mod: S$GLB,,, | Performed by: INTERNAL MEDICINE

## 2021-10-12 PROCEDURE — 3044F HG A1C LEVEL LT 7.0%: CPT | Mod: CPTII,S$GLB,, | Performed by: INTERNAL MEDICINE

## 2021-10-12 PROCEDURE — 3078F PR MOST RECENT DIASTOLIC BLOOD PRESSURE < 80 MM HG: ICD-10-PCS | Mod: CPTII,S$GLB,, | Performed by: INTERNAL MEDICINE

## 2021-10-12 PROCEDURE — 4010F ACE/ARB THERAPY RXD/TAKEN: CPT | Mod: CPTII,S$GLB,, | Performed by: INTERNAL MEDICINE

## 2021-10-12 PROCEDURE — 1101F PT FALLS ASSESS-DOCD LE1/YR: CPT | Mod: CPTII,S$GLB,, | Performed by: INTERNAL MEDICINE

## 2021-10-12 PROCEDURE — 99999 PR PBB SHADOW E&M-EST. PATIENT-LVL IV: ICD-10-PCS | Mod: PBBFAC,,, | Performed by: INTERNAL MEDICINE

## 2021-10-12 PROCEDURE — 1125F PR PAIN SEVERITY QUANTIFIED, PAIN PRESENT: ICD-10-PCS | Mod: CPTII,S$GLB,, | Performed by: INTERNAL MEDICINE

## 2021-10-12 PROCEDURE — 3060F POS MICROALBUMINURIA REV: CPT | Mod: CPTII,S$GLB,, | Performed by: INTERNAL MEDICINE

## 2021-10-12 PROCEDURE — 3008F PR BODY MASS INDEX (BMI) DOCUMENTED: ICD-10-PCS | Mod: CPTII,S$GLB,, | Performed by: INTERNAL MEDICINE

## 2021-10-12 PROCEDURE — 3044F PR MOST RECENT HEMOGLOBIN A1C LEVEL <7.0%: ICD-10-PCS | Mod: CPTII,S$GLB,, | Performed by: INTERNAL MEDICINE

## 2021-10-12 PROCEDURE — 3008F BODY MASS INDEX DOCD: CPT | Mod: CPTII,S$GLB,, | Performed by: INTERNAL MEDICINE

## 2021-10-12 PROCEDURE — 99499 UNLISTED E&M SERVICE: CPT | Mod: S$GLB,,, | Performed by: INTERNAL MEDICINE

## 2021-12-01 ENCOUNTER — LAB VISIT (OUTPATIENT)
Dept: LAB | Facility: OTHER | Age: 71
End: 2021-12-01
Attending: INTERNAL MEDICINE
Payer: MEDICARE

## 2021-12-01 ENCOUNTER — PES CALL (OUTPATIENT)
Dept: ADMINISTRATIVE | Facility: CLINIC | Age: 71
End: 2021-12-01
Payer: MEDICARE

## 2021-12-01 DIAGNOSIS — E13.9 DIABETES MELLITUS DUE TO ABNORMAL INSULIN: ICD-10-CM

## 2021-12-01 LAB
ALBUMIN SERPL BCP-MCNC: 4 G/DL (ref 3.5–5.2)
ALP SERPL-CCNC: 79 U/L (ref 55–135)
ALT SERPL W/O P-5'-P-CCNC: 40 U/L (ref 10–44)
ANION GAP SERPL CALC-SCNC: 10 MMOL/L (ref 8–16)
AST SERPL-CCNC: 36 U/L (ref 10–40)
BASOPHILS # BLD AUTO: 0.03 K/UL (ref 0–0.2)
BASOPHILS NFR BLD: 0.5 % (ref 0–1.9)
BILIRUB SERPL-MCNC: 0.3 MG/DL (ref 0.1–1)
BUN SERPL-MCNC: 10 MG/DL (ref 8–23)
CALCIUM SERPL-MCNC: 9.8 MG/DL (ref 8.7–10.5)
CHLORIDE SERPL-SCNC: 105 MMOL/L (ref 95–110)
CO2 SERPL-SCNC: 25 MMOL/L (ref 23–29)
CREAT SERPL-MCNC: 1.1 MG/DL (ref 0.5–1.4)
DIFFERENTIAL METHOD: ABNORMAL
EOSINOPHIL # BLD AUTO: 0.1 K/UL (ref 0–0.5)
EOSINOPHIL NFR BLD: 2.1 % (ref 0–8)
ERYTHROCYTE [DISTWIDTH] IN BLOOD BY AUTOMATED COUNT: 13.4 % (ref 11.5–14.5)
EST. GFR  (AFRICAN AMERICAN): >60 ML/MIN/1.73 M^2
EST. GFR  (NON AFRICAN AMERICAN): >60 ML/MIN/1.73 M^2
ESTIMATED AVG GLUCOSE: 157 MG/DL (ref 68–131)
GLUCOSE SERPL-MCNC: 172 MG/DL (ref 70–110)
HBA1C MFR BLD: 7.1 % (ref 4–5.6)
HCT VFR BLD AUTO: 39.2 % (ref 40–54)
HGB BLD-MCNC: 12.8 G/DL (ref 14–18)
IMM GRANULOCYTES # BLD AUTO: 0 K/UL (ref 0–0.04)
IMM GRANULOCYTES NFR BLD AUTO: 0 % (ref 0–0.5)
LYMPHOCYTES # BLD AUTO: 3.3 K/UL (ref 1–4.8)
LYMPHOCYTES NFR BLD: 57.2 % (ref 18–48)
MCH RBC QN AUTO: 33 PG (ref 27–31)
MCHC RBC AUTO-ENTMCNC: 32.7 G/DL (ref 32–36)
MCV RBC AUTO: 101 FL (ref 82–98)
MONOCYTES # BLD AUTO: 0.4 K/UL (ref 0.3–1)
MONOCYTES NFR BLD: 6.2 % (ref 4–15)
NEUTROPHILS # BLD AUTO: 2 K/UL (ref 1.8–7.7)
NEUTROPHILS NFR BLD: 34 % (ref 38–73)
NRBC BLD-RTO: 0 /100 WBC
PLATELET # BLD AUTO: 301 K/UL (ref 150–450)
PMV BLD AUTO: 8.7 FL (ref 9.2–12.9)
POTASSIUM SERPL-SCNC: 4.9 MMOL/L (ref 3.5–5.1)
PROT SERPL-MCNC: 7.1 G/DL (ref 6–8.4)
RBC # BLD AUTO: 3.88 M/UL (ref 4.6–6.2)
SODIUM SERPL-SCNC: 140 MMOL/L (ref 136–145)
WBC # BLD AUTO: 5.82 K/UL (ref 3.9–12.7)

## 2021-12-01 PROCEDURE — 83036 HEMOGLOBIN GLYCOSYLATED A1C: CPT | Performed by: INTERNAL MEDICINE

## 2021-12-01 PROCEDURE — 85025 COMPLETE CBC W/AUTO DIFF WBC: CPT | Performed by: INTERNAL MEDICINE

## 2021-12-01 PROCEDURE — 80053 COMPREHEN METABOLIC PANEL: CPT | Performed by: INTERNAL MEDICINE

## 2021-12-01 PROCEDURE — 36415 COLL VENOUS BLD VENIPUNCTURE: CPT | Performed by: INTERNAL MEDICINE

## 2021-12-08 ENCOUNTER — OFFICE VISIT (OUTPATIENT)
Dept: INTERNAL MEDICINE | Facility: CLINIC | Age: 71
End: 2021-12-08
Payer: MEDICARE

## 2021-12-08 VITALS
HEART RATE: 88 BPM | WEIGHT: 186.06 LBS | SYSTOLIC BLOOD PRESSURE: 139 MMHG | DIASTOLIC BLOOD PRESSURE: 70 MMHG | HEIGHT: 69 IN | BODY MASS INDEX: 27.56 KG/M2 | OXYGEN SATURATION: 99 %

## 2021-12-08 DIAGNOSIS — E13.9 DIABETES MELLITUS DUE TO ABNORMAL INSULIN: ICD-10-CM

## 2021-12-08 DIAGNOSIS — K63.5 POLYP OF COLON, UNSPECIFIED PART OF COLON, UNSPECIFIED TYPE: ICD-10-CM

## 2021-12-08 DIAGNOSIS — Z00.00 ROUTINE GENERAL MEDICAL EXAMINATION AT A HEALTH CARE FACILITY: Primary | ICD-10-CM

## 2021-12-08 PROCEDURE — 99999 PR PBB SHADOW E&M-EST. PATIENT-LVL IV: CPT | Mod: PBBFAC,,, | Performed by: INTERNAL MEDICINE

## 2021-12-08 PROCEDURE — 3060F POS MICROALBUMINURIA REV: CPT | Mod: CPTII,S$GLB,, | Performed by: INTERNAL MEDICINE

## 2021-12-08 PROCEDURE — 99499 RISK ADDL DX/OHS AUDIT: ICD-10-PCS | Mod: S$GLB,,, | Performed by: INTERNAL MEDICINE

## 2021-12-08 PROCEDURE — 99214 PR OFFICE/OUTPT VISIT, EST, LEVL IV, 30-39 MIN: ICD-10-PCS | Mod: S$GLB,,, | Performed by: INTERNAL MEDICINE

## 2021-12-08 PROCEDURE — 3066F PR DOCUMENTATION OF TREATMENT FOR NEPHROPATHY: ICD-10-PCS | Mod: CPTII,S$GLB,, | Performed by: INTERNAL MEDICINE

## 2021-12-08 PROCEDURE — 99214 OFFICE O/P EST MOD 30 MIN: CPT | Mod: S$GLB,,, | Performed by: INTERNAL MEDICINE

## 2021-12-08 PROCEDURE — 4010F PR ACE/ARB THEARPY RXD/TAKEN: ICD-10-PCS | Mod: CPTII,S$GLB,, | Performed by: INTERNAL MEDICINE

## 2021-12-08 PROCEDURE — 99999 PR PBB SHADOW E&M-EST. PATIENT-LVL IV: ICD-10-PCS | Mod: PBBFAC,,, | Performed by: INTERNAL MEDICINE

## 2021-12-08 PROCEDURE — 3066F NEPHROPATHY DOC TX: CPT | Mod: CPTII,S$GLB,, | Performed by: INTERNAL MEDICINE

## 2021-12-08 PROCEDURE — 99499 UNLISTED E&M SERVICE: CPT | Mod: S$GLB,,, | Performed by: INTERNAL MEDICINE

## 2021-12-08 PROCEDURE — 4010F ACE/ARB THERAPY RXD/TAKEN: CPT | Mod: CPTII,S$GLB,, | Performed by: INTERNAL MEDICINE

## 2021-12-08 PROCEDURE — 3060F PR POS MICROALBUMINURIA RESULT DOCUMENTED/REVIEW: ICD-10-PCS | Mod: CPTII,S$GLB,, | Performed by: INTERNAL MEDICINE

## 2022-01-07 ENCOUNTER — TELEPHONE (OUTPATIENT)
Dept: INTERNAL MEDICINE | Facility: CLINIC | Age: 72
End: 2022-01-07
Payer: MEDICARE

## 2022-01-07 NOTE — TELEPHONE ENCOUNTER
Pt states that his blood pressure on average is 150/80-85 every day. Pt wants to know if PCP wants to increase or change his medication.Pt has no symptoms.

## 2022-01-07 NOTE — TELEPHONE ENCOUNTER
----- Message from Marleny Vega sent at 1/6/2022  6:05 PM CST -----  Contact: TONY HAN, [phone]  Type:  Needs Medical Advice    Who Called: Tonypilo Han  Symptoms (please be specific): See below  How long has patient had these symptoms:   [id number] [place] [address] [facility] [phone]  Would the patient rather a call back or a response via [facility]? CALL or text  Best Call Back Number: [phone]  Additional Information:  Pt has been concerned about his high blood pressure since last visit with [facility]. Today (1/6/22) he had a visit with Dr. Carrasquillo @ [facility] and received a reading of 171/120 (Pt roughly recalls) and wants to know what Dr. Benitez advises.

## 2022-01-10 RX ORDER — LOSARTAN POTASSIUM 50 MG/1
50 TABLET ORAL DAILY
Qty: 90 TABLET | Refills: 3 | Status: SHIPPED | OUTPATIENT
Start: 2022-01-10 | End: 2022-01-19 | Stop reason: SDUPTHER

## 2022-01-10 RX ORDER — LOSARTAN POTASSIUM 50 MG/1
50 TABLET ORAL DAILY
Qty: 90 TABLET | Refills: 3 | Status: SHIPPED | OUTPATIENT
Start: 2022-01-10 | End: 2022-01-10 | Stop reason: SDUPTHER

## 2022-01-11 ENCOUNTER — PES CALL (OUTPATIENT)
Dept: ADMINISTRATIVE | Facility: CLINIC | Age: 72
End: 2022-01-11
Payer: MEDICARE

## 2022-01-18 ENCOUNTER — PATIENT MESSAGE (OUTPATIENT)
Dept: INTERNAL MEDICINE | Facility: CLINIC | Age: 72
End: 2022-01-18
Payer: MEDICARE

## 2022-01-19 RX ORDER — LOSARTAN POTASSIUM 100 MG/1
100 TABLET ORAL DAILY
Qty: 90 TABLET | Refills: 3 | Status: SHIPPED | OUTPATIENT
Start: 2022-01-19 | End: 2023-02-01 | Stop reason: SDUPTHER

## 2022-01-27 ENCOUNTER — PATIENT MESSAGE (OUTPATIENT)
Dept: INTERNAL MEDICINE | Facility: CLINIC | Age: 72
End: 2022-01-27
Payer: MEDICARE

## 2022-01-27 DIAGNOSIS — Z01.818 PRE-OP TESTING: ICD-10-CM

## 2022-01-27 DIAGNOSIS — Z12.11 SPECIAL SCREENING FOR MALIGNANT NEOPLASMS, COLON: Primary | ICD-10-CM

## 2022-01-27 RX ORDER — POLYETHYLENE GLYCOL 3350, SODIUM SULFATE, SODIUM CHLORIDE, POTASSIUM CHLORIDE, SODIUM ASCORBATE, AND ASCORBIC ACID 7.5-2.691G
2000 KIT ORAL ONCE
Qty: 1 KIT | Refills: 0 | Status: SHIPPED | OUTPATIENT
Start: 2022-01-27 | End: 2022-01-27

## 2022-01-28 ENCOUNTER — PATIENT MESSAGE (OUTPATIENT)
Dept: ENDOSCOPY | Facility: HOSPITAL | Age: 72
End: 2022-01-28
Payer: MEDICARE

## 2022-01-30 ENCOUNTER — LAB VISIT (OUTPATIENT)
Dept: PRIMARY CARE CLINIC | Facility: CLINIC | Age: 72
End: 2022-01-30
Payer: MEDICARE

## 2022-01-30 ENCOUNTER — NURSE TRIAGE (OUTPATIENT)
Dept: ADMINISTRATIVE | Facility: CLINIC | Age: 72
End: 2022-01-30
Payer: MEDICARE

## 2022-01-30 DIAGNOSIS — Z01.818 PRE-OP TESTING: ICD-10-CM

## 2022-01-30 PROCEDURE — U0003 INFECTIOUS AGENT DETECTION BY NUCLEIC ACID (DNA OR RNA); SEVERE ACUTE RESPIRATORY SYNDROME CORONAVIRUS 2 (SARS-COV-2) (CORONAVIRUS DISEASE [COVID-19]), AMPLIFIED PROBE TECHNIQUE, MAKING USE OF HIGH THROUGHPUT TECHNOLOGIES AS DESCRIBED BY CMS-2020-01-R: HCPCS | Performed by: FAMILY MEDICINE

## 2022-01-30 PROCEDURE — U0005 INFEC AGEN DETEC AMPLI PROBE: HCPCS | Performed by: FAMILY MEDICINE

## 2022-01-30 NOTE — TELEPHONE ENCOUNTER
Colonoscopy scheduled for 2/2, Wednesday but pt states also has another appt at 3pm on Tuesday and wants to know if its okay to take his dulcolax tabs at 3pm instead of noon on Tuesday, prior to procedure. Informed caller will route message to MD staff for call back.     Reason for Disposition   [1] Caller requesting NON-URGENT health information AND [2] PCP's office is the best resource    Protocols used: INFORMATION ONLY CALL - NO TRIAGE-A-

## 2022-01-31 LAB
SARS-COV-2 RNA RESP QL NAA+PROBE: NOT DETECTED
SARS-COV-2- CYCLE NUMBER: NORMAL

## 2022-02-02 ENCOUNTER — ANESTHESIA EVENT (OUTPATIENT)
Dept: ENDOSCOPY | Facility: HOSPITAL | Age: 72
End: 2022-02-02
Payer: MEDICARE

## 2022-02-02 ENCOUNTER — ANESTHESIA (OUTPATIENT)
Dept: ENDOSCOPY | Facility: HOSPITAL | Age: 72
End: 2022-02-02
Payer: MEDICARE

## 2022-02-02 ENCOUNTER — HOSPITAL ENCOUNTER (OUTPATIENT)
Facility: HOSPITAL | Age: 72
Discharge: HOME | End: 2022-02-02
Attending: STUDENT IN AN ORGANIZED HEALTH CARE EDUCATION/TRAINING PROGRAM | Admitting: STUDENT IN AN ORGANIZED HEALTH CARE EDUCATION/TRAINING PROGRAM
Payer: MEDICARE

## 2022-02-02 VITALS
HEART RATE: 74 BPM | WEIGHT: 186 LBS | TEMPERATURE: 98 F | SYSTOLIC BLOOD PRESSURE: 149 MMHG | HEIGHT: 69 IN | BODY MASS INDEX: 27.55 KG/M2 | DIASTOLIC BLOOD PRESSURE: 70 MMHG | RESPIRATION RATE: 16 BRPM | OXYGEN SATURATION: 99 %

## 2022-02-02 DIAGNOSIS — K63.5 POLYP OF COLON, UNSPECIFIED PART OF COLON, UNSPECIFIED TYPE: ICD-10-CM

## 2022-02-02 DIAGNOSIS — Z12.11 COLON CANCER SCREENING: Primary | ICD-10-CM

## 2022-02-02 LAB — POCT GLUCOSE: 181 MG/DL (ref 70–110)

## 2022-02-02 PROCEDURE — 37000008 HC ANESTHESIA 1ST 15 MINUTES: Performed by: STUDENT IN AN ORGANIZED HEALTH CARE EDUCATION/TRAINING PROGRAM

## 2022-02-02 PROCEDURE — E9220 PRA ENDO ANESTHESIA: ICD-10-PCS | Mod: PT,,, | Performed by: NURSE ANESTHETIST, CERTIFIED REGISTERED

## 2022-02-02 PROCEDURE — 27201012 HC FORCEPS, HOT/COLD, DISP: Performed by: STUDENT IN AN ORGANIZED HEALTH CARE EDUCATION/TRAINING PROGRAM

## 2022-02-02 PROCEDURE — 25000003 PHARM REV CODE 250: Performed by: STUDENT IN AN ORGANIZED HEALTH CARE EDUCATION/TRAINING PROGRAM

## 2022-02-02 PROCEDURE — 88305 TISSUE EXAM BY PATHOLOGIST: ICD-10-PCS | Mod: 26,,, | Performed by: STUDENT IN AN ORGANIZED HEALTH CARE EDUCATION/TRAINING PROGRAM

## 2022-02-02 PROCEDURE — 45380 COLONOSCOPY AND BIOPSY: CPT | Performed by: STUDENT IN AN ORGANIZED HEALTH CARE EDUCATION/TRAINING PROGRAM

## 2022-02-02 PROCEDURE — 45380 COLONOSCOPY AND BIOPSY: CPT | Mod: PT,,, | Performed by: STUDENT IN AN ORGANIZED HEALTH CARE EDUCATION/TRAINING PROGRAM

## 2022-02-02 PROCEDURE — E9220 PRA ENDO ANESTHESIA: HCPCS | Mod: PT,,, | Performed by: NURSE ANESTHETIST, CERTIFIED REGISTERED

## 2022-02-02 PROCEDURE — 88305 TISSUE EXAM BY PATHOLOGIST: CPT | Mod: 26,,, | Performed by: STUDENT IN AN ORGANIZED HEALTH CARE EDUCATION/TRAINING PROGRAM

## 2022-02-02 PROCEDURE — 25000003 PHARM REV CODE 250: Performed by: NURSE ANESTHETIST, CERTIFIED REGISTERED

## 2022-02-02 PROCEDURE — 37000009 HC ANESTHESIA EA ADD 15 MINS: Performed by: STUDENT IN AN ORGANIZED HEALTH CARE EDUCATION/TRAINING PROGRAM

## 2022-02-02 PROCEDURE — 63600175 PHARM REV CODE 636 W HCPCS: Performed by: NURSE ANESTHETIST, CERTIFIED REGISTERED

## 2022-02-02 PROCEDURE — 45380 PR COLONOSCOPY,BIOPSY: ICD-10-PCS | Mod: PT,,, | Performed by: STUDENT IN AN ORGANIZED HEALTH CARE EDUCATION/TRAINING PROGRAM

## 2022-02-02 PROCEDURE — 88305 TISSUE EXAM BY PATHOLOGIST: CPT | Performed by: STUDENT IN AN ORGANIZED HEALTH CARE EDUCATION/TRAINING PROGRAM

## 2022-02-02 RX ORDER — SODIUM CHLORIDE 9 MG/ML
INJECTION, SOLUTION INTRAVENOUS CONTINUOUS
Status: DISCONTINUED | OUTPATIENT
Start: 2022-02-02 | End: 2022-02-02 | Stop reason: HOSPADM

## 2022-02-02 RX ORDER — PROPOFOL 10 MG/ML
VIAL (ML) INTRAVENOUS
Status: DISCONTINUED | OUTPATIENT
Start: 2022-02-02 | End: 2022-02-02

## 2022-02-02 RX ORDER — PROPOFOL 10 MG/ML
VIAL (ML) INTRAVENOUS CONTINUOUS PRN
Status: DISCONTINUED | OUTPATIENT
Start: 2022-02-02 | End: 2022-02-02

## 2022-02-02 RX ORDER — LIDOCAINE HYDROCHLORIDE 20 MG/ML
INJECTION INTRAVENOUS
Status: DISCONTINUED | OUTPATIENT
Start: 2022-02-02 | End: 2022-02-02

## 2022-02-02 RX ADMIN — SODIUM CHLORIDE: 0.9 INJECTION, SOLUTION INTRAVENOUS at 01:02

## 2022-02-02 RX ADMIN — Medication 150 MCG/KG/MIN: at 02:02

## 2022-02-02 RX ADMIN — PROPOFOL 50 MG: 10 INJECTION, EMULSION INTRAVENOUS at 02:02

## 2022-02-02 RX ADMIN — LIDOCAINE HYDROCHLORIDE 50 MG: 20 INJECTION, SOLUTION INTRAVENOUS at 02:02

## 2022-02-02 NOTE — DISCHARGE INSTRUCTIONS
Patient Education       Colonoscopy Discharge Instructions   About this topic   Colonoscopy is done so your doctor can see the inside of your large intestines, also called your colon, and your rectum. It uses a lighted tube called a scope, which has a tiny camera that can be moved through the large intestine. This may be done to:  · Screen for colon cancer or polyps  · Look for the source of rectal bleeding  · Find the cause of changes in your bowel movement  · Find the cause of belly or rectal pain  · Check results from other tests  · Check your response to treatment for other diseases     What care is needed at home?   · Ask your doctor what you need to do when you go home. Make sure you ask questions if you do not understand what the doctor says. This way you will know what you need to do.  · Rest. Do not drive the rest of the day. Do not sign any important papers or make any important decisions for the next 24 hours.  · You may feel groggy. Take extra care when moving about.  · You may have gas or mild cramping. This is normal.  · A small amount of bleeding may happen during the first few days after your procedure.  · Start back on your normal diet unless you need some changes in your diet.  What follow-up care is needed?   · Your doctor will talk to you about your test results. Your doctor may ask you to make visits to the office to check on your progress. Be sure to keep these visits.  · Ask your doctor when you need to have another colonoscopy. The amount of time between tests is based on what was found during this test. People with no polyps may be able to wait 10 years to have another colonoscopy. Other people may need to have this test repeated in 1 year because of the kind of polyps that were found in their colon. Ask your doctor when you need to come back.  What drugs may be needed?   Ask your doctor what drugs you will need to take. Take your drugs as told by your doctor.  Will physical activity be  limited?   Physical activities may be limited for a short time. Rest after the procedure. You may go back to your normal activities within a day or so.  What changes to diet are needed?   · Drink 6 to 8 glasses of water each day.  · Eat food rich in fiber like fresh fruits and vegetables.  What problems could happen?   · Tear inside your colon  · Bleeding can happen up to a few days afterwards  When do I need to call the doctor?   · Fever of 100.4°F (38°C) or higher, chills  · Bleeding during bowel movements (1 teaspoon (5 mL) or more) or maroon stool  · Throwing up more than 3 times in the next 48 hours  · Feeling dizzy  · Feeling weak  · Belly pain that is getting worse  · Not able to have a bowel movement for more than 2 days  · Hard swollen belly  Teach Back: Helping You Understand   The Teach Back Method helps you understand the information we are giving you. After you talk with the staff, tell them in your own words what you learned. This helps to make sure the staff has described each thing clearly. It also helps to explain things that may have been confusing. Before going home, make sure you can do these:  · I can tell you about my procedure.  · I can tell you what signs are normal after my procedure.  · I can tell you what I will do if I throw up more than 3 times in the next 48 hours or I have more belly pain.  Where can I learn more?   American Cancer Society  https://www.cancer.org/cancer/colon-rectal-cancer/fzvowkiss-csjooepmt-ucvofyu/xwsmcyygf-rfost-yoni.html   American Society of Clinical Oncology  https://www.cancer.net/navigating-cancer-care/diagnosing-cancer/tests-and-procedures/colonoscopy   Last Reviewed Date   2021-03-10  Consumer Information Use and Disclaimer   This information is not specific medical advice and does not replace information you receive from your health care provider. This is only a brief summary of general information. It does NOT include all information about conditions,  illnesses, injuries, tests, procedures, treatments, therapies, discharge instructions or life-style choices that may apply to you. You must talk with your health care provider for complete information about your health and treatment options. This information should not be used to decide whether or not to accept your health care providers advice, instructions or recommendations. Only your health care provider has the knowledge and training to provide advice that is right for you.  Copyright   Copyright © 2021 [employer] and its affiliates and/or licensors. All rights reserved.

## 2022-02-02 NOTE — ANESTHESIA PREPROCEDURE EVALUATION
02/02/2022  Pre-operative evaluation for Procedure(s) (LRB):  COLONOSCOPY (N/A)    Tony Han is a 71 y.o. male hx of htn, dm, gerd,  Coronary artery disease with left heart catheterization in November 2011 with 40 to 50% narrowing in the mid LAD.       1 - Normal left ventricular systolic function (EF 60-65%).     2 - Normal left ventricular diastolic function.     3 - Normal right ventricular systolic function .     4 - No wall motion abnormalities.      The EKG portion of this study is negative for ischemia at a moderate workload, and peak heart rate of 141 bpm (93% of predicted).   2. Exercise capacity is average.   3. Blood pressure response to exercise was normal (Presenting BP: 149/65 Peak BP: 226/58).   4. The following arrhythmias were present: occasional PVCs.   5. There were no symptoms of chest discomfort or significant dyspnea throughout the protocol.   6. The Duke treadmill score was 6 suggesting a low probability for future cardiovascular events.      Patient Active Problem List   Diagnosis    Cholesterolosis of gallbladder    Hyperlipidemia    Essential hypertension    Diabetes mellitus due to abnormal insulin    Gastroesophageal reflux disease without esophagitis    Colon polyps    Insomnia    Lumbar disc disease    Mucosal abnormality of duodenum    Colon adenoma    LFT elevation    Fatty liver    Tattoos    Acute pain of right shoulder    Nausea    History of adenomatous polyp of colon    DMII (diabetes mellitus, type 2)    Nephrolithiasis    Benign prostatic hyperplasia with lower urinary tract symptoms    Atherosclerosis of abdominal aorta    Nontraumatic tear of left rotator cuff    S/P rotator cuff surgery    Trochanteric bursitis, right hip    Right hip pain    Meralgia paraesthetica, right    Right sided sciatica    Susan-trochanteric right hip tendinitis        Review of patient's allergies indicates:   Allergen Reactions    Aspirin Nausea And Vomiting     If over 81mg       No current facility-administered medications on file prior to encounter.     Current Outpatient Medications on File Prior to Encounter   Medication Sig Dispense Refill    amLODIPine (NORVASC) 10 MG tablet TAKE 1 TABLET(10 MG) BY MOUTH EVERY DAY 90 tablet 3    aspirin 81 MG Chew Take 1 tablet (81 mg total) by mouth once daily. 180 tablet 4    blood sugar diagnostic Strp True Metrics test strips - check blood sugar twice daily - fluctuating blood sugars 100 strip 4    diazePAM (VALIUM) 5 MG tablet Take 1 tablet (5 mg total) by mouth nightly as needed for Anxiety. 30 tablet 1    finasteride (PROSCAR) 5 mg tablet TAKE 1 TABLET(5 MG) BY MOUTH EVERY DAY 90 tablet 3    gabapentin (NEURONTIN) 400 MG capsule TAKE 1 CAPSULE(400 MG) BY MOUTH FOUR TIMES DAILY 120 capsule 4    lancets Misc True Matrix Lancets -c heck blood sugar twice daily - fluctuating blood sugars 200 each 4    metFORMIN (GLUCOPHAGE-XR) 500 MG ER 24hr tablet TAKE 2 TABLETS(1000 MG) BY MOUTH TWICE DAILY 360 tablet 3    metoprolol succinate (TOPROL-XL) 25 MG 24 hr tablet TAKE 1 TABLET(25 MG) BY MOUTH EVERY DAY 90 tablet 3    mirtazapine (REMERON) 15 MG tablet Take 0.5 tablets (7.5 mg total) by mouth every evening. 15 tablet 1    MULTIVITS-MINERALS/FA/LYCOPENE (ONE-A-DAY MEN'S ORAL) Take 1 tablet by mouth once daily.      oxyCODONE-acetaminophen (PERCOCET)  mg per tablet Take 1 tablet by mouth every 4-6 hours as needed for pain. Take stool softener with this medication. 30 tablet 0    pioglitazone (ACTOS) 15 MG tablet Take 1 tablet (15 mg total) by mouth once daily. 90 tablet 3    simvastatin (ZOCOR) 20 MG tablet TAKE 1 TABLET(20 MG) BY MOUTH EVERY EVENING 90 tablet 3    tadalafiL (CIALIS) 20 MG Tab Take 1 tablet (20 mg total) by mouth daily as needed. 30 tablet 0    tamsulosin (FLOMAX) 0.4 mg Cap TAKE 1 CAPSULE(0.4 MG)  BY MOUTH EVERY DAY 90 capsule 3    vitamin D 1000 units Tab Take 2,000 Units by mouth once daily. Take 1 tablet daily         Past Surgical History:   Procedure Laterality Date    ARTHROSCOPIC DEBRIDEMENT OF SHOULDER Left 1/9/2020    Procedure: EXTENSIVE DEBRIDEMENT, SHOULDER, ARTHROSCOPIC;  Surgeon: Marcela John MD;  Location: [facility] OR;  Service: Orthopedics;  Laterality: Left;    ARTHROSCOPIC REPAIR OF ROTATOR CUFF OF SHOULDER Left 1/9/2020    Procedure: REPAIR, ROTATOR CUFF, ARTHROSCOPIC, MEDIUM DOUBLE ROW;  Surgeon: Marcela John MD;  Location: [facility] OR;  Service: Orthopedics;  Laterality: Left;    ARTHROSCOPY OF SHOULDER WITH DECOMPRESSION OF SUBACROMIAL SPACE Left 1/9/2020    Procedure: ARTHROSCOPY, SHOULDER, WITH SUBACROMIAL DECOMPRESSION;  Surgeon: Marcela John MD;  Location: [facility] OR;  Service: Orthopedics;  Laterality: Left;    CHOLECYSTECTOMY      COLONOSCOPY N/A 12/7/2016    Procedure: COLONOSCOPY;  Surgeon: Lance Crocker MD;  Location: [address]);  Service: Endoscopy;  Laterality: N/A;    EXCISION OF BURSA Left 1/9/2020    Procedure: BURSECTOMY;  Surgeon: Marcela John MD;  Location: [facility];  Service: Orthopedics;  Laterality: Left;    LITHOTRIPSY      ORIF left humerus         Social History     Socioeconomic History    Marital status:    Tobacco Use    Smoking status: Current Every Day Smoker     Packs/day: 1.50     Years: 50.00     Pack years: 75.00     Types: Cigarettes    Smokeless tobacco: Never Used   Substance and Sexual Activity    Alcohol use: No    Drug use: Yes     Types: Hydrocodone    Sexual activity: Yes     Partners: Female     Birth control/protection: None   Social History Narrative    , 5 children - healthy.  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    CMP: No results for input(s): NA, K, CL, CO2, BUN, CREATININE, GLU, MG, PHOS,  CALCIUM, ALBUMIN, PROT, ALKPHOS, ALT, AST, BILITOT in the last 72 hours.    INR  No results for input(s): PT, INR, PROTIME, APTT in the last 72 hours.        Diagnostic Studies:      EKD Echo:  No results found for this or any previous visit.      Anesthesia Evaluation    I have reviewed the Patient Summary Reports.    I have reviewed the Nursing Notes. I have reviewed the NPO Status.   I have reviewed the Medications.     Review of Systems  Anesthesia Hx:  No problems with previous Anesthesia  History of prior surgery of interest to airway management or planning: Denies Family Hx of Anesthesia complications.   Denies Personal Hx of Anesthesia complications.   Hematology/Oncology:         -- Denies Anemia:   Cardiovascular:   Hypertension CAD   ECG has been reviewed.    Pulmonary:   Denies COPD.  Denies Asthma.  Denies Sleep Apnea.    Renal/:   Denies Chronic Renal Disease. renal calculi     Hepatic/GI:   GERD Liver Disease,    Neurological:   Denies CVA. Denies Seizures.    Endocrine:   Diabetes        Physical Exam  General:  Well nourished    Airway/Jaw/Neck:  Airway Findings: Mouth Opening: Normal Tongue: Normal  General Airway Assessment: Adult  Mallampati: III  Improves to II with phonation.  TM Distance: Normal, at least 6 cm  Jaw/Neck Findings:  Neck ROM: Normal ROM      Dental:  Dental Findings: In tact   Chest/Lungs:  Chest/Lungs Findings: Clear to auscultation, Normal Respiratory Rate     Heart/Vascular:  Heart Findings: Rate: Normal  Rhythm: Regular Rhythm  Sounds: Normal        Mental Status:  Mental Status Findings:  Alert and Oriented, Cooperative         Anesthesia Plan  Type of Anesthesia, risks & benefits discussed:  Anesthesia Type:  general    Patient's Preference:   Plan Factors:          Intra-op Monitoring Plan: standard ASA monitors  Intra-op Monitoring Plan Comments:   Post Op Pain Control Plan: per primary service following discharge from PACU  Post Op Pain Control Plan Comments:      Induction:   IV  Beta Blocker:  Patient is on a Beta-Blocker and has received one dose within the past 24 hours (No further documentation required).       Informed Consent: Patient understands risks and agrees with Anesthesia plan.  Questions answered. Anesthesia consent signed with patient.  ASA Score: 2     Day of Surgery Review of History & Physical:    H&P update referred to the provider.         Ready For Surgery From Anesthesia Perspective.

## 2022-02-02 NOTE — PROVATION PATIENT INSTRUCTIONS
Discharge Summary/Instructions after an Endoscopic Procedure  Patient Name: Tony Han  Patient MRN: [id number]  Patient YOB: 1950 Wednesday, February 2, 2022  Zena Maddox MD  Dear patient,  As a result of recent federal legislation (The Federal Cures Act), you may   receive lab or pathology results from your procedure in your [facility]   account before your physician is able to contact you. Your physician or   their representative will relay the results to you with their   recommendations at their soonest availability.  Thank you,  RESTRICTIONS:  During your procedure today, you received medications for sedation.  These   medications may affect your judgment, balance and coordination.  Therefore,   for 24 hours, you have the following restrictions:   - DO NOT drive a car, operate machinery, make legal/financial decisions,   sign important papers or drink alcohol.    ACTIVITY:  Today: no heavy lifting, straining or running due to procedural   sedation/anesthesia.  The following day: return to full activity including work.  DIET:  Eat and drink normally unless instructed otherwise.     TREATMENT FOR COMMON SIDE EFFECTS:  - Mild abdominal pain, nausea, belching, bloating or excessive gas:  rest,   eat lightly and use a heating pad.  - Sore Throat: treat with throat lozenges and/or gargle with warm salt   water.  - Because air was used during the procedure, expelling large amounts of air   from your rectum or belching is normal.  - If a bowel prep was taken, you may not have a bowel movement for 1-3 days.    This is normal.  SYMPTOMS TO WATCH FOR AND REPORT TO YOUR PHYSICIAN:  1. Abdominal pain or bloating, other than gas cramps.  2. Chest pain.  3. Back pain.  4. Signs of infection such as: chills or fever occurring within 24 hours   after the procedure.  5. Rectal bleeding, which would show as bright red, maroon, or black stools.   (A tablespoon of blood from the rectum is not serious, especially if    hemorrhoids are present.)  6. Vomiting.  7. Weakness or dizziness.  GO DIRECTLY TO THE NEAREST EMERGENCY ROOM IF YOU HAVE ANY OF THE FOLLOWING:      Difficulty breathing              Chills and/or fever over 101 F   Persistent vomiting and/or vomiting blood   Severe abdominal pain   Severe chest pain   Black, tarry stools   Bleeding- more than one tablespoon   Any other symptom or condition that you feel may need urgent attention  Your doctor recommends these additional instructions:  If any biopsies were taken, your doctors clinic will contact you in 1 to 2   weeks with any results.  - Discharge patient to home (ambulatory).   - Resume regular diet.   - Continue present medications.   - Await pathology results.   - Repeat colonoscopy in 5 years for surveillance.  For questions, problems or results please call your physician - Zena Maddox MD at Work:  [phone].  [facility] [place], EMERGENCY ROOM PHONE NUMBER: [phone]  IF A COMPLICATION OR EMERGENCY SITUATION ARISES AND YOU ARE UNABLE TO REACH   YOUR PHYSICIAN - GO DIRECTLY TO THE EMERGENCY ROOM.  Zena Maddox MD  2/2/2022 2:31:04 PM  This report has been verified and signed electronically.  Dear patient,  As a result of recent federal legislation (The Federal Cures Act), you may   receive lab or pathology results from your procedure in your [facility]   account before your physician is able to contact you. Your physician or   their representative will relay the results to you with their   recommendations at their soonest availability.  Thank you,  PROVATION

## 2022-02-03 ENCOUNTER — TELEPHONE (OUTPATIENT)
Dept: INTERNAL MEDICINE | Facility: CLINIC | Age: 72
End: 2022-02-03
Payer: MEDICARE

## 2022-02-03 NOTE — TELEPHONE ENCOUNTER
Called patient states he had the colonoscopy yesterday took Miralax not the $130.00 medicine. Had one polyp procedure went well without difficulty. PA no longer needed. Angela Shen RN HC

## 2022-02-03 NOTE — ANESTHESIA POSTPROCEDURE EVALUATION
Anesthesia Post Evaluation    Patient: Tony Han    Procedure(s) Performed: Procedure(s) (LRB):  COLONOSCOPY (N/A)    Final Anesthesia Type: general      Patient location during evaluation: PACU  Patient participation: Yes- Able to Participate  Level of consciousness: awake and alert and oriented  Post-procedure vital signs: reviewed and stable  Pain management: adequate  Airway patency: patent    PONV status at discharge: No PONV  Anesthetic complications: no      Cardiovascular status: blood pressure returned to baseline, hemodynamically stable and stable  Respiratory status: unassisted, room air and spontaneous ventilation  Hydration status: euvolemic  Follow-up not needed.          Vitals Value Taken Time   /70 02/02/22 1457   Temp 36.7 °C (98.1 °F) 02/02/22 1434   Pulse 74 02/02/22 1457   Resp 16 02/02/22 1457   SpO2 99 % 02/02/22 1457         Event Time   Out of Recovery 15:03:15         Pain/Laura Score: Laura Score: 10 (2/2/2022  2:35 PM)

## 2022-02-04 ENCOUNTER — PES CALL (OUTPATIENT)
Dept: ADMINISTRATIVE | Facility: CLINIC | Age: 72
End: 2022-02-04
Payer: MEDICARE

## 2022-02-10 LAB
FINAL PATHOLOGIC DIAGNOSIS: NORMAL
Lab: NORMAL

## 2022-03-07 ENCOUNTER — OFFICE VISIT (OUTPATIENT)
Dept: INTERNAL MEDICINE | Facility: CLINIC | Age: 72
End: 2022-03-07
Payer: MEDICARE

## 2022-03-07 VITALS
HEART RATE: 97 BPM | HEIGHT: 69 IN | DIASTOLIC BLOOD PRESSURE: 73 MMHG | BODY MASS INDEX: 28.87 KG/M2 | WEIGHT: 194.88 LBS | SYSTOLIC BLOOD PRESSURE: 167 MMHG | OXYGEN SATURATION: 98 %

## 2022-03-07 DIAGNOSIS — K21.9 GASTROESOPHAGEAL REFLUX DISEASE WITHOUT ESOPHAGITIS: ICD-10-CM

## 2022-03-07 DIAGNOSIS — Z98.890 S/P ROTATOR CUFF SURGERY: ICD-10-CM

## 2022-03-07 DIAGNOSIS — Z86.010 HISTORY OF ADENOMATOUS POLYP OF COLON: ICD-10-CM

## 2022-03-07 DIAGNOSIS — M25.511 ACUTE PAIN OF RIGHT SHOULDER: ICD-10-CM

## 2022-03-07 DIAGNOSIS — M75.102 NONTRAUMATIC TEAR OF LEFT ROTATOR CUFF, UNSPECIFIED TEAR EXTENT: ICD-10-CM

## 2022-03-07 DIAGNOSIS — N20.0 NEPHROLITHIASIS: ICD-10-CM

## 2022-03-07 DIAGNOSIS — G57.11 MERALGIA PARAESTHETICA, RIGHT: ICD-10-CM

## 2022-03-07 DIAGNOSIS — H91.90 HEARING LOSS, UNSPECIFIED HEARING LOSS TYPE, UNSPECIFIED LATERALITY: ICD-10-CM

## 2022-03-07 DIAGNOSIS — M70.61 TROCHANTERIC BURSITIS, RIGHT HIP: ICD-10-CM

## 2022-03-07 DIAGNOSIS — R79.89 LFT ELEVATION: ICD-10-CM

## 2022-03-07 DIAGNOSIS — M51.9 LUMBAR DISC DISEASE: ICD-10-CM

## 2022-03-07 DIAGNOSIS — E78.5 HYPERLIPIDEMIA, UNSPECIFIED HYPERLIPIDEMIA TYPE: ICD-10-CM

## 2022-03-07 DIAGNOSIS — E13.9 DIABETES MELLITUS DUE TO ABNORMAL INSULIN: ICD-10-CM

## 2022-03-07 DIAGNOSIS — D12.6 COLON ADENOMA: ICD-10-CM

## 2022-03-07 DIAGNOSIS — K82.4 CHOLESTEROLOSIS OF GALLBLADDER: ICD-10-CM

## 2022-03-07 DIAGNOSIS — M54.31 RIGHT SIDED SCIATICA: ICD-10-CM

## 2022-03-07 DIAGNOSIS — I70.0 ATHEROSCLEROSIS OF ABDOMINAL AORTA: ICD-10-CM

## 2022-03-07 DIAGNOSIS — R11.0 NAUSEA: ICD-10-CM

## 2022-03-07 DIAGNOSIS — K76.0 FATTY LIVER: ICD-10-CM

## 2022-03-07 DIAGNOSIS — E11.9 TYPE 2 DIABETES MELLITUS WITHOUT COMPLICATION, WITHOUT LONG-TERM CURRENT USE OF INSULIN: ICD-10-CM

## 2022-03-07 DIAGNOSIS — K63.5 POLYP OF COLON, UNSPECIFIED PART OF COLON, UNSPECIFIED TYPE: ICD-10-CM

## 2022-03-07 DIAGNOSIS — M70.61: ICD-10-CM

## 2022-03-07 DIAGNOSIS — G47.09 OTHER INSOMNIA: ICD-10-CM

## 2022-03-07 DIAGNOSIS — N40.1 BENIGN PROSTATIC HYPERPLASIA WITH LOWER URINARY TRACT SYMPTOMS, SYMPTOM DETAILS UNSPECIFIED: ICD-10-CM

## 2022-03-07 DIAGNOSIS — Z00.00 ENCOUNTER FOR PREVENTIVE HEALTH EXAMINATION: Primary | ICD-10-CM

## 2022-03-07 DIAGNOSIS — M25.551 RIGHT HIP PAIN: ICD-10-CM

## 2022-03-07 DIAGNOSIS — K31.9 MUCOSAL ABNORMALITY OF DUODENUM: ICD-10-CM

## 2022-03-07 DIAGNOSIS — L81.8 TATTOOS: ICD-10-CM

## 2022-03-07 DIAGNOSIS — I10 ESSENTIAL HYPERTENSION: ICD-10-CM

## 2022-03-07 PROCEDURE — 1170F PR FUNCTIONAL STATUS ASSESSED: ICD-10-PCS | Mod: CPTII,S$GLB,, | Performed by: NURSE PRACTITIONER

## 2022-03-07 PROCEDURE — 99999 PR PBB SHADOW E&M-EST. PATIENT-LVL V: ICD-10-PCS | Mod: PBBFAC,,, | Performed by: NURSE PRACTITIONER

## 2022-03-07 PROCEDURE — 3077F PR MOST RECENT SYSTOLIC BLOOD PRESSURE >= 140 MM HG: ICD-10-PCS | Mod: CPTII,S$GLB,, | Performed by: NURSE PRACTITIONER

## 2022-03-07 PROCEDURE — 1160F PR REVIEW ALL MEDS BY PRESCRIBER/CLIN PHARMACIST DOCUMENTED: ICD-10-PCS | Mod: CPTII,S$GLB,, | Performed by: NURSE PRACTITIONER

## 2022-03-07 PROCEDURE — 4010F PR ACE/ARB THEARPY RXD/TAKEN: ICD-10-PCS | Mod: CPTII,S$GLB,, | Performed by: NURSE PRACTITIONER

## 2022-03-07 PROCEDURE — 1101F PT FALLS ASSESS-DOCD LE1/YR: CPT | Mod: CPTII,S$GLB,, | Performed by: NURSE PRACTITIONER

## 2022-03-07 PROCEDURE — 3051F HG A1C>EQUAL 7.0%<8.0%: CPT | Mod: CPTII,S$GLB,, | Performed by: NURSE PRACTITIONER

## 2022-03-07 PROCEDURE — G0439 PR MEDICARE ANNUAL WELLNESS SUBSEQUENT VISIT: ICD-10-PCS | Mod: S$GLB,,, | Performed by: NURSE PRACTITIONER

## 2022-03-07 PROCEDURE — 1160F RVW MEDS BY RX/DR IN RCRD: CPT | Mod: CPTII,S$GLB,, | Performed by: NURSE PRACTITIONER

## 2022-03-07 PROCEDURE — 3288F FALL RISK ASSESSMENT DOCD: CPT | Mod: CPTII,S$GLB,, | Performed by: NURSE PRACTITIONER

## 2022-03-07 PROCEDURE — 99499 RISK ADDL DX/OHS AUDIT: ICD-10-PCS | Mod: S$GLB,,, | Performed by: NURSE PRACTITIONER

## 2022-03-07 PROCEDURE — 1170F FXNL STATUS ASSESSED: CPT | Mod: CPTII,S$GLB,, | Performed by: NURSE PRACTITIONER

## 2022-03-07 PROCEDURE — 4010F ACE/ARB THERAPY RXD/TAKEN: CPT | Mod: CPTII,S$GLB,, | Performed by: NURSE PRACTITIONER

## 2022-03-07 PROCEDURE — 3078F DIAST BP <80 MM HG: CPT | Mod: CPTII,S$GLB,, | Performed by: NURSE PRACTITIONER

## 2022-03-07 PROCEDURE — 3051F PR MOST RECENT HEMOGLOBIN A1C LEVEL 7.0 - < 8.0%: ICD-10-PCS | Mod: CPTII,S$GLB,, | Performed by: NURSE PRACTITIONER

## 2022-03-07 PROCEDURE — 1159F PR MEDICATION LIST DOCUMENTED IN MEDICAL RECORD: ICD-10-PCS | Mod: CPTII,S$GLB,, | Performed by: NURSE PRACTITIONER

## 2022-03-07 PROCEDURE — 3288F PR FALLS RISK ASSESSMENT DOCUMENTED: ICD-10-PCS | Mod: CPTII,S$GLB,, | Performed by: NURSE PRACTITIONER

## 2022-03-07 PROCEDURE — 1159F MED LIST DOCD IN RCRD: CPT | Mod: CPTII,S$GLB,, | Performed by: NURSE PRACTITIONER

## 2022-03-07 PROCEDURE — 3008F BODY MASS INDEX DOCD: CPT | Mod: CPTII,S$GLB,, | Performed by: NURSE PRACTITIONER

## 2022-03-07 PROCEDURE — G0439 PPPS, SUBSEQ VISIT: HCPCS | Mod: S$GLB,,, | Performed by: NURSE PRACTITIONER

## 2022-03-07 PROCEDURE — 3078F PR MOST RECENT DIASTOLIC BLOOD PRESSURE < 80 MM HG: ICD-10-PCS | Mod: CPTII,S$GLB,, | Performed by: NURSE PRACTITIONER

## 2022-03-07 PROCEDURE — 99999 PR PBB SHADOW E&M-EST. PATIENT-LVL V: CPT | Mod: PBBFAC,,, | Performed by: NURSE PRACTITIONER

## 2022-03-07 PROCEDURE — 1101F PR PT FALLS ASSESS DOC 0-1 FALLS W/OUT INJ PAST YR: ICD-10-PCS | Mod: CPTII,S$GLB,, | Performed by: NURSE PRACTITIONER

## 2022-03-07 PROCEDURE — 99499 UNLISTED E&M SERVICE: CPT | Mod: S$GLB,,, | Performed by: NURSE PRACTITIONER

## 2022-03-07 PROCEDURE — 3077F SYST BP >= 140 MM HG: CPT | Mod: CPTII,S$GLB,, | Performed by: NURSE PRACTITIONER

## 2022-03-07 PROCEDURE — 3008F PR BODY MASS INDEX (BMI) DOCUMENTED: ICD-10-PCS | Mod: CPTII,S$GLB,, | Performed by: NURSE PRACTITIONER

## 2022-03-07 NOTE — PATIENT INSTRUCTIONS
Counseling and Referral of Other Preventative  (Italic type indicates deductible and co-insurance are waived)    Patient Name: Tony Han  Today's Date: 3/7/2022    Health Maintenance       Date Due Completion Date    COVID-19 Vaccine (3 - Booster for Pfizer series) 08/05/2021 3/5/2021    Influenza Vaccine (1) 09/01/2021 9/19/2020    Foot Exam 12/02/2021 12/2/2020 (Done)    Override on 12/2/2020: Done    Override on 6/13/2017: Done    Shingles Vaccine (2 of 3) 10/12/2022 (Originally 7/6/2016) 5/11/2016    PROSTATE-SPECIFIC ANTIGEN 03/24/2022 3/24/2021    Lipid Panel 03/24/2022 3/24/2021    Hemoglobin A1c 06/01/2022 12/1/2021    Diabetes Urine Screening 07/28/2022 7/28/2021    LDCT Lung Screen 08/25/2022 8/25/2021    Eye Exam 11/17/2022 11/17/2021    Override on 10/11/2021: Done (Dr Hendrix)    Override on 2/21/2018: Done (Sees Dr Estrella and retinal specialist at [facility])    Override on 2/13/2017: Done (Nile Avina)    Override on 1/16/2017: Done (Dr Jaime Rosenbaum)    Override on 7/10/2014: Done    High Dose Statin 02/02/2023 2/2/2022    Colorectal Cancer Screening 02/02/2027 2/2/2022    TETANUS VACCINE 07/12/2030 7/12/2020    Override on 12/14/2016: Declined        No orders of the defined types were placed in this encounter.    The following information is provided to all patients.  This information is to help you find resources for any of the problems found today that may be affecting your health:                Living healthy guide: www.[facility].[place].gov      Understanding Diabetes: www.diabetes.org      Eating healthy: www.cdc.gov/healthyweight      CDC home safety checklist: www.cdc.gov/steadi/patient.html      Agency on Aging: www.goea.[place].gov      Alcoholics anonymous (AA): www.aa.org      Physical Activity: www.tamiko.nih.gov/yb3hklg      Tobacco use: www.quitwithusla.org

## 2022-03-07 NOTE — PROGRESS NOTES
"  Tony Han presented for a  Medicare AWV and comprehensive Health Risk Assessment today. The following components were reviewed and updated:    · Medical history  · Family History  · Social history  · Allergies and Current Medications  · Health Risk Assessment  · Health Maintenance  · Care Team         ** See Completed Assessments for Annual Wellness Visit within the encounter summary.**         The following assessments were completed:  · Living Situation  · CAGE  · Depression Screening  · Timed Get Up and Go  · Whisper Test  · Cognitive Function Screening  · Nutrition Screening  · ADL Screening  · PAQ Screening            Vitals:    03/07/22 1428 03/07/22 1511   BP: (!) 158/69 (!) 167/73   Pulse: 97    SpO2: 98%    Weight: 88.4 kg (194 lb 14.2 oz)    Height: 5' 9" (1.753 m)      Body mass index is 28.78 kg/m².     Physical Exam  Constitutional:       Appearance: He is well-developed.   HENT:      Head: Normocephalic and atraumatic. Not macrocephalic and not microcephalic. No raccoon eyes, Brown's sign, abrasion, contusion, right periorbital erythema, left periorbital erythema or laceration. Hair is normal.      Right Ear: No decreased hearing noted. No laceration, drainage, swelling or tenderness. No middle ear effusion. No foreign body. No mastoid tenderness. No hemotympanum. Tympanic membrane is not injected, scarred, perforated, erythematous, retracted or bulging. Tympanic membrane has normal mobility.      Left Ear: No decreased hearing noted. No laceration, drainage, swelling or tenderness.  No middle ear effusion. No foreign body. No mastoid tenderness. No hemotympanum. Tympanic membrane is not injected, scarred, perforated, erythematous, retracted or bulging. Tympanic membrane has normal mobility.      Nose: Nose normal. No nasal deformity, laceration or mucosal edema.      Mouth/Throat:      Pharynx: Uvula midline.   Eyes:      General: Lids are normal. No scleral icterus.     Conjunctiva/sclera: " Conjunctivae normal.   Neck:      Thyroid: No thyroid mass or thyromegaly.      Trachea: Trachea normal.   Cardiovascular:      Rate and Rhythm: Normal rate and regular rhythm.      Heart sounds: No murmur heard.    No friction rub. No gallop.   Pulmonary:      Effort: Pulmonary effort is normal. No respiratory distress.      Breath sounds: Normal breath sounds. No stridor. No wheezing, rhonchi or rales.   Chest:      Chest wall: No tenderness.   Abdominal:      Palpations: Abdomen is soft.   Musculoskeletal:         General: Normal range of motion.      Cervical back: Neck supple. No edema or erythema. No spinous process tenderness or muscular tenderness. Normal range of motion.   Lymphadenopathy:      Head:      Right side of head: No submental, submandibular, tonsillar, preauricular or posterior auricular adenopathy.      Left side of head: No submental, submandibular, tonsillar, preauricular, posterior auricular or occipital adenopathy.   Skin:     General: Skin is warm and dry.   Neurological:      Mental Status: He is alert and oriented to person, place, and time.   Psychiatric:         Behavior: Behavior normal.         Thought Content: Thought content normal.         Judgment: Judgment normal.             Diagnoses and health risks identified today and associated recommendations/orders:    1. Encounter for preventive health examination  Annual Health Risk Assessment (HRA) visit today.  Counseling and referral of health maintenance and preventative health measures performed.  Patient given annual wellness paperwork to take home.  Encouraged to return in 1 year for subsequent HRA visit.     2. Type 2 diabetes mellitus without complication, without long-term current use of insulin  Chronic. Stable. Continue current treatment plan as previously prescribed by PCP.    3. Atherosclerosis of abdominal aorta  Chronic. Stable. Continue current treatment plan as previously prescribed by PCP.    4. Essential  hypertension  Chronic. Stable. Controlled. Encouraged to increase exercise as tolerated (moderate-intensity aerobic activity and muscle-strengthening activities) improve diet to heart healthy, low sodium diet. Continue current treatment plan as previously prescribed by PCP.    5. Hyperlipidemia, unspecified hyperlipidemia type  Chronic. Stable. Continue current treatment plan as previously prescribed by PCP.    6. Diabetes mellitus due to abnormal insulin  Chronic. Stable. Uncontrolled. Last Hgb A1c= 7.1 from 12/1/21. Continue current treatment plan as previously prescribed by PCP.    7. Benign prostatic hyperplasia with lower urinary tract symptoms, symptom details unspecified  Chronic. Stable. Continue current treatment plan as previously prescribed by PCP.    8. Nephrolithiasis  Chronic. Stable. Continue current treatment plan as previously prescribed by PCP.    9. Tattoos  Chronic. Stable. Continue current treatment plan as previously prescribed by PCP.    10. Meralgia paraesthetica, right  Chronic. Stable. Continue current treatment plan as previously prescribed by PCP.    11. Lumbar disc disease  Chronic. Stable. Continue current treatment plan as previously prescribed by PCP.    12. Cholesterolosis of gallbladder  Chronic. Stable. Continue current treatment plan as previously prescribed by PCP.    13. Gastroesophageal reflux disease without esophagitis  Chronic. Stable. Continue current treatment plan as previously prescribed by PCP.    14. Polyp of colon, unspecified part of colon, unspecified type  Chronic. Stable. Continue current treatment plan as previously prescribed by PCP.    15. Mucosal abnormality of duodenum  Chronic. Stable. Continue current treatment plan as previously prescribed by PCP.    16. Colon adenoma  Chronic. Stable. Continue current treatment plan as previously prescribed by PCP.    17. LFT elevation  Chronic. Stable. Continue current treatment plan as previously prescribed by  PCP.    18. Fatty liver  Chronic. Stable. Continue current treatment plan as previously prescribed by PCP.    19. Nausea  Chronic. Stable. Continue current treatment plan as previously prescribed by PCP.    20. History of adenomatous polyp of colon  Chronic. Stable. Continue current treatment plan as previously prescribed by PCP.    21. Acute pain of right shoulder  Chronic. Stable. Continue current treatment plan as previously prescribed by PCP.    22. Nontraumatic tear of left rotator cuff, unspecified tear extent  Chronic. Stable. Continue current treatment plan as previously prescribed by PCP.    23. S/P rotator cuff surgery  Chronic. Stable. Continue current treatment plan as previously prescribed by PCP.    24. Trochanteric bursitis, right hip  Chronic. Stable. Continue current treatment plan as previously prescribed by PCP.    25. Right hip pain  Chronic. Stable. Continue current treatment plan as previously prescribed by PCP.    26. Right sided sciatica  Chronic. Stable. Continue current treatment plan as previously prescribed by PCP.    27. Susan-trochanteric right hip tendinitis  Chronic. Stable. Continue current treatment plan as previously prescribed by PCP.    28. Other insomnia  Chronic. Stable. Continue current treatment plan as previously prescribed by PCP.        Provided Tony with a 5-10 year written screening schedule and personal prevention plan. Recommendations were developed using the USPSTF age appropriate recommendations. Education, counseling, and referrals were provided as needed. After Visit Summary printed and given to patient which includes a list of additional screenings\tests needed.      I offered to discuss end of life issues, including information on how to make advance directives that the patient could use to name someone who would make medical decisions on their behalf if they became too ill to make themselves.    ___Patient declined  _X_Patient is interested, I provided paper  work and offered to discuss.    Follow up in about 1 year (around 3/7/2023).    Mateo Capone NP  I offered to discuss advanced care planning, including how to pick a person who would make decisions for you if you were unable to make them for yourself, called a health care power of , and what kind of decisions you might make such as use of life sustaining treatments such as ventilators and tube feeding when faced with a life limiting illness recorded on a living will that they will need to know. (How you want to be cared for as you near the end of your natural life)     X Patient is interested in learning more about how to make advanced directives.  I provided them paperwork and offered to discuss this with them.

## 2022-03-24 ENCOUNTER — PATIENT MESSAGE (OUTPATIENT)
Dept: INTERNAL MEDICINE | Facility: CLINIC | Age: 72
End: 2022-03-24
Payer: MEDICARE

## 2022-03-25 NOTE — TELEPHONE ENCOUNTER
Spoke to pt to get clarification on the message. Pt stated he was just letting Dr. Benitez know what he was doing....

## 2022-04-13 ENCOUNTER — LAB VISIT (OUTPATIENT)
Dept: LAB | Facility: OTHER | Age: 72
End: 2022-04-13
Payer: MEDICARE

## 2022-04-13 DIAGNOSIS — E13.9 DIABETES MELLITUS DUE TO ABNORMAL INSULIN: ICD-10-CM

## 2022-04-13 LAB
ALBUMIN SERPL BCP-MCNC: 4.4 G/DL (ref 3.5–5.2)
ALP SERPL-CCNC: 72 U/L (ref 55–135)
ALT SERPL W/O P-5'-P-CCNC: 37 U/L (ref 10–44)
ANION GAP SERPL CALC-SCNC: 15 MMOL/L (ref 8–16)
AST SERPL-CCNC: 30 U/L (ref 10–40)
BASOPHILS # BLD AUTO: 0.04 K/UL (ref 0–0.2)
BASOPHILS NFR BLD: 0.7 % (ref 0–1.9)
BILIRUB SERPL-MCNC: 0.3 MG/DL (ref 0.1–1)
BUN SERPL-MCNC: 13 MG/DL (ref 8–23)
CALCIUM SERPL-MCNC: 9.9 MG/DL (ref 8.7–10.5)
CHLORIDE SERPL-SCNC: 106 MMOL/L (ref 95–110)
CHOLEST SERPL-MCNC: 162 MG/DL (ref 120–199)
CHOLEST/HDLC SERPL: 4.5 {RATIO} (ref 2–5)
CO2 SERPL-SCNC: 19 MMOL/L (ref 23–29)
CREAT SERPL-MCNC: 1.1 MG/DL (ref 0.5–1.4)
DIFFERENTIAL METHOD: ABNORMAL
EOSINOPHIL # BLD AUTO: 0.2 K/UL (ref 0–0.5)
EOSINOPHIL NFR BLD: 3.4 % (ref 0–8)
ERYTHROCYTE [DISTWIDTH] IN BLOOD BY AUTOMATED COUNT: 13 % (ref 11.5–14.5)
EST. GFR  (AFRICAN AMERICAN): >60 ML/MIN/1.73 M^2
EST. GFR  (NON AFRICAN AMERICAN): >60 ML/MIN/1.73 M^2
ESTIMATED AVG GLUCOSE: 177 MG/DL (ref 68–131)
GLUCOSE SERPL-MCNC: 148 MG/DL (ref 70–110)
HBA1C MFR BLD: 7.8 % (ref 4–5.6)
HCT VFR BLD AUTO: 38.8 % (ref 40–54)
HDLC SERPL-MCNC: 36 MG/DL (ref 40–75)
HDLC SERPL: 22.2 % (ref 20–50)
HGB BLD-MCNC: 13.1 G/DL (ref 14–18)
IMM GRANULOCYTES # BLD AUTO: 0 K/UL (ref 0–0.04)
IMM GRANULOCYTES NFR BLD AUTO: 0 % (ref 0–0.5)
LDLC SERPL CALC-MCNC: ABNORMAL MG/DL (ref 63–159)
LYMPHOCYTES # BLD AUTO: 2.7 K/UL (ref 1–4.8)
LYMPHOCYTES NFR BLD: 48.4 % (ref 18–48)
MCH RBC QN AUTO: 34.1 PG (ref 27–31)
MCHC RBC AUTO-ENTMCNC: 33.8 G/DL (ref 32–36)
MCV RBC AUTO: 101 FL (ref 82–98)
MONOCYTES # BLD AUTO: 0.3 K/UL (ref 0.3–1)
MONOCYTES NFR BLD: 5.4 % (ref 4–15)
NEUTROPHILS # BLD AUTO: 2.4 K/UL (ref 1.8–7.7)
NEUTROPHILS NFR BLD: 42.1 % (ref 38–73)
NONHDLC SERPL-MCNC: 126 MG/DL
NRBC BLD-RTO: 0 /100 WBC
PLATELET # BLD AUTO: 318 K/UL (ref 150–450)
PMV BLD AUTO: 8.6 FL (ref 9.2–12.9)
POTASSIUM SERPL-SCNC: 4.5 MMOL/L (ref 3.5–5.1)
PROT SERPL-MCNC: 7.6 G/DL (ref 6–8.4)
RBC # BLD AUTO: 3.84 M/UL (ref 4.6–6.2)
SODIUM SERPL-SCNC: 140 MMOL/L (ref 136–145)
TRIGL SERPL-MCNC: 425 MG/DL (ref 30–150)
TSH SERPL DL<=0.005 MIU/L-ACNC: 0.66 UIU/ML (ref 0.4–4)
WBC # BLD AUTO: 5.58 K/UL (ref 3.9–12.7)

## 2022-04-13 PROCEDURE — 80053 COMPREHEN METABOLIC PANEL: CPT | Performed by: INTERNAL MEDICINE

## 2022-04-13 PROCEDURE — 80061 LIPID PANEL: CPT | Performed by: INTERNAL MEDICINE

## 2022-04-13 PROCEDURE — 84443 ASSAY THYROID STIM HORMONE: CPT | Performed by: INTERNAL MEDICINE

## 2022-04-13 PROCEDURE — 36415 COLL VENOUS BLD VENIPUNCTURE: CPT | Performed by: INTERNAL MEDICINE

## 2022-04-13 PROCEDURE — 83036 HEMOGLOBIN GLYCOSYLATED A1C: CPT | Performed by: INTERNAL MEDICINE

## 2022-04-13 PROCEDURE — 85025 COMPLETE CBC W/AUTO DIFF WBC: CPT | Performed by: INTERNAL MEDICINE

## 2022-04-20 ENCOUNTER — OFFICE VISIT (OUTPATIENT)
Dept: INTERNAL MEDICINE | Facility: CLINIC | Age: 72
End: 2022-04-20
Payer: MEDICARE

## 2022-04-20 VITALS
HEIGHT: 69 IN | BODY MASS INDEX: 28.28 KG/M2 | HEART RATE: 77 BPM | DIASTOLIC BLOOD PRESSURE: 60 MMHG | OXYGEN SATURATION: 99 % | WEIGHT: 190.94 LBS | SYSTOLIC BLOOD PRESSURE: 120 MMHG

## 2022-04-20 DIAGNOSIS — E11.9 TYPE 2 DIABETES MELLITUS WITHOUT COMPLICATION, WITHOUT LONG-TERM CURRENT USE OF INSULIN: Primary | ICD-10-CM

## 2022-04-20 DIAGNOSIS — E11.40 TYPE 2 DIABETES MELLITUS WITH DIABETIC NEUROPATHY, WITHOUT LONG-TERM CURRENT USE OF INSULIN: ICD-10-CM

## 2022-04-20 DIAGNOSIS — I10 ESSENTIAL HYPERTENSION: ICD-10-CM

## 2022-04-20 DIAGNOSIS — I70.0 ATHEROSCLEROSIS OF ABDOMINAL AORTA: ICD-10-CM

## 2022-04-20 DIAGNOSIS — E78.5 HYPERLIPIDEMIA, UNSPECIFIED HYPERLIPIDEMIA TYPE: ICD-10-CM

## 2022-04-20 DIAGNOSIS — R91.1 SOLITARY PULMONARY NODULE: ICD-10-CM

## 2022-04-20 DIAGNOSIS — K21.9 GASTROESOPHAGEAL REFLUX DISEASE WITHOUT ESOPHAGITIS: ICD-10-CM

## 2022-04-20 PROCEDURE — 3078F PR MOST RECENT DIASTOLIC BLOOD PRESSURE < 80 MM HG: ICD-10-PCS | Mod: CPTII,S$GLB,, | Performed by: INTERNAL MEDICINE

## 2022-04-20 PROCEDURE — 99499 UNLISTED E&M SERVICE: CPT | Mod: S$GLB,,, | Performed by: INTERNAL MEDICINE

## 2022-04-20 PROCEDURE — 3074F SYST BP LT 130 MM HG: CPT | Mod: CPTII,S$GLB,, | Performed by: INTERNAL MEDICINE

## 2022-04-20 PROCEDURE — 3051F HG A1C>EQUAL 7.0%<8.0%: CPT | Mod: CPTII,S$GLB,, | Performed by: INTERNAL MEDICINE

## 2022-04-20 PROCEDURE — 3078F DIAST BP <80 MM HG: CPT | Mod: CPTII,S$GLB,, | Performed by: INTERNAL MEDICINE

## 2022-04-20 PROCEDURE — 1101F PR PT FALLS ASSESS DOC 0-1 FALLS W/OUT INJ PAST YR: ICD-10-PCS | Mod: CPTII,S$GLB,, | Performed by: INTERNAL MEDICINE

## 2022-04-20 PROCEDURE — 1159F MED LIST DOCD IN RCRD: CPT | Mod: CPTII,S$GLB,, | Performed by: INTERNAL MEDICINE

## 2022-04-20 PROCEDURE — 3074F PR MOST RECENT SYSTOLIC BLOOD PRESSURE < 130 MM HG: ICD-10-PCS | Mod: CPTII,S$GLB,, | Performed by: INTERNAL MEDICINE

## 2022-04-20 PROCEDURE — 1125F PR PAIN SEVERITY QUANTIFIED, PAIN PRESENT: ICD-10-PCS | Mod: CPTII,S$GLB,, | Performed by: INTERNAL MEDICINE

## 2022-04-20 PROCEDURE — 3008F PR BODY MASS INDEX (BMI) DOCUMENTED: ICD-10-PCS | Mod: CPTII,S$GLB,, | Performed by: INTERNAL MEDICINE

## 2022-04-20 PROCEDURE — 99999 PR PBB SHADOW E&M-EST. PATIENT-LVL III: CPT | Mod: PBBFAC,,, | Performed by: INTERNAL MEDICINE

## 2022-04-20 PROCEDURE — 99214 PR OFFICE/OUTPT VISIT, EST, LEVL IV, 30-39 MIN: ICD-10-PCS | Mod: S$GLB,,, | Performed by: INTERNAL MEDICINE

## 2022-04-20 PROCEDURE — 3288F FALL RISK ASSESSMENT DOCD: CPT | Mod: CPTII,S$GLB,, | Performed by: INTERNAL MEDICINE

## 2022-04-20 PROCEDURE — 3288F PR FALLS RISK ASSESSMENT DOCUMENTED: ICD-10-PCS | Mod: CPTII,S$GLB,, | Performed by: INTERNAL MEDICINE

## 2022-04-20 PROCEDURE — 1159F PR MEDICATION LIST DOCUMENTED IN MEDICAL RECORD: ICD-10-PCS | Mod: CPTII,S$GLB,, | Performed by: INTERNAL MEDICINE

## 2022-04-20 PROCEDURE — 1125F AMNT PAIN NOTED PAIN PRSNT: CPT | Mod: CPTII,S$GLB,, | Performed by: INTERNAL MEDICINE

## 2022-04-20 PROCEDURE — 99214 OFFICE O/P EST MOD 30 MIN: CPT | Mod: S$GLB,,, | Performed by: INTERNAL MEDICINE

## 2022-04-20 PROCEDURE — 4010F ACE/ARB THERAPY RXD/TAKEN: CPT | Mod: CPTII,S$GLB,, | Performed by: INTERNAL MEDICINE

## 2022-04-20 PROCEDURE — 99499 RISK ADDL DX/OHS AUDIT: ICD-10-PCS | Mod: S$GLB,,, | Performed by: INTERNAL MEDICINE

## 2022-04-20 PROCEDURE — 3008F BODY MASS INDEX DOCD: CPT | Mod: CPTII,S$GLB,, | Performed by: INTERNAL MEDICINE

## 2022-04-20 PROCEDURE — 3051F PR MOST RECENT HEMOGLOBIN A1C LEVEL 7.0 - < 8.0%: ICD-10-PCS | Mod: CPTII,S$GLB,, | Performed by: INTERNAL MEDICINE

## 2022-04-20 PROCEDURE — 1101F PT FALLS ASSESS-DOCD LE1/YR: CPT | Mod: CPTII,S$GLB,, | Performed by: INTERNAL MEDICINE

## 2022-04-20 PROCEDURE — 99999 PR PBB SHADOW E&M-EST. PATIENT-LVL III: ICD-10-PCS | Mod: PBBFAC,,, | Performed by: INTERNAL MEDICINE

## 2022-04-20 PROCEDURE — 4010F PR ACE/ARB THEARPY RXD/TAKEN: ICD-10-PCS | Mod: CPTII,S$GLB,, | Performed by: INTERNAL MEDICINE

## 2022-04-20 RX ORDER — METOPROLOL SUCCINATE 25 MG/1
25 TABLET, EXTENDED RELEASE ORAL DAILY
Qty: 90 TABLET | Refills: 3 | Status: SHIPPED | OUTPATIENT
Start: 2022-04-20 | End: 2023-02-14

## 2022-04-20 RX ORDER — FENOFIBRATE 43 MG/1
43 CAPSULE ORAL DAILY
Qty: 30 CAPSULE | Refills: 3 | Status: SHIPPED | OUTPATIENT
Start: 2022-04-20 | End: 2022-04-24

## 2022-04-20 NOTE — PROGRESS NOTES
Chief Complaint: Follow up of multiple problems     HISTORY OF PRESENT ILLNESS: This is a 71-year-old man who presents for above.     He has been sleeping well.  Mood has been anxious at times.        He is now taking Metformin  mg one in the morning and 2 at night. .      He had a CT scan chest on 8/25/21 which revealed - Patchy ground-glass opacities in the lower lobes bilaterally, which could represent multifocal aspiration or bronchopneumonia - he took a course of doxycycline 100 mg twice daily for 10 days. No  cough or shortness of breath.       Balance is off at times if he turns to quickly.  No falls     HE is trying to quit smoking- he is back smoking a pack of cigarettes daily.  He was smoking 35 cigarettes daily He started to decrease tobacco on 9/12/21.      He continues to have back pain and lateral right leg pain. He saw Dr Sagastume in pain management yesterday, 12/7/21.  Currently taking oxycodone apap 10/325 1/2 tablet every 5-6 hours. . He stopped gabapentin 400 mg 4 times daily on 8/10/21. Pain is tolerable. His pain is currently 3/10. He is followed by Dr César Carrasquillo in pain management.  His sleep has always been broken He sleeps 6-8 at night .  He has intermittent pins and needles sensation in his right thigh if he sits more than  20 minutes.      He had a normal cystoscope  with Dr Dexter  9/13/21      Dr Randall  on 3/17/20 who felt he has -  Right meralgia paresthetica; right lateral thigh tendinitis at the hip; history lumbar disc disease rule out lumbar radiculopathy. EMG was 5/5/20 -  Sensory neuropathy consistent with a history of diabetes; these findings would be compatible with clinical picture of meralgia paresthetica.  He continues to have significant right leg pain that starts in the right groin and goes down the lateral right thigh and the anterior right thigh.  He has a burning sensation on the right thigh.  HE is followed by  Dr Carrasquillo in pain management for his pain.    "No change in this pain     Left shoulder is ok. He is not doing physical therapy due to COVID pandemic. He is doing his exercises on his own.       He had lithotripsy on 3/23/17. No dysuria, hematuria.   He saw Dr cerna on 1/19. He still has a stone in the left kidney. He is on finasteride and tamsulosin for his prostate.      He has not had any alcohol and has not had a recurrence of pancreatitis. No nausea, vomiting, constipation, diarrhea, melana, bloody stools. He gets constipated from the oxycodone.      He is taking amlodipine 10 mg 1 once daily, Toprol-XL 25 mg once daily and losartan 25 mg daily.  No chest pain, shortness of breath. metformin  mg 1 tablets twice daily. HE checks his blood sugar twice daily due to fluctuating blood sugars.   We are holding simvastatin due to weight issues     No polydipsia. He has a history of hyperlipidemia,has not taken zocor.  No joint pain or muscle pain. NO shortness breath, headaches, vision, sinus congestion.              PAST MEDICAL HISTORY:    1. Hypertension.    2. Coronary artery disease with left heart catheterization in November 2011 with 40 to 50% narrowing in the mid LAD.    3. Diabetes mellitus.    4. Hyperlipidemia.    5. History of reflux.    6. Colon polyps.    7. History of cluster headaches.    8. History of glaucoma.    9. Lumbar disk disease at L5 to S1.    10. History of stomach ulcers.    11. Nephrolithiasis.       PAST SURGICAL HISTORY: Cholecystectomy in May 2013, ORIF of the left humerus.      SOCIAL HISTORY: Currently smokes a pack of cigarettes daily, has smoked his whole life. 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 He is a retired schoolteacher.       FAMILY HISTORY: Updated on [facility].      PHYSICAL EXAMINATION:     /60 (BP Location: Right arm, Patient Position: Sitting)   Pulse 77   Ht 5' 9" (1.753 m)   Wt 86.6 kg (190 lb 14.7 oz)   SpO2 99%   BMI 28.19 kg/m²     GENERAL: He is alert, oriented, no " apparent distress. Affect within normal limits.    HEENT: Conjunctivae anicteric. Tympanic membranes clear.  . .    NECK: Supple. No cervical lymphadenopathy, no thyroid enlargement.    Respiratory: Effort normal. Lungs are clear to auscultation.    HEART: Regular rate and rhythm without murmurs, gallops or rubs. No lower extremity edema     Labs 4/13/22 reviewed        ASSESSMENT AND PLAN:     1. Weight loss -  weight is improved  2. Cough with smoking tobacco- ct scan chest - Patchy ground-glass opacities in the lower lobes bilaterally, which could represent multifocal aspiration or bronchopneumonia - he took a course of doxycycline 100 mg twice daily for 10 days. Never had cough or shortness of breath. Working on quitting smoking. CT scan in 8/2022  3. Possible kidney cyst on CT scan abdomen without contrast on 8/10/21 - renal ultrasound. Urinalysis and culture as has had recurrent UTI in the past  4. Hypertension -better with lower dose of amlodipine.   5. Diabetes mellitus with neuropathy. stable  6. Hyperlipidemia. off Zocor.  Triglycerides are elevated. Will try low dose fenofribate 45 mg once daily.   7. History of reflux and peptic ulcer disease -asx. Off pantoprazole  8. Tubular adenomas - colonoscopy 12/2016 - one polyp - due 12/2021  9. History of glaucoma - not on treatment - pressure in eyes ok - followed Nile Avina and retinal specialist at [facility]  10. History of nephrolithiasis -asymptomatic.    11. Irregular heart beat sensation - holter revealed PVC  11. Pancreatitis - resolved. Do not suspect malabsorption as cause of weight loss as he is not having diarrhea. He is not drinking alochol. May need an evaluation for pancreatic insufficiency if work up is unremarkable  12. Anxiety and depression -valium 5 mg nightly prn anxiety.    13. Tobacco abuse -working on cessation.   II will see him back in 2 weeks, sooner if issues.      Colonoscopy 12/7/16 - one hyperplastic polyp due in 2021 - ordered

## 2022-04-23 ENCOUNTER — PATIENT MESSAGE (OUTPATIENT)
Dept: INTERNAL MEDICINE | Facility: CLINIC | Age: 72
End: 2022-04-23
Payer: MEDICARE

## 2022-04-24 RX ORDER — FENOFIBRATE 145 MG/1
145 TABLET, FILM COATED ORAL DAILY
Qty: 30 TABLET | Refills: 4 | Status: SHIPPED | OUTPATIENT
Start: 2022-04-24 | End: 2022-09-07 | Stop reason: SDUPTHER

## 2022-05-12 ENCOUNTER — PATIENT MESSAGE (OUTPATIENT)
Dept: SMOKING CESSATION | Facility: CLINIC | Age: 72
End: 2022-05-12
Payer: MEDICARE

## 2022-05-24 ENCOUNTER — TELEPHONE (OUTPATIENT)
Dept: INTERNAL MEDICINE | Facility: CLINIC | Age: 72
End: 2022-05-24
Payer: MEDICARE

## 2022-05-24 DIAGNOSIS — E11.40 TYPE 2 DIABETES MELLITUS WITH DIABETIC NEUROPATHY, WITHOUT LONG-TERM CURRENT USE OF INSULIN: Primary | ICD-10-CM

## 2022-05-24 RX ORDER — LANCETS
EACH MISCELLANEOUS
Qty: 200 EACH | Refills: 4 | Status: SHIPPED | OUTPATIENT
Start: 2022-05-24 | End: 2022-05-25 | Stop reason: SDUPTHER

## 2022-05-24 RX ORDER — INSULIN PUMP SYRINGE, 3 ML
EACH MISCELLANEOUS
Qty: 1 EACH | Refills: 0 | Status: SHIPPED | OUTPATIENT
Start: 2022-05-24 | End: 2022-05-25 | Stop reason: SDUPTHER

## 2022-05-24 NOTE — TELEPHONE ENCOUNTER
----- Message from Chad Franklin sent at 5/24/2022  3:10 PM CDT -----  Contact: [facility] [phone] option [phone]  Kay needs a new order for diabatic supplies fax # [phone].

## 2022-05-25 RX ORDER — INSULIN PUMP SYRINGE, 3 ML
EACH MISCELLANEOUS
Qty: 1 EACH | Refills: 0 | OUTPATIENT
Start: 2022-05-25 | End: 2022-05-25 | Stop reason: SDUPTHER

## 2022-05-25 RX ORDER — LANCETS
EACH MISCELLANEOUS
Qty: 200 EACH | Refills: 4 | Status: SHIPPED | OUTPATIENT
Start: 2022-05-25

## 2022-05-25 RX ORDER — INSULIN PUMP SYRINGE, 3 ML
EACH MISCELLANEOUS
Qty: 1 EACH | Refills: 0 | Status: SHIPPED | OUTPATIENT
Start: 2022-05-25

## 2022-05-25 RX ORDER — LANCETS
EACH MISCELLANEOUS
Qty: 200 EACH | Refills: 4 | OUTPATIENT
Start: 2022-05-25 | End: 2022-05-25 | Stop reason: SDUPTHER

## 2022-05-27 ENCOUNTER — TELEPHONE (OUTPATIENT)
Dept: SPORTS MEDICINE | Facility: CLINIC | Age: 72
End: 2022-05-27
Payer: MEDICARE

## 2022-05-27 NOTE — TELEPHONE ENCOUNTER
----- Message from Carmelita Cabrera sent at 5/27/2022 10:52 AM CDT -----  Contact: [phone]  Pt is calling to get a call back from the staff.    Pt stated that he is having very bad pain in his left rotator cuff.    Pt would like a call back.    [phone]

## 2022-06-06 ENCOUNTER — HOSPITAL ENCOUNTER (OUTPATIENT)
Dept: RADIOLOGY | Facility: HOSPITAL | Age: 72
Discharge: HOME | End: 2022-06-06
Attending: ORTHOPAEDIC SURGERY
Payer: MEDICARE

## 2022-06-06 ENCOUNTER — OFFICE VISIT (OUTPATIENT)
Dept: SPORTS MEDICINE | Facility: CLINIC | Age: 72
End: 2022-06-06
Payer: MEDICARE

## 2022-06-06 VITALS
HEIGHT: 69 IN | WEIGHT: 188 LBS | BODY MASS INDEX: 27.85 KG/M2 | HEART RATE: 84 BPM | DIASTOLIC BLOOD PRESSURE: 69 MMHG | SYSTOLIC BLOOD PRESSURE: 164 MMHG

## 2022-06-06 DIAGNOSIS — M25.512 LEFT SHOULDER PAIN, UNSPECIFIED CHRONICITY: Primary | ICD-10-CM

## 2022-06-06 DIAGNOSIS — M25.512 LEFT SHOULDER PAIN, UNSPECIFIED CHRONICITY: ICD-10-CM

## 2022-06-06 DIAGNOSIS — S46.912A LEFT SHOULDER STRAIN, INITIAL ENCOUNTER: ICD-10-CM

## 2022-06-06 PROCEDURE — 3077F PR MOST RECENT SYSTOLIC BLOOD PRESSURE >= 140 MM HG: ICD-10-PCS | Mod: CPTII,S$GLB,, | Performed by: ORTHOPAEDIC SURGERY

## 2022-06-06 PROCEDURE — 4010F ACE/ARB THERAPY RXD/TAKEN: CPT | Mod: CPTII,S$GLB,, | Performed by: ORTHOPAEDIC SURGERY

## 2022-06-06 PROCEDURE — 3078F PR MOST RECENT DIASTOLIC BLOOD PRESSURE < 80 MM HG: ICD-10-PCS | Mod: CPTII,S$GLB,, | Performed by: ORTHOPAEDIC SURGERY

## 2022-06-06 PROCEDURE — 73030 X-RAY EXAM OF SHOULDER: CPT | Mod: 26,LT,, | Performed by: RADIOLOGY

## 2022-06-06 PROCEDURE — 3008F PR BODY MASS INDEX (BMI) DOCUMENTED: ICD-10-PCS | Mod: CPTII,S$GLB,, | Performed by: ORTHOPAEDIC SURGERY

## 2022-06-06 PROCEDURE — 1159F PR MEDICATION LIST DOCUMENTED IN MEDICAL RECORD: ICD-10-PCS | Mod: CPTII,S$GLB,, | Performed by: ORTHOPAEDIC SURGERY

## 2022-06-06 PROCEDURE — 3288F PR FALLS RISK ASSESSMENT DOCUMENTED: ICD-10-PCS | Mod: CPTII,S$GLB,, | Performed by: ORTHOPAEDIC SURGERY

## 2022-06-06 PROCEDURE — 1125F AMNT PAIN NOTED PAIN PRSNT: CPT | Mod: CPTII,S$GLB,, | Performed by: ORTHOPAEDIC SURGERY

## 2022-06-06 PROCEDURE — 99999 PR PBB SHADOW E&M-EST. PATIENT-LVL IV: CPT | Mod: PBBFAC,,, | Performed by: ORTHOPAEDIC SURGERY

## 2022-06-06 PROCEDURE — 3077F SYST BP >= 140 MM HG: CPT | Mod: CPTII,S$GLB,, | Performed by: ORTHOPAEDIC SURGERY

## 2022-06-06 PROCEDURE — 3078F DIAST BP <80 MM HG: CPT | Mod: CPTII,S$GLB,, | Performed by: ORTHOPAEDIC SURGERY

## 2022-06-06 PROCEDURE — 3051F HG A1C>EQUAL 7.0%<8.0%: CPT | Mod: CPTII,S$GLB,, | Performed by: ORTHOPAEDIC SURGERY

## 2022-06-06 PROCEDURE — 3008F BODY MASS INDEX DOCD: CPT | Mod: CPTII,S$GLB,, | Performed by: ORTHOPAEDIC SURGERY

## 2022-06-06 PROCEDURE — 1159F MED LIST DOCD IN RCRD: CPT | Mod: CPTII,S$GLB,, | Performed by: ORTHOPAEDIC SURGERY

## 2022-06-06 PROCEDURE — 73030 XR SHOULDER COMPLETE 2 OR MORE VIEWS LEFT: ICD-10-PCS | Mod: 26,LT,, | Performed by: RADIOLOGY

## 2022-06-06 PROCEDURE — 1101F PR PT FALLS ASSESS DOC 0-1 FALLS W/OUT INJ PAST YR: ICD-10-PCS | Mod: CPTII,S$GLB,, | Performed by: ORTHOPAEDIC SURGERY

## 2022-06-06 PROCEDURE — 4010F PR ACE/ARB THEARPY RXD/TAKEN: ICD-10-PCS | Mod: CPTII,S$GLB,, | Performed by: ORTHOPAEDIC SURGERY

## 2022-06-06 PROCEDURE — 3288F FALL RISK ASSESSMENT DOCD: CPT | Mod: CPTII,S$GLB,, | Performed by: ORTHOPAEDIC SURGERY

## 2022-06-06 PROCEDURE — 1125F PR PAIN SEVERITY QUANTIFIED, PAIN PRESENT: ICD-10-PCS | Mod: CPTII,S$GLB,, | Performed by: ORTHOPAEDIC SURGERY

## 2022-06-06 PROCEDURE — 99999 PR PBB SHADOW E&M-EST. PATIENT-LVL IV: ICD-10-PCS | Mod: PBBFAC,,, | Performed by: ORTHOPAEDIC SURGERY

## 2022-06-06 PROCEDURE — 1101F PT FALLS ASSESS-DOCD LE1/YR: CPT | Mod: CPTII,S$GLB,, | Performed by: ORTHOPAEDIC SURGERY

## 2022-06-06 PROCEDURE — 3051F PR MOST RECENT HEMOGLOBIN A1C LEVEL 7.0 - < 8.0%: ICD-10-PCS | Mod: CPTII,S$GLB,, | Performed by: ORTHOPAEDIC SURGERY

## 2022-06-06 PROCEDURE — 99214 OFFICE O/P EST MOD 30 MIN: CPT | Mod: S$GLB,,, | Performed by: ORTHOPAEDIC SURGERY

## 2022-06-06 PROCEDURE — 99214 PR OFFICE/OUTPT VISIT, EST, LEVL IV, 30-39 MIN: ICD-10-PCS | Mod: S$GLB,,, | Performed by: ORTHOPAEDIC SURGERY

## 2022-06-06 PROCEDURE — 73030 X-RAY EXAM OF SHOULDER: CPT | Mod: TC,LT

## 2022-06-06 NOTE — PROGRESS NOTES
CC: left shoulder pain     72 y.o. Male RHD reports that the pain is severe and not responding to any conservative care.      Patient notes that his pain has been present for 3.5 weeks  He notes that he slept on his arm for 2.5-3 hours, he notes tripping and caught himself on the wall with his arm outstretched     He describes radiating pain with certain neck motions   He notes constant pain but that it does not worsen with activity like overhead activity, out to the side, etc. He has pain with his arm hanging.     Pain from his neck down into his shoulder and to his lateral upper arm  He denies any numbness or tingling     It also bothers him at night.    Is affecting ADLs.     He notes temporary pain improvement with narcotic pain medications     SANE: 25    DATE OF PROCEDURE: 01/09/2020  OPERATION:   left  1. Shoulder arthroscopic rotator cuff repair 25 x 15 mm, medium, double row (CPT 30910)   2. Shoulder arthroscopic biceps tenodesis (CPT 11922)  3. Shoulder arthroscopic subacromial decompression, bursectomy   4. Shoulder arthroscopic extensive debridement (anterior, posterior glenohumeral joint, subacromial space) (CPT 72516)  5. Shoulder arthroscopic microfracture (Crimson duvet technique) (CPT 56311)  6. Shoulder arthroscopic lysis of adhesions (CPT 85603)    Review of Systems   Constitution: Negative. Negative for chills, fever and night sweats.   HENT: Negative for congestion and headaches.    Eyes: Negative for blurred vision, left vision loss and right vision loss.   Cardiovascular: Negative for chest pain and syncope.   Respiratory: Negative for cough and shortness of breath.    Endocrine: Negative for polydipsia, polyphagia and polyuria.   Hematologic/Lymphatic: Negative for bleeding problem. Does not bruise/bleed easily.   Skin: Negative for dry skin, itching and rash.   Musculoskeletal: Negative for falls and muscle weakness.   Gastrointestinal: Negative for abdominal pain and bowel incontinence.    Genitourinary: Negative for bladder incontinence and nocturia.   Neurological: Negative for disturbances in coordination, loss of balance and seizures.   Psychiatric/Behavioral: Negative for depression. The patient does not have insomnia.    Allergic/Immunologic: Negative for hives and persistent infections.     PAST MEDICAL HISTORY:   Past Medical History:   Diagnosis Date    Angina pectoris     Back pain     Cluster headaches     1999    Colon polyps     5 polyps in Dec 2010    Coronary artery disease 11/2011    moderate nonobstructive cad on left heart cath at [facility] 11/2011    Diabetes mellitus     GERD (gastroesophageal reflux disease)     history of peptic ulcer disease    Hyperlipidemia     Hypertension     Lumbar disc disease     MRI 2008 - L5-S1    Nephrolithiasis     July 2013     PAST SURGICAL HISTORY:   Past Surgical History:   Procedure Laterality Date    ARTHROSCOPIC DEBRIDEMENT OF SHOULDER Left 01/09/2020    Procedure: EXTENSIVE DEBRIDEMENT, SHOULDER, ARTHROSCOPIC;  Surgeon: Marcela John MD;  Location: [facility] OR;  Service: Orthopedics;  Laterality: Left;    ARTHROSCOPIC REPAIR OF ROTATOR CUFF OF SHOULDER Left 01/09/2020    Procedure: REPAIR, ROTATOR CUFF, ARTHROSCOPIC, MEDIUM DOUBLE ROW;  Surgeon: Marcela John MD;  Location: [facility] OR;  Service: Orthopedics;  Laterality: Left;    ARTHROSCOPY OF SHOULDER WITH DECOMPRESSION OF SUBACROMIAL SPACE Left 01/09/2020    Procedure: ARTHROSCOPY, SHOULDER, WITH SUBACROMIAL DECOMPRESSION;  Surgeon: Marcela John MD;  Location: [facility] OR;  Service: Orthopedics;  Laterality: Left;    CHOLECYSTECTOMY      COLONOSCOPY N/A 12/07/2016    Procedure: COLONOSCOPY;  Surgeon: Lance Crocker MD;  Location: [address]);  Service: Endoscopy;  Laterality: N/A;    COLONOSCOPY N/A 02/02/2022    Procedure: COLONOSCOPY;  Surgeon: Zena Maddox MD;  Location: [facility] ([address] [place] [address]);  Service: Endoscopy;  Laterality: N/A;  covid test 1/30/22 [place], prep instr  emailed -ml  Miralax prep    EXCISION OF BURSA Left 01/09/2020    Procedure: BURSECTOMY;  Surgeon: Marcela John MD;  Location: [facility] OR;  Service: Orthopedics;  Laterality: Left;    LITHOTRIPSY      ORIF left humerus       FAMILY HISTORY:   Family History   Problem Relation Age of Onset    Heart disease Mother     No Known Problems Father     Cancer Sister         ovarian    Heart disease Brother     Heart failure Brother     Kidney disease Brother     Alzheimer's disease Brother     No Known Problems Brother     Diabetes Son     Blindness Son     Diabetes Son     Hypertension Son     Diabetes Son     Hypertension Son     Diabetes Son     Hypertension Son     Sleep apnea Son     Celiac disease Neg Hx     Cirrhosis Neg Hx     Colon cancer Neg Hx     Colon polyps Neg Hx     Crohn's disease Neg Hx     Esophageal cancer Neg Hx     Inflammatory bowel disease Neg Hx     Liver cancer Neg Hx     Rectal cancer Neg Hx     Stomach cancer Neg Hx     Ulcerative colitis Neg Hx     Melanoma Neg Hx      SOCIAL HISTORY:   Social History     Socioeconomic History    Marital status:    Tobacco Use    Smoking status: Current Every Day Smoker     Packs/day: 1.50     Years: 50.00     Pack years: 75.00     Types: Cigarettes    Smokeless tobacco: Never Used   Substance and Sexual Activity    Alcohol use: Yes    Drug use: Yes     Types: Hydrocodone    Sexual activity: Yes     Partners: Female     Birth control/protection: None     Comment: occaionally   Social History Narrative    , 5 children - healthy.  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  Gabriel MEtric, Disp: 1 each, Rfl: 0    diazePAM (VALIUM) 5 MG tablet, Take 1 tablet (5 mg total) by mouth nightly as needed for Anxiety., Disp: 30 tablet, Rfl: 1    fenofibrate (TRICOR) 145 MG tablet, Take 1 tablet (145 mg total) by mouth once daily., Disp: 30 tablet, Rfl: 4    finasteride (PROSCAR) 5 mg tablet, TAKE 1 TABLET(5 MG) BY MOUTH EVERY DAY, Disp: 90 tablet, Rfl: 3    gabapentin (NEURONTIN) 400 MG capsule, TAKE 1 CAPSULE(400 MG) BY MOUTH FOUR TIMES DAILY, Disp: 120 capsule, Rfl: 4    lancets Misc, True Matrix Lancets -c heck blood sugar twice daily - fluctuating blood sugars, Disp: 200 each, Rfl: 4    losartan (COZAAR) 100 MG tablet, Take 1 tablet (100 mg total) by mouth once daily., Disp: 90 tablet, Rfl: 3    metFORMIN (GLUCOPHAGE-XR) 500 MG ER 24hr tablet, TAKE 2 TABLETS(1000 MG) BY MOUTH TWICE DAILY, Disp: 360 tablet, Rfl: 3    metoprolol succinate (TOPROL-XL) 25 MG 24 hr tablet, Take 1 tablet (25 mg total) by mouth once daily., Disp: 90 tablet, Rfl: 3    MULTIVITS-MINERALS/FA/LYCOPENE (ONE-A-DAY MEN'S ORAL), Take 1 tablet by mouth once daily., Disp: , Rfl:     oxyCODONE-acetaminophen (PERCOCET)  mg per tablet, Take 1 tablet by mouth every 4-6 hours as needed for pain. Take stool softener with this medication., Disp: 30 tablet, Rfl: 0    tamsulosin (FLOMAX) 0.4 mg Cap, TAKE 1 CAPSULE(0.4 MG) BY MOUTH EVERY DAY, Disp: 90 capsule, Rfl: 3    vitamin D 1000 units Tab, Take 2,000 Units by mouth once daily. Take 1 tablet daily, Disp: , Rfl:   ALLERGIES:   Review of patient's allergies indicates:   Allergen Reactions    Aspirin Nausea And Vomiting     If over 81mg       VITAL SIGNS: BP (!) 164/69   Pulse 84   Ht 5' 9" (1.753 m)   Wt 85.3 kg (188 lb)   BMI 27.76 kg/m²      PHYSICAL EXAMINATION:  General:  The patient is alert and oriented x 3.  Mood is pleasant.  Observation " of ears, eyes and nose reveal no gross abnormalities.  No labored breathing observed.  Gait is coordinated. Patient can toe walk and heel walk without difficulty.      left SHOULDER / UPPER EXTREMITY EXAM    OBSERVATION:     Swelling  none  Deformity  none   Discoloration  none   Scapular winging none   Scars   none  Atrophy  none    TENDERNESS / CREPITUS (T/C):          T/C      T/C   Clavicle   -/-  SUPRAspinatus    -/-     AC Jt.    -/-  INFRAspinatus  -/-    SC Jt.    -/-  Deltoid    -/-      G. Tuberosity  -/-  LH BICEP groove  +/-   Acromion:  -/-  Midline Neck   -/-     Scapular Spine -/-  Trapezium   -/-   SMA Scapula  -/-  GH jt. line - post  -/-     Scapulothoracic  -/-         ROM: (* = with pain)  Right shoulder   Left shoulder        AROM (PROM)   AROM (PROM)   FE    170° (175°)     170° (175°)     ER at 0°    60°  (65°)    60°  (65°)   ER at 90° ABD  90°  (90°)    90°  (90°)   IR at 90°  ABD   NA  (40°)     NA  (40°)      IR (spine level)   T10     T12    STRENGTH: (* = with pain) RIGHT SHOULDER  LEFT SHOULDER   SCAPTION at 0  5/5    5/5   SCAPTION at 30  5/5    5/5    IR    5/5    5/5   ER    5/5    5/5   BICEPS   5/5    5/5   Deltoid    5/5    5/5     SIGNS:  Painful side       NEER   +    OKEVINS  neg    BANDA   +    SPEEDS  neg     DROP ARM   neg   BELLY PRESS neg   Superior escape none    LIFT-OFF  neg   X-Body ADD    neg    MOVING VALGUS neg        STABILITY TESTING    RIGHT SHOULDER   LEFT SHOULDER     Translation     Anterior  up face     up face    Posterior  up face    up face    Sulcus   < 10mm    < 10 mm     Signs   Apprehension   neg      neg       Relocation   no change     no change      Jerk test  neg     neg    EXTREMITY NEURO-VASCULAR EXAM    Sensation grossly intact to light touch all dermatomal regions.    DTR 2+ Biceps, Triceps, BR and Negative Joannes sign   Grossly intact motor function at Elbow, Wrist and Hand   Distal pulses radial and ulnar 2+, brisk cap refill,  symmetric.      NECK:  Painless FROM and spinous processes non-tender. Negative Spurlings sign.      OTHER FINDINGS:  + scapular dyskinesia    XRAYS:  Shoulder trauma series left,  were obtained and reviewed  No convincing fracture or dislocation is noted. The osseous structures appear well mineralized and well aligned      ASSESSMENT:   left:  1. Shoulder pain, strain   2.  Scapular dyskinesia    PLAN:      PT for scapular stabilization: Scapular dyskinesia   Scapular stabilization - [place] protocol, 1-3x/week x 6-8 weeks with HEP    All questions were answered, pt will contact us for questions or concerns in the interim.

## 2022-06-23 ENCOUNTER — TELEPHONE (OUTPATIENT)
Dept: DERMATOLOGY | Facility: CLINIC | Age: 72
End: 2022-06-23
Payer: MEDICARE

## 2022-06-23 NOTE — TELEPHONE ENCOUNTER
----- Message from Theresa Duckworth sent at 6/22/2022  4:59 PM CDT -----  Type:  Sooner Apoointment Request    Caller is requesting a sooner appointment.  Caller declined first available appointment listed below.  Caller will not accept being placed on the waitlist and is requesting a message be sent to doctor.  Name of Caller:pt   When is the first available appointment?  Symptoms:skin tags and knot on neck   Would the patient rather a call back or a response via [facility]? Both   Best Call Back Number:[phone]  Additional Information: pt would [facility]

## 2022-07-17 ENCOUNTER — HOSPITAL ENCOUNTER (EMERGENCY)
Facility: OTHER | Age: 72
Discharge: HOME | End: 2022-07-17
Attending: EMERGENCY MEDICINE
Payer: MEDICARE

## 2022-07-17 VITALS
HEIGHT: 72 IN | OXYGEN SATURATION: 100 % | SYSTOLIC BLOOD PRESSURE: 157 MMHG | HEART RATE: 81 BPM | TEMPERATURE: 99 F | RESPIRATION RATE: 16 BRPM | WEIGHT: 188 LBS | BODY MASS INDEX: 25.47 KG/M2 | DIASTOLIC BLOOD PRESSURE: 70 MMHG

## 2022-07-17 DIAGNOSIS — R07.89 ATYPICAL CHEST PAIN: Primary | ICD-10-CM

## 2022-07-17 DIAGNOSIS — R07.9 CHEST PAIN: ICD-10-CM

## 2022-07-17 LAB
ALBUMIN SERPL BCP-MCNC: 4.1 G/DL (ref 3.5–5.2)
ALP SERPL-CCNC: 62 U/L (ref 55–135)
ALT SERPL W/O P-5'-P-CCNC: 23 U/L (ref 10–44)
ANION GAP SERPL CALC-SCNC: 12 MMOL/L (ref 8–16)
AST SERPL-CCNC: 25 U/L (ref 10–40)
BASOPHILS # BLD AUTO: 0.02 K/UL (ref 0–0.2)
BASOPHILS NFR BLD: 0.3 % (ref 0–1.9)
BILIRUB SERPL-MCNC: 0.3 MG/DL (ref 0.1–1)
BNP SERPL-MCNC: 55 PG/ML (ref 0–99)
BUN SERPL-MCNC: 13 MG/DL (ref 8–23)
CALCIUM SERPL-MCNC: 9.8 MG/DL (ref 8.7–10.5)
CHLORIDE SERPL-SCNC: 104 MMOL/L (ref 95–110)
CO2 SERPL-SCNC: 21 MMOL/L (ref 23–29)
CREAT SERPL-MCNC: 1.4 MG/DL (ref 0.5–1.4)
DIFFERENTIAL METHOD: ABNORMAL
EOSINOPHIL # BLD AUTO: 0.1 K/UL (ref 0–0.5)
EOSINOPHIL NFR BLD: 2.4 % (ref 0–8)
ERYTHROCYTE [DISTWIDTH] IN BLOOD BY AUTOMATED COUNT: 13.2 % (ref 11.5–14.5)
EST. GFR  (AFRICAN AMERICAN): 57 ML/MIN/1.73 M^2
EST. GFR  (NON AFRICAN AMERICAN): 49 ML/MIN/1.73 M^2
GLUCOSE SERPL-MCNC: 344 MG/DL (ref 70–110)
HCT VFR BLD AUTO: 36.4 % (ref 40–54)
HGB BLD-MCNC: 12.3 G/DL (ref 14–18)
IMM GRANULOCYTES # BLD AUTO: 0.01 K/UL (ref 0–0.04)
IMM GRANULOCYTES NFR BLD AUTO: 0.2 % (ref 0–0.5)
INR PPP: 1 (ref 0.8–1.2)
LYMPHOCYTES # BLD AUTO: 3.1 K/UL (ref 1–4.8)
LYMPHOCYTES NFR BLD: 53.2 % (ref 18–48)
MCH RBC QN AUTO: 34 PG (ref 27–31)
MCHC RBC AUTO-ENTMCNC: 33.8 G/DL (ref 32–36)
MCV RBC AUTO: 101 FL (ref 82–98)
MONOCYTES # BLD AUTO: 0.3 K/UL (ref 0.3–1)
MONOCYTES NFR BLD: 5.6 % (ref 4–15)
NEUTROPHILS # BLD AUTO: 2.3 K/UL (ref 1.8–7.7)
NEUTROPHILS NFR BLD: 38.3 % (ref 38–73)
NRBC BLD-RTO: 0 /100 WBC
PLATELET # BLD AUTO: 363 K/UL (ref 150–450)
PMV BLD AUTO: 8.7 FL (ref 9.2–12.9)
POTASSIUM SERPL-SCNC: 4.7 MMOL/L (ref 3.5–5.1)
PROT SERPL-MCNC: 7.2 G/DL (ref 6–8.4)
PROTHROMBIN TIME: 10.6 SEC (ref 9–12.5)
RBC # BLD AUTO: 3.62 M/UL (ref 4.6–6.2)
SODIUM SERPL-SCNC: 137 MMOL/L (ref 136–145)
TROPONIN I SERPL DL<=0.01 NG/ML-MCNC: <0.006 NG/ML (ref 0–0.03)
TROPONIN I SERPL DL<=0.01 NG/ML-MCNC: <0.006 NG/ML (ref 0–0.03)
WBC # BLD AUTO: 5.87 K/UL (ref 3.9–12.7)

## 2022-07-17 PROCEDURE — 83880 ASSAY OF NATRIURETIC PEPTIDE: CPT | Performed by: EMERGENCY MEDICINE

## 2022-07-17 PROCEDURE — 84484 ASSAY OF TROPONIN QUANT: CPT | Mod: 91 | Performed by: NURSE PRACTITIONER

## 2022-07-17 PROCEDURE — 85025 COMPLETE CBC W/AUTO DIFF WBC: CPT | Performed by: EMERGENCY MEDICINE

## 2022-07-17 PROCEDURE — 93010 EKG 12-LEAD: ICD-10-PCS | Mod: ,,, | Performed by: INTERNAL MEDICINE

## 2022-07-17 PROCEDURE — 80053 COMPREHEN METABOLIC PANEL: CPT | Performed by: EMERGENCY MEDICINE

## 2022-07-17 PROCEDURE — 36000 PLACE NEEDLE IN VEIN: CPT

## 2022-07-17 PROCEDURE — 85610 PROTHROMBIN TIME: CPT | Performed by: EMERGENCY MEDICINE

## 2022-07-17 PROCEDURE — 84484 ASSAY OF TROPONIN QUANT: CPT | Performed by: EMERGENCY MEDICINE

## 2022-07-17 PROCEDURE — 93005 ELECTROCARDIOGRAM TRACING: CPT

## 2022-07-17 PROCEDURE — 99285 EMERGENCY DEPT VISIT HI MDM: CPT | Mod: 25

## 2022-07-17 PROCEDURE — 93010 ELECTROCARDIOGRAM REPORT: CPT | Mod: ,,, | Performed by: INTERNAL MEDICINE

## 2022-07-17 NOTE — ED PROVIDER NOTES
"Encounter Date: 7/17/2022    SCRIBE #1 NOTE: I, Valentina Nelson, am scribing for, and in the presence of,  Valentin Mcmullen II, MD. I have scribed the following portions of the note - the EKG reading. Other sections scribed: HPI, ROS, PE, XRAY.       History     Chief Complaint   Patient presents with    Chest Pain     L side chest pain x 45 min.      Time seen by provider: 6:38 PM    This is a 72 y.o. male, with a PMHx of HTN, DM, HLD, CAD, and GERD, who presents with complaint of intermittent left side chest pain with onset at 1:00-2:00 PM. Patient reports that he was sitting down when he felt like "an ice pick was stabbing" him in the chest. He adds that the pain lasted for around 15 minutes. He states that at baseline he takes 1 81mg Aspirin everyday, but after the chest pain he took 2 more. He reports that the pain worsens when he takes a breathe or goes to sit up. Patient denies SOB, cough, and fever. Patient had a stress test and angiogram done in 2013. He notes that he has a CT scan scheduled with his PCP, Dr. Benitez.    The history is provided by the patient.     Review of patient's allergies indicates:   Allergen Reactions    Aspirin Nausea And Vomiting     If over 81mg     Past Medical History:   Diagnosis Date    Angina pectoris     Back pain     Cluster headaches     1999    Colon polyps     5 polyps in Dec 2010    Coronary artery disease 11/2011    moderate nonobstructive cad on left heart cath at [facility] [place], operated by [facility] 11/2011    Diabetes mellitus     GERD (gastroesophageal reflux disease)     history of peptic ulcer disease    Hyperlipidemia     Hypertension     Lumbar disc disease     MRI 2008 - L5-S1    Nephrolithiasis     July 2013     Past Surgical History:   Procedure Laterality Date    ARTHROSCOPIC DEBRIDEMENT OF SHOULDER Left 01/09/2020    Procedure: EXTENSIVE DEBRIDEMENT, SHOULDER, ARTHROSCOPIC;  Surgeon: Marcela John MD;  Location: [facility] OR;  Service: Orthopedics;  Laterality: Left;    " ARTHROSCOPIC REPAIR OF ROTATOR CUFF OF SHOULDER Left 01/09/2020    Procedure: REPAIR, ROTATOR CUFF, ARTHROSCOPIC, MEDIUM DOUBLE ROW;  Surgeon: Marcela John MD;  Location: [facility] OR;  Service: Orthopedics;  Laterality: Left;    ARTHROSCOPY OF SHOULDER WITH DECOMPRESSION OF SUBACROMIAL SPACE Left 01/09/2020    Procedure: ARTHROSCOPY, SHOULDER, WITH SUBACROMIAL DECOMPRESSION;  Surgeon: Marcela John MD;  Location: [facility] OR;  Service: Orthopedics;  Laterality: Left;    CHOLECYSTECTOMY      COLONOSCOPY N/A 12/07/2016    Procedure: COLONOSCOPY;  Surgeon: Lance Crocker MD;  Location: [facility] ENDO (4TH FLR);  Service: Endoscopy;  Laterality: N/A;    COLONOSCOPY N/A 02/02/2022    Procedure: COLONOSCOPY;  Surgeon: Zena Maddox MD;  [facility]  Service: Endoscopy;  Laterality: N/A;  covid test 1/30/22 [place], prep instr emailed -ml  Miralax prep    EXCISION OF BURSA Left 01/09/2020    Procedure: BURSECTOMY;  Surgeon: Marcela John MD;  Location: [facility] OR;  Service: Orthopedics;  Laterality: Left;    LITHOTRIPSY      ORIF left humerus       Family History   Problem Relation Age of Onset    Heart disease Mother     No Known Problems Father     Cancer Sister         ovarian    Heart disease Brother     Heart failure Brother     Kidney disease Brother     Alzheimer's disease Brother     No Known Problems Brother     Diabetes Son     Blindness Son     Diabetes Son     Hypertension Son     Diabetes Son     Hypertension Son     Diabetes Son     Hypertension Son     Sleep apnea Son     Celiac disease Neg Hx     Cirrhosis Neg Hx     Colon cancer Neg Hx     Colon polyps Neg Hx     Crohn's disease Neg Hx     Esophageal cancer Neg Hx     Inflammatory bowel disease Neg Hx     Liver cancer Neg Hx     Rectal cancer Neg Hx     Stomach cancer Neg Hx     Ulcerative colitis Neg Hx     Melanoma Neg Hx      Social History     Tobacco Use    Smoking status: Current Every Day Smoker      Packs/day: 1.50     Years: 50.00     Pack years: 75.00     Types: Cigarettes    Smokeless tobacco: Never Used   Substance Use Topics    Alcohol use: Yes    Drug use: Yes     Types: Hydrocodone     Review of Systems   Constitutional: Negative for fever.   HENT: Negative for sore throat.    Respiratory: Negative for cough and shortness of breath.    Cardiovascular: Positive for chest pain.   Gastrointestinal: Negative for nausea.   Genitourinary: Negative for dysuria.   Musculoskeletal: Negative for back pain.   Skin: Negative for rash.   Neurological: Negative for weakness.   Hematological: Does not bruise/bleed easily.       Physical Exam     Initial Vitals [07/17/22 1539]   BP Pulse Resp Temp SpO2   (!) 158/70 81 16 98.6 °F (37 °C) 99 %      MAP       --         Physical Exam    Nursing note and vitals reviewed.  Constitutional: He appears well-developed and well-nourished. He is not diaphoretic. No distress.   HENT:   Head: Normocephalic and atraumatic.   Eyes: Conjunctivae and EOM are normal. Pupils are equal, round, and reactive to light.   Neck: Neck supple.   Normal range of motion.  Cardiovascular: Normal rate, regular rhythm and intact distal pulses. Exam reveals no gallop and no friction rub.    No murmur heard.  Pulmonary/Chest: Breath sounds normal. No respiratory distress. He has no wheezes. He has no rhonchi. He has no rales.   Abdominal: Abdomen is soft. There is no abdominal tenderness.   Musculoskeletal:         General: No tenderness or edema. Normal range of motion.      Cervical back: Normal range of motion and neck supple.     Neurological: He is alert and oriented to person, place, and time.   Skin: Skin is warm and dry.   Psychiatric: He has a normal mood and affect. His behavior is normal. Judgment and thought content normal.         ED Course   Procedures  Labs Reviewed   CBC W/ AUTO DIFFERENTIAL - Abnormal; Notable for the following components:       Result Value    RBC 3.62 (*)      Hemoglobin 12.3 (*)     Hematocrit 36.4 (*)      (*)     MCH 34.0 (*)     MPV 8.7 (*)     Lymph % 53.2 (*)     All other components within normal limits   COMPREHENSIVE METABOLIC PANEL - Abnormal; Notable for the following components:    CO2 21 (*)     Glucose 344 (*)     eGFR if  57 (*)     eGFR if non  49 (*)     All other components within normal limits   B-TYPE NATRIURETIC PEPTIDE   TROPONIN I   PROTIME-INR   TROPONIN I     EKG Readings: (Independently Interpreted)   Sinus rhythm rate of 80. No ST or T wave changes. No ischemia. No arrhythmia. No pericarditis.        Imaging Results          X-Ray Chest PA And Lateral (Final result)  Result time 07/17/22 16:44:13    Final result by Collin Meyers DO (07/17/22 16:44:13)                 Impression:      See above      Electronically signed by: Collin Meyers DO  Date:    07/17/2022  Time:    16:44             Narrative:    EXAMINATION:  XR CHEST PA AND LATERAL    CLINICAL HISTORY:  Chest Pain;    TECHNIQUE:  PA and lateral views of the chest were performed.    COMPARISON:  12/24/2020    FINDINGS:  No significant change from prior.  No lung consolidation.  No pleural effusion or pneumothorax with slight limitation by partial exclusion of the right costophrenic angle.  Heart size stable within normal limits.  Visualized osseous structures grossly intact.  Further evaluation as warranted clinically.                              X-Rays:   Independently Interpreted Readings:   Chest X-Ray: No focal infiltrate or effusion. No pneumothorax.     Medications - No data to display  Medical Decision Making:   History:   Old Medical Records: I decided to obtain old medical records.  Independently Interpreted Test(s):   I have ordered and independently interpreted X-rays - see prior notes.  I have ordered and independently interpreted EKG Reading(s) - see prior notes  Clinical Tests:   Lab Tests: Ordered and Reviewed  Radiological Study:  Ordered and Reviewed  Medical Tests: Ordered and Reviewed  Patient presents complaining of onset of sharp focal left sided chest pains under the pectoral area.  Described as like an ice pick.  Intermittent fleeting total episodes lasted 20 minutes.  Does report he took his usual amount of large pills shortly before this and did have some belching.  He has not had any exertional symptoms.  He does report prior angiogram that was unremarkable approximately 9 years ago.  He was evaluated triaged workup initiated EKG, x-ray and initial troponin were negative.  His clinical picture does not suggest cardiac etiology and a repeat troponin is likewise negative.  Suspect this is musculoskeletal, GI or similar benign etiology.  No evidence of cardiac or pulmonary pathology.  Encouraged follow-up with primary care, especially if symptoms persist.  Return precautions discussed for the interim          Scribe Attestation:   Scribe #1: I performed the above scribed service and the documentation accurately describes the services I performed. I attest to the accuracy of the note.               Physician Attestation for Scribe: I, [facility], reviewed documentation as scribed in my presence, which is both accurate and complete.    Clinical Impression:   Final diagnoses:  [R07.9] Chest pain  [R07.89] Atypical chest pain (Primary)          ED Disposition Condition    Discharge Stable        ED Prescriptions     None        Follow-up Information     Follow up With Specialties Details Why Contact Info    Elizabeth Benitez MD Internal Medicine In 5 days  [address]  [place] [address]  [phone]             Valentin Mcmullen II, MD  07/17/22 2007

## 2022-07-17 NOTE — FIRST PROVIDER EVALUATION
Medical screening exam completed.  I have conducted a focused provider triage encounter, findings are as follows:    Brief history of present illness:  Left sided CP that began around 230, took aspirin PTA.     Vitals:    07/17/22 1539   BP: (!) 158/70   BP Location: Left arm   Patient Position: Sitting   Pulse: 81   Resp: 16   Temp: 98.6 °F (37 °C)   TempSrc: Oral   SpO2: 99%   Weight: 85.3 kg (188 lb)   Height: 6' (1.829 m)       Pertinent physical exam:  Well appearing    Brief workup plan:  Labs, eval once roomed, ekg    Preliminary workup initiated; this workup will be continued and followed by the physician or advanced practice provider that is assigned to the patient when roomed.

## 2022-08-31 ENCOUNTER — HOSPITAL ENCOUNTER (OUTPATIENT)
Dept: RADIOLOGY | Facility: OTHER | Age: 72
Discharge: HOME | End: 2022-08-31
Attending: INTERNAL MEDICINE
Payer: MEDICARE

## 2022-08-31 DIAGNOSIS — R91.1 SOLITARY PULMONARY NODULE: ICD-10-CM

## 2022-08-31 PROCEDURE — 71250 CT THORAX DX C-: CPT | Mod: TC

## 2022-08-31 PROCEDURE — 71250 CT CHEST WITHOUT CONTRAST: ICD-10-PCS | Mod: 26,,, | Performed by: RADIOLOGY

## 2022-08-31 PROCEDURE — 71250 CT THORAX DX C-: CPT | Mod: 26,,, | Performed by: RADIOLOGY

## 2022-09-07 ENCOUNTER — OFFICE VISIT (OUTPATIENT)
Dept: INTERNAL MEDICINE | Facility: CLINIC | Age: 72
End: 2022-09-07
Payer: MEDICARE

## 2022-09-07 VITALS
BODY MASS INDEX: 25.55 KG/M2 | HEIGHT: 72 IN | SYSTOLIC BLOOD PRESSURE: 136 MMHG | DIASTOLIC BLOOD PRESSURE: 58 MMHG | OXYGEN SATURATION: 99 % | HEART RATE: 83 BPM | WEIGHT: 188.63 LBS

## 2022-09-07 DIAGNOSIS — J47.9 BRONCHIECTASIS WITHOUT COMPLICATION: ICD-10-CM

## 2022-09-07 DIAGNOSIS — N13.8 BPH WITH OBSTRUCTION/LOWER URINARY TRACT SYMPTOMS: ICD-10-CM

## 2022-09-07 DIAGNOSIS — E13.9 DIABETES MELLITUS DUE TO ABNORMAL INSULIN: ICD-10-CM

## 2022-09-07 DIAGNOSIS — N40.1 BPH WITH OBSTRUCTION/LOWER URINARY TRACT SYMPTOMS: ICD-10-CM

## 2022-09-07 DIAGNOSIS — Z00.00 ROUTINE GENERAL MEDICAL EXAMINATION AT A HEALTH CARE FACILITY: Primary | ICD-10-CM

## 2022-09-07 PROCEDURE — 3008F PR BODY MASS INDEX (BMI) DOCUMENTED: ICD-10-PCS | Mod: CPTII,S$GLB,, | Performed by: INTERNAL MEDICINE

## 2022-09-07 PROCEDURE — 3288F PR FALLS RISK ASSESSMENT DOCUMENTED: ICD-10-PCS | Mod: CPTII,S$GLB,, | Performed by: INTERNAL MEDICINE

## 2022-09-07 PROCEDURE — 1101F PT FALLS ASSESS-DOCD LE1/YR: CPT | Mod: CPTII,S$GLB,, | Performed by: INTERNAL MEDICINE

## 2022-09-07 PROCEDURE — 99999 PR PBB SHADOW E&M-EST. PATIENT-LVL III: CPT | Mod: PBBFAC,,, | Performed by: INTERNAL MEDICINE

## 2022-09-07 PROCEDURE — 3051F PR MOST RECENT HEMOGLOBIN A1C LEVEL 7.0 - < 8.0%: ICD-10-PCS | Mod: CPTII,S$GLB,, | Performed by: INTERNAL MEDICINE

## 2022-09-07 PROCEDURE — 1101F PR PT FALLS ASSESS DOC 0-1 FALLS W/OUT INJ PAST YR: ICD-10-PCS | Mod: CPTII,S$GLB,, | Performed by: INTERNAL MEDICINE

## 2022-09-07 PROCEDURE — 4010F PR ACE/ARB THEARPY RXD/TAKEN: ICD-10-PCS | Mod: CPTII,S$GLB,, | Performed by: INTERNAL MEDICINE

## 2022-09-07 PROCEDURE — 4010F ACE/ARB THERAPY RXD/TAKEN: CPT | Mod: CPTII,S$GLB,, | Performed by: INTERNAL MEDICINE

## 2022-09-07 PROCEDURE — 3288F FALL RISK ASSESSMENT DOCD: CPT | Mod: CPTII,S$GLB,, | Performed by: INTERNAL MEDICINE

## 2022-09-07 PROCEDURE — 3051F HG A1C>EQUAL 7.0%<8.0%: CPT | Mod: CPTII,S$GLB,, | Performed by: INTERNAL MEDICINE

## 2022-09-07 PROCEDURE — 99397 PER PM REEVAL EST PAT 65+ YR: CPT | Mod: GZ,S$GLB,, | Performed by: INTERNAL MEDICINE

## 2022-09-07 PROCEDURE — 99999 PR PBB SHADOW E&M-EST. PATIENT-LVL III: ICD-10-PCS | Mod: PBBFAC,,, | Performed by: INTERNAL MEDICINE

## 2022-09-07 PROCEDURE — 3075F SYST BP GE 130 - 139MM HG: CPT | Mod: CPTII,S$GLB,, | Performed by: INTERNAL MEDICINE

## 2022-09-07 PROCEDURE — 3078F DIAST BP <80 MM HG: CPT | Mod: CPTII,S$GLB,, | Performed by: INTERNAL MEDICINE

## 2022-09-07 PROCEDURE — 1159F MED LIST DOCD IN RCRD: CPT | Mod: CPTII,S$GLB,, | Performed by: INTERNAL MEDICINE

## 2022-09-07 PROCEDURE — 1159F PR MEDICATION LIST DOCUMENTED IN MEDICAL RECORD: ICD-10-PCS | Mod: CPTII,S$GLB,, | Performed by: INTERNAL MEDICINE

## 2022-09-07 PROCEDURE — 3008F BODY MASS INDEX DOCD: CPT | Mod: CPTII,S$GLB,, | Performed by: INTERNAL MEDICINE

## 2022-09-07 PROCEDURE — 1126F AMNT PAIN NOTED NONE PRSNT: CPT | Mod: CPTII,S$GLB,, | Performed by: INTERNAL MEDICINE

## 2022-09-07 PROCEDURE — 3075F PR MOST RECENT SYSTOLIC BLOOD PRESS GE 130-139MM HG: ICD-10-PCS | Mod: CPTII,S$GLB,, | Performed by: INTERNAL MEDICINE

## 2022-09-07 PROCEDURE — 1126F PR PAIN SEVERITY QUANTIFIED, NO PAIN PRESENT: ICD-10-PCS | Mod: CPTII,S$GLB,, | Performed by: INTERNAL MEDICINE

## 2022-09-07 PROCEDURE — 99397 PR PREVENTIVE VISIT,EST,65 & OVER: ICD-10-PCS | Mod: GZ,S$GLB,, | Performed by: INTERNAL MEDICINE

## 2022-09-07 PROCEDURE — 3078F PR MOST RECENT DIASTOLIC BLOOD PRESSURE < 80 MM HG: ICD-10-PCS | Mod: CPTII,S$GLB,, | Performed by: INTERNAL MEDICINE

## 2022-09-07 RX ORDER — FENOFIBRATE 145 MG/1
145 TABLET, FILM COATED ORAL DAILY
Qty: 90 TABLET | Refills: 4 | Status: SHIPPED | OUTPATIENT
Start: 2022-09-07 | End: 2023-02-01

## 2022-09-07 RX ORDER — LEVOFLOXACIN 500 MG/1
500 TABLET, FILM COATED ORAL DAILY
Qty: 10 TABLET | Refills: 0 | Status: SHIPPED | OUTPATIENT
Start: 2022-09-07 | End: 2022-09-17

## 2022-09-07 RX ORDER — GABAPENTIN 100 MG/1
CAPSULE ORAL
Qty: 180 CAPSULE | Refills: 11 | Status: SHIPPED | OUTPATIENT
Start: 2022-09-07 | End: 2023-09-28

## 2022-09-07 NOTE — PROGRESS NOTES
Chief Complaint: annual exam Follow up of multiple problems     HISTORY OF PRESENT ILLNESS: This is a 72-year-old man who presents for above.    No cough. He is back smoking 1.5 packs of cigarettes daily.        He is taking Metformin  mg one tablet twice daily      Balance is better.  No falls      He continues to have back pain and lateral right leg pain. He sees Dr Carrasquillo in pain management.  Currently taking oxycodone apap 10/325 1/2 tablet three times daily. He has not taken gabapentin 400 mg because it knocks him out. He would like to try a lower dose of gabapentin. . Pain is tolerable. His pain is currently 3/10.   He has intermittent pins and needles sensation in his right thigh if he sits more than  20 minutes.  Pain is keeping him awake at  night. He is sleeping 5-6 hours at [facility].      He had a normal cystoscope  with Dr Dexter  9/13/21      Dr Randall  on 3/17/20 who felt he has -  Right meralgia paresthetica; right lateral thigh tendinitis at the hip; history lumbar disc disease rule out lumbar radiculopathy. EMG was 5/5/20 -  Sensory neuropathy consistent with a history of diabetes; these findings would be compatible with clinical picture of meralgia paresthetica.  He continues to have significant right leg pain that starts in the right groin and goes down the lateral right thigh and the anterior right thigh.  He has a burning sensation on the right thigh.  HE is followed by  Dr Carrasquillo in pain management for his pain.   No change in this pain     Left shoulder is ok. . He is doing his exercises on his own.       He had lithotripsy on 3/23/17. No dysuria, hematuria.   He saw Dr cerna on 1/19. He still has a stone in the left kidney. He is on finasteride and tamsulosin for his prostate.      He has not had any alcohol and has not had a recurrence of pancreatitis. No nausea, vomiting, constipation, diarrhea, melana, bloody stools.      He is taking amlodipine 10 mg 1 once daily, Toprol-XL 25 mg  once daily and losartan 25 mg daily.  No chest pain, shortness of breath. metformin  mg 1 tablets twice daily. HE checks his blood sugar twice daily due to fluctuating blood sugars.   We are holding simvastatin due to weight issues     No polydipsia. He has a history of hyperlipidemia,has not taken zocor.  No joint pain or muscle pain. NO shortness breath, headaches, vision, sinus congestion.              PAST MEDICAL HISTORY:    1. Hypertension.    2. Coronary artery disease with left heart catheterization in November 2011 with 40 to 50% narrowing in the mid LAD.    3. Diabetes mellitus.    4. Hyperlipidemia.    5. History of reflux.    6. Colon polyps.    7. History of cluster headaches.    8. History of glaucoma.    9. Lumbar disk disease at L5 to S1.    10. History of stomach ulcers.    11. Nephrolithiasis.       PAST SURGICAL HISTORY: Cholecystectomy in May 2013, ORIF of the left humerus.      SOCIAL HISTORY: Currently smokes a pack of cigarettes daily, has smoked his whole life. 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 He is a retired schoolteacher.       FAMILY HISTORY: Updated on [facility].      PHYSICAL EXAMINATION:      BP (!) 136/58 (BP Location: Right arm, Patient Position: Sitting)   Pulse 83   Ht 6' (1.829 m)   Wt 85.6 kg (188 lb 9.7 oz)   SpO2 99%   BMI 25.58 kg/m²     GENERAL: He is alert, oriented, no apparent distress. Affect within normal limits.    HEENT: Conjunctivae anicteric. Tympanic membranes clear.  . .    NECK: Supple. No cervical lymphadenopathy, no thyroid enlargement.    Respiratory: Effort normal. Lungs are clear to auscultation.    HEART: Regular rate and rhythm without murmurs, gallops or rubs. No lower extremity edema     Labs 8/3122 reviewed     CT chest 8/31/22 reviewed     ASSESSMENT AND PLAN:      Annual exam - discussed diet, exercise and safety issues.     1.  Abnormal CT chest - will treat with levaquin 500 mg once daily for 10 days  3. Possible  kidney cyst on CT scan abdomen without contrast on 8/10/21 -  4. Hypertension stable  5. Diabetes mellitus with neuropathy. stable  6. Hyperlipidemia. off Zocor.  on fenofibrate  7. History of reflux and peptic ulcer disease -asx. Off pantoprazole  8. Tubular adenomas - colonoscopy 2/2022 - due 2027  9. History of glaucoma - not on treatment - pressure in eyes ok - followed [facility] Avina and retinal specialist at [facility]  10. History of nephrolithiasis -asymptomatic.    11. Irregular heart beat sensation - holter revealed PVC  11. Pancreatitis - resolved. Do not suspect malabsorption as cause of weight loss as he is not having diarrhea. He is not drinking alochol. May need an evaluation for pancreatic insufficiency if work up is unremarkable  12. Anxiety and depression -valium 5 mg nightly prn anxiety.  Rarely takes  13. Tobacco abuse -working on cessation.   II will see him back in 2 weeks, sooner if issues.      Colonoscopy 12/7/16 - one hyperplastic polyp due in 2021 - ordered

## 2022-10-11 ENCOUNTER — TELEPHONE (OUTPATIENT)
Dept: INTERNAL MEDICINE | Facility: CLINIC | Age: 72
End: 2022-10-11
Payer: MEDICARE

## 2022-10-11 NOTE — TELEPHONE ENCOUNTER
----- Message from Dilcia Pereira sent at 10/11/2022  1:32 PM CDT -----  Contact: Pt [phone]  Pt called in regards to speaking with dr Benitez he would like to be advised on getting the flu shot and the new Booster vaccine please advise

## 2022-10-14 DIAGNOSIS — E11.9 TYPE 2 DIABETES MELLITUS WITHOUT COMPLICATION: ICD-10-CM

## 2022-10-14 NOTE — TRANSFER OF CARE
"Anesthesia Transfer of Care Note    Patient: Tony Han    Procedure(s) Performed: Procedure(s) (LRB):  COLONOSCOPY (N/A)    Patient location: PACU    Anesthesia Type: general    Transport from OR: Transported from OR on 6-10 L/min O2 by face mask with adequate spontaneous ventilation    Post pain: adequate analgesia    Post assessment: no apparent anesthetic complications and tolerated procedure well    Post vital signs: stable    Level of consciousness: awake    Nausea/Vomiting: no nausea/vomiting    Complications: none    Transfer of care protocol was followed      Last vitals:   Visit Vitals  BP (!) 180/77   Pulse 87   Temp 36.5 °C (97.7 °F)   Resp 15   Ht 5' 9" (1.753 m)   Wt 84.4 kg (186 lb)   SpO2 100%   BMI 27.47 kg/m²     " Forehead abrasion

## 2022-10-18 ENCOUNTER — PATIENT MESSAGE (OUTPATIENT)
Dept: ADMINISTRATIVE | Facility: HOSPITAL | Age: 72
End: 2022-10-18
Payer: MEDICARE

## 2022-10-21 ENCOUNTER — TELEPHONE (OUTPATIENT)
Dept: PULMONOLOGY | Facility: CLINIC | Age: 72
End: 2022-10-21
Payer: MEDICARE

## 2022-10-21 NOTE — TELEPHONE ENCOUNTER
LVM advising patient to give me to call back.  My name and office number was provided to receive a call back.

## 2022-10-21 NOTE — TELEPHONE ENCOUNTER
----- Message from Leandra Riley sent at 10/21/2022  1:59 PM CDT -----  PETER STRATTON calling regarding Patient Advice (message) for missed call from the nurse or MA, call back [phone]

## 2022-10-26 DIAGNOSIS — R91.8 PULMONARY NODULES: Primary | ICD-10-CM

## 2022-10-26 DIAGNOSIS — F17.210 CIGARETTE NICOTINE DEPENDENCE WITHOUT COMPLICATION: ICD-10-CM

## 2022-10-26 NOTE — PROGRESS NOTES
Subjective:       Patient ID: Tony Han is a 72 y.o. male.    Chief Complaint: Abnormal Ct Scan    HPI:   Tony Han is a 72 y.o. male new to me who presents for evaluation of TIB opacities on a CT Chest.    Cough- every morning productive of light green sputum.     Denies recurrent URI/LRTIs, dyspnea, coryza, rhinorrhea, GERD sx.    Denies chest pain, orthopnea, PND, LE edema, palpitations, syncope/presyncope    Denies f/c/ns, weight loss, malaise, fatigue    Denies rashes, myalgias, arthralgias, hair/nail changes, dysphagia, eye changes, raynauds, mucosal ulcerations    Exposures:  Work- Teacher  Tobacco- 1.5ppd for 62 years.  Inhalational Agents- denies  Mold- Denies  Pets- Denies  Birds- Denies    Childhood history of Lung Disease: Denies    Review of Systems   Constitutional:  Negative for fever, chills, weight loss, activity change, appetite change, night sweats and weakness.   HENT:  Negative for postnasal drip, sinus pressure, voice change and congestion.    Eyes:  Negative for redness.   Respiratory:  Positive for cough and sputum production. Negative for shortness of breath, wheezing, previous hospitialization due to pulmonary problems, asthma nighttime symptoms, dyspnea on extertion, use of rescue inhaler and somnolence.    Cardiovascular:  Negative for chest pain, palpitations and leg swelling.   Musculoskeletal:  Negative for joint swelling and myalgias.   Skin:  Negative for rash.   Gastrointestinal:  Negative for acid reflux.   Neurological:  Negative for syncope and weakness.   Psychiatric/Behavioral:  Negative for sleep disturbance.        Social History     Tobacco Use    Smoking status: Every Day     Packs/day: 1.50     Years: 50.00     Pack years: 75.00     Types: Cigarettes     Passive exposure: Past    Smokeless tobacco: Never   Substance Use Topics    Alcohol use: Yes       Review of patient's allergies indicates:   Allergen Reactions    Aspirin Nausea And Vomiting     If over 81mg     Past  Medical History:   Diagnosis Date    Angina pectoris     Back pain     Cluster headaches     1999    Colon polyps     5 polyps in Dec 2010    Coronary artery disease 11/2011    moderate nonobstructive cad on left heart cath at [facility] 11/2011    Diabetes mellitus     GERD (gastroesophageal reflux disease)     history of peptic ulcer disease    Hyperlipidemia     Hypertension     Lumbar disc disease     MRI 2008 - L5-S1    Nephrolithiasis     July 2013     Past Surgical History:   Procedure Laterality Date    ARTHROSCOPIC DEBRIDEMENT OF SHOULDER Left 01/09/2020    Procedure: EXTENSIVE DEBRIDEMENT, SHOULDER, ARTHROSCOPIC;  Surgeon: Marcela John MD;  Location: [facility] OR;  Service: Orthopedics;  Laterality: Left;    ARTHROSCOPIC REPAIR OF ROTATOR CUFF OF SHOULDER Left 01/09/2020    Procedure: REPAIR, ROTATOR CUFF, ARTHROSCOPIC, MEDIUM DOUBLE ROW;  Surgeon: Marcela John MD;  Location: [facility] OR;  Service: Orthopedics;  Laterality: Left;    ARTHROSCOPY OF SHOULDER WITH DECOMPRESSION OF SUBACROMIAL SPACE Left 01/09/2020    Procedure: ARTHROSCOPY, SHOULDER, WITH SUBACROMIAL DECOMPRESSION;  Surgeon: Marcela John MD;  Location: [facility] OR;  Service: Orthopedics;  Laterality: Left;    CHOLECYSTECTOMY      COLONOSCOPY N/A 12/07/2016    Procedure: COLONOSCOPY;  Surgeon: Lance Crocker MD;  [facility]  Service: Endoscopy;  Laterality: N/A;    COLONOSCOPY N/A 02/02/2022    Procedure: COLONOSCOPY;  Surgeon: Zena Maddox MD;  [facility]  Service: Endoscopy;  Laterality: N/A;  covid test 1/30/22 arian, prep instr emailed -ml  Miralax prep    EXCISION OF BURSA Left 01/09/2020    Procedure: BURSECTOMY;  Surgeon: Marcela John MD;  Location: [facility] OR;  Service: Orthopedics;  Laterality: Left;    LITHOTRIPSY      ORIF left humerus       Current Outpatient Medications on File Prior to Visit   Medication Sig    amLODIPine (NORVASC) 10 MG tablet TAKE 1 TABLET(10 MG) BY MOUTH EVERY DAY    aspirin 81 MG  Chew Take 1 tablet (81 mg total) by mouth once daily.    blood sugar diagnostic Strp True Metrics test strips - check blood sugar twice daily - fluctuating blood sugars    blood-glucose meter kit Use as instructed.  Gabriel MEtric    diazePAM (VALIUM) 5 MG tablet Take 1 tablet (5 mg total) by mouth nightly as needed for Anxiety.    fenofibrate (TRICOR) 145 MG tablet Take 1 tablet (145 mg total) by mouth once daily.    finasteride (PROSCAR) 5 mg tablet TAKE 1 TABLET(5 MG) BY MOUTH EVERY DAY    gabapentin (NEURONTIN) 100 MG capsule 1-2 capsules three times daily for nerve pain    lancets Misc True Matrix Lancets -c heck blood sugar twice daily - fluctuating blood sugars    losartan (COZAAR) 100 MG tablet Take 1 tablet (100 mg total) by mouth once daily.    metFORMIN (GLUCOPHAGE-XR) 500 MG ER 24hr tablet TAKE 2 TABLETS(1000 MG) BY MOUTH TWICE DAILY    metoprolol succinate (TOPROL-XL) 25 MG 24 hr tablet Take 1 tablet (25 mg total) by mouth once daily.    MULTIVITS-MINERALS/FA/LYCOPENE (ONE-A-DAY MEN'S ORAL) Take 1 tablet by mouth once daily.    oxyCODONE-acetaminophen (PERCOCET)  mg per tablet Take 1 tablet by mouth every 4-6 hours as needed for pain. Take stool softener with this medication.    PREVIDENT 5000 BOOSTER PLUS 1.1 % Pste SMARTSIG:To Teeth    tamsulosin (FLOMAX) 0.4 mg Cap TAKE 1 CAPSULE(0.4 MG) BY MOUTH EVERY DAY    vitamin D 1000 units Tab Take 2,000 Units by mouth once daily. Take 1 tablet daily     No current facility-administered medications on file prior to visit.       Objective:      Vitals:    10/28/22 0919   BP: 130/60   BP Location: Left arm   Patient Position: Sitting   Pulse: 90   SpO2: 98%   Weight: 85.1 kg (187 lb 9.8 oz)   Height: 6' (1.829 m)     Physical Exam   Constitutional: He is oriented to person, place, and time. He appears well-developed and well-nourished.   HENT:   Nose: No mucosal edema.   Mouth/Throat: Oropharynx is clear and moist. Mallampati Score: I.   Neck: No tracheal  deviation present.   Cardiovascular: Normal rate, regular rhythm and intact distal pulses.   Pulmonary/Chest: Normal expansion, symmetric chest wall expansion, effort normal and breath sounds normal. No respiratory distress. He has no wheezes. He has no rhonchi. He has no rales.   Abdominal: He exhibits no distension.   Musculoskeletal:         General: No edema.      Cervical back: Neck supple.   Lymphadenopathy: No supraclavicular adenopathy is present.     He has no cervical adenopathy.   Neurological: He is alert and oriented to person, place, and time.   Skin: Skin is warm and dry. No cyanosis or erythema. Nails show no clubbing.   Psychiatric: He has a normal mood and affect.   Nursing note and vitals reviewed.    Personal Diagnostic Review    Laboratory:  8/31/22  Bicarb- 19  Eosinophils- 0.2      CT Chest 8/31/22- Images personally reviewed and compared to priors. I agree w/ the radiologist who notes;  Subcentimeter nodules in the right lung, many in a tree-in-bud distribution are increased in number compared to prior.  Favor an infectious or inflammatory process.  Findings can be followed with chest CT in 12 weeks after appropriate treatment.     CXR 7/17/22- Images personally reviewed and compared to priors. I agree w/ the radiologist who notes;  No significant change from prior.  No lung consolidation.  No pleural effusion or pneumothorax with slight limitation by partial exclusion of the right costophrenic angle.  Heart size stable within normal limits.  Visualized osseous structures grossly intact.  Further evaluation as warranted clinically.    PFTs   10/28/22  FVC: 2.78 (83.4 % predicted), FEV1: 2.35 (93.1 % predicted), FEV1/FVC: 85    No flowsheet data found.        Assessment:     Problem List Items Addressed This Visit          Pulmonary    Pulmonary nodules     TIB opacities on CT slightly increased from prior that are c/w small airways disease. No signs of symptoms of an indolent infection such  as NTM. Opacities potentially just related to ongoing smoking. He is interested in quitting.     I don't feel strongly about empiric abx and repeating the CT. All the opacities are of similar morphology and are <4mm, so the likelihood of one being an early malignant lesion is low.     - Would continue yearly surveillance  - Strongly encouraged smoking cessation  - PFTs w/o obstruction  - UTD on vaccines  - Encouraged regular, progressive exercise            Cardiac/Vascular    Aortic atherosclerosis     Noted at the aortic arch. Mnmt per PCP            Other    Cigarette nicotine dependence without complication - Primary     Amenable to cessation. Referral placed         Relevant Orders    Ambulatory referral/consult to Smoking Cessation Program     Other Visit Diagnoses       Bronchiectasis without complication                45 minutes of total time spent on the encounter, which includes face to face time and non-face to face time preparing to see the patient (eg, review of tests), Obtaining and/or reviewing separately obtained history, Documenting clinical information in the electronic or other health record, Independently interpreting results (not separately reported) and communicating results to the patient/family/caregiver, or Care coordination (not separately reported).

## 2022-10-28 ENCOUNTER — OFFICE VISIT (OUTPATIENT)
Dept: PULMONOLOGY | Facility: CLINIC | Age: 72
End: 2022-10-28
Payer: MEDICARE

## 2022-10-28 ENCOUNTER — HOSPITAL ENCOUNTER (OUTPATIENT)
Dept: PULMONOLOGY | Facility: CLINIC | Age: 72
Discharge: HOME | End: 2022-10-28
Payer: MEDICARE

## 2022-10-28 VITALS
HEART RATE: 90 BPM | OXYGEN SATURATION: 98 % | BODY MASS INDEX: 25.41 KG/M2 | SYSTOLIC BLOOD PRESSURE: 130 MMHG | WEIGHT: 187.63 LBS | HEIGHT: 72 IN | DIASTOLIC BLOOD PRESSURE: 60 MMHG

## 2022-10-28 DIAGNOSIS — R91.8 PULMONARY NODULES: ICD-10-CM

## 2022-10-28 DIAGNOSIS — I70.0 AORTIC ATHEROSCLEROSIS: ICD-10-CM

## 2022-10-28 DIAGNOSIS — F17.210 CIGARETTE NICOTINE DEPENDENCE WITHOUT COMPLICATION: ICD-10-CM

## 2022-10-28 DIAGNOSIS — F17.210 CIGARETTE NICOTINE DEPENDENCE WITHOUT COMPLICATION: Primary | ICD-10-CM

## 2022-10-28 DIAGNOSIS — J47.9 BRONCHIECTASIS WITHOUT COMPLICATION: ICD-10-CM

## 2022-10-28 PROCEDURE — 94060 EVALUATION OF WHEEZING: CPT | Mod: S$GLB,,, | Performed by: INTERNAL MEDICINE

## 2022-10-28 PROCEDURE — 1159F MED LIST DOCD IN RCRD: CPT | Mod: CPTII,S$GLB,, | Performed by: INTERNAL MEDICINE

## 2022-10-28 PROCEDURE — 3288F PR FALLS RISK ASSESSMENT DOCUMENTED: ICD-10-PCS | Mod: CPTII,S$GLB,, | Performed by: INTERNAL MEDICINE

## 2022-10-28 PROCEDURE — 3078F PR MOST RECENT DIASTOLIC BLOOD PRESSURE < 80 MM HG: ICD-10-PCS | Mod: CPTII,S$GLB,, | Performed by: INTERNAL MEDICINE

## 2022-10-28 PROCEDURE — 3008F BODY MASS INDEX DOCD: CPT | Mod: CPTII,S$GLB,, | Performed by: INTERNAL MEDICINE

## 2022-10-28 PROCEDURE — 3051F PR MOST RECENT HEMOGLOBIN A1C LEVEL 7.0 - < 8.0%: ICD-10-PCS | Mod: CPTII,S$GLB,, | Performed by: INTERNAL MEDICINE

## 2022-10-28 PROCEDURE — 3075F PR MOST RECENT SYSTOLIC BLOOD PRESS GE 130-139MM HG: ICD-10-PCS | Mod: CPTII,S$GLB,, | Performed by: INTERNAL MEDICINE

## 2022-10-28 PROCEDURE — 99999 PR PBB SHADOW E&M-EST. PATIENT-LVL V: CPT | Mod: PBBFAC,,, | Performed by: INTERNAL MEDICINE

## 2022-10-28 PROCEDURE — 99999 PR PBB SHADOW E&M-EST. PATIENT-LVL V: ICD-10-PCS | Mod: PBBFAC,,, | Performed by: INTERNAL MEDICINE

## 2022-10-28 PROCEDURE — 1159F PR MEDICATION LIST DOCUMENTED IN MEDICAL RECORD: ICD-10-PCS | Mod: CPTII,S$GLB,, | Performed by: INTERNAL MEDICINE

## 2022-10-28 PROCEDURE — 3075F SYST BP GE 130 - 139MM HG: CPT | Mod: CPTII,S$GLB,, | Performed by: INTERNAL MEDICINE

## 2022-10-28 PROCEDURE — 99204 PR OFFICE/OUTPT VISIT, NEW, LEVL IV, 45-59 MIN: ICD-10-PCS | Mod: 25,S$GLB,, | Performed by: INTERNAL MEDICINE

## 2022-10-28 PROCEDURE — 1126F AMNT PAIN NOTED NONE PRSNT: CPT | Mod: CPTII,S$GLB,, | Performed by: INTERNAL MEDICINE

## 2022-10-28 PROCEDURE — 94060 PR EVAL OF BRONCHOSPASM: ICD-10-PCS | Mod: S$GLB,,, | Performed by: INTERNAL MEDICINE

## 2022-10-28 PROCEDURE — 3078F DIAST BP <80 MM HG: CPT | Mod: CPTII,S$GLB,, | Performed by: INTERNAL MEDICINE

## 2022-10-28 PROCEDURE — 3008F PR BODY MASS INDEX (BMI) DOCUMENTED: ICD-10-PCS | Mod: CPTII,S$GLB,, | Performed by: INTERNAL MEDICINE

## 2022-10-28 PROCEDURE — 3051F HG A1C>EQUAL 7.0%<8.0%: CPT | Mod: CPTII,S$GLB,, | Performed by: INTERNAL MEDICINE

## 2022-10-28 PROCEDURE — 4010F ACE/ARB THERAPY RXD/TAKEN: CPT | Mod: CPTII,S$GLB,, | Performed by: INTERNAL MEDICINE

## 2022-10-28 PROCEDURE — 1101F PR PT FALLS ASSESS DOC 0-1 FALLS W/OUT INJ PAST YR: ICD-10-PCS | Mod: CPTII,S$GLB,, | Performed by: INTERNAL MEDICINE

## 2022-10-28 PROCEDURE — 3288F FALL RISK ASSESSMENT DOCD: CPT | Mod: CPTII,S$GLB,, | Performed by: INTERNAL MEDICINE

## 2022-10-28 PROCEDURE — 1101F PT FALLS ASSESS-DOCD LE1/YR: CPT | Mod: CPTII,S$GLB,, | Performed by: INTERNAL MEDICINE

## 2022-10-28 PROCEDURE — 99204 OFFICE O/P NEW MOD 45 MIN: CPT | Mod: 25,S$GLB,, | Performed by: INTERNAL MEDICINE

## 2022-10-28 PROCEDURE — 99499 UNLISTED E&M SERVICE: CPT | Mod: S$GLB,,, | Performed by: INTERNAL MEDICINE

## 2022-10-28 PROCEDURE — 1126F PR PAIN SEVERITY QUANTIFIED, NO PAIN PRESENT: ICD-10-PCS | Mod: CPTII,S$GLB,, | Performed by: INTERNAL MEDICINE

## 2022-10-28 PROCEDURE — 4010F PR ACE/ARB THEARPY RXD/TAKEN: ICD-10-PCS | Mod: CPTII,S$GLB,, | Performed by: INTERNAL MEDICINE

## 2022-10-28 RX ORDER — FLUORIDE (SODIUM) 1.1 %
PASTE (ML) DENTAL
COMMUNITY
Start: 2022-10-18 | End: 2023-11-08

## 2022-10-30 LAB
FEF 25 75 LLN: 0.63
FEF 25 75 PRE REF: 151.9 %
FEF 25 75 REF: 1.91
FET100 CHG: 6 %
FEV05 LLN: 1.35
FEV05 REF: 2.48
FEV1 CHG: 2.3 %
FEV1 FVC LLN: 63
FEV1 FVC PRE REF: 111.1 %
FEV1 FVC REF: 76
FEV1 LLN: 1.7
FEV1 PRE REF: 93.1 %
FEV1 REF: 2.53
FEV1 VOL CHG: 0.05
FVC CHG: -0.5 %
FVC LLN: 2.37
FVC PRE REF: 83.4 %
FVC REF: 3.33
FVC VOL CHG: -0.01
PEF LLN: 5.02
PEF PRE REF: 135.3 %
PEF REF: 7.6
PHYSICIAN COMMENT: ABNORMAL
POST FEF 25 75: 3.49 L/S (ref 0.63–3.87)
POST FET 100: 6.34 SEC
POST FEV1 FVC: 86.96 % (ref 62.54–88.32)
POST FEV1: 2.41 L (ref 1.7–3.29)
POST FEV5: 2.11 L (ref 1.35–3.62)
POST FVC: 2.77 L (ref 2.37–4.31)
POST PEF: 9.61 L/S (ref 5.02–10.18)
PRE FEF 25 75: 2.9 L/S (ref 0.63–3.87)
PRE FET 100: 5.98 SEC
PRE FEV05 REF: 82.2 %
PRE FEV1 FVC: 84.61 % (ref 62.54–88.32)
PRE FEV1: 2.35 L (ref 1.7–3.29)
PRE FEV5: 2.04 L (ref 1.35–3.62)
PRE FVC: 2.78 L (ref 2.37–4.31)
PRE PEF: 10.28 L/S (ref 5.02–10.18)

## 2022-11-03 PROBLEM — F17.210 CIGARETTE NICOTINE DEPENDENCE WITHOUT COMPLICATION: Status: ACTIVE | Noted: 2022-11-03

## 2022-11-03 NOTE — ASSESSMENT & PLAN NOTE
TIB opacities on CT slightly increased from prior that are c/w small airways disease. No signs of symptoms of an indolent infection such as NTM. Opacities potentially just related to ongoing smoking. He is interested in quitting.     I don't feel strongly about empiric abx and repeating the CT. All the opacities are of similar morphology and are <4mm, so the likelihood of one being an early malignant lesion is low.     - Would continue yearly surveillance  - Strongly encouraged smoking cessation  - PFTs w/o obstruction  - UTD on vaccines  - Encouraged regular, progressive exercise

## 2022-11-14 ENCOUNTER — OFFICE VISIT (OUTPATIENT)
Dept: UROLOGY | Facility: CLINIC | Age: 72
End: 2022-11-14
Attending: UROLOGY
Payer: MEDICARE

## 2022-11-14 VITALS
WEIGHT: 187 LBS | OXYGEN SATURATION: 98 % | RESPIRATION RATE: 16 BRPM | HEIGHT: 72 IN | SYSTOLIC BLOOD PRESSURE: 147 MMHG | DIASTOLIC BLOOD PRESSURE: 67 MMHG | BODY MASS INDEX: 25.33 KG/M2 | HEART RATE: 73 BPM

## 2022-11-14 DIAGNOSIS — N13.8 BPH WITH OBSTRUCTION/LOWER URINARY TRACT SYMPTOMS: ICD-10-CM

## 2022-11-14 DIAGNOSIS — N40.1 BPH WITH OBSTRUCTION/LOWER URINARY TRACT SYMPTOMS: ICD-10-CM

## 2022-11-14 DIAGNOSIS — N41.0 ACUTE PROSTATITIS: ICD-10-CM

## 2022-11-14 DIAGNOSIS — N30.00 ACUTE CYSTITIS WITHOUT HEMATURIA: ICD-10-CM

## 2022-11-14 DIAGNOSIS — N40.1 BENIGN PROSTATIC HYPERPLASIA WITH LOWER URINARY TRACT SYMPTOMS, SYMPTOM DETAILS UNSPECIFIED: Primary | ICD-10-CM

## 2022-11-14 PROCEDURE — 3008F BODY MASS INDEX DOCD: CPT | Mod: CPTII,S$GLB,, | Performed by: UROLOGY

## 2022-11-14 PROCEDURE — 3077F SYST BP >= 140 MM HG: CPT | Mod: CPTII,S$GLB,, | Performed by: UROLOGY

## 2022-11-14 PROCEDURE — 3078F PR MOST RECENT DIASTOLIC BLOOD PRESSURE < 80 MM HG: ICD-10-PCS | Mod: CPTII,S$GLB,, | Performed by: UROLOGY

## 2022-11-14 PROCEDURE — 1126F PR PAIN SEVERITY QUANTIFIED, NO PAIN PRESENT: ICD-10-PCS | Mod: CPTII,S$GLB,, | Performed by: UROLOGY

## 2022-11-14 PROCEDURE — 1160F RVW MEDS BY RX/DR IN RCRD: CPT | Mod: CPTII,S$GLB,, | Performed by: UROLOGY

## 2022-11-14 PROCEDURE — 3077F PR MOST RECENT SYSTOLIC BLOOD PRESSURE >= 140 MM HG: ICD-10-PCS | Mod: CPTII,S$GLB,, | Performed by: UROLOGY

## 2022-11-14 PROCEDURE — 99214 OFFICE O/P EST MOD 30 MIN: CPT | Mod: S$GLB,,, | Performed by: UROLOGY

## 2022-11-14 PROCEDURE — 3008F PR BODY MASS INDEX (BMI) DOCUMENTED: ICD-10-PCS | Mod: CPTII,S$GLB,, | Performed by: UROLOGY

## 2022-11-14 PROCEDURE — 1159F MED LIST DOCD IN RCRD: CPT | Mod: CPTII,S$GLB,, | Performed by: UROLOGY

## 2022-11-14 PROCEDURE — 1101F PR PT FALLS ASSESS DOC 0-1 FALLS W/OUT INJ PAST YR: ICD-10-PCS | Mod: CPTII,S$GLB,, | Performed by: UROLOGY

## 2022-11-14 PROCEDURE — 1101F PT FALLS ASSESS-DOCD LE1/YR: CPT | Mod: CPTII,S$GLB,, | Performed by: UROLOGY

## 2022-11-14 PROCEDURE — 99214 PR OFFICE/OUTPT VISIT, EST, LEVL IV, 30-39 MIN: ICD-10-PCS | Mod: S$GLB,,, | Performed by: UROLOGY

## 2022-11-14 PROCEDURE — 3078F DIAST BP <80 MM HG: CPT | Mod: CPTII,S$GLB,, | Performed by: UROLOGY

## 2022-11-14 PROCEDURE — 3288F PR FALLS RISK ASSESSMENT DOCUMENTED: ICD-10-PCS | Mod: CPTII,S$GLB,, | Performed by: UROLOGY

## 2022-11-14 PROCEDURE — 3051F HG A1C>EQUAL 7.0%<8.0%: CPT | Mod: CPTII,S$GLB,, | Performed by: UROLOGY

## 2022-11-14 PROCEDURE — 1126F AMNT PAIN NOTED NONE PRSNT: CPT | Mod: CPTII,S$GLB,, | Performed by: UROLOGY

## 2022-11-14 PROCEDURE — 1160F PR REVIEW ALL MEDS BY PRESCRIBER/CLIN PHARMACIST DOCUMENTED: ICD-10-PCS | Mod: CPTII,S$GLB,, | Performed by: UROLOGY

## 2022-11-14 PROCEDURE — 4010F PR ACE/ARB THEARPY RXD/TAKEN: ICD-10-PCS | Mod: CPTII,S$GLB,, | Performed by: UROLOGY

## 2022-11-14 PROCEDURE — 1159F PR MEDICATION LIST DOCUMENTED IN MEDICAL RECORD: ICD-10-PCS | Mod: CPTII,S$GLB,, | Performed by: UROLOGY

## 2022-11-14 PROCEDURE — 3051F PR MOST RECENT HEMOGLOBIN A1C LEVEL 7.0 - < 8.0%: ICD-10-PCS | Mod: CPTII,S$GLB,, | Performed by: UROLOGY

## 2022-11-14 PROCEDURE — 4010F ACE/ARB THERAPY RXD/TAKEN: CPT | Mod: CPTII,S$GLB,, | Performed by: UROLOGY

## 2022-11-14 PROCEDURE — 3288F FALL RISK ASSESSMENT DOCD: CPT | Mod: CPTII,S$GLB,, | Performed by: UROLOGY

## 2022-11-14 RX ORDER — FINASTERIDE 5 MG/1
5 TABLET, FILM COATED ORAL DAILY
Qty: 90 TABLET | Refills: 3 | Status: SHIPPED | OUTPATIENT
Start: 2022-11-14 | End: 2023-06-02

## 2022-11-14 RX ORDER — TAMSULOSIN HYDROCHLORIDE 0.4 MG/1
0.4 CAPSULE ORAL DAILY
Qty: 90 CAPSULE | Refills: 3 | Status: SHIPPED | OUTPATIENT
Start: 2022-11-14 | End: 2023-08-28

## 2022-11-14 NOTE — PROGRESS NOTES
Subjective:      Tony Han is a 72 y.o. male who returns today regarding his gross hematuria.    Patient with a long history of BPH on flomax and finasteride. Main bother symptoms include nocturia, incomplete emptying ,and urgency. Drinks caffiene throughout the day and night via diet coke. Not on diuretics, but takes an SGLT2 inhibitor for DM. He is not interested in surgical therapy for BPH.    He presents today because he developed gross hematuria on 8/10 after having intercourse with his wife. Denies penile pain, swelling, or ecchymosis. He presented to ED, where UA showed elevated RBC and WBC. Started on cipro, but culture negative. CT AP w/o showed nonobstructive nephrolithiasis, no hydronephrosis, and thickening of the anterior bladder wall.    Patient is a current smoker (1.3 ppd x 60 years.) His aunt had bladder cancer. 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    Negative cystoscopy 09/21 and CT Urogram 08/21.    Review of Systems  A comprehensive multipoint review of systems was negative except as otherwise stated in the HPI.     Objective:   Vitals: BP (!) 147/67 (BP Location: Right arm, Patient Position: Sitting, BP Method: Large (Automatic))   Pulse 73   Resp 16   Ht 6' (1.829 m)   Wt 84.8 kg (187 lb)   SpO2 98%   BMI 25.36 kg/m²     Physical Exam   General: alert and oriented, no acute distress  Respiratory: Symmetric expansion, non-labored breathing  Cardiovascular: regular rate and rhythm, no peripheral edema  Abdomen: soft, non distended  Skin: normal coloration and turgor, no rashes, no suspicious skin lesions noted  Neuro: no gross deficits  Psych: normal judgment and insight, normal mood/affect and non-anxious    Lab Review   Urine dipstick today  demonstrates trace proteinnegative for all components  Lab Results   Component Value Date    WBC 6.17 08/31/2022    HGB 12.7 (L) 08/31/2022    HCT 37.9 (L) 08/31/2022     (H) 08/31/2022     08/31/2022     Lab Results   Component Value Date    CREATININE 1.2 08/31/2022    BUN 12 08/31/2022     Lab Results   Component Value Date    PSA 0.59 03/24/2021    PSADIAG 0.60 07/14/2017       Imaging   See HPI    Assessment and Plan:   1. Gross hematuria  - Previous negative workup in August 2021  - Patient not interested in UroLift at this time. Will monitor his LUTS  - Continue Flomax and Finasteride

## 2022-12-11 NOTE — PROGRESS NOTES
CHIEF COMPLAINT: Right jaw pain      HISTORY OF PRESENT ILLNESS: This is a 66-year-old man who presents due to right jaw pain. Since mid February, he woke up and had right florence pain.  It hurt to open his mouth and chew a hard substance. Pain is 9/10 when he open his mouth wide. No pain with biting down.  Pain does not keep him awake.  No tooth pain.  In January 2016 he had a new plate made. He had trouble adjusting to his new denture.  He does not sleep with his dentures. No ear pain, fever, chills, sinus congestion, sore throat, headache.     HE is now taking Keflex 250 mg daily to prevent recurrent UTI.  He had lithotripsy on 3/23/17. No dysuria, hematuria.        He has not had any alcohol and has not had a recurrence of pancreatitis. No nausea, vomiting, constipation, diarrhea, melana, bloody stools.      He is taking amlodipine 10 mg once daily, Toprol-XL 25 mg once daily and losartan 25 mg daily. No chest pain, shortness of breath. He is now taking metformin  mg 2 tablets twice daily. No polydipsia. He has a history of hyperlipidemia, currently on Zocor 20 mg daily. No joint pain or muscle pain.       He has some low back pain and takes a hydrocodone apap 7.5/325 one tablet in the evening for his back. He used to see Dr Mcdaniel for his back. HE retired. He had a MRI 1/18/17 through DR Pedroza who he did not like. HE will be getting a new pain management doctor.      Mood is better since he is feeling a little better.         PAST MEDICAL HISTORY:    1. Hypertension.    2. Coronary artery disease with left heart catheterization in November 2011 with 40 to 50% narrowing in the mid LAD.    3. Diabetes mellitus.    4. Hyperlipidemia.    5. History of reflux.    6. Colon polyps.    7. History of cluster headaches.    8. History of glaucoma.    9. Lumbar disk disease at L5 to S1.    10. History of stomach ulcers.    11. Nephrolithiasis.        PAST SURGICAL HISTORY: Cholecystectomy in May 2013, ORIF of the left  "humerus.       SOCIAL HISTORY: Currently smokes a pack of cigarettes daily, has smoked his whole life. 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 He is a retired schoolteacher.    FAMILY HISTORY: Updated on [employer].       PHYSICAL EXAMINATION:    /64 (BP Location: Right arm, Patient Position: Sitting, BP Method: Manual)  Pulse 72  Ht 5' 9" (1.753 m)  Wt 88.3 kg (194 lb 9.6 oz)  SpO2 98%  BMI 28.74 kg/m2    GENERAL: He is alert, oriented, no apparent distress. Affect within normal limits.    HEENT: Conjunctivae anicteric. Tympanic membranes clear.  . .    NECK: Supple. No cervical lymphadenopathy, no thyroid enlargement.    Respiratory: Effort normal. Lungs are clear to auscultation.    HEART: Regular rate and rhythm without murmurs, gallops or rubs. No lower extremity edema.    ABDOMEN: soft, non distended, non tender, bowel sounds present, no hepatosplenomgaly      Popping of the right TMJ joint on opening of the mouth.          ASSESSMENT AND PLAN:    1. TMJ pain - heat to the area. No NSAIDS due to blood pressure. Oral surgery referral  2. Recurrent UTI -on cephalexin. S/p ESWL  3. Hypertension - Continue current medications  3. Diabetes mellitus. Doing well.    4. Hyperlipidemia. on Zocor.    5. History of reflux and peptic ulcer disease -asx. Off pantoprazole  6. Tubular adenomas - colonoscopy 12/2016 - one polyp - due 2021  7. History of glaucoma - not on treatment - pressure in eyes ok - followed [facility]ina  8. History of nephrolithiasis -asymptomatic.    9. Irregular heart beat sensation - holter revealed PVC  10. Pancreatitis - resolved  11. 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 Anxiety and depression -stable  I will see him back as scheduled, sooner if problems arise.         Colonoscopy 12/7/16 - one hyperplastic polyp due in 2021  Influenza 9/7/16  Pneumovax 7/2014  Zostavax 5/16  Prevnar today 12/14/16  PSA 1/16  " English

## 2022-12-17 ENCOUNTER — HOSPITAL ENCOUNTER (EMERGENCY)
Facility: OTHER | Age: 72
Discharge: HOME | End: 2022-12-17
Attending: EMERGENCY MEDICINE
Payer: MEDICARE

## 2022-12-17 VITALS
OXYGEN SATURATION: 99 % | RESPIRATION RATE: 17 BRPM | SYSTOLIC BLOOD PRESSURE: 136 MMHG | TEMPERATURE: 98 F | BODY MASS INDEX: 27.4 KG/M2 | HEART RATE: 75 BPM | WEIGHT: 185 LBS | HEIGHT: 69 IN | DIASTOLIC BLOOD PRESSURE: 67 MMHG

## 2022-12-17 DIAGNOSIS — R10.10 UPPER ABDOMINAL PAIN: Primary | ICD-10-CM

## 2022-12-17 LAB
ALBUMIN SERPL BCP-MCNC: 4.3 G/DL (ref 3.5–5.2)
ALP SERPL-CCNC: 61 U/L (ref 55–135)
ALT SERPL W/O P-5'-P-CCNC: 20 U/L (ref 10–44)
ANION GAP SERPL CALC-SCNC: 12 MMOL/L (ref 8–16)
AST SERPL-CCNC: 22 U/L (ref 10–40)
BASOPHILS # BLD AUTO: 0.02 K/UL (ref 0–0.2)
BASOPHILS NFR BLD: 0.3 % (ref 0–1.9)
BILIRUB SERPL-MCNC: 0.3 MG/DL (ref 0.1–1)
BILIRUB UR QL STRIP: NEGATIVE
BUN SERPL-MCNC: 13 MG/DL (ref 8–23)
CALCIUM SERPL-MCNC: 9.8 MG/DL (ref 8.7–10.5)
CHLORIDE SERPL-SCNC: 105 MMOL/L (ref 95–110)
CLARITY UR: CLEAR
CO2 SERPL-SCNC: 21 MMOL/L (ref 23–29)
COLOR UR: YELLOW
CREAT SERPL-MCNC: 1.3 MG/DL (ref 0.5–1.4)
DIFFERENTIAL METHOD: ABNORMAL
EOSINOPHIL # BLD AUTO: 0.1 K/UL (ref 0–0.5)
EOSINOPHIL NFR BLD: 1.5 % (ref 0–8)
ERYTHROCYTE [DISTWIDTH] IN BLOOD BY AUTOMATED COUNT: 12.8 % (ref 11.5–14.5)
EST. GFR  (NO RACE VARIABLE): 58 ML/MIN/1.73 M^2
GLUCOSE SERPL-MCNC: 187 MG/DL (ref 70–110)
GLUCOSE UR QL STRIP: NEGATIVE
HCT VFR BLD AUTO: 35.8 % (ref 40–54)
HGB BLD-MCNC: 12.4 G/DL (ref 14–18)
HGB UR QL STRIP: NEGATIVE
IMM GRANULOCYTES # BLD AUTO: 0.01 K/UL (ref 0–0.04)
IMM GRANULOCYTES NFR BLD AUTO: 0.1 % (ref 0–0.5)
KETONES UR QL STRIP: NEGATIVE
LEUKOCYTE ESTERASE UR QL STRIP: NEGATIVE
LIPASE SERPL-CCNC: 40 U/L (ref 4–60)
LYMPHOCYTES # BLD AUTO: 2.7 K/UL (ref 1–4.8)
LYMPHOCYTES NFR BLD: 40.1 % (ref 18–48)
MCH RBC QN AUTO: 34 PG (ref 27–31)
MCHC RBC AUTO-ENTMCNC: 34.6 G/DL (ref 32–36)
MCV RBC AUTO: 98 FL (ref 82–98)
MONOCYTES # BLD AUTO: 0.3 K/UL (ref 0.3–1)
MONOCYTES NFR BLD: 5 % (ref 4–15)
NEUTROPHILS # BLD AUTO: 3.6 K/UL (ref 1.8–7.7)
NEUTROPHILS NFR BLD: 53 % (ref 38–73)
NITRITE UR QL STRIP: NEGATIVE
NRBC BLD-RTO: 0 /100 WBC
PH UR STRIP: 6 [PH] (ref 5–8)
PLATELET # BLD AUTO: 367 K/UL (ref 150–450)
PMV BLD AUTO: 8.6 FL (ref 9.2–12.9)
POTASSIUM SERPL-SCNC: 4.4 MMOL/L (ref 3.5–5.1)
PROT SERPL-MCNC: 7.8 G/DL (ref 6–8.4)
PROT UR QL STRIP: ABNORMAL
RBC # BLD AUTO: 3.65 M/UL (ref 4.6–6.2)
SODIUM SERPL-SCNC: 138 MMOL/L (ref 136–145)
SP GR UR STRIP: 1.01 (ref 1–1.03)
URN SPEC COLLECT METH UR: ABNORMAL
UROBILINOGEN UR STRIP-ACNC: NEGATIVE EU/DL
WBC # BLD AUTO: 6.84 K/UL (ref 3.9–12.7)

## 2022-12-17 PROCEDURE — 25500020 PHARM REV CODE 255: Performed by: EMERGENCY MEDICINE

## 2022-12-17 PROCEDURE — 80053 COMPREHEN METABOLIC PANEL: CPT | Performed by: EMERGENCY MEDICINE

## 2022-12-17 PROCEDURE — 99285 EMERGENCY DEPT VISIT HI MDM: CPT | Mod: 25

## 2022-12-17 PROCEDURE — 81003 URINALYSIS AUTO W/O SCOPE: CPT | Performed by: EMERGENCY MEDICINE

## 2022-12-17 PROCEDURE — 85025 COMPLETE CBC W/AUTO DIFF WBC: CPT | Performed by: EMERGENCY MEDICINE

## 2022-12-17 PROCEDURE — 83690 ASSAY OF LIPASE: CPT | Performed by: EMERGENCY MEDICINE

## 2022-12-17 RX ADMIN — IOHEXOL 100 ML: 350 INJECTION, SOLUTION INTRAVENOUS at 05:12

## 2022-12-17 NOTE — ED PROVIDER NOTES
Encounter Date: 12/17/2022       History     Chief Complaint   Patient presents with    Abdominal Pain     C/o abd pain 4/10 x 4 days. -n/v/d. PMH pancreatitis. VSS     Seen by physician at 3:55PM:    Patient is a 72 year old male who presents to the emergency department with abdominal pain.  Patient states he has been having abdominal pain for 4 days, worse with eating or drinking anything.  Abdominal pain is in the upper region.  Denies any radiation to the back or chest.  Denies any nausea/vomiting/diarrhea.  Denies any fevers or chills.  Denies any urinary symptoms.  Does have a history of pancreatitis.  Denies any EtOH use.  He had a history of cholecystectomy many years ago but denies any abdominal surgery.    Review of patient's allergies indicates:   Allergen Reactions    Aspirin Nausea And Vomiting     If over 81mg     Past Medical History:   Diagnosis Date    Angina pectoris     Back pain     Cluster headaches     1999    Colon polyps     5 polyps in Dec 2010    Coronary artery disease 11/2011    moderate nonobstructive cad on left heart cath at [facility] 11/2011    Diabetes mellitus     GERD (gastroesophageal reflux disease)     history of peptic ulcer disease    Hyperlipidemia     Hypertension     Lumbar disc disease     MRI 2008 - L5-S1    Nephrolithiasis     July 2013     Past Surgical History:   Procedure Laterality Date    ARTHROSCOPIC DEBRIDEMENT OF SHOULDER Left 01/09/2020    Procedure: EXTENSIVE DEBRIDEMENT, SHOULDER, ARTHROSCOPIC;  Surgeon: Marcela John MD;  Location: [facility] OR;  Service: Orthopedics;  Laterality: Left;    ARTHROSCOPIC REPAIR OF ROTATOR CUFF OF SHOULDER Left 01/09/2020    Procedure: REPAIR, ROTATOR CUFF, ARTHROSCOPIC, MEDIUM DOUBLE ROW;  Surgeon: Marcela John MD;  Location: [facility] OR;  Service: Orthopedics;  Laterality: Left;    ARTHROSCOPY OF SHOULDER WITH DECOMPRESSION OF SUBACROMIAL SPACE Left 01/09/2020    Procedure: ARTHROSCOPY, SHOULDER, WITH SUBACROMIAL DECOMPRESSION;  Surgeon:  Marcela John MD;  Location: [facility] OR;  Service: Orthopedics;  Laterality: Left;    CHOLECYSTECTOMY      COLONOSCOPY N/A 12/07/2016    Procedure: COLONOSCOPY;  Surgeon: Lance Crocker MD;  Location: [facility] ([facility] FLR);  Service: Endoscopy;  Laterality: N/A;    COLONOSCOPY N/A 02/02/2022    Procedure: COLONOSCOPY;  Surgeon: Zena Maddox MD;  Location: [facility] ENDO (4TH FLR);  Service: Endoscopy;  Laterality: N/A;  covid test 1/30/22 [place], prep instr emailed -ml  Miralax prep    EXCISION OF BURSA Left 01/09/2020    Procedure: BURSECTOMY;  Surgeon: Marcela John MD;  Location: [facility] OR;  Service: Orthopedics;  Laterality: Left;    LITHOTRIPSY      ORIF left humerus       Family History   Problem Relation Age of Onset    Heart disease Mother     No Known Problems Father     Cancer Sister         ovarian    Heart disease Brother     Heart failure Brother     Kidney disease Brother     Alzheimer's disease Brother     No Known Problems Brother     Diabetes Son     Blindness Son     Diabetes Son     Hypertension Son     Diabetes Son     Hypertension Son     Diabetes Son     Hypertension Son     Sleep apnea Son     Celiac disease Neg Hx     Cirrhosis Neg Hx     Colon cancer Neg Hx     Colon polyps Neg Hx     Crohn's disease Neg Hx     Esophageal cancer Neg Hx     Inflammatory bowel disease Neg Hx     Liver cancer Neg Hx     Rectal cancer Neg Hx     Stomach cancer Neg Hx     Ulcerative colitis Neg Hx     Melanoma Neg Hx      Social History     Tobacco Use    Smoking status: Every Day     Packs/day: 1.50     Years: 50.00     Pack years: 75.00     Types: Cigarettes     Passive exposure: Past    Smokeless tobacco: Never   Substance Use Topics    Alcohol use: Yes    Drug use: Yes     Types: Hydrocodone     Review of Systems   Constitutional:  Negative for chills and fever.   HENT:  Negative for congestion and rhinorrhea.    Respiratory:  Negative for chest tightness and shortness of breath.    Cardiovascular:  Negative for  chest pain and palpitations.   Gastrointestinal:  Positive for abdominal pain. Negative for diarrhea, nausea and vomiting.   Genitourinary:  Negative for dysuria and flank pain.   Musculoskeletal:  Negative for back pain and neck pain.   Skin:  Negative for color change and wound.   Neurological:  Negative for dizziness and headaches.     Physical Exam     Initial Vitals [12/17/22 1441]   BP Pulse Resp Temp SpO2   138/69 92 17 98.1 °F (36.7 °C) 98 %      MAP       --         Physical Exam    Nursing note and vitals reviewed.  Constitutional: He appears well-developed and well-nourished.   HENT:   Head: Normocephalic and atraumatic.   Eyes: Conjunctivae are normal.   Neck: Neck supple.   Normal range of motion.  Cardiovascular:  Normal rate, regular rhythm and normal heart sounds.           Pulmonary/Chest: Breath sounds normal. No respiratory distress. He has no wheezes. He has no rales.   Abdominal: Abdomen is soft. Bowel sounds are normal. He exhibits no distension. There is no abdominal tenderness. There is no rebound.   Musculoskeletal:         General: No tenderness or edema. Normal range of motion.      Cervical back: Normal range of motion and neck supple.     Neurological: He is alert and oriented to person, place, and time.   Ambulatory with steady gait.   Skin: Skin is warm and dry. Capillary refill takes less than 2 seconds.       ED Course   Procedures  Labs Reviewed   CBC W/ AUTO DIFFERENTIAL - Abnormal; Notable for the following components:       Result Value    RBC 3.65 (*)     Hemoglobin 12.4 (*)     Hematocrit 35.8 (*)     MCH 34.0 (*)     MPV 8.6 (*)     All other components within normal limits   COMPREHENSIVE METABOLIC PANEL - Abnormal; Notable for the following components:    CO2 21 (*)     Glucose 187 (*)     eGFR 58 (*)     All other components within normal limits   URINALYSIS - Abnormal; Notable for the following components:    Protein, UA Trace (*)     All other components within normal  limits   LIPASE          Imaging Results              CT Abdomen Pelvis With Contrast (Final result)  Result time 12/17/22 17:55:30      Final result by Glory Lynn MD (12/17/22 17:55:30)                   Impression:      1. No acute intra-abdominal abnormalities identified.  2. Colonic diverticulosis with no evidence of acute diverticulitis.  3. Nonobstructing left renal stone.  4. Cholecystectomy.  5. Additional findings as detailed above.      Electronically signed by: Glory Lynn MD  Date:    12/17/2022  Time:    17:55               Narrative:    EXAMINATION:  CT ABDOMEN PELVIS WITH CONTRAST    CLINICAL HISTORY:  Abdominal pain, acute, nonlocalized;    TECHNIQUE:  Low dose axial images, sagittal and coronal reformations were obtained from the lung bases to the pubic symphysis following the IV administration of 100 mL of Omnipaque 350 .  Oral contrast was not given.    COMPARISON:  CT abdomen and pelvis from August 2021.    FINDINGS:  The visualized portion of the heart is unremarkable.  The lung bases are clear.    No significant hepatic abnormalities are identified.  There is no intra-or extrahepatic biliary ductal dilatation.  Gallbladder has been removed.  The stomach, pancreas, spleen, and adrenal glands are unremarkable.    Kidneys enhance normally with no evidence of hydronephrosis.  There is unchanged mild symmetric bilateral perinephric stranding.  Multiple bilateral renal cysts are seen of varying sizes.  Largest cyst measures 6 cm on the left.  Two presumed right-sided cysts measure slightly higher than simple fluid density.  Nonobstructing left renal stone is seen.  No abnormalities are seen along the ureteral courses.  Prostate is enlarged and abuts into the urinary bladder base.    Appendix is visualized and is unremarkable.  The visualized loops of small and large bowel show no evidence of obstruction or inflammation.  Colonic diverticulosis is seen.  Moderate stool is visualized within  the colon.  No free air or free fluid.    Aorta tapers normally with mild atherosclerosis.    No acute osseous abnormality identified. Small fat containing umbilical hernia is seen.                                       Medications   iohexoL (OMNIPAQUE 350) injection 100 mL (100 mLs Intravenous Given 12/17/22 1736)     Medical Decision Making:   History:   Old Medical Records: I decided to obtain old medical records.  Old Records Summarized: other records and records from clinic visits.  Initial Assessment:   3:55PM:  Patient is a 72-year-old male who presents to the emergency room with abdominal pain.  Patient appears well, nontoxic.  On exam his abdomen is very soft, nontender.  However given his age and comorbidities, we will plan for labs, imaging, will continue to follow and reassess.  Clinical Tests:   Lab Tests: Ordered and Reviewed  Radiological Study: Ordered and Reviewed     6:54PM:  Patient doing well, remains stable.  He is working on his computer and has no complaints at this time.  His CT does not show any acute findings.  His labs are otherwise unremarkable.  I do not feel that further work up in the ED is indicated at this time.  I updated pt regarding results and I counseled pt regarding supportive care measures.  I have discussed with the pt ED return warnings and need for close PCP f/u.  Pt agreeable to plan and all questions answered.  I feel that pt is stable for discharge and management as an outpatient and no further intervention is needed at this time.  Pt is comfortable returning to the ED if needed.  Will DC home in stable condition.                       Clinical Impression:   Final diagnoses:  [R10.10] Upper abdominal pain (Primary)        ED Disposition Condition    Discharge Stable          ED Prescriptions    None       Follow-up Information       Follow up With Specialties Details Why Contact Info    Elizabeth Benitez MD Internal Medicine   [address] [facility] [address]  [address]  [phone]               Cleo Smith MD  12/17/22 1160

## 2022-12-18 NOTE — ED NOTES
LOC: The patient is awake, alert and aware of environment with an appropriate affect, the patient is oriented x 3 and speaking appropriately.  APPEARANCE: Patient resting comfortably and in no acute distress, patient is clean and well groomed, patient's clothing is properly fastened.  SKIN: The skin is warm and dry, patient has normal skin turgor and moist mucus membranes, skin intact, no breakdown or brusing noted.  MUSKULOSKELETAL: Patient moving all extremities well, no obvious swelling or deformities noted.  RESPIRATORY: Airway is open and patent, respirations are spontaneous, patient has a normal effort and rate. Breath sounds are clear and equal bilaterally.  CARDIAC: Normal heart sounds. No peripheral edema.  ABDOMEN: Soft and slight tenderness to upper transverse to palpation, no distention noted. Bowel sounds present.   
Pt given specimen cup for urine sample, Pt educated on clean-catch technique, pt verbalized understanding.     
Updated on lab and imaging results, sitting at bedside  
Opt out

## 2022-12-28 ENCOUNTER — TELEPHONE (OUTPATIENT)
Dept: INTERNAL MEDICINE | Facility: CLINIC | Age: 72
End: 2022-12-28
Payer: MEDICARE

## 2022-12-28 RX ORDER — FAMOTIDINE 40 MG/1
40 TABLET, FILM COATED ORAL NIGHTLY
Qty: 30 TABLET | Refills: 4 | Status: SHIPPED | OUTPATIENT
Start: 2022-12-28 | End: 2023-01-26

## 2022-12-28 RX ORDER — PANTOPRAZOLE SODIUM 40 MG/1
40 TABLET, DELAYED RELEASE ORAL DAILY
Qty: 30 TABLET | Refills: 4 | Status: SHIPPED | OUTPATIENT
Start: 2022-12-28 | End: 2023-01-26 | Stop reason: SDUPTHER

## 2022-12-28 NOTE — TELEPHONE ENCOUNTER
----- Message from Radha Huang sent at 12/28/2022  7:33 AM CST -----  Contact: pt [phone]  Would like to get medical advice  Symptoms: pain in diaphragm  How long has patient had these symptoms: 17 days  Would the patient like a call back, or a response through their [facility] portal?: call    Comments:Patient was seen in the ED but is still have extreme pain.    Please call and advise.    Thank you

## 2022-12-28 NOTE — TELEPHONE ENCOUNTER
He has pain under the diaphragm - across the diaphragm, worse on the left side of the ab domen since December 13, 2022. Pain occurs almost every time he takes a breath.  Wrapping the abdomen helps a little. He cannot lie a certain way - has continual constant pain.     HE takes hydrocodone apap 1/2  tablet 6 times daily which keeps the pain dull. A nagging pain    He went to the ER on 12/17/22 - labs and CT scan of the abdomen and pelvis unremarkable.    He wonders if he lifted something that could have caused a pain.     No nausea, vomiting, constipation, diarrhea.    If he eats something or drinks a carbonated beverage, he feels a little more irritated in the area.  No reflux.  He is not on any acid lowering medication.     Wonder if it is his stomach that is inflamed - pantoprazole 40 mg in am and famotidine 40 mg in the evening.

## 2022-12-28 NOTE — TELEPHONE ENCOUNTER
Called and spoke to pt. Pt states he was seen in ED on 12/17 for what pt describes as pain to his diaphragm. Pt describes the pain as constant 7-8/10 and that the pain increases with breathing. Pt denies fever, NVD, or constipation. Pt states he ED workup didn't show anything and was told to follow up with PCP. First available appt is late January. Explained to pt he should report to ED if he is still experiencing 8/10 pain. Pt verbalized understanding but states he financially cannot go back to the ED and wants to be referred to a specialist. Pt asked to send PCP a message requesting a referral. Re-iterated to pt that he needs to be seen in ED today for pain.

## 2023-01-01 ENCOUNTER — HOSPITAL ENCOUNTER (EMERGENCY)
Facility: OTHER | Age: 73
Discharge: HOME | End: 2023-01-01
Attending: EMERGENCY MEDICINE
Payer: MEDICARE

## 2023-01-01 VITALS
HEART RATE: 83 BPM | SYSTOLIC BLOOD PRESSURE: 136 MMHG | BODY MASS INDEX: 26.81 KG/M2 | HEIGHT: 69 IN | TEMPERATURE: 98 F | WEIGHT: 181 LBS | OXYGEN SATURATION: 98 % | DIASTOLIC BLOOD PRESSURE: 64 MMHG | RESPIRATION RATE: 18 BRPM

## 2023-01-01 DIAGNOSIS — K59.00 CONSTIPATION: ICD-10-CM

## 2023-01-01 DIAGNOSIS — R10.13 EPIGASTRIC ABDOMINAL PAIN: ICD-10-CM

## 2023-01-01 LAB
ALBUMIN SERPL BCP-MCNC: 4.4 G/DL (ref 3.5–5.2)
ALP SERPL-CCNC: 56 U/L (ref 55–135)
ALT SERPL W/O P-5'-P-CCNC: 17 U/L (ref 10–44)
ANION GAP SERPL CALC-SCNC: 14 MMOL/L (ref 8–16)
AST SERPL-CCNC: 19 U/L (ref 10–40)
BASOPHILS # BLD AUTO: 0.02 K/UL (ref 0–0.2)
BASOPHILS NFR BLD: 0.3 % (ref 0–1.9)
BILIRUB SERPL-MCNC: 0.3 MG/DL (ref 0.1–1)
BILIRUB UR QL STRIP: NEGATIVE
BUN SERPL-MCNC: 11 MG/DL (ref 8–23)
CALCIUM SERPL-MCNC: 10.1 MG/DL (ref 8.7–10.5)
CHLORIDE SERPL-SCNC: 102 MMOL/L (ref 95–110)
CLARITY UR: CLEAR
CO2 SERPL-SCNC: 20 MMOL/L (ref 23–29)
COLOR UR: YELLOW
CREAT SERPL-MCNC: 1.1 MG/DL (ref 0.5–1.4)
DIFFERENTIAL METHOD: ABNORMAL
EOSINOPHIL # BLD AUTO: 0.1 K/UL (ref 0–0.5)
EOSINOPHIL NFR BLD: 1.8 % (ref 0–8)
ERYTHROCYTE [DISTWIDTH] IN BLOOD BY AUTOMATED COUNT: 12.3 % (ref 11.5–14.5)
EST. GFR  (NO RACE VARIABLE): >60 ML/MIN/1.73 M^2
GLUCOSE SERPL-MCNC: 150 MG/DL (ref 70–110)
GLUCOSE UR QL STRIP: NEGATIVE
HCT VFR BLD AUTO: 35.1 % (ref 40–54)
HGB BLD-MCNC: 12.4 G/DL (ref 14–18)
HGB UR QL STRIP: ABNORMAL
IMM GRANULOCYTES # BLD AUTO: 0.01 K/UL (ref 0–0.04)
IMM GRANULOCYTES NFR BLD AUTO: 0.2 % (ref 0–0.5)
KETONES UR QL STRIP: NEGATIVE
LEUKOCYTE ESTERASE UR QL STRIP: NEGATIVE
LIPASE SERPL-CCNC: 62 U/L (ref 4–60)
LYMPHOCYTES # BLD AUTO: 2.2 K/UL (ref 1–4.8)
LYMPHOCYTES NFR BLD: 34.7 % (ref 18–48)
MCH RBC QN AUTO: 33.9 PG (ref 27–31)
MCHC RBC AUTO-ENTMCNC: 35.3 G/DL (ref 32–36)
MCV RBC AUTO: 96 FL (ref 82–98)
MONOCYTES # BLD AUTO: 0.4 K/UL (ref 0.3–1)
MONOCYTES NFR BLD: 5.6 % (ref 4–15)
NEUTROPHILS # BLD AUTO: 3.6 K/UL (ref 1.8–7.7)
NEUTROPHILS NFR BLD: 57.4 % (ref 38–73)
NITRITE UR QL STRIP: NEGATIVE
NRBC BLD-RTO: 0 /100 WBC
PH UR STRIP: 7 [PH] (ref 5–8)
PLATELET # BLD AUTO: 371 K/UL (ref 150–450)
PMV BLD AUTO: 8.5 FL (ref 9.2–12.9)
POTASSIUM SERPL-SCNC: 4.2 MMOL/L (ref 3.5–5.1)
PROT SERPL-MCNC: 7.9 G/DL (ref 6–8.4)
PROT UR QL STRIP: NEGATIVE
RBC # BLD AUTO: 3.66 M/UL (ref 4.6–6.2)
SODIUM SERPL-SCNC: 136 MMOL/L (ref 136–145)
SP GR UR STRIP: 1.01 (ref 1–1.03)
URN SPEC COLLECT METH UR: ABNORMAL
UROBILINOGEN UR STRIP-ACNC: NEGATIVE EU/DL
WBC # BLD AUTO: 6.26 K/UL (ref 3.9–12.7)

## 2023-01-01 PROCEDURE — 96374 THER/PROPH/DIAG INJ IV PUSH: CPT

## 2023-01-01 PROCEDURE — 63600175 PHARM REV CODE 636 W HCPCS: Performed by: PHYSICIAN ASSISTANT

## 2023-01-01 PROCEDURE — 93005 ELECTROCARDIOGRAM TRACING: CPT

## 2023-01-01 PROCEDURE — 83690 ASSAY OF LIPASE: CPT | Performed by: PHYSICIAN ASSISTANT

## 2023-01-01 PROCEDURE — 85025 COMPLETE CBC W/AUTO DIFF WBC: CPT | Performed by: NURSE PRACTITIONER

## 2023-01-01 PROCEDURE — 96372 THER/PROPH/DIAG INJ SC/IM: CPT | Mod: 59 | Performed by: PHYSICIAN ASSISTANT

## 2023-01-01 PROCEDURE — 99285 EMERGENCY DEPT VISIT HI MDM: CPT | Mod: 25

## 2023-01-01 PROCEDURE — 96361 HYDRATE IV INFUSION ADD-ON: CPT

## 2023-01-01 PROCEDURE — 80053 COMPREHEN METABOLIC PANEL: CPT | Performed by: PHYSICIAN ASSISTANT

## 2023-01-01 PROCEDURE — 93010 EKG 12-LEAD: ICD-10-PCS | Mod: ,,, | Performed by: INTERNAL MEDICINE

## 2023-01-01 PROCEDURE — 81003 URINALYSIS AUTO W/O SCOPE: CPT | Performed by: NURSE PRACTITIONER

## 2023-01-01 PROCEDURE — 25000003 PHARM REV CODE 250: Performed by: PHYSICIAN ASSISTANT

## 2023-01-01 PROCEDURE — 93010 ELECTROCARDIOGRAM REPORT: CPT | Mod: ,,, | Performed by: INTERNAL MEDICINE

## 2023-01-01 RX ORDER — KETOROLAC TROMETHAMINE 30 MG/ML
10 INJECTION, SOLUTION INTRAMUSCULAR; INTRAVENOUS
Status: COMPLETED | OUTPATIENT
Start: 2023-01-01 | End: 2023-01-01

## 2023-01-01 RX ORDER — HYOSCYAMINE SULFATE 0.12 MG/1
0.12 TABLET SUBLINGUAL EVERY 4 HOURS PRN
Qty: 15 TABLET | Refills: 0 | Status: SHIPPED | OUTPATIENT
Start: 2023-01-01

## 2023-01-01 RX ORDER — LACTULOSE 10 G/15ML
10 SOLUTION ORAL EVERY 6 HOURS PRN
Qty: 200 ML | Refills: 0 | Status: SHIPPED | OUTPATIENT
Start: 2023-01-01 | End: 2023-02-01

## 2023-01-01 RX ADMIN — SODIUM CHLORIDE 1000 ML: 0.9 INJECTION, SOLUTION INTRAVENOUS at 12:01

## 2023-01-01 RX ADMIN — KETOROLAC TROMETHAMINE 10 MG: 30 INJECTION, SOLUTION INTRAMUSCULAR; INTRAVENOUS at 12:01

## 2023-01-01 RX ADMIN — METHYLNALTREXONE BROMIDE 12 MG: 12 INJECTION, SOLUTION SUBCUTANEOUS at 02:01

## 2023-01-01 NOTE — ED TRIAGE NOTES
Pt reports to ED with epigastric pain. He states he was seen here on the 17th for the same problem and has had no improvement. Pt also reports constipation and states he has not had a bowel movement in 5 days. Pt has lower back pain and takes opioids for the pain. Denies chest pain, denies shortness of breath. Aaox4.

## 2023-01-01 NOTE — ED PROVIDER NOTES
"Encounter Date: 1/1/2023       History     Chief Complaint   Patient presents with    Abdominal Pain     " Last time I was here abdominal pain"" the doctor prescribed two medicine" pt reports constipation x 5 days, pt reports taking an enema 2 days ago with minimal relief, taking milk of magnesium last night with minimal relief,      Afebrile 72-year-old male continues with upper abdominal pain.  Patient states that he was seen here recently.  He states that he was started on acid reflux medication.  He has been taking this medication with no significant improvement symptoms.  He does report some decreased appetite and some nausea that began today.  He states due to the pain he was taking increased amounts of his oxycodone.  He states since this time he has had constipation.  He tried an enema on Friday night and milk of magnesia yesterday with no significant improvement.  He is passing gas.  He denies any fever, chills, URI symptoms or  symptoms.    The history is provided by the patient.   Review of patient's allergies indicates:   Allergen Reactions    Aspirin Nausea And Vomiting     If over 81mg     Past Medical History:   Diagnosis Date    Angina pectoris     Back pain     Cluster headaches     1999    Colon polyps     5 polyps in Dec 2010    Coronary artery disease 11/2011    moderate nonobstructive cad on left heart cath at [facility] 11/2011    Diabetes mellitus     GERD (gastroesophageal reflux disease)     history of peptic ulcer disease    Hyperlipidemia     Hypertension     Lumbar disc disease     MRI 2008 - L5-S1    Nephrolithiasis     July 2013     Past Surgical History:   Procedure Laterality Date    ARTHROSCOPIC DEBRIDEMENT OF SHOULDER Left 01/09/2020    Procedure: EXTENSIVE DEBRIDEMENT, SHOULDER, ARTHROSCOPIC;  Surgeon: Marcela John MD;  Location: [facility];  Service: Orthopedics;  Laterality: Left;    ARTHROSCOPIC REPAIR OF ROTATOR CUFF OF SHOULDER Left 01/09/2020    Procedure: REPAIR, ROTATOR CUFF, " ARTHROSCOPIC, MEDIUM DOUBLE ROW;  Surgeon: Marcela John MD;  Location: [facility] OR;  Service: Orthopedics;  Laterality: Left;    ARTHROSCOPY OF SHOULDER WITH DECOMPRESSION OF SUBACROMIAL SPACE Left 01/09/2020    Procedure: ARTHROSCOPY, SHOULDER, WITH SUBACROMIAL DECOMPRESSION;  Surgeon: Marcela John MD;  Location: [facility] OR;  Service: Orthopedics;  Laterality: Left;    CHOLECYSTECTOMY      COLONOSCOPY N/A 12/07/2016    Procedure: COLONOSCOPY;  Surgeon: Lance Crocker MD;  Location[place] [facility] [place] ([place]  Service: Endoscopy;  Laterality: N/A;    COLONOSCOPY N/A 02/02/2022    Procedure: COLONOSCOPY;  Surgeon: Zena Maddox MD;  Location: [facility] ENDO (4TH FLR);  Service: Endoscopy;  Laterality: N/A;  covid test 1/30/22 [place], prep instr emailed -ml  Miralax prep    EXCISION OF BURSA Left 01/09/2020    Procedure: BURSECTOMY;  Surgeon: Marcela John MD;  Location: [facility] OR;  Service: Orthopedics;  Laterality: Left;    LITHOTRIPSY      ORIF left humerus       Family History   Problem Relation Age of Onset    Heart disease Mother     No Known Problems Father     Cancer Sister         ovarian    Heart disease Brother     Heart failure Brother     Kidney disease Brother     Alzheimer's disease Brother     No Known Problems Brother     Diabetes Son     Blindness Son     Diabetes Son     Hypertension Son     Diabetes Son     Hypertension Son     Diabetes Son     Hypertension Son     Sleep apnea Son     Celiac disease Neg Hx     Cirrhosis Neg Hx     Colon cancer Neg Hx     Colon polyps Neg Hx     Crohn's disease Neg Hx     Esophageal cancer Neg Hx     Inflammatory bowel disease Neg Hx     Liver cancer Neg Hx     Rectal cancer Neg Hx     Stomach cancer Neg Hx     Ulcerative colitis Neg Hx     Melanoma Neg Hx      Social History     Tobacco Use    Smoking status: Every Day     Packs/day: 1.50     Years: 50.00     Pack years: 75.00     Types: Cigarettes     Passive exposure: Past    Smokeless tobacco: Never   Substance Use Topics     Alcohol use: Yes    Drug use: Yes     Types: Hydrocodone     Review of Systems   Constitutional:  Positive for appetite change. Negative for chills and fever.   HENT:  Negative for sore throat.    Respiratory:  Negative for shortness of breath.    Cardiovascular:  Negative for chest pain.   Gastrointestinal:  Positive for abdominal pain, constipation and nausea. Negative for diarrhea and vomiting.   Genitourinary:  Negative for decreased urine volume, dysuria, flank pain and hematuria.   Musculoskeletal:  Negative for arthralgias, back pain and myalgias.   Skin:  Negative for rash.   Neurological:  Negative for weakness, light-headedness and headaches.   Hematological:  Does not bruise/bleed easily.     Physical Exam     Initial Vitals [01/01/23 1028]   BP Pulse Resp Temp SpO2   (!) 146/69 81 18 98.8 °F (37.1 °C) 100 %      MAP       --         Physical Exam    Nursing note and vitals reviewed.  Constitutional: Vital signs are normal. He appears well-developed and well-nourished. He is cooperative.  Non-toxic appearance. He does not appear ill. No distress.   HENT:   Head: Normocephalic and atraumatic.   Dry mucous membranes   Eyes: Conjunctivae and lids are normal.   Neck: Trachea normal. Neck supple. No stridor present. No tracheal deviation present.   Normal range of motion.  Cardiovascular:  Normal rate and regular rhythm.           Pulmonary/Chest: Breath sounds normal. No respiratory distress. He has no wheezes.   Abdominal: Abdomen is soft. There is abdominal tenderness. There is no rebound and no guarding.   Musculoskeletal:         General: Normal range of motion.      Cervical back: Normal range of motion and neck supple.     Neurological: He is alert and oriented to person, place, and time. He has normal strength. GCS score is 15. GCS eye subscore is 4. GCS verbal subscore is 5. GCS motor subscore is 6.   Skin: Skin is warm, dry and intact. No rash noted. No pallor.   Psychiatric: He has a normal mood and  affect. His speech is normal and behavior is normal. Thought content normal.       ED Course   Procedures  Labs Reviewed   CBC W/ AUTO DIFFERENTIAL - Abnormal; Notable for the following components:       Result Value    RBC 3.66 (*)     Hemoglobin 12.4 (*)     Hematocrit 35.1 (*)     MCH 33.9 (*)     MPV 8.5 (*)     All other components within normal limits   URINALYSIS, REFLEX TO URINE CULTURE - Abnormal; Notable for the following components:    Occult Blood UA Trace (*)     All other components within normal limits    Narrative:     Specimen Source->Urine   COMPREHENSIVE METABOLIC PANEL - Abnormal; Notable for the following components:    CO2 20 (*)     Glucose 150 (*)     All other components within normal limits   LIPASE - Abnormal; Notable for the following components:    Lipase 62 (*)     All other components within normal limits        ECG Results              EKG 12-lead (In process)  Result time 01/02/23 09:03:09      In process by Interface, Lab In [facility] (01/02/23 09:03:09)                   Narrative:    Test Reason : R10.13,    Vent. Rate : 076 BPM     Atrial Rate : 076 BPM     P-R Int : 144 ms          QRS Dur : 080 ms      QT Int : 388 ms       P-R-T Axes : 064 083 010 degrees     QTc Int : 436 ms    Normal sinus rhythm  Nonspecific T wave abnormality  Abnormal ECG      Referred By: [email or web address]   SELF           Confirmed By:                                   Imaging Results              X-Ray Abdomen Flat And Erect (Final result)  Result time 01/01/23 12:47:22      Final result by Aditi Martinez MD (01/01/23 12:47:22)                   Impression:      Moderate stool within the colon, without bowel distension    Nonobstructing left renal calculus      Electronically signed by: Aditi Martinez MD  Date:    01/01/2023  Time:    12:47               Narrative:    EXAMINATION:  XR ABDOMEN FLAT AND ERECT    CLINICAL HISTORY:  Constipation, unspecified    TECHNIQUE:  Flat and erect AP views of the abdomen  were performed.    COMPARISON:  July 2017 radiograph.  CT performed December 2022    FINDINGS:  There are surgical clips within the right upper quadrant.    There is no free air.  There is moderate stool within the colon.  No bowel distension is present.  There is a left renal calculus.  The osseous structures show no significant abnormality.  Vascular calcification is noted.                                       Medications   ketorolac injection 9.999 mg (9.999 mg Intravenous Given 1/1/23 1228)   sodium chloride 0.9% bolus 1,000 mL 1,000 mL (0 mLs Intravenous Stopped 1/1/23 1326)   methylnaltrexone 12 mg/0.6 mL subcutaneous injection 12 mg (12 mg Subcutaneous Given 1/1/23 1409)     Medical Decision Making:   History:   Old Medical Records: I decided to obtain old medical records.  Initial Assessment:   Afebrile 72-year-old male presents the ED for evaluation of continued upper abdominal discomfort.  Due to discomfort and ongoing chronic pain he is taken increased pain medicine.  He reports constipation despite trying home medications.  Is passing gas.  Denies any vomiting.  Does report some nausea.  He appears well in no significant distress.  Abdomen with epigastric tenderness palpation.  Normoactive bowel sounds.  No guarding, rebound or rigidity.  No distention.  Remaining exam unremarkable.  Differential Diagnosis:   Differential Diagnosis includes, but is not limited to:  AAA, aortic dissection, mesenteric ischemia, perforated viscous, MI/ACS, SBO/volvulus, incarcerated/strangulated hernia, intussusception, ileus, appendicitis, cholecystitis, cholangitis, diverticulitis, esophagitis, hepatitis, nephrolithiasis, pancreatitis, gastroenteritis, colitis, IBD/IBS, biliary colic, GERD, PUD, constipation, UTI/pyelonephritis,  disorder.      Clinical Tests:   Lab Tests: Reviewed and Ordered  Radiological Study: Reviewed and Ordered  ED Management:  Will obtain labs to assess for acute emergent process.  Reviewed  previous CT with no acute abnormalities.  As suspicion for constipation will obtain flat and erect to evaluate for any obstruction as he is had previous abdominal surgery.  X-ray consistent with constipation.  No fecal impaction or obstruction noted.  Suspect induced by opiate use and blister was administered with improvement symptoms.  His lipase is mildly elevated.  Did not feel CT indicated this time is low suspicion of pseudocyst or abscess as he remains well and nontoxic with recent unremarkable imaging.  This could be elevated from recent PPI administration.  Considered ulcer believe he would benefit from close outpatient follow-up with Gastroenterology with probable scope for ongoing epigastric pain.  He was encouraged to continue Pepcid and Protonix and instructed on prompt return to the ED for new or worsening symptoms.  Patient did report bowel movements in the ED and felt comfortable going home. Strict instructions to follow up with primary care physician or reference provided for further assessment and evaluation. Given instructions to return for any acute symptoms and verbalized understanding of this medical plan.                          Clinical Impression:   Final diagnoses:  [K59.00] Constipation  [R10.13] Epigastric abdominal pain        ED Disposition Condition    Discharge Stable          ED Prescriptions       Medication Sig Dispense Start Date End Date Auth. Provider    lactulose (CHRONULAC) 20 gram/30 mL Soln Take 15 mLs (10 g total) by mouth every 6 (six) hours as needed. 200 mL 1/1/2023 -- STACY Mejia    hyoscyamine (LEVSIN/SL) 0.125 mg Subl Place 1 tablet (0.125 mg total) under the tongue every 4 (four) hours as needed. 15 tablet 1/1/2023 -- STACY Mejia          Follow-up Information       Follow up With Specialties Details Why Contact Info    Elizabeth Benitez MD Internal Medicine Schedule an appointment as soon as possible for a visit   [address]  [facility] [address]  [phone]      [facility] Locations Gastroenterology Schedule an appointment as soon as possible for a visit   [address]  [address]  [facility] [address]  [phone]               STACY Mejia  01/02/23 1252

## 2023-01-04 DIAGNOSIS — E11.9 TYPE 2 DIABETES MELLITUS WITHOUT COMPLICATION, UNSPECIFIED WHETHER LONG TERM INSULIN USE: ICD-10-CM

## 2023-01-08 ENCOUNTER — HOSPITAL ENCOUNTER (EMERGENCY)
Facility: OTHER | Age: 73
Discharge: HOME | End: 2023-01-09
Attending: EMERGENCY MEDICINE
Payer: MEDICARE

## 2023-01-08 DIAGNOSIS — K59.00 CONSTIPATION: Primary | ICD-10-CM

## 2023-01-09 VITALS
RESPIRATION RATE: 16 BRPM | TEMPERATURE: 98 F | DIASTOLIC BLOOD PRESSURE: 75 MMHG | WEIGHT: 177 LBS | HEART RATE: 73 BPM | OXYGEN SATURATION: 100 % | SYSTOLIC BLOOD PRESSURE: 151 MMHG | BODY MASS INDEX: 26.14 KG/M2

## 2023-01-09 PROCEDURE — 99284 EMERGENCY DEPT VISIT MOD MDM: CPT

## 2023-01-09 PROCEDURE — 63600175 PHARM REV CODE 636 W HCPCS: Performed by: EMERGENCY MEDICINE

## 2023-01-09 PROCEDURE — 96372 THER/PROPH/DIAG INJ SC/IM: CPT | Performed by: EMERGENCY MEDICINE

## 2023-01-09 RX ORDER — AMOXICILLIN 250 MG
1 CAPSULE ORAL DAILY PRN
Qty: 10 TABLET | Refills: 0 | Status: SHIPPED | OUTPATIENT
Start: 2023-01-09 | End: 2023-11-08

## 2023-01-09 RX ADMIN — METHYLNALTREXONE BROMIDE 12 MG: 12 INJECTION, SOLUTION SUBCUTANEOUS at 12:01

## 2023-01-09 NOTE — ED PROVIDER NOTES
Encounter Date: 1/8/2023    SCRIBE #1 NOTE: I, Massimo Julian, am scribing for, and in the presence of,  Edgardo Patiño MD. I have scribed the following portions of the note - Other sections scribed: HPI, ROS, PE.     History     Chief Complaint   Patient presents with    Abdominal Pain     Diffuse lower abd pain.   Pt has not had a successful bowel movement since the 3rd of this month.     Time seen by provider: 10:01 PM    This is a 72 y.o. male, on TID oxycodone for 18 hours secondary to chronic back pain, who presents with complaint of diffuse, tight upper abdominal pains today. Associated symptoms include constipation. He recalls this presentation as familiar to an episode occurring one week ago, at which time he was given an anti-opioid agent, produced a large bowel movement, and had immediate relief afterwards. Patient was discharged on Levsin and lactulose, which he has been taking for his current episode without relief. He denies any fever, chills, dysuria, frequency, urgency, or urinary hesitation. Patient does not use ibuprofen. This is the extent of the patient's complaints at this time.    The history is provided by the patient.   Review of patient's allergies indicates:   Allergen Reactions    Aspirin Nausea And Vomiting     If over 81mg     Past Medical History:   Diagnosis Date    Angina pectoris     Back pain     Cluster headaches     1999    Colon polyps     5 polyps in Dec 2010    Coronary artery disease 11/2011    moderate nonobstructive cad on left heart cath at [facility] 11/2011    Diabetes mellitus     GERD (gastroesophageal reflux disease)     history of peptic ulcer disease    Hyperlipidemia     Hypertension     Lumbar disc disease     MRI 2008 - L5-S1    Nephrolithiasis     July 2013     Past Surgical History:   Procedure Laterality Date    ARTHROSCOPIC DEBRIDEMENT OF SHOULDER Left 01/09/2020    Procedure: EXTENSIVE DEBRIDEMENT, SHOULDER, ARTHROSCOPIC;  Surgeon: Marcela John MD;  Location:  [facility] OR;  Service: Orthopedics;  Laterality: Left;    ARTHROSCOPIC REPAIR OF ROTATOR CUFF OF SHOULDER Left 01/09/2020    Procedure: REPAIR, ROTATOR CUFF, ARTHROSCOPIC, MEDIUM DOUBLE ROW;  Surgeon: Marcela John MD;  Location: [facility] OR;  Service: Orthopedics;  Laterality: Left;    ARTHROSCOPY OF SHOULDER WITH DECOMPRESSION OF SUBACROMIAL SPACE Left 01/09/2020    Procedure: ARTHROSCOPY, SHOULDER, WITH SUBACROMIAL DECOMPRESSION;  Surgeon: Marcela John MD;  Location: [facility] OR;  Service: Orthopedics;  Laterality: Left;    CHOLECYSTECTOMY      COLONOSCOPY N/A 12/07/2016    Procedure: COLONOSCOPY;  Surgeon: Lance Crocker MD;  [facility]  Service: Endoscopy;  Laterality: N/A;    COLONOSCOPY N/A 02/02/2022    Procedure: COLONOSCOPY;  Surgeon: Zena Maddox MD;  [facility]  Service: Endoscopy;  Laterality: N/A;  covid test 1/30/22 [place], prep instr emailed -ml  Miralax prep    EXCISION OF BURSA Left 01/09/2020    Procedure: BURSECTOMY;  Surgeon: Marcela John MD;  Location: [facility] OR;  Service: Orthopedics;  Laterality: Left;    LITHOTRIPSY      ORIF left humerus       Family History   Problem Relation Age of Onset    Heart disease Mother     No Known Problems Father     Cancer Sister         ovarian    Heart disease Brother     Heart failure Brother     Kidney disease Brother     Alzheimer's disease Brother     No Known Problems Brother     Diabetes Son     Blindness Son     Diabetes Son     Hypertension Son     Diabetes Son     Hypertension Son     Diabetes Son     Hypertension Son     Sleep apnea Son     Celiac disease Neg Hx     Cirrhosis Neg Hx     Colon cancer Neg Hx     Colon polyps Neg Hx     Crohn's disease Neg Hx     Esophageal cancer Neg Hx     Inflammatory bowel disease Neg Hx     Liver cancer Neg Hx     Rectal cancer Neg Hx     Stomach cancer Neg Hx     Ulcerative colitis Neg Hx     Melanoma Neg Hx      Social History     Tobacco Use    Smoking status: Every Day      Packs/day: 1.50     Years: 50.00     Pack years: 75.00     Types: Cigarettes     Passive exposure: Past    Smokeless tobacco: Never   Substance Use Topics    Alcohol use: Yes    Drug use: Yes     Types: Hydrocodone     Review of Systems   Constitutional:  Negative for chills and fever.   HENT:  Negative for congestion and sore throat.    Eyes:  Negative for photophobia and redness.   Respiratory:  Negative for cough and shortness of breath.    Cardiovascular:  Negative for chest pain.   Gastrointestinal:  Positive for abdominal pain and constipation. Negative for nausea and vomiting.   Genitourinary:  Negative for dysuria.   Musculoskeletal:  Negative for back pain.   Skin:  Negative for rash.   Neurological:  Negative for weakness, light-headedness and headaches.   Psychiatric/Behavioral:  Negative for confusion.      Physical Exam     Initial Vitals [01/08/23 2123]   BP Pulse Resp Temp SpO2   (!) 147/67 87 17 98 °F (36.7 °C) 100 %      MAP       --         Physical Exam    Nursing note and vitals reviewed.  Constitutional: He appears well-developed and well-nourished. He is not diaphoretic. No distress.   HENT:   Head: Normocephalic and atraumatic.   Mouth/Throat: Oropharynx is clear and moist.   Oropharynx is clear and intact.  Moist mucous membranes.   Eyes: Conjunctivae and EOM are normal. Pupils are equal, round, and reactive to light.   Conjunctivae pink, clear, and intact.    Neck: Neck supple.   No cervical lymphadenopathy.    Normal range of motion.  Cardiovascular:  Normal rate, regular rhythm and normal heart sounds.     Exam reveals no gallop and no friction rub.       No murmur heard.  Pulmonary/Chest: Breath sounds normal. No respiratory distress. He has no wheezes.   Abdominal: Abdomen is soft. Bowel sounds are normal. There is no abdominal tenderness.   No right CVA tenderness.  No left CVA tenderness.   Musculoskeletal:         General: No tenderness or edema. Normal range of motion.      Cervical  back: Normal range of motion and neck supple.     Lymphadenopathy:     He has no cervical adenopathy.   Neurological: He is alert and oriented to person, place, and time.   Skin: Skin is warm and dry. Capillary refill takes less than 2 seconds. No rash noted. No pallor.   Psychiatric: He has a normal mood and affect. Thought content normal.       ED Course   Procedures  Labs Reviewed - No data to display       Imaging Results              X-Ray Abdomen Flat And Erect (Final result)  Result time 01/08/23 22:38:29      Final result by Devaughn Curry MD (01/08/23 22:38:29)                   Impression:      Radiographic findings as above.      Electronically signed by: Devaughn Curry  Date:    01/08/2023  Time:    22:38               Narrative:    EXAMINATION:  XR ABDOMEN FLAT AND ERECT    CLINICAL HISTORY:  Constipation, unspecified    TECHNIQUE:  Flat and erect AP views of the abdomen were performed.    COMPARISON:  Abdominal radiograph January 1, 2023    FINDINGS:  Abdominal radiographic examination is submitted, 3 radiographs are submitted, erect and supine radiographs.  Limited imaging of the lung bases demonstrates appearance that likely relates to mild atelectatic change.  Surgical clips of the right upper quadrant may relate to prior cholecystectomy.    There is no evidence for free intraperitoneal air.  There is no abnormal small bowel distention.  There is mild prominence of the colon with stool without abnormal distention.  Air and stool is noted throughout the colon.    There is a small density projected over the left mid abdomen, this could represent a renal calculus, and appears similar to the prior study.  Vascular calcifications are noted.  The osseous structures demonstrate chronic change.                                    X-Rays:   Independently Interpreted Readings:   Abdomen:   Flat and Erect of Abdomen - No free air under the diaphragm. No air fluid levels. Market amount of stool in the colon  consistent with constipation.    Medications   methylnaltrexone 12 mg/0.6 mL subcutaneous injection 12 mg (12 mg Subcutaneous Given 1/9/23 0043)     Medical Decision Making:   History:   Old Medical Records: I decided to obtain old medical records.  Clinical Tests:   Radiological Study: Ordered and Reviewed        Scribe Attestation:   Scribe #1: I performed the above scribed service and the documentation accurately describes the services I performed. I attest to the accuracy of the note.    Attending Attestation:             Attending ED Notes:   Emergent evaluation a 72-year-old male with recent diagnosis of constipation who now presents back to the emergency department with complaint of constipation.  Patient is on long-term opiates for chronic back pain.  Abdominal exam is benign.  Patient has no flank tenderness to palpation.  I do not suspect obstructing kidney stone or diverticulitis.  X-ray of abdomen reveals a marked amount of stool in the colon consistent with constipation.  The patient is extensively counseled on their diagnosis and treatment including all diagnostic and physical exam findings.  The patient is directed to take his lactulose as directed.  The patient is discharged good condition and directed follow-up with their PCP in the next 24-48 hours.          Physician Attestation for Scribe: I, Edgardo Petersen, reviewed documentation as scribed in my presence, which is both accurate and complete.         Clinical Impression:   Final diagnoses:  [K59.00] Constipation (Primary)        ED Disposition Condition    Discharge Good          ED Prescriptions       Medication Sig Dispense Start Date End Date Auth. Provider    senna-docusate 8.6-50 mg (PERICOLACE) 8.6-50 mg per tablet Take 1 tablet by mouth daily as needed for Constipation. 10 tablet 1/9/2023 -- Edgardo Petersen MD          Follow-up Information       Follow up With Specialties Details Why Contact Info    [facility]  Gastroenterology In 2 days  [address] [address]  [phone]             Edgardo Petersen MD  01/09/23 0529

## 2023-01-12 ENCOUNTER — TELEPHONE (OUTPATIENT)
Dept: INTERNAL MEDICINE | Facility: CLINIC | Age: 73
End: 2023-01-12
Payer: MEDICARE

## 2023-01-12 RX ORDER — OXYCODONE AND ACETAMINOPHEN 10; 325 MG/1; MG/1
1 TABLET ORAL EVERY 8 HOURS PRN
Qty: 6 TABLET | Refills: 0 | Status: SHIPPED | OUTPATIENT
Start: 2023-01-12 | End: 2023-11-08

## 2023-01-12 RX ORDER — LOSARTAN POTASSIUM 100 MG/1
100 TABLET ORAL DAILY
Qty: 2 TABLET | Refills: 0 | Status: SHIPPED | OUTPATIENT
Start: 2023-01-12 | End: 2023-02-01

## 2023-01-12 RX ORDER — AMLODIPINE BESYLATE 10 MG/1
10 TABLET ORAL DAILY
Qty: 2 TABLET | Refills: 0 | Status: SHIPPED | OUTPATIENT
Start: 2023-01-12 | End: 2023-03-30

## 2023-01-12 RX ORDER — METOPROLOL SUCCINATE 25 MG/1
25 TABLET, EXTENDED RELEASE ORAL DAILY
Qty: 2 TABLET | Refills: 0 | Status: SHIPPED | OUTPATIENT
Start: 2023-01-12 | End: 2023-02-01
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
## 2023-01-12 NOTE — TELEPHONE ENCOUNTER
----- Message from Chad Franklin sent at 1/12/2023 11:11 AM CST -----  Contact: [phone]  Pt said he needs a call back about his meds. Please call call pt back.

## 2023-01-13 ENCOUNTER — LAB VISIT (OUTPATIENT)
Dept: LAB | Facility: OTHER | Age: 73
End: 2023-01-13
Attending: INTERNAL MEDICINE
Payer: MEDICARE

## 2023-01-13 DIAGNOSIS — K85.90 ACUTE PANCREATITIS WITHOUT NECROSIS OR INFECTION, UNSPECIFIED: ICD-10-CM

## 2023-01-13 DIAGNOSIS — R10.13 ABDOMINAL PAIN, EPIGASTRIC: Primary | ICD-10-CM

## 2023-01-13 LAB
AMYLASE SERPL-CCNC: 38 U/L (ref 20–110)
CRP SERPL-MCNC: 18 MG/L (ref 0–8.2)
ERYTHROCYTE [SEDIMENTATION RATE] IN BLOOD: 29 MM/HR (ref 0–10)
LIPASE SERPL-CCNC: 78 U/L (ref 4–60)

## 2023-01-13 PROCEDURE — 82150 ASSAY OF AMYLASE: CPT | Performed by: INTERNAL MEDICINE

## 2023-01-13 PROCEDURE — 82378 CARCINOEMBRYONIC ANTIGEN: CPT | Performed by: INTERNAL MEDICINE

## 2023-01-13 PROCEDURE — 86140 C-REACTIVE PROTEIN: CPT | Performed by: INTERNAL MEDICINE

## 2023-01-13 PROCEDURE — 85651 RBC SED RATE NONAUTOMATED: CPT | Performed by: INTERNAL MEDICINE

## 2023-01-13 PROCEDURE — 86301 IMMUNOASSAY TUMOR CA 19-9: CPT | Performed by: INTERNAL MEDICINE

## 2023-01-13 PROCEDURE — 36415 COLL VENOUS BLD VENIPUNCTURE: CPT | Performed by: INTERNAL MEDICINE

## 2023-01-13 PROCEDURE — 83690 ASSAY OF LIPASE: CPT | Performed by: INTERNAL MEDICINE

## 2023-01-14 LAB
CANCER AG19-9 SERPL-ACNC: 50.8 U/ML (ref 0–40)
CEA SERPL-MCNC: 2.8 NG/ML (ref 0–5)

## 2023-01-20 ENCOUNTER — PATIENT MESSAGE (OUTPATIENT)
Dept: INTERNAL MEDICINE | Facility: CLINIC | Age: 73
End: 2023-01-20
Payer: MEDICARE

## 2023-01-24 ENCOUNTER — TELEPHONE (OUTPATIENT)
Dept: INTERNAL MEDICINE | Facility: CLINIC | Age: 73
End: 2023-01-24
Payer: MEDICARE

## 2023-01-24 NOTE — TELEPHONE ENCOUNTER
----- Message from Dariela Hernandez sent at 1/24/2023  9:57 AM CST -----  Contact: [phone]  Patient called, requested a courtesy call from Dr Benitez in regards needing to find out if she got the test procedure results from the endoscopy from Dr Carter on 01/13. Please advise. Than you

## 2023-01-25 ENCOUNTER — LAB VISIT (OUTPATIENT)
Dept: LAB | Facility: OTHER | Age: 73
End: 2023-01-25
Attending: INTERNAL MEDICINE
Payer: MEDICARE

## 2023-01-25 DIAGNOSIS — E13.9 DIABETES MELLITUS DUE TO ABNORMAL INSULIN: ICD-10-CM

## 2023-01-25 LAB
ALBUMIN SERPL BCP-MCNC: 4.4 G/DL (ref 3.5–5.2)
ALP SERPL-CCNC: 73 U/L (ref 55–135)
ALT SERPL W/O P-5'-P-CCNC: 15 U/L (ref 10–44)
ANION GAP SERPL CALC-SCNC: 9 MMOL/L (ref 8–16)
AST SERPL-CCNC: 16 U/L (ref 10–40)
BASOPHILS # BLD AUTO: 0.02 K/UL (ref 0–0.2)
BASOPHILS NFR BLD: 0.4 % (ref 0–1.9)
BILIRUB SERPL-MCNC: 0.3 MG/DL (ref 0.1–1)
BUN SERPL-MCNC: 13 MG/DL (ref 8–23)
CALCIUM SERPL-MCNC: 10.8 MG/DL (ref 8.7–10.5)
CHLORIDE SERPL-SCNC: 104 MMOL/L (ref 95–110)
CHOLEST SERPL-MCNC: 145 MG/DL (ref 120–199)
CHOLEST/HDLC SERPL: 3.3 {RATIO} (ref 2–5)
CO2 SERPL-SCNC: 25 MMOL/L (ref 23–29)
CREAT SERPL-MCNC: 1.2 MG/DL (ref 0.5–1.4)
DIFFERENTIAL METHOD: ABNORMAL
EOSINOPHIL # BLD AUTO: 0.1 K/UL (ref 0–0.5)
EOSINOPHIL NFR BLD: 2.1 % (ref 0–8)
ERYTHROCYTE [DISTWIDTH] IN BLOOD BY AUTOMATED COUNT: 12.1 % (ref 11.5–14.5)
EST. GFR  (NO RACE VARIABLE): >60 ML/MIN/1.73 M^2
ESTIMATED AVG GLUCOSE: 166 MG/DL (ref 68–131)
GLUCOSE SERPL-MCNC: 173 MG/DL (ref 70–110)
HBA1C MFR BLD: 7.4 % (ref 4–5.6)
HCT VFR BLD AUTO: 37.8 % (ref 40–54)
HDLC SERPL-MCNC: 44 MG/DL (ref 40–75)
HDLC SERPL: 30.3 % (ref 20–50)
HGB BLD-MCNC: 12.5 G/DL (ref 14–18)
IMM GRANULOCYTES # BLD AUTO: 0.01 K/UL (ref 0–0.04)
IMM GRANULOCYTES NFR BLD AUTO: 0.2 % (ref 0–0.5)
LDLC SERPL CALC-MCNC: 62.8 MG/DL (ref 63–159)
LYMPHOCYTES # BLD AUTO: 2.4 K/UL (ref 1–4.8)
LYMPHOCYTES NFR BLD: 44.9 % (ref 18–48)
MCH RBC QN AUTO: 32.5 PG (ref 27–31)
MCHC RBC AUTO-ENTMCNC: 33.1 G/DL (ref 32–36)
MCV RBC AUTO: 98 FL (ref 82–98)
MONOCYTES # BLD AUTO: 0.3 K/UL (ref 0.3–1)
MONOCYTES NFR BLD: 4.9 % (ref 4–15)
NEUTROPHILS # BLD AUTO: 2.6 K/UL (ref 1.8–7.7)
NEUTROPHILS NFR BLD: 47.5 % (ref 38–73)
NONHDLC SERPL-MCNC: 101 MG/DL
NRBC BLD-RTO: 0 /100 WBC
PLATELET # BLD AUTO: 382 K/UL (ref 150–450)
PMV BLD AUTO: 8.3 FL (ref 9.2–12.9)
POTASSIUM SERPL-SCNC: 5.1 MMOL/L (ref 3.5–5.1)
PROT SERPL-MCNC: 8 G/DL (ref 6–8.4)
RBC # BLD AUTO: 3.85 M/UL (ref 4.6–6.2)
SODIUM SERPL-SCNC: 138 MMOL/L (ref 136–145)
TRIGL SERPL-MCNC: 191 MG/DL (ref 30–150)
TSH SERPL DL<=0.005 MIU/L-ACNC: 0.76 UIU/ML (ref 0.4–4)
WBC # BLD AUTO: 5.35 K/UL (ref 3.9–12.7)

## 2023-01-25 PROCEDURE — 85025 COMPLETE CBC W/AUTO DIFF WBC: CPT | Performed by: INTERNAL MEDICINE

## 2023-01-25 PROCEDURE — 36415 COLL VENOUS BLD VENIPUNCTURE: CPT | Performed by: INTERNAL MEDICINE

## 2023-01-25 PROCEDURE — 84443 ASSAY THYROID STIM HORMONE: CPT | Performed by: INTERNAL MEDICINE

## 2023-01-25 PROCEDURE — 83036 HEMOGLOBIN GLYCOSYLATED A1C: CPT | Performed by: INTERNAL MEDICINE

## 2023-01-25 PROCEDURE — 80061 LIPID PANEL: CPT | Performed by: INTERNAL MEDICINE

## 2023-01-25 PROCEDURE — 80053 COMPREHEN METABOLIC PANEL: CPT | Performed by: INTERNAL MEDICINE

## 2023-01-26 ENCOUNTER — NURSE TRIAGE (OUTPATIENT)
Dept: ADMINISTRATIVE | Facility: CLINIC | Age: 73
End: 2023-01-26
Payer: MEDICARE

## 2023-01-26 ENCOUNTER — TELEPHONE (OUTPATIENT)
Dept: INTERNAL MEDICINE | Facility: CLINIC | Age: 73
End: 2023-01-26
Payer: MEDICARE

## 2023-01-26 NOTE — TELEPHONE ENCOUNTER
----- Message from Chad Franklin sent at 1/26/2023  3:23 PM CST -----  Contact: [phone]  Pt needs a call back about getting an ultrasound and other questions he has. Please call pt back.

## 2023-01-26 NOTE — TELEPHONE ENCOUNTER
Spoke to pt, pt is requesting a referral for a Endoscopic Ultrsound for Inflamed Pancreas. Also, pt wants to know what he can do for pain from a stomach ulcer when eating. Pt stated he takes pain meds that only works 2-3 hours...

## 2023-01-27 NOTE — TELEPHONE ENCOUNTER
Spoke with patient at length. See other message  Need to get endoscopy report faxed from Dr Medardo Alford's office. [phone]

## 2023-01-27 NOTE — TELEPHONE ENCOUNTER
Pt reports seen his MD, but is confused as to what meds he is to take, Pt advised from his chart that the Pepcid was discontinued and he is to now take his Protonix twice a day. Pt encouraged to call back with any worsening symptoms or questions and verbalized understanding.    Reason for Disposition   Caller has medicine question only, adult not sick, AND triager answers question    Protocols used: Medication Question Call-A-AH

## 2023-01-30 ENCOUNTER — TELEPHONE (OUTPATIENT)
Dept: ENDOSCOPY | Facility: HOSPITAL | Age: 73
End: 2023-01-30
Payer: MEDICARE

## 2023-01-30 NOTE — TELEPHONE ENCOUNTER
Patient has called the endoscopy scheduling department several times to schedule procedure. 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

## 2023-01-30 NOTE — TELEPHONE ENCOUNTER
Attempted to contact patient. He answered and then call dropped. Tried to call back. Unable to reach.

## 2023-01-30 NOTE — TELEPHONE ENCOUNTER
----- Message from Wilma Villar sent at 1/30/2023  3:14 PM CST -----  Contact: @[phone]  Pt is calling in stating that he needs to be seen before the 2/16, pt states that he is in so much pain. Please call to further. Pt states that the call kept hanging up when they called him back and would not go thru when he called back.

## 2023-01-30 NOTE — TELEPHONE ENCOUNTER
----- Message from Emerita Riddle sent at 1/30/2023  1:53 PM CST -----  Regarding: self [phone]  .Type:  Sooner Appointment Request    Patient is requesting a sooner appointment.  Patient declined first available appointment listed as well as another facility and provider .  Patient will not accept being placed on the waitlist and is requesting a message be sent to doctor.    Name of Caller:  self     When is the first available appointment? 02-16     Symptoms: Pt stated that he's in a lot of pain and needs a sooner appt     Would the patient rather a call back or a response via [facility]? Call back     Best Call Back Number: .[phone]

## 2023-01-31 ENCOUNTER — TELEPHONE (OUTPATIENT)
Dept: ENDOSCOPY | Facility: HOSPITAL | Age: 73
End: 2023-01-31
Payer: MEDICARE

## 2023-01-31 NOTE — TELEPHONE ENCOUNTER
Spoke with patient patient is calling to schedule procedure patient can not schedule until he see Dr Rodriguez.

## 2023-02-01 ENCOUNTER — OFFICE VISIT (OUTPATIENT)
Dept: INTERNAL MEDICINE | Facility: CLINIC | Age: 73
End: 2023-02-01
Payer: MEDICARE

## 2023-02-01 ENCOUNTER — PATIENT OUTREACH (OUTPATIENT)
Dept: ADMINISTRATIVE | Facility: HOSPITAL | Age: 73
End: 2023-02-01
Payer: MEDICARE

## 2023-02-01 VITALS
SYSTOLIC BLOOD PRESSURE: 132 MMHG | HEART RATE: 97 BPM | BODY MASS INDEX: 25.21 KG/M2 | DIASTOLIC BLOOD PRESSURE: 70 MMHG | HEIGHT: 69 IN | WEIGHT: 170.19 LBS | OXYGEN SATURATION: 100 %

## 2023-02-01 DIAGNOSIS — K29.80 DUODENITIS: ICD-10-CM

## 2023-02-01 DIAGNOSIS — K29.30 CHRONIC SUPERFICIAL GASTRITIS WITHOUT BLEEDING: Primary | ICD-10-CM

## 2023-02-01 DIAGNOSIS — J47.9 BRONCHIECTASIS WITHOUT COMPLICATION: ICD-10-CM

## 2023-02-01 DIAGNOSIS — I70.0 AORTIC ATHEROSCLEROSIS: ICD-10-CM

## 2023-02-01 DIAGNOSIS — E11.40 TYPE 2 DIABETES MELLITUS WITH DIABETIC NEUROPATHY, WITHOUT LONG-TERM CURRENT USE OF INSULIN: ICD-10-CM

## 2023-02-01 DIAGNOSIS — F11.20 OPIOID DEPENDENCE, UNCOMPLICATED: ICD-10-CM

## 2023-02-01 DIAGNOSIS — R79.89 HIGH SERUM CARBOHYDRATE ANTIGEN 19-9 (CA19-9): ICD-10-CM

## 2023-02-01 PROCEDURE — 1159F MED LIST DOCD IN RCRD: CPT | Mod: CPTII,S$GLB,, | Performed by: INTERNAL MEDICINE

## 2023-02-01 PROCEDURE — 99214 OFFICE O/P EST MOD 30 MIN: CPT | Mod: S$GLB,,, | Performed by: INTERNAL MEDICINE

## 2023-02-01 PROCEDURE — 1159F PR MEDICATION LIST DOCUMENTED IN MEDICAL RECORD: ICD-10-PCS | Mod: CPTII,S$GLB,, | Performed by: INTERNAL MEDICINE

## 2023-02-01 PROCEDURE — 1125F AMNT PAIN NOTED PAIN PRSNT: CPT | Mod: CPTII,S$GLB,, | Performed by: INTERNAL MEDICINE

## 2023-02-01 PROCEDURE — 3008F PR BODY MASS INDEX (BMI) DOCUMENTED: ICD-10-PCS | Mod: CPTII,S$GLB,, | Performed by: INTERNAL MEDICINE

## 2023-02-01 PROCEDURE — 3008F BODY MASS INDEX DOCD: CPT | Mod: CPTII,S$GLB,, | Performed by: INTERNAL MEDICINE

## 2023-02-01 PROCEDURE — 4010F PR ACE/ARB THEARPY RXD/TAKEN: ICD-10-PCS | Mod: CPTII,S$GLB,, | Performed by: INTERNAL MEDICINE

## 2023-02-01 PROCEDURE — 3075F PR MOST RECENT SYSTOLIC BLOOD PRESS GE 130-139MM HG: ICD-10-PCS | Mod: CPTII,S$GLB,, | Performed by: INTERNAL MEDICINE

## 2023-02-01 PROCEDURE — 3078F PR MOST RECENT DIASTOLIC BLOOD PRESSURE < 80 MM HG: ICD-10-PCS | Mod: CPTII,S$GLB,, | Performed by: INTERNAL MEDICINE

## 2023-02-01 PROCEDURE — 99214 PR OFFICE/OUTPT VISIT, EST, LEVL IV, 30-39 MIN: ICD-10-PCS | Mod: S$GLB,,, | Performed by: INTERNAL MEDICINE

## 2023-02-01 PROCEDURE — 99499 UNLISTED E&M SERVICE: CPT | Mod: S$GLB,,, | Performed by: INTERNAL MEDICINE

## 2023-02-01 PROCEDURE — 3051F HG A1C>EQUAL 7.0%<8.0%: CPT | Mod: CPTII,S$GLB,, | Performed by: INTERNAL MEDICINE

## 2023-02-01 PROCEDURE — 3051F PR MOST RECENT HEMOGLOBIN A1C LEVEL 7.0 - < 8.0%: ICD-10-PCS | Mod: CPTII,S$GLB,, | Performed by: INTERNAL MEDICINE

## 2023-02-01 PROCEDURE — 1101F PT FALLS ASSESS-DOCD LE1/YR: CPT | Mod: CPTII,S$GLB,, | Performed by: INTERNAL MEDICINE

## 2023-02-01 PROCEDURE — 1101F PR PT FALLS ASSESS DOC 0-1 FALLS W/OUT INJ PAST YR: ICD-10-PCS | Mod: CPTII,S$GLB,, | Performed by: INTERNAL MEDICINE

## 2023-02-01 PROCEDURE — 99999 PR PBB SHADOW E&M-EST. PATIENT-LVL IV: CPT | Mod: PBBFAC,,, | Performed by: INTERNAL MEDICINE

## 2023-02-01 PROCEDURE — 3288F FALL RISK ASSESSMENT DOCD: CPT | Mod: CPTII,S$GLB,, | Performed by: INTERNAL MEDICINE

## 2023-02-01 PROCEDURE — 99999 PR PBB SHADOW E&M-EST. PATIENT-LVL IV: ICD-10-PCS | Mod: PBBFAC,,, | Performed by: INTERNAL MEDICINE

## 2023-02-01 PROCEDURE — 3075F SYST BP GE 130 - 139MM HG: CPT | Mod: CPTII,S$GLB,, | Performed by: INTERNAL MEDICINE

## 2023-02-01 PROCEDURE — 4010F ACE/ARB THERAPY RXD/TAKEN: CPT | Mod: CPTII,S$GLB,, | Performed by: INTERNAL MEDICINE

## 2023-02-01 PROCEDURE — 99499 RISK ADDL DX/OHS AUDIT: ICD-10-PCS | Mod: S$GLB,,, | Performed by: INTERNAL MEDICINE

## 2023-02-01 PROCEDURE — 3288F PR FALLS RISK ASSESSMENT DOCUMENTED: ICD-10-PCS | Mod: CPTII,S$GLB,, | Performed by: INTERNAL MEDICINE

## 2023-02-01 PROCEDURE — 1125F PR PAIN SEVERITY QUANTIFIED, PAIN PRESENT: ICD-10-PCS | Mod: CPTII,S$GLB,, | Performed by: INTERNAL MEDICINE

## 2023-02-01 PROCEDURE — 3078F DIAST BP <80 MM HG: CPT | Mod: CPTII,S$GLB,, | Performed by: INTERNAL MEDICINE

## 2023-02-01 RX ORDER — LOSARTAN POTASSIUM 100 MG/1
100 TABLET ORAL DAILY
Qty: 90 TABLET | Refills: 3 | Status: SHIPPED | OUTPATIENT
Start: 2023-02-01 | End: 2024-01-17

## 2023-02-01 NOTE — PROGRESS NOTES
Chief Complaint:  Follow up of multiple problems     HISTORY OF PRESENT ILLNESS: This is a 72-year-old man who presents for above.    He started to have epigastric pain (right side more than left side) m id December 2022. He went to the ER on 12/17/22, 1/1/23 and 1/8/23.  He was diagnosed with constipation on 1/1/23 and 1/8/23.    I sent in pantoprazole 40 mg in am and famotidine 40 mg in evening December 28 - he did not take it    January 11, 2023, he had an EGD with DR Raoul Alford  - Medical record of the report of EGD revealed ulceration, congestion and edema in the antrum, pre-pyloric region and pylorus compatable with gastritis and erosive gastritis.   Ulceration, congestion and erythema and edema in the distal bulb and second part of the duodenum compatable with duodenitis.  Biopsies with reactive changes. H pylori negative.    DR Alford thought he was taking pantoprazole 40 mg in am and famotidine 40 mg in evening so he added sulcrafate which made the pain worse and raised his sugars. He stopped the sulcralfate.     HE continued to have abdominal pain - worse after eating and lying down.  I spoke with him on 1/26/23 due to continued pain and realized that he has not been taking pantoprazole.  I started him on pantoprazole 40 mg twice daily and Mylanta. He is taking Mylanta 10 mg with meals and at bedtime. Pain is much improved. Bowels are moving.  He will take Miralax to help his bowel move.    No nausea or vomiting. Appetite is better since he is not in as much pain. No melana or bloody stools.  He has lost about 20 pounds on his home scale in the last several months (18 pounds on our scale)      No cough. He is back smoking 1.5 packs of cigarettes daily.        He is taking Metformin  mg one tablet in the morning and 2 at night.      He stopped fenofibrate when he started to have abdominal issues.     He has been frustrated over not feeling well. Mood is better since he is feeling better. He does not  feel sad. No homicidal or gutiérrez icidal ideations.        He continues to have back pain and lateral right leg pain. He sees Dr Carrasquillo in pain management.  Currently taking oxycodone apap 10/325 1/2 tablet three times daily. He has not taken gabapentin 400 mg because it knocks him out. He would like to try a lower dose of gabapentin. . Pain is tolerable. His pain is currently 3/10.   He has intermittent pins and needles sensation in his right thigh if he sits more than  20 minutes.  Pain is keeping him awake at  night. He is sleeping 5-6 hours at [facility].      He had a normal cystoscope  with Dr Dexter  9/13/21      Dr Randall  on 3/17/20 who felt he has -  Right meralgia paresthetica; right lateral thigh tendinitis at the hip; history lumbar disc disease rule out lumbar radiculopathy. EMG was 5/5/20 -  Sensory neuropathy consistent with a history of diabetes; these findings would be compatible with clinical picture of meralgia paresthetica.  He continues to have significant right leg pain that starts in the right groin and goes down the lateral right thigh and the anterior right thigh.  He has a burning sensation on the right thigh.  HE is followed by  Dr Carrasquillo in pain management for his pain.   No change in this pain     Left shoulder is ok. . He is doing his exercises on his own.       He had lithotripsy on 3/23/17. No dysuria, hematuria.   He saw Dr cerna on 1/19. He still has a stone in the left kidney. He is on finasteride and tamsulosin for his prostate.      He has not had any alcohol      He is taking amlodipine 10 mg 1 once daily, Toprol-XL 25 mg once daily and losartan 100 mg daily.  No chest pain, shortness of breath.    . HE checks his blood sugar twice daily due to fluctuating blood sugars.   We are holding simvastatin due to weight issues     No polydipsia. He has a history of hyperlipidemia  No joint pain or muscle pain. NO shortness breath, headaches, vision, sinus congestion.              PAST  "MEDICAL HISTORY:    1. Hypertension.    2. Coronary artery disease with left heart catheterization in November 2011 with 40 to 50% narrowing in the mid LAD.    3. Diabetes mellitus.    4. Hyperlipidemia.    5. History of reflux.    6. Colon polyps.    7. History of cluster headaches.    8. History of glaucoma.    9. Lumbar disk disease at L5 to S1.    10. History of stomach ulcers.    11. Nephrolithiasis.       PAST SURGICAL HISTORY: Cholecystectomy in May 2013, ORIF of the left humerus.      SOCIAL HISTORY: Currently smokes a pack of cigarettes daily, has smoked his whole life. 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 He is a retired schoolteacher.       FAMILY HISTORY: Updated on [facility].      PHYSICAL EXAMINATION:       /70 (BP Location: Right arm, Patient Position: Sitting)   Pulse 97   Ht 5' 9" (1.753 m)   Wt 77.2 kg (170 lb 3.1 oz)   SpO2 100%   BMI 25.13 kg/m²     GENERAL: He is alert, oriented, no apparent distress. Affect within normal limits.    HEENT: Conjunctivae anicteric. Tympanic membranes clear.  . .    NECK: Supple. No cervical lymphadenopathy, no thyroid enlargement.    Respiratory: Effort normal. Lungs are clear to auscultation.    HEART: Regular rate and rhythm without murmurs, gallops or rubs. No lower extremity edema  ABDOMEN: soft, non distended, non tender, bowel sounds present, no hepatosplenomgaly         Labs 1/25/23 reviewed     CT chest 8/31/22 reviewed     ASSESSMENT AND PLAN:     1. Gastritis and duodenitis on EGD by Raoul Alford 1/11/22 - doing better on pantoprazole 40 mg twice daily and mylanta. Will need repeat EGD in 3 months to ensure healing  2. Elevated lipase and  - to see DR Rodriguez for endoscopic ultrasound     3. Lung changes on CT scan chest (bronchiectasis) 8/2022- saw pulmonary on 10/28/22 - needs yearly CT scan chest.  4. Hypertension stable  5. Diabetes mellitus with neuropathy. stable  6. Hyperlipidemia. off fenofibrate  7. Tubular " adenomas - colonoscopy 2/2022 - due 2027  9. History of glaucoma - not on treatment - pressure in eyes ok - followed Nile Avina and retinal specialist at [facility]  10. History of nephrolithiasis -asymptomatic.    11. Irregular heart beat sensation - holter revealed PVC  12. Anxiety and depression -valium 5 mg nightly prn anxiety.  Rarely takes  13. Tobacco abuse -working on cessation.   II will see him back in 3 months, sooner if issues.   14. Aortic atherosclerosis - modifying risk factors  15. Back pain - followed by Dr Carrasquillo - on chronic opiates - stable.      Colonoscopy 12/7/16 - one hyperplastic polyp due in 2021 - ordered

## 2023-02-01 NOTE — PROGRESS NOTES
Health Maintenance Due   Topic Date Due    High Dose Statin  Never done    Shingles Vaccine (2 of 3) 07/06/2016    PROSTATE-SPECIFIC ANTIGEN  03/24/2022    Diabetes Urine Screening  07/28/2022    Foot Exam  03/02/2023        updated. Triggered LINKS and Care Everywhere. Reconciled immunizations.  EGD record and Pathology Report scanned into chart.    Nilda Garcia LPN   Clinical Care Coordinator  Primary Care and Wellness

## 2023-02-03 ENCOUNTER — TELEPHONE (OUTPATIENT)
Dept: ENDOSCOPY | Facility: HOSPITAL | Age: 73
End: 2023-02-03
Payer: MEDICARE

## 2023-02-03 ENCOUNTER — PES CALL (OUTPATIENT)
Dept: ADMINISTRATIVE | Facility: CLINIC | Age: 73
End: 2023-02-03
Payer: MEDICARE

## 2023-02-03 NOTE — TELEPHONE ENCOUNTER
----- Message from Wilma Villar sent at 2/3/2023  2:24 PM CST -----  Contact: @[phone]  Pt is calling in from a missed call, pt states that it takes him a while to get to the phone, please call to discuss further.

## 2023-02-03 NOTE — TELEPHONE ENCOUNTER
----- Message from Mercy Nguyen sent at 2/3/2023  1:28 PM CST -----  Regarding: Call Back  Contact: pt @[phone]  Pt is asking for a call back to reschedule appt.

## 2023-02-16 ENCOUNTER — OFFICE VISIT (OUTPATIENT)
Dept: GASTROENTEROLOGY | Facility: CLINIC | Age: 73
End: 2023-02-16
Payer: MEDICARE

## 2023-02-16 ENCOUNTER — TELEPHONE (OUTPATIENT)
Dept: ENDOSCOPY | Facility: HOSPITAL | Age: 73
End: 2023-02-16
Payer: MEDICARE

## 2023-02-16 VITALS
SYSTOLIC BLOOD PRESSURE: 132 MMHG | BODY MASS INDEX: 25.77 KG/M2 | DIASTOLIC BLOOD PRESSURE: 70 MMHG | HEIGHT: 69 IN | HEART RATE: 97 BPM | WEIGHT: 174 LBS

## 2023-02-16 DIAGNOSIS — K86.1 CHRONIC PANCREATITIS, UNSPECIFIED PANCREATITIS TYPE: Primary | ICD-10-CM

## 2023-02-16 DIAGNOSIS — R10.13 EPIGASTRIC ABDOMINAL PAIN: ICD-10-CM

## 2023-02-16 DIAGNOSIS — R74.8 ELEVATED LIPASE: Primary | ICD-10-CM

## 2023-02-16 DIAGNOSIS — R97.8 ELEVATED CA 19-9 LEVEL: ICD-10-CM

## 2023-02-16 PROCEDURE — 3075F SYST BP GE 130 - 139MM HG: CPT | Mod: CPTII,S$GLB,, | Performed by: INTERNAL MEDICINE

## 2023-02-16 PROCEDURE — 3008F PR BODY MASS INDEX (BMI) DOCUMENTED: ICD-10-PCS | Mod: CPTII,S$GLB,, | Performed by: INTERNAL MEDICINE

## 2023-02-16 PROCEDURE — 1101F PT FALLS ASSESS-DOCD LE1/YR: CPT | Mod: CPTII,S$GLB,, | Performed by: INTERNAL MEDICINE

## 2023-02-16 PROCEDURE — 3051F PR MOST RECENT HEMOGLOBIN A1C LEVEL 7.0 - < 8.0%: ICD-10-PCS | Mod: CPTII,S$GLB,, | Performed by: INTERNAL MEDICINE

## 2023-02-16 PROCEDURE — 1101F PR PT FALLS ASSESS DOC 0-1 FALLS W/OUT INJ PAST YR: ICD-10-PCS | Mod: CPTII,S$GLB,, | Performed by: INTERNAL MEDICINE

## 2023-02-16 PROCEDURE — 3288F FALL RISK ASSESSMENT DOCD: CPT | Mod: CPTII,S$GLB,, | Performed by: INTERNAL MEDICINE

## 2023-02-16 PROCEDURE — 99204 PR OFFICE/OUTPT VISIT, NEW, LEVL IV, 45-59 MIN: ICD-10-PCS | Mod: S$GLB,,, | Performed by: INTERNAL MEDICINE

## 2023-02-16 PROCEDURE — 1160F RVW MEDS BY RX/DR IN RCRD: CPT | Mod: CPTII,S$GLB,, | Performed by: INTERNAL MEDICINE

## 2023-02-16 PROCEDURE — 4010F ACE/ARB THERAPY RXD/TAKEN: CPT | Mod: CPTII,S$GLB,, | Performed by: INTERNAL MEDICINE

## 2023-02-16 PROCEDURE — 3078F DIAST BP <80 MM HG: CPT | Mod: CPTII,S$GLB,, | Performed by: INTERNAL MEDICINE

## 2023-02-16 PROCEDURE — 3288F PR FALLS RISK ASSESSMENT DOCUMENTED: ICD-10-PCS | Mod: CPTII,S$GLB,, | Performed by: INTERNAL MEDICINE

## 2023-02-16 PROCEDURE — 3078F PR MOST RECENT DIASTOLIC BLOOD PRESSURE < 80 MM HG: ICD-10-PCS | Mod: CPTII,S$GLB,, | Performed by: INTERNAL MEDICINE

## 2023-02-16 PROCEDURE — 1160F PR REVIEW ALL MEDS BY PRESCRIBER/CLIN PHARMACIST DOCUMENTED: ICD-10-PCS | Mod: CPTII,S$GLB,, | Performed by: INTERNAL MEDICINE

## 2023-02-16 PROCEDURE — 99999 PR PBB SHADOW E&M-EST. PATIENT-LVL III: CPT | Mod: PBBFAC,,, | Performed by: INTERNAL MEDICINE

## 2023-02-16 PROCEDURE — 3051F HG A1C>EQUAL 7.0%<8.0%: CPT | Mod: CPTII,S$GLB,, | Performed by: INTERNAL MEDICINE

## 2023-02-16 PROCEDURE — 1159F PR MEDICATION LIST DOCUMENTED IN MEDICAL RECORD: ICD-10-PCS | Mod: CPTII,S$GLB,, | Performed by: INTERNAL MEDICINE

## 2023-02-16 PROCEDURE — 1159F MED LIST DOCD IN RCRD: CPT | Mod: CPTII,S$GLB,, | Performed by: INTERNAL MEDICINE

## 2023-02-16 PROCEDURE — 4010F PR ACE/ARB THEARPY RXD/TAKEN: ICD-10-PCS | Mod: CPTII,S$GLB,, | Performed by: INTERNAL MEDICINE

## 2023-02-16 PROCEDURE — 99204 OFFICE O/P NEW MOD 45 MIN: CPT | Mod: S$GLB,,, | Performed by: INTERNAL MEDICINE

## 2023-02-16 PROCEDURE — 3075F PR MOST RECENT SYSTOLIC BLOOD PRESS GE 130-139MM HG: ICD-10-PCS | Mod: CPTII,S$GLB,, | Performed by: INTERNAL MEDICINE

## 2023-02-16 PROCEDURE — 3008F BODY MASS INDEX DOCD: CPT | Mod: CPTII,S$GLB,, | Performed by: INTERNAL MEDICINE

## 2023-02-16 PROCEDURE — 99999 PR PBB SHADOW E&M-EST. PATIENT-LVL III: ICD-10-PCS | Mod: PBBFAC,,, | Performed by: INTERNAL MEDICINE

## 2023-02-16 NOTE — TELEPHONE ENCOUNTER
Patient seen in clinic by Dr Rodriguez on 2/16    Please arrange for EGD and EUS sometime in early March.  He prefers [facility].  Can be scheduled as a 60 minute case.

## 2023-02-16 NOTE — PROGRESS NOTES
Advanced Endoscopy / Pancreaticobiliary Service    Reason for visit (Chief Complaint):  Epigastric pain, elevated lipase, elevated CA 19-9    Referring provider/PCP: Elizabeth Benitez MD  [address] [place] [address]    History of Present Illness: Patient presents for above.  He was evaluated in January by an outside gastroenterologist for symptoms of abdominal pain.  Workup included labs, which revealed elevated lipase, and elevated CA 19 9.  An EGD was performed, and this revealed ulceration edema and congestion in the gastric antrum and pre-pyloric area as well as duodenitis.  Biopsies showed nothing concerning.  His primary care doctor started him on twice daily pantoprazole about a month ago, and his symptoms have markedly improved.       Physical Exam:  General: Well-developed, well-appearing, no acute distress  Abdomen: soft, non-tender      Laboratory:       Imaging:  Reviewed CT scan images performed in December 2022.  The pancreas appears normal on the CT.      Assessment/Plan:  His epigastric pain was likely related to his ulcers, and is now resolved and has responded well to proton pump inhibitor therapy.  It would be reasonable to confirm healing of the gastric ulcer during a follow-up upper endoscopy.  Given the slight elevation of his lipase and CA 19-9, I think an endoscopic ultrasound performed at that same session is also reasonable to verify the absence of pancreaticobiliary pathology.  We will arrange for an EGD and EUS to be performed sometime in early March.  In the interim I have advised him to continue twice daily proton pump inhibitor.        Maverick Rodriguez MD, SARANYA   - Department of Gastroenterology  [facility] [place]

## 2023-02-22 ENCOUNTER — PATIENT MESSAGE (OUTPATIENT)
Dept: ENDOSCOPY | Facility: HOSPITAL | Age: 73
End: 2023-02-22
Payer: MEDICARE

## 2023-02-22 ENCOUNTER — TELEPHONE (OUTPATIENT)
Dept: ENDOSCOPY | Facility: HOSPITAL | Age: 73
End: 2023-02-22
Payer: MEDICARE

## 2023-02-22 NOTE — TELEPHONE ENCOUNTER
Telephoned pt to schedule EGDEUS.  Spoke with pt and procedure scheduled for 3/14/23 at 11:00am.  Reviewed history and medications.  Instructed on procedure and preparation.  Pt verbalized understanding.  Instructions sent via patient portal and mailed to address on file.

## 2023-02-24 ENCOUNTER — PES CALL (OUTPATIENT)
Dept: ADMINISTRATIVE | Facility: CLINIC | Age: 73
End: 2023-02-24
Payer: MEDICARE

## 2023-03-08 ENCOUNTER — PATIENT MESSAGE (OUTPATIENT)
Dept: ENDOSCOPY | Facility: HOSPITAL | Age: 73
End: 2023-03-08
Payer: MEDICARE

## 2023-03-14 ENCOUNTER — HOSPITAL ENCOUNTER (OUTPATIENT)
Facility: HOSPITAL | Age: 73
Discharge: HOME | End: 2023-03-14
Attending: INTERNAL MEDICINE | Admitting: INTERNAL MEDICINE
Payer: MEDICARE

## 2023-03-14 ENCOUNTER — TELEPHONE (OUTPATIENT)
Dept: ENDOSCOPY | Facility: HOSPITAL | Age: 73
End: 2023-03-14
Payer: MEDICARE

## 2023-03-14 ENCOUNTER — ANESTHESIA (OUTPATIENT)
Dept: ENDOSCOPY | Facility: HOSPITAL | Age: 73
End: 2023-03-14
Payer: MEDICARE

## 2023-03-14 ENCOUNTER — ANESTHESIA EVENT (OUTPATIENT)
Dept: ENDOSCOPY | Facility: HOSPITAL | Age: 73
End: 2023-03-14
Payer: MEDICARE

## 2023-03-14 VITALS
BODY MASS INDEX: 25.92 KG/M2 | TEMPERATURE: 98 F | RESPIRATION RATE: 16 BRPM | HEIGHT: 69 IN | HEART RATE: 69 BPM | SYSTOLIC BLOOD PRESSURE: 163 MMHG | WEIGHT: 175 LBS | OXYGEN SATURATION: 100 % | DIASTOLIC BLOOD PRESSURE: 77 MMHG

## 2023-03-14 DIAGNOSIS — R74.8 ELEVATED LIPASE: ICD-10-CM

## 2023-03-14 DIAGNOSIS — K83.1 BILIARY STRICTURE: Primary | ICD-10-CM

## 2023-03-14 DIAGNOSIS — R97.8 OTHER ABNORMAL TUMOR MARKERS: ICD-10-CM

## 2023-03-14 LAB — POCT GLUCOSE: 132 MG/DL (ref 70–110)

## 2023-03-14 PROCEDURE — D9220A PRA ANESTHESIA: ICD-10-PCS | Mod: CRNA,,, | Performed by: NURSE ANESTHETIST, CERTIFIED REGISTERED

## 2023-03-14 PROCEDURE — 43259 EGD US EXAM DUODENUM/JEJUNUM: CPT | Mod: 59,,, | Performed by: INTERNAL MEDICINE

## 2023-03-14 PROCEDURE — 27201012 HC FORCEPS, HOT/COLD, DISP: Performed by: INTERNAL MEDICINE

## 2023-03-14 PROCEDURE — 63600175 PHARM REV CODE 636 W HCPCS: Performed by: NURSE ANESTHETIST, CERTIFIED REGISTERED

## 2023-03-14 PROCEDURE — D9220A PRA ANESTHESIA: ICD-10-PCS | Mod: ANES,,, | Performed by: ANESTHESIOLOGY

## 2023-03-14 PROCEDURE — 37000009 HC ANESTHESIA EA ADD 15 MINS: Performed by: INTERNAL MEDICINE

## 2023-03-14 PROCEDURE — 43239 PR EGD, FLEX, W/BIOPSY, SGL/MULTI: ICD-10-PCS | Mod: ,,, | Performed by: INTERNAL MEDICINE

## 2023-03-14 PROCEDURE — 88305 TISSUE EXAM BY PATHOLOGIST: CPT | Performed by: PATHOLOGY

## 2023-03-14 PROCEDURE — 43259 EGD US EXAM DUODENUM/JEJUNUM: CPT | Performed by: INTERNAL MEDICINE

## 2023-03-14 PROCEDURE — 43259 PR ENDOSCOPIC ULTRASOUND EXAM: ICD-10-PCS | Mod: 59,,, | Performed by: INTERNAL MEDICINE

## 2023-03-14 PROCEDURE — 25000003 PHARM REV CODE 250: Performed by: INTERNAL MEDICINE

## 2023-03-14 PROCEDURE — 82962 GLUCOSE BLOOD TEST: CPT | Performed by: INTERNAL MEDICINE

## 2023-03-14 PROCEDURE — 37000008 HC ANESTHESIA 1ST 15 MINUTES: Performed by: INTERNAL MEDICINE

## 2023-03-14 PROCEDURE — D9220A PRA ANESTHESIA: Mod: CRNA,,, | Performed by: NURSE ANESTHETIST, CERTIFIED REGISTERED

## 2023-03-14 PROCEDURE — 88305 TISSUE EXAM BY PATHOLOGIST: CPT | Mod: 26,,, | Performed by: PATHOLOGY

## 2023-03-14 PROCEDURE — 43239 EGD BIOPSY SINGLE/MULTIPLE: CPT | Performed by: INTERNAL MEDICINE

## 2023-03-14 PROCEDURE — 43239 EGD BIOPSY SINGLE/MULTIPLE: CPT | Mod: ,,, | Performed by: INTERNAL MEDICINE

## 2023-03-14 PROCEDURE — 25000003 PHARM REV CODE 250: Performed by: NURSE ANESTHETIST, CERTIFIED REGISTERED

## 2023-03-14 PROCEDURE — D9220A PRA ANESTHESIA: Mod: ANES,,, | Performed by: ANESTHESIOLOGY

## 2023-03-14 PROCEDURE — 88305 TISSUE EXAM BY PATHOLOGIST: ICD-10-PCS | Mod: 26,,, | Performed by: PATHOLOGY

## 2023-03-14 RX ORDER — SODIUM CHLORIDE 0.9 % (FLUSH) 0.9 %
10 SYRINGE (ML) INJECTION
Status: DISCONTINUED | OUTPATIENT
Start: 2023-03-14 | End: 2023-03-14 | Stop reason: HOSPADM

## 2023-03-14 RX ORDER — SODIUM CHLORIDE 9 MG/ML
INJECTION, SOLUTION INTRAVENOUS CONTINUOUS
Status: DISCONTINUED | OUTPATIENT
Start: 2023-03-14 | End: 2023-03-14 | Stop reason: HOSPADM

## 2023-03-14 RX ORDER — PANTOPRAZOLE SODIUM 40 MG/1
40 TABLET, DELAYED RELEASE ORAL DAILY
Qty: 90 TABLET | Refills: 3 | Status: SHIPPED | OUTPATIENT
Start: 2023-03-14 | End: 2024-03-18

## 2023-03-14 RX ORDER — PROPOFOL 10 MG/ML
VIAL (ML) INTRAVENOUS
Status: DISCONTINUED | OUTPATIENT
Start: 2023-03-14 | End: 2023-03-14

## 2023-03-14 RX ORDER — PROPOFOL 10 MG/ML
VIAL (ML) INTRAVENOUS CONTINUOUS PRN
Status: DISCONTINUED | OUTPATIENT
Start: 2023-03-14 | End: 2023-03-14

## 2023-03-14 RX ORDER — LIDOCAINE HYDROCHLORIDE 20 MG/ML
INJECTION, SOLUTION EPIDURAL; INFILTRATION; INTRACAUDAL; PERINEURAL
Status: DISCONTINUED | OUTPATIENT
Start: 2023-03-14 | End: 2023-03-14

## 2023-03-14 RX ADMIN — SODIUM CHLORIDE: 9 INJECTION, SOLUTION INTRAVENOUS at 10:03

## 2023-03-14 RX ADMIN — LIDOCAINE HYDROCHLORIDE 100 MG: 20 INJECTION, SOLUTION EPIDURAL; INFILTRATION; INTRACAUDAL; PERINEURAL at 10:03

## 2023-03-14 RX ADMIN — SODIUM CHLORIDE: 0.9 INJECTION, SOLUTION INTRAVENOUS at 10:03

## 2023-03-14 RX ADMIN — Medication 125 MCG/KG/MIN: at 10:03

## 2023-03-14 RX ADMIN — PROPOFOL 70 MG: 10 INJECTION, EMULSION INTRAVENOUS at 10:03

## 2023-03-14 NOTE — H&P
Short Stay Endoscopy History and Physical    PCP - Elizabeth Benitez MD  Referring Physician - Maverick Rodriguez MD  [address] [place] [address]    Procedure - EGD/EUS  ASA - per anesthesia  Mallampati - per anesthesia  History of Anesthesia problems - no  Family history Anesthesia problems -  no   Plan of anesthesia - General    HPI:  This is a 72 y.o. male here for evaluation of: f/u PUD, elevated lipase    Reflux - no  Dysphagia - no  Abdominal pain - no  Diarrhea - no    ROS:  Constitutional: No fevers, chills, No weight loss  CV: No chest pain  Pulm: No cough, No shortness of breath  GI: see HPI    Medical History:  has a past medical history of Angina pectoris, Back pain, Cluster headaches, Colon polyps, Coronary artery disease (11/2011), Diabetes mellitus, GERD (gastroesophageal reflux disease), Hyperlipidemia, Hypertension, Lumbar disc disease, and Nephrolithiasis.    Surgical History:  has a past surgical history that includes Cholecystectomy; ORIF left humerus; Colonoscopy (N/A, 12/07/2016); Lithotripsy; Arthroscopic repair of rotator cuff of shoulder (Left, 01/09/2020); Arthroscopic debridement of shoulder (Left, 01/09/2020); Excision of bursa (Left, 01/09/2020); Arthroscopy of shoulder with decompression of subacromial space (Left, 01/09/2020); and Colonoscopy (N/A, 02/02/2022).    Family History: family history includes Alzheimer's disease in his brother; Blindness in his son; Cancer in his sister; Diabetes in his son, son, son, and son; Heart disease in his brother and mother; Heart failure in his brother; Hypertension in his son, son, and son; Kidney disease in his brother; No Known Problems in his brother and father; Sleep apnea in his son..    Social History:  reports that he has been smoking cigarettes. He has a 75.00 pack-year smoking history. He has been exposed to tobacco smoke. He has never used smokeless tobacco. He reports current alcohol use. He reports current drug use.  Drug: Hydrocodone.    Review of patient's allergies indicates:   Allergen Reactions    Aspirin Nausea And Vomiting     If over 81mg       Medications:   Medications Prior to Admission   Medication Sig Dispense Refill Last Dose    amLODIPine (NORVASC) 10 MG tablet TAKE 1 TABLET(10 MG) BY MOUTH EVERY DAY 90 tablet 3 3/13/2023    aspirin 81 MG Chew Take 1 tablet (81 mg total) by mouth once daily. 180 tablet 4 3/13/2023    finasteride (PROSCAR) 5 mg tablet Take 1 tablet (5 mg total) by mouth once daily. 90 tablet 3 3/13/2023    losartan (COZAAR) 100 MG tablet Take 1 tablet (100 mg total) by mouth once daily. 90 tablet 3 3/13/2023    metFORMIN (GLUCOPHAGE-XR) 500 MG ER 24hr tablet TAKE 2 TABLETS(1000 MG) BY MOUTH TWICE DAILY 360 tablet 3 3/13/2023    metoprolol succinate (TOPROL-XL) 25 MG 24 hr tablet TAKE 1 TABLET(25 MG) BY MOUTH EVERY DAY 90 tablet 3 3/13/2023    oxyCODONE-acetaminophen (PERCOCET)  mg per tablet Take 1 tablet by mouth every 8 (eight) hours as needed for Pain. 6 tablet 0 3/13/2023    pantoprazole (PROTONIX) 40 MG tablet Take 1 tablet (40 mg total) by mouth 2 (two) times daily. 60 tablet 4 3/13/2023    tamsulosin (FLOMAX) 0.4 mg Cap Take 1 capsule (0.4 mg total) by mouth once daily. 90 capsule 3 3/13/2023    vitamin D 1000 units Tab Take 2,000 Units by mouth once daily. Take 1 tablet daily   3/13/2023    amLODIPine (NORVASC) 10 MG tablet Take 1 tablet (10 mg total) by mouth once daily. 2 tablet 0     blood sugar diagnostic Strp True Metrics test strips - check blood sugar twice daily - fluctuating blood sugars 200 strip 4     blood-glucose meter kit Use as instructed.  Gabriel MEtric 1 each 0     diazePAM (VALIUM) 5 MG tablet Take 1 tablet (5 mg total) by mouth nightly as needed for Anxiety. 30 tablet 1     gabapentin (NEURONTIN) 100 MG capsule 1-2 capsules three times daily for nerve pain 180 capsule 11 More than a month    hyoscyamine (LEVSIN/SL) 0.125 mg Subl Place 1 tablet (0.125 mg total) under  the tongue every 4 (four) hours as needed. (Patient not taking: Reported on 2/1/2023) 15 tablet 0     lancets Misc True Matrix Lancets -c heck blood sugar twice daily - fluctuating blood sugars 200 each 4     MULTIVITS-MINERALS/FA/LYCOPENE (ONE-A-DAY MEN'S ORAL) Take 1 tablet by mouth once daily.       PREVIDENT 5000 BOOSTER PLUS 1.1 % Pste SMARTSIG:To Teeth       senna-docusate 8.6-50 mg (PERICOLACE) 8.6-50 mg per tablet Take 1 tablet by mouth daily as needed for Constipation. (Patient not taking: Reported on 2/1/2023) 10 tablet 0        Physical Exam:    Vital Signs:   Vitals:    03/14/23 1019   BP: (!) 156/72   Pulse: 71   Resp: 16   Temp: 98 °F (36.7 °C)       General Appearance: Well appearing in no acute distress  Eyes:    No scleral icterus  Lungs: no labored breathing  Heart:  Regular  Abdomen: non tender    Labs:  Lab Results   Component Value Date    WBC 5.35 01/25/2023    HGB 12.5 (L) 01/25/2023    HCT 37.8 (L) 01/25/2023     01/25/2023    CHOL 145 01/25/2023    TRIG 191 (H) 01/25/2023    HDL 44 01/25/2023    ALT 15 01/25/2023    AST 16 01/25/2023     01/25/2023    K 5.1 01/25/2023     01/25/2023    CREATININE 1.2 01/25/2023    BUN 13 01/25/2023    CO2 25 01/25/2023    TSH 0.758 01/25/2023    PSA 0.59 03/24/2021    INR 1.0 07/17/2022    HGBA1C 7.4 (H) 01/25/2023       I have explained the risks and benefits of this endoscopic procedure to the patient including but not limited to bleeding, inflammation, infection, perforation, and death.      Maverick Rodriguez MD

## 2023-03-14 NOTE — PROVATION PATIENT INSTRUCTIONS
Discharge Summary/Instructions after an Endoscopic Procedure  Patient Name: Tony Han  Patient MRN: [id number]  Patient YOB: 1950 Tuesday, March 14, 2023  Maverick Rodriguez MD  Dear patient,  As a result of recent federal legislation (The Federal Cures Act), you may   receive lab or pathology results from your procedure in your [facility]   account before your physician is able to contact you. Your physician or   their representative will relay the results to you with their   recommendations at their soonest availability.  Thank you,  RESTRICTIONS:  During your procedure today, you received medications for sedation.  These   medications may affect your judgment, balance and coordination.  Therefore,   for 24 hours, you have the following restrictions:   - DO NOT drive a car, operate machinery, make legal/financial decisions,   sign important papers or drink alcohol.    ACTIVITY:  Today: no heavy lifting, straining or running due to procedural   sedation/anesthesia.  The following day: return to full activity including work.  DIET:  Eat and drink normally unless instructed otherwise.     TREATMENT FOR COMMON SIDE EFFECTS:  - Mild abdominal pain, nausea, belching, bloating or excessive gas:  rest,   eat lightly and use a heating pad.  - Sore Throat: treat with throat lozenges and/or gargle with warm salt   water.  - Because air was used during the procedure, expelling large amounts of air   from your rectum or belching is normal.  - If a bowel prep was taken, you may not have a bowel movement for 1-3 days.    This is normal.  SYMPTOMS TO WATCH FOR AND REPORT TO YOUR PHYSICIAN:  1. Abdominal pain or bloating, other than gas cramps.  2. Chest pain.  3. Back pain.  4. Signs of infection such as: chills or fever occurring within 24 hours   after the procedure.  5. Rectal bleeding, which would show as bright red, maroon, or black stools.   (A tablespoon of blood from the rectum is not serious, especially if    hemorrhoids are present.)  6. Vomiting.  7. Weakness or dizziness.  GO DIRECTLY TO THE NEAREST EMERGENCY ROOM IF YOU HAVE ANY OF THE FOLLOWING:      Difficulty breathing              Chills and/or fever over 101 F   Persistent vomiting and/or vomiting blood   Severe abdominal pain   Severe chest pain   Black, tarry stools   Bleeding- more than one tablespoon   Any other symptom or condition that you feel may need urgent attention  Your doctor recommends these additional instructions:  If any biopsies were taken, your doctors clinic will contact you in 1 to 2   weeks with any results.  - Discharge patient to home (ambulatory).   - Resume previous diet; Discharge to home (ambulatory); Resume outpatient   medications  - Return to primary care physician as previously scheduled.   - Await path results.   - Given resolution of gastric ulcerations, OK to decrease PPI to once daily   (will inform patient).  - Regarding slightly elevated CA 19-9, no evidence for pancreatic mass on   today's EUS. Will arrange a repeat CA 19-9.  For questions, problems or results please call your physician - Maverick Rodriguez MD at Work:  [phone].  [facility] [place], EMERGENCY ROOM PHONE NUMBER: [phone]  IF A COMPLICATION OR EMERGENCY SITUATION ARISES AND YOU ARE UNABLE TO REACH   YOUR PHYSICIAN - GO DIRECTLY TO THE EMERGENCY ROOM.  Maverick Rodriguez MD  3/14/2023 11:33:28 AM  This report has been verified and signed electronically.  Dear patient,  As a result of recent federal legislation (The Federal Cures Act), you may   receive lab or pathology results from your procedure in your [facility]   account before your physician is able to contact you. Your physician or   their representative will relay the results to you with their   recommendations at their soonest availability.  Thank you,  PROVATION

## 2023-03-14 NOTE — ANESTHESIA POSTPROCEDURE EVALUATION
Anesthesia Post Evaluation    Patient: Tony Han    Procedure(s) Performed: Procedure(s) (LRB):  ULTRASOUND, UPPER GI TRACT, ENDOSCOPIC (N/A)  EGD (ESOPHAGOGASTRODUODENOSCOPY) (N/A)    Final Anesthesia Type: general      Patient location during evaluation: [facility]  Patient participation: Yes- Able to Participate  Level of consciousness: awake and alert  Post-procedure vital signs: reviewed and stable  Pain management: adequate  Airway patency: patent    PONV status at discharge: No PONV  Anesthetic complications: no      Cardiovascular status: hemodynamically stable  Respiratory status: unassisted, spontaneous ventilation and room air  Hydration status: euvolemic  Follow-up not needed.          Vitals Value Taken Time   /77 03/14/23 1146   Temp 36.7 °C (98 °F) 03/14/23 1145   Pulse 64 03/14/23 1151   Resp 16 03/14/23 1145   SpO2 100 % 03/14/23 1151   Vitals shown include unvalidated device data.      No case tracking events are documented in the log.      Pain/Laura Score: Laura Score: 10 (3/14/2023 11:45 AM)

## 2023-03-14 NOTE — ANESTHESIA RELEASE NOTE
"Anesthesia Release from PACU Note    Patient: Tony Han    Procedure(s) Performed: Procedure(s) (LRB):  ULTRASOUND, UPPER GI TRACT, ENDOSCOPIC (N/A)  EGD (ESOPHAGOGASTRODUODENOSCOPY) (N/A)    Anesthesia type: general    Post pain: Adequate analgesia    Post assessment: no apparent anesthetic complications and tolerated procedure well    Last Vitals:   Visit Vitals  BP (!) 163/77   Pulse 69   Temp 36.7 °C (98 °F)   Resp 16   Ht 5' 9" (1.753 m)   Wt 79.4 kg (175 lb)   SpO2 100%   BMI 25.84 kg/m²       Post vital signs: stable    Level of consciousness: awake and alert     Nausea/Vomiting: no nausea/no vomiting    Complications: none    Airway Patency: patent    Respiratory: unassisted, spontaneous ventilation, room air    Cardiovascular: stable and blood pressure at baseline    Hydration: euvolemic  "

## 2023-03-14 NOTE — TRANSFER OF CARE
"Anesthesia Transfer of Care Note    Patient: Tony Han    Procedure(s) Performed: Procedure(s) (LRB):  ULTRASOUND, UPPER GI TRACT, ENDOSCOPIC (N/A)  EGD (ESOPHAGOGASTRODUODENOSCOPY) (N/A)    Patient location: PACU    Anesthesia Type: general    Transport from OR: Transported from OR on room air with adequate spontaneous ventilation    Post pain: adequate analgesia    Post assessment: no apparent anesthetic complications    Post vital signs: stable    Level of consciousness: awake    Nausea/Vomiting: no nausea/vomiting    Complications: none    Transfer of care protocol was followed      Last vitals:   Visit Vitals  BP (!) 144/74 (BP Location: Left arm)   Pulse 72   Temp 36.7 °C (98 °F) (Temporal)   Resp 20   Ht 5' 9" (1.753 m)   Wt 79.4 kg (175 lb)   SpO2 99%   BMI 25.84 kg/m²     "

## 2023-03-14 NOTE — TELEPHONE ENCOUNTER
----- Message from Maverick Rodriguez MD sent at 3/14/2023 12:18 PM CDT -----  Priscila- Please arrange for a repeat CA 19-9 to be done in the next few weeks. Thanks.

## 2023-03-14 NOTE — ANESTHESIA PREPROCEDURE EVALUATION
03/14/2023  Tony Han is a 72 y.o., male.  Past Medical History:   Diagnosis Date    Angina pectoris     Back pain     Cluster headaches     1999    Colon polyps     5 polyps in Dec 2010    Coronary artery disease 11/2011    moderate nonobstructive cad on left heart cath at [facility] 11/2011    Diabetes mellitus     GERD (gastroesophageal reflux disease)     history of peptic ulcer disease    Hyperlipidemia     Hypertension     Lumbar disc disease     MRI 2008 - L5-S1    Nephrolithiasis     July 2013     Past Surgical History:   Procedure Laterality Date    ARTHROSCOPIC DEBRIDEMENT OF SHOULDER Left 01/09/2020    Procedure: EXTENSIVE DEBRIDEMENT, SHOULDER, ARTHROSCOPIC;  Surgeon: Marcela John MD;  Location: [facility] OR;  Service: Orthopedics;  Laterality: Left;    ARTHROSCOPIC REPAIR OF ROTATOR CUFF OF SHOULDER Left 01/09/2020    Procedure: REPAIR, ROTATOR CUFF, ARTHROSCOPIC, MEDIUM DOUBLE ROW;  Surgeon: Marcela John MD;  Location: [facility] OR;  Service: Orthopedics;  Laterality: Left;    ARTHROSCOPY OF SHOULDER WITH DECOMPRESSION OF SUBACROMIAL SPACE Left 01/09/2020    Procedure: ARTHROSCOPY, SHOULDER, WITH SUBACROMIAL DECOMPRESSION;  Surgeon: Marcela John MD;  Location: [facility] OR;  Service: Orthopedics;  Laterality: Left;    CHOLECYSTECTOMY      COLONOSCOPY N/A 12/07/2016    Procedure: COLONOSCOPY;  Surgeon: Lance Crocker MD;  Location: [facility] ([address] [place] [address]);  Service: Endoscopy;  Laterality: N/A;    COLONOSCOPY N/A 02/02/2022    Procedure: COLONOSCOPY;  Surgeon: Zena Maddox MD;  [facility]  Service: Endoscopy;  Laterality: N/A;  covid test 1/30/22 [place], prep instr emailed -ml  Miralax prep    EXCISION OF BURSA Left 01/09/2020    Procedure: BURSECTOMY;  Surgeon: Marcela John MD;  Location: [facility] OR;  Service: Orthopedics;  Laterality: Left;    LITHOTRIPSY      ORIF left  humerus             Pre-op Assessment    I have reviewed the Patient Summary Reports.    I have reviewed the NPO Status.      Review of Systems  Anesthesia Hx:  No problems with previous Anesthesia  History of prior surgery of interest to airway management or planning: Denies Family Hx of Anesthesia complications.   Denies Personal Hx of Anesthesia complications.   Social:  Non-Smoker    Cardiovascular:   Exercise tolerance: good Hypertension CAD      Pulmonary:  Pulmonary Normal    Renal/:   renal calculi    Hepatic/GI:   GERD, well controlled    Musculoskeletal:  Spine Disorders: lumbar    Endocrine:   Diabetes, type 2      Lab Results   Component Value Date    HGBA1C 7.4 (H) 01/25/2023       Physical Exam  General: Well nourished    Airway:  Mallampati: I   Mouth Opening: Normal  TM Distance: Normal  Tongue: Normal    Dental:  Intact    Chest/Lungs:  Normal Respiratory Rate    Heart:  Rate: Normal        Anesthesia Plan  Type of Anesthesia, risks & benefits discussed:    Anesthesia Type: Gen Natural Airway  Intra-op Monitoring Plan: Standard ASA Monitors  Induction:  IV  Informed Consent: Informed consent signed with the Patient and all parties understand the risks and agree with anesthesia plan.  All questions answered.   ASA Score: 3  Day of Surgery Review of History & Physical: H&P Update referred to the surgeon/provider.    Ready For Surgery From Anesthesia Perspective.     .

## 2023-03-15 ENCOUNTER — TELEPHONE (OUTPATIENT)
Dept: ENDOSCOPY | Facility: HOSPITAL | Age: 73
End: 2023-03-15

## 2023-03-15 NOTE — TELEPHONE ENCOUNTER
----- Message from Tremontana Chevalier sent at 3/15/2023  4:42 PM CDT -----  Regarding: pt advice  Pt says had proc on yesterday with Dr. Maverick Rodriguez and can't get rid of painful cough and sore throat. Pls cll pt @ [phone]. Pt says has no information on discharge papers and did not get any info from provider.       [id number] [address] [facility]  [address] [facility] [address]  Phone: [phone] Fax: [phone]

## 2023-03-15 NOTE — TELEPHONE ENCOUNTER
Patient says his throat is sore since the scope yesterday. He will try gargling with warm salt water and throat lozenges

## 2023-03-16 ENCOUNTER — PATIENT MESSAGE (OUTPATIENT)
Dept: ENDOSCOPY | Facility: HOSPITAL | Age: 73
End: 2023-03-16
Payer: MEDICARE

## 2023-03-20 ENCOUNTER — TELEPHONE (OUTPATIENT)
Dept: INTERNAL MEDICINE | Facility: CLINIC | Age: 73
End: 2023-03-20
Payer: MEDICARE

## 2023-03-20 LAB
FINAL PATHOLOGIC DIAGNOSIS: NORMAL
GROSS: NORMAL
Lab: NORMAL

## 2023-03-20 NOTE — TELEPHONE ENCOUNTER
Pt states that he only person scheduled on 3-30 because his appt is for a follow up of a procedure.  He doesn't think wife was supposed to come in at that time.

## 2023-03-20 NOTE — TELEPHONE ENCOUNTER
----- Message from Elizabeth Benitez MD sent at 3/19/2023  7:59 AM CDT -----  Mr Han has a follow up apt with me on 3/30/23 and wife Nelly Han MRN [id number] has an apt with me on 5/2/23    They usually come together as a couple.  Please contact . If he and wife want to come together on 3/30/23 - please book wife to see me on 3/30/23 in a d hold slot

## 2023-03-30 ENCOUNTER — OFFICE VISIT (OUTPATIENT)
Dept: INTERNAL MEDICINE | Facility: CLINIC | Age: 73
End: 2023-03-30
Payer: MEDICARE

## 2023-03-30 ENCOUNTER — PATIENT OUTREACH (OUTPATIENT)
Dept: ADMINISTRATIVE | Facility: HOSPITAL | Age: 73
End: 2023-03-30
Payer: MEDICARE

## 2023-03-30 VITALS
SYSTOLIC BLOOD PRESSURE: 126 MMHG | BODY MASS INDEX: 26.87 KG/M2 | DIASTOLIC BLOOD PRESSURE: 84 MMHG | HEIGHT: 69 IN | WEIGHT: 181.44 LBS | HEART RATE: 70 BPM | OXYGEN SATURATION: 99 %

## 2023-03-30 DIAGNOSIS — I70.0 AORTIC ATHEROSCLEROSIS: ICD-10-CM

## 2023-03-30 DIAGNOSIS — E11.40 TYPE 2 DIABETES MELLITUS WITH DIABETIC NEUROPATHY, WITHOUT LONG-TERM CURRENT USE OF INSULIN: Primary | ICD-10-CM

## 2023-03-30 DIAGNOSIS — I10 ESSENTIAL HYPERTENSION: ICD-10-CM

## 2023-03-30 DIAGNOSIS — N40.1 BENIGN PROSTATIC HYPERPLASIA WITH LOWER URINARY TRACT SYMPTOMS, SYMPTOM DETAILS UNSPECIFIED: Primary | ICD-10-CM

## 2023-03-30 DIAGNOSIS — K25.9 GASTRIC ULCER, UNSPECIFIED CHRONICITY, UNSPECIFIED WHETHER GASTRIC ULCER HEMORRHAGE OR PERFORATION PRESENT: ICD-10-CM

## 2023-03-30 DIAGNOSIS — E78.5 HYPERLIPIDEMIA, UNSPECIFIED HYPERLIPIDEMIA TYPE: ICD-10-CM

## 2023-03-30 DIAGNOSIS — F39 MOOD DISORDER: ICD-10-CM

## 2023-03-30 PROBLEM — F33.9 DEPRESSION, RECURRENT: Status: ACTIVE | Noted: 2023-03-30

## 2023-03-30 PROCEDURE — 3008F BODY MASS INDEX DOCD: CPT | Mod: CPTII,S$GLB,, | Performed by: INTERNAL MEDICINE

## 2023-03-30 PROCEDURE — 3051F PR MOST RECENT HEMOGLOBIN A1C LEVEL 7.0 - < 8.0%: ICD-10-PCS | Mod: CPTII,S$GLB,, | Performed by: INTERNAL MEDICINE

## 2023-03-30 PROCEDURE — 3288F FALL RISK ASSESSMENT DOCD: CPT | Mod: CPTII,S$GLB,, | Performed by: INTERNAL MEDICINE

## 2023-03-30 PROCEDURE — 3051F HG A1C>EQUAL 7.0%<8.0%: CPT | Mod: CPTII,S$GLB,, | Performed by: INTERNAL MEDICINE

## 2023-03-30 PROCEDURE — 99499 RISK ADDL DX/OHS AUDIT: ICD-10-PCS | Mod: S$GLB,,, | Performed by: INTERNAL MEDICINE

## 2023-03-30 PROCEDURE — 3074F SYST BP LT 130 MM HG: CPT | Mod: CPTII,S$GLB,, | Performed by: INTERNAL MEDICINE

## 2023-03-30 PROCEDURE — 99214 PR OFFICE/OUTPT VISIT, EST, LEVL IV, 30-39 MIN: ICD-10-PCS | Mod: S$GLB,,, | Performed by: INTERNAL MEDICINE

## 2023-03-30 PROCEDURE — 3079F PR MOST RECENT DIASTOLIC BLOOD PRESSURE 80-89 MM HG: ICD-10-PCS | Mod: CPTII,S$GLB,, | Performed by: INTERNAL MEDICINE

## 2023-03-30 PROCEDURE — 3079F DIAST BP 80-89 MM HG: CPT | Mod: CPTII,S$GLB,, | Performed by: INTERNAL MEDICINE

## 2023-03-30 PROCEDURE — 1101F PR PT FALLS ASSESS DOC 0-1 FALLS W/OUT INJ PAST YR: ICD-10-PCS | Mod: CPTII,S$GLB,, | Performed by: INTERNAL MEDICINE

## 2023-03-30 PROCEDURE — 99999 PR PBB SHADOW E&M-EST. PATIENT-LVL II: CPT | Mod: PBBFAC,,, | Performed by: INTERNAL MEDICINE

## 2023-03-30 PROCEDURE — 4010F PR ACE/ARB THEARPY RXD/TAKEN: ICD-10-PCS | Mod: CPTII,S$GLB,, | Performed by: INTERNAL MEDICINE

## 2023-03-30 PROCEDURE — 3074F PR MOST RECENT SYSTOLIC BLOOD PRESSURE < 130 MM HG: ICD-10-PCS | Mod: CPTII,S$GLB,, | Performed by: INTERNAL MEDICINE

## 2023-03-30 PROCEDURE — 99499 UNLISTED E&M SERVICE: CPT | Mod: S$GLB,,, | Performed by: INTERNAL MEDICINE

## 2023-03-30 PROCEDURE — 3288F PR FALLS RISK ASSESSMENT DOCUMENTED: ICD-10-PCS | Mod: CPTII,S$GLB,, | Performed by: INTERNAL MEDICINE

## 2023-03-30 PROCEDURE — 99214 OFFICE O/P EST MOD 30 MIN: CPT | Mod: S$GLB,,, | Performed by: INTERNAL MEDICINE

## 2023-03-30 PROCEDURE — 3008F PR BODY MASS INDEX (BMI) DOCUMENTED: ICD-10-PCS | Mod: CPTII,S$GLB,, | Performed by: INTERNAL MEDICINE

## 2023-03-30 PROCEDURE — 99999 PR PBB SHADOW E&M-EST. PATIENT-LVL II: ICD-10-PCS | Mod: PBBFAC,,, | Performed by: INTERNAL MEDICINE

## 2023-03-30 PROCEDURE — 4010F ACE/ARB THERAPY RXD/TAKEN: CPT | Mod: CPTII,S$GLB,, | Performed by: INTERNAL MEDICINE

## 2023-03-30 PROCEDURE — 1101F PT FALLS ASSESS-DOCD LE1/YR: CPT | Mod: CPTII,S$GLB,, | Performed by: INTERNAL MEDICINE

## 2023-03-30 RX ORDER — BNT162B2 ORIGINAL AND OMICRON BA.4/BA.5 .1125; .1125 MG/2.25ML; MG/2.25ML
INJECTION, SUSPENSION INTRAMUSCULAR
COMMUNITY
Start: 2022-10-24 | End: 2023-11-08

## 2023-03-30 RX ORDER — CYCLOBENZAPRINE HCL 10 MG
TABLET ORAL
COMMUNITY
End: 2023-06-28

## 2023-03-30 RX ORDER — CIPROFLOXACIN 500 MG/1
TABLET ORAL
COMMUNITY
End: 2023-03-30

## 2023-03-30 RX ORDER — TETANUS TOXOID, REDUCED DIPHTHERIA TOXOID AND ACELLULAR PERTUSSIS VACCINE, ADSORBED 5; 2.5; 8; 8; 2.5 [IU]/.5ML; [IU]/.5ML; UG/.5ML; UG/.5ML; UG/.5ML
SUSPENSION INTRAMUSCULAR
COMMUNITY
End: 2023-11-08

## 2023-03-30 RX ORDER — FAMOTIDINE 40 MG/1
TABLET, FILM COATED ORAL
COMMUNITY
End: 2023-05-24

## 2023-03-30 NOTE — PROGRESS NOTES
Chief Complaint:  Follow up of multiple problems     HISTORY OF PRESENT ILLNESS: This is a 72-year-old man who presents for above.     He had an endoscopic ultrasound on 3/14/23 which revealed changes in hte pancreas consistent with prior pancreatitis. No mass in the pancreas.  Gastric ulcers were healed.    HE is now taking pantoprazole 40 mg once daily.  Stomach is fine.  No nausea, vomiting, abdominal pain, constipation or diarrhea. Appetite is good     No cough. He is back smoking 1.5 packs of cigarettes daily.        He is taking Metformin  mg one tablet in the morning and 2 at night.       Mood is better now that he is feeling better        He continues to have back pain and lateral right leg pain. He sees Dr Carrasquillo in pain management.  Currently taking oxycodone apap 10/325 1/2 tablet three times daily. He has not taken gabapentin 400 mg because it knocks him out. He would like to try a lower dose of gabapentin. . Pain is tolerable. His pain is currently 3/10.   He has intermittent pins and needles sensation in his right thigh if he sits more than  20 minutes.  Pain is keeping him awake at  night. He is sleeping 5-6 hours at [facility].      He had a normal cystoscope  with Dr Dexter  9/13/21      Dr Randall  on 3/17/20 who felt he has -  Right meralgia paresthetica; right lateral thigh tendinitis at the hip; history lumbar disc disease rule out lumbar radiculopathy. EMG was 5/5/20 -  Sensory neuropathy consistent with a history of diabetes; these findings would be compatible with clinical picture of meralgia paresthetica.  He continues to have significant right leg pain that starts in the right groin and goes down the lateral right thigh and the anterior right thigh.  He has a burning sensation on the right thigh.  HE is followed by  Dr Carrasquillo in pain management for his pain.   No change in this pain     Left shoulder is ok. . He is doing his exercises on his own.       He had lithotripsy on 3/23/17. No  "dysuria, hematuria.   He saw Dr cerna on 1/19. He still has a stone in the left kidney. He is on finasteride and tamsulosin for his prostate.      He has not had any alcohol      He is taking amlodipine 10 mg 1 once daily, Toprol-XL 25 mg once daily and losartan 100 mg daily.  No chest pain, shortness of breath.     . HE checks his blood sugar twice daily due to fluctuating blood sugars.   We are holding simvastatin due to weight issues     No polydipsia. He has a history of hyperlipidemia  No joint pain or muscle pain. NO shortness breath, headaches, vision, sinus congestion.              PAST MEDICAL HISTORY:    1. Hypertension.    2. Coronary artery disease with left heart catheterization in November 2011 with 40 to 50% narrowing in the mid LAD.    3. Diabetes mellitus.    4. Hyperlipidemia.    5. History of reflux.    6. Colon polyps.    7. History of cluster headaches.    8. History of glaucoma.    9. Lumbar disk disease at L5 to S1.    10. History of stomach ulcers.    11. Nephrolithiasis.       PAST SURGICAL HISTORY: Cholecystectomy in May 2013, ORIF of the left humerus.      SOCIAL HISTORY: Currently smokes a pack of cigarettes daily, has smoked his whole life. 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 He is a retired schoolteacher.       FAMILY HISTORY: Updated on [facility].      PHYSICAL EXAMINATION:       /84 (BP Location: Right arm, Patient Position: Sitting, BP Method: Medium (Manual))   Pulse 70   Ht 5' 9" (1.753 m)   Wt 82.3 kg (181 lb 7 oz)   SpO2 99%   BMI 26.79 kg/m²         GENERAL: He is alert, oriented, no apparent distress. Affect within normal limits.    HEENT: Conjunctivae anicteric. Tympanic membranes clear.  . .    NECK: Supple. No cervical lymphadenopathy, no thyroid enlargement.    Respiratory: Effort normal. Lungs are clear to auscultation.    HEART: Regular rate and rhythm without murmurs, gallops or rubs. No lower extremity edema  ABDOMEN: soft, non " distended, non tender, bowel sounds present, no hepatosplenomgaly           Labs 1/25/23 reviewed     CT chest 8/31/22 reviewed     ASSESSMENT AND PLAN:     1. Gastritis and duodenitis on EGD by Raoul Alford 1/11/22 - resovled - now taking pantoprazole 40 mg once daily.. Endoscopic ultrasound 3/14/23 revealed healing of the ulcers.   2. Elevated lipase and  - endoscopic ultrasound 3/14/23 revealed no mass in pancreas - changes consistent with prior pancreatitis. Repeat      3. Lung changes on CT scan chest (bronchiectasis) 8/2022- saw pulmonary on 10/28/22 - needs yearly CT scan chest.  4. Hypertension stable  5. Diabetes mellitus with neuropathy. stable  6. Hyperlipidemia. off fenofibrate  7. Tubular adenomas - colonoscopy 2/2022 - due 2027  9. History of glaucoma - not on treatment - pressure in eyes ok - followed Nile Avina and [facility] specialist [facility]  10. History of nephrolithiasis -asymptomatic.    11. Irregular heart beat sensation - holter revealed PVC  12. Anxiety and depression/mood disorder -valium 5 mg nightly prn anxiety.  Rarely takes  13. Tobacco abuse -working on cessation.   II will see him back in 3 months, sooner if issues.   14. Aortic atherosclerosis - modifying risk factors  15. Back pain - followed by Dr Carrasquillo - on chronic opiates - stable.   16. Mood disorder - better.

## 2023-03-30 NOTE — PROGRESS NOTES
Health Maintenance Due   Topic Date Due    High Dose Statin  Never done    Shingles Vaccine (2 of 3) 07/06/2016    PROSTATE-SPECIFIC ANTIGEN  03/24/2022    Diabetes Urine Screening  07/28/2022    Foot Exam  03/02/2023       Eye exam record scanned into chart. HM updated.     Nilda Garcia LPN   Clinical Care Coordinator  Primary Care and Wellness

## 2023-04-06 ENCOUNTER — PES CALL (OUTPATIENT)
Dept: ADMINISTRATIVE | Facility: CLINIC | Age: 73
End: 2023-04-06
Payer: MEDICARE

## 2023-04-26 ENCOUNTER — PATIENT MESSAGE (OUTPATIENT)
Dept: INTERNAL MEDICINE | Facility: CLINIC | Age: 73
End: 2023-04-26
Payer: MEDICARE

## 2023-05-02 ENCOUNTER — LAB VISIT (OUTPATIENT)
Dept: LAB | Facility: OTHER | Age: 73
End: 2023-05-02
Attending: INTERNAL MEDICINE
Payer: MEDICARE

## 2023-05-02 DIAGNOSIS — R97.8 OTHER ABNORMAL TUMOR MARKERS: ICD-10-CM

## 2023-05-02 DIAGNOSIS — E11.40 TYPE 2 DIABETES MELLITUS WITH DIABETIC NEUROPATHY, WITHOUT LONG-TERM CURRENT USE OF INSULIN: ICD-10-CM

## 2023-05-02 DIAGNOSIS — K83.1 BILIARY STRICTURE: ICD-10-CM

## 2023-05-02 DIAGNOSIS — E11.9 TYPE 2 DIABETES MELLITUS WITHOUT COMPLICATION: ICD-10-CM

## 2023-05-02 LAB
ALBUMIN SERPL BCP-MCNC: 4 G/DL (ref 3.5–5.2)
ALBUMIN/CREAT UR: 89.4 UG/MG (ref 0–30)
ALP SERPL-CCNC: 87 U/L (ref 55–135)
ALT SERPL W/O P-5'-P-CCNC: 22 U/L (ref 10–44)
ANION GAP SERPL CALC-SCNC: 9 MMOL/L (ref 8–16)
AST SERPL-CCNC: 21 U/L (ref 10–40)
BASOPHILS # BLD AUTO: 0.03 K/UL (ref 0–0.2)
BASOPHILS NFR BLD: 0.4 % (ref 0–1.9)
BILIRUB SERPL-MCNC: 0.3 MG/DL (ref 0.1–1)
BUN SERPL-MCNC: 13 MG/DL (ref 8–23)
CALCIUM SERPL-MCNC: 9.4 MG/DL (ref 8.7–10.5)
CANCER AG19-9 SERPL-ACNC: 40.2 U/ML (ref 0–40)
CHLORIDE SERPL-SCNC: 105 MMOL/L (ref 95–110)
CO2 SERPL-SCNC: 25 MMOL/L (ref 23–29)
CREAT SERPL-MCNC: 1.2 MG/DL (ref 0.5–1.4)
CREAT UR-MCNC: 97.3 MG/DL (ref 23–375)
DIFFERENTIAL METHOD: ABNORMAL
EOSINOPHIL # BLD AUTO: 0.2 K/UL (ref 0–0.5)
EOSINOPHIL NFR BLD: 2 % (ref 0–8)
ERYTHROCYTE [DISTWIDTH] IN BLOOD BY AUTOMATED COUNT: 13.2 % (ref 11.5–14.5)
EST. GFR  (NO RACE VARIABLE): >60 ML/MIN/1.73 M^2
ESTIMATED AVG GLUCOSE: 148 MG/DL (ref 68–131)
GLUCOSE SERPL-MCNC: 142 MG/DL (ref 70–110)
HBA1C MFR BLD: 6.8 % (ref 4–5.6)
HCT VFR BLD AUTO: 38.8 % (ref 40–54)
HGB BLD-MCNC: 12.8 G/DL (ref 14–18)
IMM GRANULOCYTES # BLD AUTO: 0.01 K/UL (ref 0–0.04)
IMM GRANULOCYTES NFR BLD AUTO: 0.1 % (ref 0–0.5)
LYMPHOCYTES # BLD AUTO: 3.1 K/UL (ref 1–4.8)
LYMPHOCYTES NFR BLD: 41 % (ref 18–48)
MCH RBC QN AUTO: 32.8 PG (ref 27–31)
MCHC RBC AUTO-ENTMCNC: 33 G/DL (ref 32–36)
MCV RBC AUTO: 100 FL (ref 82–98)
MICROALBUMIN UR DL<=1MG/L-MCNC: 87 UG/ML
MONOCYTES # BLD AUTO: 0.4 K/UL (ref 0.3–1)
MONOCYTES NFR BLD: 5.2 % (ref 4–15)
NEUTROPHILS # BLD AUTO: 3.9 K/UL (ref 1.8–7.7)
NEUTROPHILS NFR BLD: 51.3 % (ref 38–73)
NRBC BLD-RTO: 0 /100 WBC
PLATELET # BLD AUTO: 283 K/UL (ref 150–450)
PMV BLD AUTO: 8.2 FL (ref 9.2–12.9)
POTASSIUM SERPL-SCNC: 4.3 MMOL/L (ref 3.5–5.1)
PROT SERPL-MCNC: 7.3 G/DL (ref 6–8.4)
RBC # BLD AUTO: 3.9 M/UL (ref 4.6–6.2)
SODIUM SERPL-SCNC: 139 MMOL/L (ref 136–145)
WBC # BLD AUTO: 7.65 K/UL (ref 3.9–12.7)

## 2023-05-02 PROCEDURE — 83036 HEMOGLOBIN GLYCOSYLATED A1C: CPT | Performed by: INTERNAL MEDICINE

## 2023-05-02 PROCEDURE — 82570 ASSAY OF URINE CREATININE: CPT | Performed by: INTERNAL MEDICINE

## 2023-05-02 PROCEDURE — 85025 COMPLETE CBC W/AUTO DIFF WBC: CPT | Performed by: INTERNAL MEDICINE

## 2023-05-02 PROCEDURE — 86301 IMMUNOASSAY TUMOR CA 19-9: CPT | Performed by: INTERNAL MEDICINE

## 2023-05-02 PROCEDURE — 36415 COLL VENOUS BLD VENIPUNCTURE: CPT | Performed by: INTERNAL MEDICINE

## 2023-05-02 PROCEDURE — 80053 COMPREHEN METABOLIC PANEL: CPT | Performed by: INTERNAL MEDICINE

## 2023-05-03 ENCOUNTER — PATIENT MESSAGE (OUTPATIENT)
Dept: INTERNAL MEDICINE | Facility: CLINIC | Age: 73
End: 2023-05-03
Payer: MEDICARE

## 2023-05-09 ENCOUNTER — PATIENT MESSAGE (OUTPATIENT)
Dept: INTERNAL MEDICINE | Facility: CLINIC | Age: 73
End: 2023-05-09
Payer: MEDICARE

## 2023-05-22 DIAGNOSIS — E11.40 TYPE 2 DIABETES MELLITUS WITH DIABETIC NEUROPATHY, WITHOUT LONG-TERM CURRENT USE OF INSULIN: Primary | ICD-10-CM

## 2023-05-22 RX ORDER — METFORMIN HYDROCHLORIDE 500 MG/1
1000 TABLET, EXTENDED RELEASE ORAL 2 TIMES DAILY
Qty: 360 TABLET | Refills: 1 | Status: SHIPPED | OUTPATIENT
Start: 2023-05-22 | End: 2024-02-16

## 2023-05-22 NOTE — TELEPHONE ENCOUNTER
No care due was identified.  [facility] Embedded Care Due Messages. Reference number: [id number].   5/22/2023 5:13:59 AM CDT

## 2023-05-22 NOTE — TELEPHONE ENCOUNTER
Refill Decision Note   Tony Han  is requesting a refill authorization.  Brief Assessment and Rationale for Refill:  Approve     Medication Therapy Plan:         Alert overridden per protocol: Yes   Comments:     Note composed:12:13 PM 05/22/2023             Appointments     Last Visit   3/30/2023 Elizabeth Benitez MD   Next Visit   6/28/2023 Elizabeth Benitez MD

## 2023-05-24 RX ORDER — FAMOTIDINE 40 MG/1
TABLET, FILM COATED ORAL
Qty: 90 TABLET | Refills: 3 | Status: SHIPPED | OUTPATIENT
Start: 2023-05-24 | End: 2023-06-28

## 2023-05-25 NOTE — TELEPHONE ENCOUNTER
No care due was identified.  [facility] Embedded Care Due Messages. Reference number: [id number].   5/24/2023 8:05:33 PM CDT

## 2023-05-25 NOTE — TELEPHONE ENCOUNTER
Refill Routing Note   Medication(s) are not appropriate for processing by [facility] for the following reason(s):      No active prescription written by PCP    [facility] action(s):  Defer Care Due:  None identified          Appointments  past 12m or future 3m with PCP    Date Provider   Last Visit   3/30/2023 Elizabeth Benitez MD   Next Visit   6/28/2023 Elizabeth Benitez MD   ED visits in past 90 days: 0        Note composed:8:08 PM 05/24/2023

## 2023-05-31 DIAGNOSIS — N30.00 ACUTE CYSTITIS WITHOUT HEMATURIA: ICD-10-CM

## 2023-05-31 DIAGNOSIS — N41.0 ACUTE PROSTATITIS: ICD-10-CM

## 2023-06-02 ENCOUNTER — TELEPHONE (OUTPATIENT)
Dept: UROLOGY | Facility: CLINIC | Age: 73
End: 2023-06-02
Payer: MEDICARE

## 2023-06-02 RX ORDER — FINASTERIDE 5 MG/1
TABLET, FILM COATED ORAL
Qty: 90 TABLET | Refills: 3 | Status: SHIPPED | OUTPATIENT
Start: 2023-06-02

## 2023-06-02 NOTE — TELEPHONE ENCOUNTER
Spoke with Pharmacist and was told that pt received an Rx for 90 and it is too soon for pt to get medication. Pt said they pharmacy called him to say that his meds were ready and he already has pills. Pt was concerned and confused thing he was missing some medication. He know understands what is going on and is okay.

## 2023-06-22 ENCOUNTER — PES CALL (OUTPATIENT)
Dept: ADMINISTRATIVE | Facility: CLINIC | Age: 73
End: 2023-06-22
Payer: MEDICARE

## 2023-06-28 ENCOUNTER — PES CALL (OUTPATIENT)
Dept: ADMINISTRATIVE | Facility: CLINIC | Age: 73
End: 2023-06-28
Payer: MEDICARE

## 2023-06-28 ENCOUNTER — OFFICE VISIT (OUTPATIENT)
Dept: INTERNAL MEDICINE | Facility: CLINIC | Age: 73
End: 2023-06-28
Payer: MEDICARE

## 2023-06-28 VITALS
HEIGHT: 69 IN | SYSTOLIC BLOOD PRESSURE: 130 MMHG | BODY MASS INDEX: 27.4 KG/M2 | OXYGEN SATURATION: 98 % | DIASTOLIC BLOOD PRESSURE: 70 MMHG | WEIGHT: 185 LBS | HEART RATE: 89 BPM

## 2023-06-28 DIAGNOSIS — E78.5 HYPERLIPIDEMIA, UNSPECIFIED HYPERLIPIDEMIA TYPE: ICD-10-CM

## 2023-06-28 DIAGNOSIS — R91.1 SOLITARY PULMONARY NODULE: Primary | ICD-10-CM

## 2023-06-28 DIAGNOSIS — K21.9 GASTROESOPHAGEAL REFLUX DISEASE WITHOUT ESOPHAGITIS: ICD-10-CM

## 2023-06-28 DIAGNOSIS — G57.11 MERALGIA PARAESTHETICA, RIGHT: ICD-10-CM

## 2023-06-28 DIAGNOSIS — I10 ESSENTIAL HYPERTENSION: ICD-10-CM

## 2023-06-28 DIAGNOSIS — M51.9 LUMBAR DISC DISEASE: ICD-10-CM

## 2023-06-28 DIAGNOSIS — E13.9 DIABETES MELLITUS DUE TO ABNORMAL INSULIN: ICD-10-CM

## 2023-06-28 PROCEDURE — 3075F SYST BP GE 130 - 139MM HG: CPT | Mod: CPTII,S$GLB,, | Performed by: INTERNAL MEDICINE

## 2023-06-28 PROCEDURE — 99214 OFFICE O/P EST MOD 30 MIN: CPT | Mod: S$GLB,,, | Performed by: INTERNAL MEDICINE

## 2023-06-28 PROCEDURE — 3060F POS MICROALBUMINURIA REV: CPT | Mod: CPTII,S$GLB,, | Performed by: INTERNAL MEDICINE

## 2023-06-28 PROCEDURE — 99214 PR OFFICE/OUTPT VISIT, EST, LEVL IV, 30-39 MIN: ICD-10-PCS | Mod: S$GLB,,, | Performed by: INTERNAL MEDICINE

## 2023-06-28 PROCEDURE — 4010F ACE/ARB THERAPY RXD/TAKEN: CPT | Mod: CPTII,S$GLB,, | Performed by: INTERNAL MEDICINE

## 2023-06-28 PROCEDURE — 3078F DIAST BP <80 MM HG: CPT | Mod: CPTII,S$GLB,, | Performed by: INTERNAL MEDICINE

## 2023-06-28 PROCEDURE — 3044F HG A1C LEVEL LT 7.0%: CPT | Mod: CPTII,S$GLB,, | Performed by: INTERNAL MEDICINE

## 2023-06-28 PROCEDURE — 3044F PR MOST RECENT HEMOGLOBIN A1C LEVEL <7.0%: ICD-10-PCS | Mod: CPTII,S$GLB,, | Performed by: INTERNAL MEDICINE

## 2023-06-28 PROCEDURE — 4010F PR ACE/ARB THEARPY RXD/TAKEN: ICD-10-PCS | Mod: CPTII,S$GLB,, | Performed by: INTERNAL MEDICINE

## 2023-06-28 PROCEDURE — 99999 PR PBB SHADOW E&M-EST. PATIENT-LVL III: ICD-10-PCS | Mod: PBBFAC,,, | Performed by: INTERNAL MEDICINE

## 2023-06-28 PROCEDURE — 3078F PR MOST RECENT DIASTOLIC BLOOD PRESSURE < 80 MM HG: ICD-10-PCS | Mod: CPTII,S$GLB,, | Performed by: INTERNAL MEDICINE

## 2023-06-28 PROCEDURE — 3075F PR MOST RECENT SYSTOLIC BLOOD PRESS GE 130-139MM HG: ICD-10-PCS | Mod: CPTII,S$GLB,, | Performed by: INTERNAL MEDICINE

## 2023-06-28 PROCEDURE — 3066F PR DOCUMENTATION OF TREATMENT FOR NEPHROPATHY: ICD-10-PCS | Mod: CPTII,S$GLB,, | Performed by: INTERNAL MEDICINE

## 2023-06-28 PROCEDURE — 99999 PR PBB SHADOW E&M-EST. PATIENT-LVL III: CPT | Mod: PBBFAC,,, | Performed by: INTERNAL MEDICINE

## 2023-06-28 PROCEDURE — 3066F NEPHROPATHY DOC TX: CPT | Mod: CPTII,S$GLB,, | Performed by: INTERNAL MEDICINE

## 2023-06-28 PROCEDURE — 3060F PR POS MICROALBUMINURIA RESULT DOCUMENTED/REVIEW: ICD-10-PCS | Mod: CPTII,S$GLB,, | Performed by: INTERNAL MEDICINE

## 2023-06-28 RX ORDER — AMLODIPINE BESYLATE 10 MG/1
10 TABLET ORAL DAILY
Qty: 90 TABLET | Refills: 3 | Status: SHIPPED | OUTPATIENT
Start: 2023-06-28

## 2023-06-28 NOTE — PROGRESS NOTES
Chief Complaint:  Follow up of multiple problems     HISTORY OF PRESENT ILLNESS: This is a 72-year-old man who presents for above.    He is now taking pantoprazole 40 mg once daily in the evening. He had a repeat endoscopic ultrasound 3/14/23 - erythema in the gastric body and antrum,  Pancreas revealed sequelae of prior pancreatitis. Rec PPI once daily.       No nausea or vomiting. Appetite is good. Bowels are moving well after drinking a Diet Coke and water.  No melana or bloody stools.     No cough. He is back smoking 1.5 packs of cigarettes daily.        He is taking Metformin  mg one tablet in the morning and 2 at night.       He stopped fenofibrate when he started to have abdominal issues.         He continues to have back pain and lateral right leg pain. He sees Dr Carrasquillo in pain management.  Currently taking oxycodone apap 10/325 1/2 tablet three times daily. He takes gabapentin 100 mg 1-2 tablets as needed which does help a little.     He has intermittent pins and needles sensation in his right thigh if he sits more than  20 minutes.  Pain is keeping him awake at  night. He is sleeping 5-6 hours at [facility].      He had a normal cystoscope  with Dr Dexter  9/13/21      Dr Randall  on 3/17/20 who felt he has -  Right meralgia paresthetica; right lateral thigh tendinitis at the hip; history lumbar disc disease rule out lumbar radiculopathy. EMG was 5/5/20 -  Sensory neuropathy consistent with a history of diabetes; these findings would be compatible with clinical picture of meralgia paresthetica.  He continues to have significant right leg pain that starts in the right groin and goes down the lateral right thigh and the anterior right thigh.  He has a burning sensation on the right thigh.  HE is followed by  Dr Carrasquillo in pain management for his pain.   No change in this pain     Left shoulder is ok. . He is doing his exercises on his own.       He had lithotripsy on 3/23/17. No dysuria, hematuria.   He  "saw Dr cerna on 1/19. He still has a stone in the left kidney. He is on finasteride and tamsulosin for his prostate.      He has not had any alcohol      He is taking amlodipine 10 mg 1 once daily, Toprol-XL 25 mg once daily and losartan 100 mg daily.  No chest pain, shortness of breath.     . HE checks his blood sugar twice daily due to fluctuating blood sugars.   We are holding simvastatin due to weight issues     No polydipsia. He has a history of hyperlipidemia  No joint pain or muscle pain. NO shortness breath, headaches, vision, sinus congestion.              PAST MEDICAL HISTORY:    1. Hypertension.    2. Coronary artery disease with left heart catheterization in November 2011 with 40 to 50% narrowing in the mid LAD.    3. Diabetes mellitus.    4. Hyperlipidemia.    5. History of reflux.    6. Colon polyps.    7. History of cluster headaches.    8. History of glaucoma.    9. Lumbar disk disease at L5 to S1.    10. History of stomach ulcers.    11. Nephrolithiasis.       PAST SURGICAL HISTORY: Cholecystectomy in May 2013, ORIF of the left humerus.      SOCIAL HISTORY: Currently smokes a pack of cigarettes daily, has smoked his whole life. 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 He is a retired schoolteacher.       FAMILY HISTORY: Updated on [facility].      PHYSICAL EXAMINATION:        /70   Pulse 89   Ht 5' 9" (1.753 m)   Wt 83.9 kg (185 lb)   SpO2 98%   BMI 27.32 kg/m²     GENERAL: He is alert, oriented, no apparent distress. Affect within normal limits.    HEENT: Conjunctivae anicteric. Tympanic membranes clear.     NECK: Supple. No cervical lymphadenopathy, no thyroid enlargement.    Respiratory: Effort normal. Lungs are clear to auscultation.    HEART: Regular rate and rhythm without murmurs, gallops or rubs. No lower extremity edema  ABDOMEN: soft, non distended, non tender, bowel sounds present, no hepatosplenomgaly           Labs 5/2/23 reviewed 6.8     CT chest 8/31/22 " reviewed     ASSESSMENT AND PLAN:     1. Gastritis and duodenitis on EGD by Raoul Alford 1/11/22 - doing better on pantoprazole 40 mg twice daily     2. Elevated lipase and  - to see DR Rodriguez for endoscopic ultrasound     3. Lung changes on CT scan chest (bronchiectasis) 8/2022- saw pulmonary on 10/28/22 - needs yearly CT scan chest.  4. Hypertension-  stable  5. Diabetes mellitus with neuropathy. stable  6. Hyperlipidemia. off fenofibrate  7. Tubular adenomas - colonoscopy 2/2022 - due 2027  9. History of glaucoma - not on treatment - pressure in eyes ok - followed Nile Avina and retinal specialist at [facility]  10. History of nephrolithiasis -asymptomatic.    11. Irregular heart beat sensation - holter revealed PVC  12. Anxiety and depression -valium 5 mg nightly prn anxiety.  Rarely takes  13. Tobacco abuse -working on cessation.   II will see him back in 4 months, sooner if issues.   14. Aortic atherosclerosis - modifying risk factors  15. Back pain - followed by Dr Carrasquillo - on chronic opiates - stable.      Colonoscopy 2/2/2022- due 2027

## 2023-06-29 ENCOUNTER — TELEPHONE (OUTPATIENT)
Dept: INTERNAL MEDICINE | Facility: CLINIC | Age: 73
End: 2023-06-29
Payer: MEDICARE

## 2023-06-29 NOTE — TELEPHONE ENCOUNTER
----- Message from Shu Hodges sent at 6/28/2023 10:39 AM CDT -----  Regarding: pt return call  \Patient Returning Call Who Called: PETER STRATTON [[id number]]      Who Left Message for Patient: porfirio beatty       Does the patient know what this is regarding? pt is returning call, would like a call back.       Best Call Back Number: [phone]        Additional Information

## 2023-07-04 NOTE — TELEPHONE ENCOUNTER
----- Message from Susanna Osborne sent at 3/16/2017 11:19 AM CDT -----  X_  1st Request  _  2nd Request  _  3rd Request        Who: PETER STRATTON [[id number]]    Why: Patient has questions about his upcoming procedure and would like to know if he can still take his blood pressure medication the day of surgery. Please call back to advise.    What Number to Call Back: [phone]    When to Expect a call back: (Before the end of the day)   -- if the call is after 12:00, the call back will be tomorrow.                       Normal for race

## 2023-08-04 ENCOUNTER — TELEPHONE (OUTPATIENT)
Dept: ADMINISTRATIVE | Facility: CLINIC | Age: 73
End: 2023-08-04
Payer: MEDICARE

## 2023-08-07 ENCOUNTER — OFFICE VISIT (OUTPATIENT)
Dept: INTERNAL MEDICINE | Facility: CLINIC | Age: 73
End: 2023-08-07
Payer: MEDICARE

## 2023-08-07 ENCOUNTER — TELEPHONE (OUTPATIENT)
Dept: INTERNAL MEDICINE | Facility: CLINIC | Age: 73
End: 2023-08-07

## 2023-08-07 VITALS
HEIGHT: 69 IN | DIASTOLIC BLOOD PRESSURE: 68 MMHG | OXYGEN SATURATION: 99 % | WEIGHT: 190.69 LBS | BODY MASS INDEX: 28.24 KG/M2 | SYSTOLIC BLOOD PRESSURE: 120 MMHG | HEART RATE: 74 BPM

## 2023-08-07 DIAGNOSIS — M54.31 RIGHT SIDED SCIATICA: ICD-10-CM

## 2023-08-07 DIAGNOSIS — G47.09 OTHER INSOMNIA: ICD-10-CM

## 2023-08-07 DIAGNOSIS — R91.8 PULMONARY NODULES: ICD-10-CM

## 2023-08-07 DIAGNOSIS — R11.0 NAUSEA: ICD-10-CM

## 2023-08-07 DIAGNOSIS — D12.6 COLON ADENOMA: ICD-10-CM

## 2023-08-07 DIAGNOSIS — N20.0 NEPHROLITHIASIS: ICD-10-CM

## 2023-08-07 DIAGNOSIS — M70.61: ICD-10-CM

## 2023-08-07 DIAGNOSIS — M25.551 RIGHT HIP PAIN: ICD-10-CM

## 2023-08-07 DIAGNOSIS — N40.1 BENIGN PROSTATIC HYPERPLASIA WITH LOWER URINARY TRACT SYMPTOMS, SYMPTOM DETAILS UNSPECIFIED: ICD-10-CM

## 2023-08-07 DIAGNOSIS — K76.0 FATTY LIVER: ICD-10-CM

## 2023-08-07 DIAGNOSIS — Z00.00 ENCOUNTER FOR PREVENTIVE HEALTH EXAMINATION: Primary | ICD-10-CM

## 2023-08-07 DIAGNOSIS — E13.9 DIABETES MELLITUS DUE TO ABNORMAL INSULIN: ICD-10-CM

## 2023-08-07 DIAGNOSIS — L81.8 TATTOOS: ICD-10-CM

## 2023-08-07 DIAGNOSIS — K31.9 MUCOSAL ABNORMALITY OF DUODENUM: ICD-10-CM

## 2023-08-07 DIAGNOSIS — I70.0 AORTIC ATHEROSCLEROSIS: ICD-10-CM

## 2023-08-07 DIAGNOSIS — Z86.010 HISTORY OF ADENOMATOUS POLYP OF COLON: ICD-10-CM

## 2023-08-07 DIAGNOSIS — M25.511 ACUTE PAIN OF RIGHT SHOULDER: ICD-10-CM

## 2023-08-07 DIAGNOSIS — K29.80 DUODENITIS: ICD-10-CM

## 2023-08-07 DIAGNOSIS — I10 ESSENTIAL HYPERTENSION: ICD-10-CM

## 2023-08-07 DIAGNOSIS — E78.5 HYPERLIPIDEMIA, UNSPECIFIED HYPERLIPIDEMIA TYPE: ICD-10-CM

## 2023-08-07 DIAGNOSIS — G57.11 MERALGIA PARAESTHETICA, RIGHT: ICD-10-CM

## 2023-08-07 DIAGNOSIS — Z98.890 S/P ROTATOR CUFF SURGERY: ICD-10-CM

## 2023-08-07 DIAGNOSIS — K29.30 CHRONIC SUPERFICIAL GASTRITIS WITHOUT BLEEDING: ICD-10-CM

## 2023-08-07 DIAGNOSIS — F17.210 CIGARETTE NICOTINE DEPENDENCE WITHOUT COMPLICATION: ICD-10-CM

## 2023-08-07 DIAGNOSIS — M75.102 NONTRAUMATIC TEAR OF LEFT ROTATOR CUFF, UNSPECIFIED TEAR EXTENT: ICD-10-CM

## 2023-08-07 DIAGNOSIS — K82.4 CHOLESTEROLOSIS OF GALLBLADDER: ICD-10-CM

## 2023-08-07 DIAGNOSIS — M51.9 LUMBAR DISC DISEASE: ICD-10-CM

## 2023-08-07 DIAGNOSIS — F11.20 OPIOID DEPENDENCE, UNCOMPLICATED: ICD-10-CM

## 2023-08-07 DIAGNOSIS — K21.9 GASTROESOPHAGEAL REFLUX DISEASE WITHOUT ESOPHAGITIS: ICD-10-CM

## 2023-08-07 DIAGNOSIS — M70.61 TROCHANTERIC BURSITIS, RIGHT HIP: ICD-10-CM

## 2023-08-07 DIAGNOSIS — F39 MOOD DISORDER: ICD-10-CM

## 2023-08-07 DIAGNOSIS — E11.40 TYPE 2 DIABETES MELLITUS WITH DIABETIC NEUROPATHY, WITHOUT LONG-TERM CURRENT USE OF INSULIN: ICD-10-CM

## 2023-08-07 DIAGNOSIS — H91.90 HEARING LOSS, UNSPECIFIED HEARING LOSS TYPE, UNSPECIFIED LATERALITY: ICD-10-CM

## 2023-08-07 DIAGNOSIS — R79.89 HIGH SERUM CARBOHYDRATE ANTIGEN 19-9 (CA19-9): ICD-10-CM

## 2023-08-07 DIAGNOSIS — K63.5 POLYP OF COLON, UNSPECIFIED PART OF COLON, UNSPECIFIED TYPE: ICD-10-CM

## 2023-08-07 PROCEDURE — 1101F PR PT FALLS ASSESS DOC 0-1 FALLS W/OUT INJ PAST YR: ICD-10-PCS | Mod: CPTII,S$GLB,, | Performed by: NURSE PRACTITIONER

## 2023-08-07 PROCEDURE — 4010F PR ACE/ARB THEARPY RXD/TAKEN: ICD-10-PCS | Mod: CPTII,S$GLB,, | Performed by: NURSE PRACTITIONER

## 2023-08-07 PROCEDURE — 99999 PR PBB SHADOW E&M-EST. PATIENT-LVL V: CPT | Mod: PBBFAC,,, | Performed by: NURSE PRACTITIONER

## 2023-08-07 PROCEDURE — 1160F PR REVIEW ALL MEDS BY PRESCRIBER/CLIN PHARMACIST DOCUMENTED: ICD-10-PCS | Mod: CPTII,S$GLB,, | Performed by: NURSE PRACTITIONER

## 2023-08-07 PROCEDURE — G0439 PPPS, SUBSEQ VISIT: HCPCS | Mod: S$GLB,,, | Performed by: NURSE PRACTITIONER

## 2023-08-07 PROCEDURE — 3066F PR DOCUMENTATION OF TREATMENT FOR NEPHROPATHY: ICD-10-PCS | Mod: CPTII,S$GLB,, | Performed by: NURSE PRACTITIONER

## 2023-08-07 PROCEDURE — 3288F FALL RISK ASSESSMENT DOCD: CPT | Mod: CPTII,S$GLB,, | Performed by: NURSE PRACTITIONER

## 2023-08-07 PROCEDURE — 3060F PR POS MICROALBUMINURIA RESULT DOCUMENTED/REVIEW: ICD-10-PCS | Mod: CPTII,S$GLB,, | Performed by: NURSE PRACTITIONER

## 2023-08-07 PROCEDURE — 1101F PT FALLS ASSESS-DOCD LE1/YR: CPT | Mod: CPTII,S$GLB,, | Performed by: NURSE PRACTITIONER

## 2023-08-07 PROCEDURE — 3060F POS MICROALBUMINURIA REV: CPT | Mod: CPTII,S$GLB,, | Performed by: NURSE PRACTITIONER

## 2023-08-07 PROCEDURE — 3078F PR MOST RECENT DIASTOLIC BLOOD PRESSURE < 80 MM HG: ICD-10-PCS | Mod: CPTII,S$GLB,, | Performed by: NURSE PRACTITIONER

## 2023-08-07 PROCEDURE — 4010F ACE/ARB THERAPY RXD/TAKEN: CPT | Mod: CPTII,S$GLB,, | Performed by: NURSE PRACTITIONER

## 2023-08-07 PROCEDURE — 1170F FXNL STATUS ASSESSED: CPT | Mod: CPTII,S$GLB,, | Performed by: NURSE PRACTITIONER

## 2023-08-07 PROCEDURE — 1126F PR PAIN SEVERITY QUANTIFIED, NO PAIN PRESENT: ICD-10-PCS | Mod: CPTII,S$GLB,, | Performed by: NURSE PRACTITIONER

## 2023-08-07 PROCEDURE — 1126F AMNT PAIN NOTED NONE PRSNT: CPT | Mod: CPTII,S$GLB,, | Performed by: NURSE PRACTITIONER

## 2023-08-07 PROCEDURE — 3288F PR FALLS RISK ASSESSMENT DOCUMENTED: ICD-10-PCS | Mod: CPTII,S$GLB,, | Performed by: NURSE PRACTITIONER

## 2023-08-07 PROCEDURE — 3078F DIAST BP <80 MM HG: CPT | Mod: CPTII,S$GLB,, | Performed by: NURSE PRACTITIONER

## 2023-08-07 PROCEDURE — 1159F PR MEDICATION LIST DOCUMENTED IN MEDICAL RECORD: ICD-10-PCS | Mod: CPTII,S$GLB,, | Performed by: NURSE PRACTITIONER

## 2023-08-07 PROCEDURE — 3044F PR MOST RECENT HEMOGLOBIN A1C LEVEL <7.0%: ICD-10-PCS | Mod: CPTII,S$GLB,, | Performed by: NURSE PRACTITIONER

## 2023-08-07 PROCEDURE — 3044F HG A1C LEVEL LT 7.0%: CPT | Mod: CPTII,S$GLB,, | Performed by: NURSE PRACTITIONER

## 2023-08-07 PROCEDURE — 3074F SYST BP LT 130 MM HG: CPT | Mod: CPTII,S$GLB,, | Performed by: NURSE PRACTITIONER

## 2023-08-07 PROCEDURE — 99999 PR PBB SHADOW E&M-EST. PATIENT-LVL V: ICD-10-PCS | Mod: PBBFAC,,, | Performed by: NURSE PRACTITIONER

## 2023-08-07 PROCEDURE — 3066F NEPHROPATHY DOC TX: CPT | Mod: CPTII,S$GLB,, | Performed by: NURSE PRACTITIONER

## 2023-08-07 PROCEDURE — 1159F MED LIST DOCD IN RCRD: CPT | Mod: CPTII,S$GLB,, | Performed by: NURSE PRACTITIONER

## 2023-08-07 PROCEDURE — 1160F RVW MEDS BY RX/DR IN RCRD: CPT | Mod: CPTII,S$GLB,, | Performed by: NURSE PRACTITIONER

## 2023-08-07 PROCEDURE — 99499 UNLISTED E&M SERVICE: CPT | Mod: S$GLB,,, | Performed by: NURSE PRACTITIONER

## 2023-08-07 PROCEDURE — G0439 PR MEDICARE ANNUAL WELLNESS SUBSEQUENT VISIT: ICD-10-PCS | Mod: S$GLB,,, | Performed by: NURSE PRACTITIONER

## 2023-08-07 PROCEDURE — 3074F PR MOST RECENT SYSTOLIC BLOOD PRESSURE < 130 MM HG: ICD-10-PCS | Mod: CPTII,S$GLB,, | Performed by: NURSE PRACTITIONER

## 2023-08-07 PROCEDURE — 1170F PR FUNCTIONAL STATUS ASSESSED: ICD-10-PCS | Mod: CPTII,S$GLB,, | Performed by: NURSE PRACTITIONER

## 2023-08-07 NOTE — PATIENT INSTRUCTIONS
Counseling and Referral of Other Preventative  (Italic type indicates deductible and co-insurance are waived)    Patient Name: Tony Han  Today's Date: 8/7/2023    Health Maintenance       Date Due Completion Date    High Dose Statin Never done ---    Shingles Vaccine (2 of 3) 07/06/2016 5/11/2016    PROSTATE-SPECIFIC ANTIGEN 03/24/2022 3/24/2021    COVID-19 Vaccine (5 - Pfizer series) 02/24/2023 10/24/2022    Foot Exam 03/02/2023 3/2/2022 (Done)    Override on 3/2/2022: Done (patients PCP does foot exam)    Override on 12/2/2020: Done    Override on 6/13/2017: Done    LDCT Lung Screen 08/31/2023 8/31/2022    Influenza Vaccine (1) 09/01/2023 10/24/2022    Hemoglobin A1c 11/02/2023 5/2/2023    Eye Exam 11/18/2023 11/18/2022    Override on 10/11/2021: Done (Dr Hendrix)    Override on 2/21/2018: Done (Sees Dr Estrella and retinal specialist at [facility])    Override on 2/13/2017: Done (Nile Avina)    Override on 1/16/2017: Done (Dr Jaime Rosenbaum)    Override on 7/10/2014: Done    Lipid Panel 01/25/2024 1/25/2023    Diabetes Urine Screening 05/02/2024 5/2/2023    Colorectal Cancer Screening 02/02/2027 2/2/2022    TETANUS VACCINE 07/12/2030 7/12/2020    Override on 12/14/2016: Declined        No orders of the defined types were placed in this encounter.      The following information is provided to all patients.  This information is to help you find resources for any of the problems found today that may be affecting your health:                Living healthy guide: www.[facility].[place].gov      Understanding Diabetes: www.diabetes.org      Eating healthy: www.cdc.gov/healthyweight      CDC home safety checklist: www.cdc.gov/steadi/patient.html      Agency on Aging: www.goea.[place].gov      Alcoholics anonymous (AA): www.aa.org      Physical Activity: www.tamiko.nih.gov/gh6vloi      Tobacco use: www.quitwithusla.org

## 2023-08-07 NOTE — PROGRESS NOTES
"  Tony Han presented for a  Medicare AWV and comprehensive Health Risk Assessment today. The following components were reviewed and updated:    Medical history  Family History  Social history  Allergies and Current Medications  Health Risk Assessment  Health Maintenance  Care Team         ** See Completed Assessments for Annual Wellness Visit within the encounter summary.**         The following assessments were completed:  Living Situation  CAGE  Depression Screening  Timed Get Up and Go  Whisper Test  Cognitive Function Screening  Nutrition Screening  ADL Screening  PAQ Screening          Vitals:    08/07/23 1345   BP: 120/68   BP Location: Right arm   Patient Position: Sitting   Pulse: 74   SpO2: 99%   Weight: 86.5 kg (190 lb 11.2 oz)   Height: 5' 9" (1.753 m)     Body mass index is 28.16 kg/m².    Physical Exam  Vitals reviewed.   Constitutional:       Appearance: He is well-developed.   HENT:      Head: Normocephalic and atraumatic. Not macrocephalic and not microcephalic. No raccoon eyes, Brown's sign, abrasion, contusion, right periorbital erythema, left periorbital erythema or laceration. Hair is normal.      Right Ear: No decreased hearing noted. No laceration, drainage, swelling or tenderness. No middle ear effusion. No foreign body. No mastoid tenderness. No hemotympanum. Tympanic membrane is not injected, scarred, perforated, erythematous, retracted or bulging. Tympanic membrane has normal mobility.      Left Ear: No decreased hearing noted. No laceration, drainage, swelling or tenderness.  No middle ear effusion. No foreign body. No mastoid tenderness. No hemotympanum. Tympanic membrane is not injected, scarred, perforated, erythematous, retracted or bulging. Tympanic membrane has normal mobility.      Nose: Nose normal. No nasal deformity, laceration or mucosal edema.      Mouth/Throat:      Pharynx: Uvula midline.   Eyes:      General: Lids are normal. No scleral icterus.     Conjunctiva/sclera: " Conjunctivae normal.   Neck:      Thyroid: No thyroid mass or thyromegaly.      Trachea: Trachea normal.   Cardiovascular:      Rate and Rhythm: Normal rate and regular rhythm.   Pulmonary:      Effort: Pulmonary effort is normal. No respiratory distress.      Breath sounds: Normal breath sounds.   Abdominal:      Palpations: Abdomen is soft.   Musculoskeletal:         General: Normal range of motion.      Cervical back: Neck supple. No edema or erythema. No spinous process tenderness or muscular tenderness. Normal range of motion.   Lymphadenopathy:      Head:      Right side of head: No submental, submandibular, tonsillar, preauricular or posterior auricular adenopathy.      Left side of head: No submental, submandibular, tonsillar, preauricular, posterior auricular or occipital adenopathy.   Skin:     General: Skin is warm and dry.   Neurological:      Mental Status: He is alert and oriented to person, place, and time.      Cranial Nerves: No cranial nerve deficit.      Sensory: No sensory deficit.   Psychiatric:         Behavior: Behavior normal.         Thought Content: Thought content normal.         Judgment: Judgment normal.             Diagnoses and health risks identified today and associated recommendations/orders:    1. Encounter for preventive health examination  Annual Health Risk Assessment (HRA) visit today.  Counseling and referral of health maintenance and preventative health measures performed.  Patient given annual wellness paperwork to take home.  Encouraged to return in 1 year for subsequent HRA visit.     2. Type 2 diabetes mellitus with diabetic neuropathy, without long-term current use of insulin  Chronic. Stable. Controlled. Hgb A1c=6.8 from 5/2/23. Continue current treatment plan as previously prescribed by PCP.    3. Diabetes mellitus due to abnormal insulin  Chronic. Stable. Continue current treatment plan as previously prescribed by PCP.    4. Benign prostatic hyperplasia with lower  urinary tract symptoms, symptom details unspecified  Chronic. Stable. Continue current treatment plan as previously prescribed by PCP.    5. Nephrolithiasis  Chronic. Stable. Continue current treatment plan as previously prescribed by PCP.    6. Aortic atherosclerosis  Chronic. Stable. Continue current treatment plan as previously prescribed by PCP.    7. Essential hypertension  Chronic. Stable. Controlled. Encouraged to increase exercise as tolerated (moderate-intensity aerobic activity and muscle-strengthening activities) improve diet to heart healthy, low sodium diet.  Continue current treatment plan as previously prescribed by PCP.    8. Hyperlipidemia, unspecified hyperlipidemia type  Chronic. Stable. Continue current treatment plan as previously prescribed by PCP.    9. Pulmonary nodules  Chronic. Stable. Continue current treatment plan as previously prescribed by PCP.    10. Tattoos  Chronic. Stable. Continue current treatment plan as previously prescribed by PCP.    11. Mood disorder  Chronic. Stable. Continue current treatment plan as previously prescribed by PCP.    12. Opioid dependence, uncomplicated  Chronic. Stable. Continue current treatment plan as previously prescribed by PCP.    13. Lumbar disc disease  Chronic. Stable. Continue current treatment plan as previously prescribed by PCP.    14. Meralgia paraesthetica, right  Chronic. Stable. Continue current treatment plan as previously prescribed by PCP.    15. High serum carbohydrate antigen 19-9 ()  Chronic. Stable. Continue current treatment plan as previously prescribed by PCP.    16. Nausea  Chronic. Stable. Continue current treatment plan as previously prescribed by PCP.    17. Mucosal abnormality of duodenum  Chronic. Stable. Continue current treatment plan as previously prescribed by PCP.    18. History of adenomatous polyp of colon  Chronic. Stable. Continue current treatment plan as previously prescribed by PCP.    19. Gastroesophageal  reflux disease without esophagitis  Chronic. Stable. Continue current treatment plan as previously prescribed by PCP.    20. Fatty liver  Chronic. Stable. Continue current treatment plan as previously prescribed by PCP.    21. Duodenitis  Chronic. Stable. Continue current treatment plan as previously prescribed by PCP.    22. Polyp of colon, unspecified part of colon, unspecified type  Chronic. Stable. Continue current treatment plan as previously prescribed by PCP.    23. Colon adenoma  Chronic. Stable. Continue current treatment plan as previously prescribed by PCP.    24. Chronic superficial gastritis without bleeding  Chronic. Stable. Continue current treatment plan as previously prescribed by PCP.    25. Cholesterolosis of gallbladder  Chronic. Stable. Continue current treatment plan as previously prescribed by PCP.    26. Trochanteric bursitis, right hip  Chronic. Stable. Continue current treatment plan as previously prescribed by PCP.    27. S/P rotator cuff surgery  Chronic. Stable. Continue current treatment plan as previously prescribed by PCP.    28. Right sided sciatica  Chronic. Stable. Continue current treatment plan as previously prescribed by PCP.    29. Right hip pain  Chronic. Stable. Continue current treatment plan as previously prescribed by PCP.    30. Susan-trochanteric right hip tendinitis  Chronic. Stable. Continue current treatment plan as previously prescribed by PCP.    31. Nontraumatic tear of left rotator cuff, unspecified tear extent  Chronic. Stable. Continue current treatment plan as previously prescribed by PCP.    32. Acute pain of right shoulder  Chronic. Stable. Continue current treatment plan as previously prescribed by PCP.    33. Other insomnia  Chronic. Stable. Continue current treatment plan as previously prescribed by PCP.    34. Cigarette nicotine dependence without complication  Chronic. Stable. Continue current treatment plan as previously prescribed by  PCP.        Provided Tony with a 5-10 year written screening schedule and personal prevention plan. Recommendations were developed using the USPSTF age appropriate recommendations. Education, counseling, and referrals were provided as needed. After Visit Summary printed and given to patient which includes a list of additional screenings\tests needed.      I offered to discuss end of life issues, including information on how to make advance directives that the patient could use to name someone who would make medical decisions on their behalf if they became too ill to make themselves.    ___Patient declined  _X_Patient is interested, I provided paper work and offered to discuss.    Follow up in about 1 year (around 8/7/2024).    DUY Shore offered to discuss advanced care planning, including how to pick a person who would make decisions for you if you were unable to make them for yourself, called a health care power of , and what kind of decisions you might make such as use of life sustaining treatments such as ventilators and tube feeding when faced with a life limiting illness recorded on a living will that they will need to know. (How you want to be cared for as you near the end of your natural life)     X Patient is interested in learning more about how to make advanced directives.  I provided them paperwork and offered to discuss this with them.

## 2023-08-07 NOTE — TELEPHONE ENCOUNTER
----- Message from Renaldo Parrish sent at 8/7/2023 12:59 PM CDT -----  Who is Calling:PETER STRATTON [[id number]]              What is the request in detail: Patient is running 15 minutes late.  If patient needs to reschedule, please contact.                  Can the clinic reply by [facility]: NO             What Number to Call Back if not in [facility]:[phone]

## 2023-08-08 ENCOUNTER — TELEPHONE (OUTPATIENT)
Dept: ORTHOPEDICS | Facility: CLINIC | Age: 73
End: 2023-08-08
Payer: MEDICARE

## 2023-08-27 DIAGNOSIS — N40.1 BPH WITH OBSTRUCTION/LOWER URINARY TRACT SYMPTOMS: ICD-10-CM

## 2023-08-27 DIAGNOSIS — N13.8 BPH WITH OBSTRUCTION/LOWER URINARY TRACT SYMPTOMS: ICD-10-CM

## 2023-08-28 RX ORDER — TAMSULOSIN HYDROCHLORIDE 0.4 MG/1
0.4 CAPSULE ORAL
Qty: 90 CAPSULE | Refills: 3 | Status: SHIPPED | OUTPATIENT
Start: 2023-08-28

## 2023-08-30 ENCOUNTER — TELEPHONE (OUTPATIENT)
Dept: INTERNAL MEDICINE | Facility: CLINIC | Age: 73
End: 2023-08-30
Payer: MEDICARE

## 2023-08-30 ENCOUNTER — OFFICE VISIT (OUTPATIENT)
Dept: PODIATRY | Facility: CLINIC | Age: 73
End: 2023-08-30
Payer: MEDICARE

## 2023-08-30 VITALS
SYSTOLIC BLOOD PRESSURE: 175 MMHG | HEART RATE: 85 BPM | WEIGHT: 191.38 LBS | HEIGHT: 69 IN | BODY MASS INDEX: 28.35 KG/M2 | DIASTOLIC BLOOD PRESSURE: 85 MMHG

## 2023-08-30 DIAGNOSIS — E11.40 TYPE 2 DIABETES MELLITUS WITH DIABETIC NEUROPATHY, WITHOUT LONG-TERM CURRENT USE OF INSULIN: Primary | ICD-10-CM

## 2023-08-30 DIAGNOSIS — E11.40 TYPE 2 DIABETES MELLITUS WITH DIABETIC NEUROPATHY, WITHOUT LONG-TERM CURRENT USE OF INSULIN: ICD-10-CM

## 2023-08-30 PROCEDURE — 3044F PR MOST RECENT HEMOGLOBIN A1C LEVEL <7.0%: ICD-10-PCS | Mod: CPTII,S$GLB,, | Performed by: PODIATRIST

## 2023-08-30 PROCEDURE — 3079F DIAST BP 80-89 MM HG: CPT | Mod: CPTII,S$GLB,, | Performed by: PODIATRIST

## 2023-08-30 PROCEDURE — 3060F PR POS MICROALBUMINURIA RESULT DOCUMENTED/REVIEW: ICD-10-PCS | Mod: CPTII,S$GLB,, | Performed by: PODIATRIST

## 2023-08-30 PROCEDURE — 99999 PR PBB SHADOW E&M-EST. PATIENT-LVL IV: ICD-10-PCS | Mod: PBBFAC,,, | Performed by: PODIATRIST

## 2023-08-30 PROCEDURE — 1159F PR MEDICATION LIST DOCUMENTED IN MEDICAL RECORD: ICD-10-PCS | Mod: CPTII,S$GLB,, | Performed by: PODIATRIST

## 2023-08-30 PROCEDURE — 3066F NEPHROPATHY DOC TX: CPT | Mod: CPTII,S$GLB,, | Performed by: PODIATRIST

## 2023-08-30 PROCEDURE — 99203 PR OFFICE/OUTPT VISIT, NEW, LEVL III, 30-44 MIN: ICD-10-PCS | Mod: S$GLB,,, | Performed by: PODIATRIST

## 2023-08-30 PROCEDURE — 3044F HG A1C LEVEL LT 7.0%: CPT | Mod: CPTII,S$GLB,, | Performed by: PODIATRIST

## 2023-08-30 PROCEDURE — 99999 PR PBB SHADOW E&M-EST. PATIENT-LVL IV: CPT | Mod: PBBFAC,,, | Performed by: PODIATRIST

## 2023-08-30 PROCEDURE — 4010F PR ACE/ARB THEARPY RXD/TAKEN: ICD-10-PCS | Mod: CPTII,S$GLB,, | Performed by: PODIATRIST

## 2023-08-30 PROCEDURE — 3060F POS MICROALBUMINURIA REV: CPT | Mod: CPTII,S$GLB,, | Performed by: PODIATRIST

## 2023-08-30 PROCEDURE — 99203 OFFICE O/P NEW LOW 30 MIN: CPT | Mod: S$GLB,,, | Performed by: PODIATRIST

## 2023-08-30 PROCEDURE — 1126F AMNT PAIN NOTED NONE PRSNT: CPT | Mod: CPTII,S$GLB,, | Performed by: PODIATRIST

## 2023-08-30 PROCEDURE — 4010F ACE/ARB THERAPY RXD/TAKEN: CPT | Mod: CPTII,S$GLB,, | Performed by: PODIATRIST

## 2023-08-30 PROCEDURE — 3079F PR MOST RECENT DIASTOLIC BLOOD PRESSURE 80-89 MM HG: ICD-10-PCS | Mod: CPTII,S$GLB,, | Performed by: PODIATRIST

## 2023-08-30 PROCEDURE — 3066F PR DOCUMENTATION OF TREATMENT FOR NEPHROPATHY: ICD-10-PCS | Mod: CPTII,S$GLB,, | Performed by: PODIATRIST

## 2023-08-30 PROCEDURE — 1126F PR PAIN SEVERITY QUANTIFIED, NO PAIN PRESENT: ICD-10-PCS | Mod: CPTII,S$GLB,, | Performed by: PODIATRIST

## 2023-08-30 PROCEDURE — 3077F PR MOST RECENT SYSTOLIC BLOOD PRESSURE >= 140 MM HG: ICD-10-PCS | Mod: CPTII,S$GLB,, | Performed by: PODIATRIST

## 2023-08-30 PROCEDURE — 1159F MED LIST DOCD IN RCRD: CPT | Mod: CPTII,S$GLB,, | Performed by: PODIATRIST

## 2023-08-30 PROCEDURE — 3077F SYST BP >= 140 MM HG: CPT | Mod: CPTII,S$GLB,, | Performed by: PODIATRIST

## 2023-08-30 RX ORDER — LEVETIRACETAM 500 MG/1
TABLET ORAL
COMMUNITY
End: 2023-11-08

## 2023-08-30 RX ORDER — FENOFIBRATE 145 MG/1
145 TABLET, FILM COATED ORAL
COMMUNITY
Start: 2023-05-24 | End: 2023-11-08

## 2023-08-30 RX ORDER — SIMVASTATIN 20 MG/1
TABLET, FILM COATED ORAL
COMMUNITY
End: 2023-11-08

## 2023-08-30 RX ORDER — LEVOFLOXACIN 500 MG/1
TABLET, FILM COATED ORAL
COMMUNITY
End: 2023-11-08

## 2023-08-30 RX ORDER — PIOGLITAZONEHYDROCHLORIDE 15 MG/1
TABLET ORAL
COMMUNITY
End: 2023-11-08

## 2023-08-30 RX ORDER — GABAPENTIN 400 MG/1
CAPSULE ORAL
COMMUNITY
End: 2023-11-08

## 2023-08-30 RX ORDER — INFLUENZA A VIRUS A/MICHIGAN/45/2015 X-275 (H1N1) ANTIGEN (FORMALDEHYDE INACTIVATED), INFLUENZA A VIRUS A/SINGAPORE/INFIMH-16-0019/2016 IVR-186 (H3N2) ANTIGEN (FORMALDEHYDE INACTIVATED), INFLUENZA B VIRUS B/PHUKET/3073/2013 ANTIGEN (FORMALDEHYDE INACTIVATED), AND INFLUENZA B VIRUS B/MARYLAND/15/2016 BX-69A ANTIGEN (FORMALDEHYDE INACTIVATED) 60; 60; 60; 60 UG/.7ML; UG/.7ML; UG/.7ML; UG/.7ML
INJECTION, SUSPENSION INTRAMUSCULAR
COMMUNITY
End: 2023-11-08

## 2023-08-30 RX ORDER — LOSARTAN POTASSIUM 25 MG/1
TABLET ORAL
COMMUNITY
End: 2023-11-08

## 2023-08-30 RX ORDER — TRAMADOL HYDROCHLORIDE 50 MG/1
TABLET ORAL
COMMUNITY
End: 2023-11-08

## 2023-08-30 RX ORDER — FENOFIBRATE 145 MG/1
TABLET, FILM COATED ORAL
COMMUNITY
End: 2023-11-08 | Stop reason: SDUPTHER

## 2023-08-30 RX ORDER — MIRTAZAPINE 15 MG/1
TABLET, FILM COATED ORAL
COMMUNITY
End: 2023-11-08

## 2023-08-30 RX ORDER — SUCRALFATE 1 G/10ML
SUSPENSION ORAL
COMMUNITY
End: 2023-11-08

## 2023-08-30 RX ORDER — SODIUM FLUORIDE 5 MG/G
GEL, DENTIFRICE DENTAL
COMMUNITY

## 2023-08-30 RX ORDER — LACTULOSE 10 G/15ML
SOLUTION ORAL; RECTAL
COMMUNITY
End: 2023-11-08

## 2023-08-30 RX ORDER — OXYCODONE AND ACETAMINOPHEN 7.5; 325 MG/1; MG/1
1 TABLET ORAL 4 TIMES DAILY PRN
COMMUNITY
Start: 2023-08-17 | End: 2023-11-08

## 2023-08-30 RX ORDER — LOSARTAN POTASSIUM 50 MG/1
TABLET ORAL
COMMUNITY
End: 2023-11-08

## 2023-08-30 RX ORDER — DIAZEPAM 10 MG/1
TABLET ORAL
COMMUNITY
End: 2023-11-08

## 2023-08-30 NOTE — TELEPHONE ENCOUNTER
----- Message from Carolin Light sent at 8/30/2023  1:54 PM CDT -----  Contact: [phone]  [email or web address]     Patient is calling for Medical Advice regarding:next blood test     How long has patient had these symptoms:check cholesterol     Pharmacy name and phone#:  [id number] - [place], [place] - [facility] [facility] [address]  [address] [address]  Phone: [phone] Fax: [phone]       Would like response via [employer]:  no     Comments:pt is calling to ask for his next bllood test if the dr can check his cholesterol as well please advise

## 2023-08-30 NOTE — PROGRESS NOTES
Subjective:      Patient ID: Tony Han is a 73 y.o. male.    Chief Complaint: Diabetic Foot Exam (PCP - Elizabeth Benitez MD - 6/28/2023)    Tony is a 73 y.o. male who presents to the clinic upon referral from Dr. Capone  for evaluation and treatment of diabetic feet. Tony has a past medical history of Angina pectoris, Back pain, Cluster headaches, Colon polyps, Coronary artery disease (11/2011), Diabetes mellitus, GERD (gastroesophageal reflux disease), Hyperlipidemia, Hypertension, Lumbar disc disease, and Nephrolithiasis. Patient relates no major problem with feet. Only complaints today consist of diabetic foot exam.    PCP: Elizabeth Benitez MD    Date Last Seen by PCP:   Chief Complaint   Patient presents with    Diabetic Foot Exam     PCP - Elizabeth Benitez MD - 6/28/2023         Current shoe gear: Tennis shoes    Hemoglobin A1C   Date Value Ref Range Status   05/02/2023 6.8 (H) 4.0 - 5.6 % Final     Comment:     ADA Screening Guidelines:  5.7-6.4%  Consistent with prediabetes  >or=6.5%  Consistent with diabetes    High levels of fetal hemoglobin interfere with the HbA1C  assay. Heterozygous hemoglobin variants (HbS, HgC, etc)do  not significantly interfere with this assay.   However, presence of multiple variants may affect accuracy.     01/25/2023 7.4 (H) 4.0 - 5.6 % Final     Comment:     ADA Screening Guidelines:  5.7-6.4%  Consistent with prediabetes  >or=6.5%  Consistent with diabetes    High levels of fetal hemoglobin interfere with the HbA1C  assay. Heterozygous hemoglobin variants (HbS, HgC, etc)do  not significantly interfere with this assay.   However, presence of multiple variants may affect accuracy.     08/31/2022 7.3 (H) 4.0 - 5.6 % Final     Comment:     ADA Screening Guidelines:  5.7-6.4%  Consistent with prediabetes  >or=6.5%  Consistent with diabetes    High levels of fetal hemoglobin interfere with the HbA1C  assay. Heterozygous hemoglobin variants (HbS, HgC, etc)do  not  significantly interfere with this assay.   However, presence of multiple variants may affect accuracy.           Review of Systems   Constitutional: Negative for chills, fever and malaise/fatigue.   HENT:  Negative for hearing loss.    Cardiovascular:  Negative for claudication.   Respiratory:  Negative for shortness of breath.    Skin:  Negative for flushing and rash.   Musculoskeletal:  Negative for joint pain and myalgias.   Neurological:  Positive for numbness and paresthesias. Negative for loss of balance and sensory change.   Psychiatric/Behavioral:  Negative for altered mental status.            Objective:      Physical Exam  Vitals reviewed.   Cardiovascular:      Pulses:           Dorsalis pedis pulses are 2+ on the right side and 2+ on the left side.        Posterior tibial pulses are 2+ on the right side and 2+ on the left side.      Comments: No edema noted to b/L LEs  Musculoskeletal:      Comments: Adequate joint ROM noted to all lower extremity muscle groups with no pain or crepitation noted. Muscle strength is 5/5 in all groups bilaterally.     Feet:      Right foot:      Protective Sensation: 5 sites tested.  5 sites sensed.      Left foot:      Protective Sensation: 5 sites tested.  5 sites sensed.   Skin:     General: Skin is warm.      Capillary Refill: Capillary refill takes 2 to 3 seconds.      Comments: Normal skin tugor noted.   No open lesion noted b/L  Skin temp is warm to warm from proximal to distal b/L.  Webspaces clean, dry, and intact  Nails x10 short   Neurological:      Mental Status: He is alert and oriented to person, place, and time.      Comments: Intact gross sensation noted to b/L LEs               Assessment:       Encounter Diagnosis   Name Primary?    Type 2 diabetes mellitus with diabetic neuropathy, without long-term current use of insulin          Plan:       Tony was seen today for diabetic foot exam.    Diagnoses and all orders for this visit:    Type 2 diabetes  mellitus with diabetic neuropathy, without long-term current use of insulin  -     Ambulatory referral/consult to Podiatry      I counseled the patient on his conditions, their implications and medical management.    Discussed DM foot care:  Wear comfortable, proper fitting shoes. Wash feet daily. Dry well. After drying, apply moisturizer to feet (no lotion to webspaces). Inspect feet daily for skin breaks, blisters, swelling, or redness. Wear cotton socks (preferably white)  Change socks every day. Do NOT walk barefoot. Do NOT use heating pads or warm/hot water soaks      - Discussed importance of daily moisturizer to the feet such as Gold bonds diabetic foot cream     - Patient is low risk for developing lower extremity issues secondary to diabetes     - Advised the patient that fungus likes warm, dark, and moist environments such as bathrooms, gyms, and pools. Advised to spray shower, shower mat , and any shoes w/ Lysol periodically  RTC in 1 year or sooner if any new pedal problems should arise.      .

## 2023-09-05 DIAGNOSIS — R93.89 ABNORMAL FINDING ON IMAGING: Primary | ICD-10-CM

## 2023-09-05 DIAGNOSIS — R97.8 ABNORMAL TUMOR MARKERS: ICD-10-CM

## 2023-09-05 DIAGNOSIS — R79.89 HIGH SERUM CARBOHYDRATE ANTIGEN 19-9 (CA19-9): ICD-10-CM

## 2023-09-05 DIAGNOSIS — R93.89 ABNORMAL FINDING OF DIAGNOSTIC IMAGING: ICD-10-CM

## 2023-09-06 ENCOUNTER — HOSPITAL ENCOUNTER (OUTPATIENT)
Dept: RADIOLOGY | Facility: OTHER | Age: 73
Discharge: HOME | End: 2023-09-06
Attending: INTERNAL MEDICINE
Payer: MEDICARE

## 2023-09-06 DIAGNOSIS — R91.1 SOLITARY PULMONARY NODULE: ICD-10-CM

## 2023-09-06 PROCEDURE — 71250 CT THORAX DX C-: CPT | Mod: TC

## 2023-09-06 PROCEDURE — 71250 CT THORAX DX C-: CPT | Mod: 26,,, | Performed by: RADIOLOGY

## 2023-09-06 PROCEDURE — 71250 CT CHEST WITHOUT CONTRAST: ICD-10-PCS | Mod: 26,,, | Performed by: RADIOLOGY

## 2023-09-07 ENCOUNTER — TELEPHONE (OUTPATIENT)
Dept: INTERNAL MEDICINE | Facility: CLINIC | Age: 73
End: 2023-09-07
Payer: MEDICARE

## 2023-09-07 NOTE — TELEPHONE ENCOUNTER
----- Message from Elizabeth Benitez MD sent at 9/6/2023  8:35 PM CDT -----  Please notify pt  THe lung nodules in the right lung have decreased in number which indicated resolving infection or inflammatory process.  Will repeat the CT scan in one year  Elizabeth Benitez M.D.

## 2023-09-11 NOTE — TELEPHONE ENCOUNTER
Appt scheduled for 1/30/17 to speak to Dr. Dexter about workup.   no history of blood product transfusion

## 2023-09-28 RX ORDER — GABAPENTIN 100 MG/1
CAPSULE ORAL
Qty: 180 CAPSULE | Refills: 11 | Status: SHIPPED | OUTPATIENT
Start: 2023-09-28

## 2023-09-28 NOTE — TELEPHONE ENCOUNTER
No care due was identified.  [facility] Embedded Care Due Messages. Reference number: [id number].   9/28/2023 3:28:32 AM CDT

## 2023-10-18 ENCOUNTER — OFFICE VISIT (OUTPATIENT)
Dept: OTOLARYNGOLOGY | Facility: CLINIC | Age: 73
End: 2023-10-18
Payer: MEDICARE

## 2023-10-18 ENCOUNTER — CLINICAL SUPPORT (OUTPATIENT)
Dept: AUDIOLOGY | Facility: CLINIC | Age: 73
End: 2023-10-18
Payer: MEDICARE

## 2023-10-18 DIAGNOSIS — H91.90 HEARING LOSS, UNSPECIFIED HEARING LOSS TYPE, UNSPECIFIED LATERALITY: ICD-10-CM

## 2023-10-18 DIAGNOSIS — H90.3 SENSORINEURAL HEARING LOSS OF BOTH EARS: ICD-10-CM

## 2023-10-18 DIAGNOSIS — H61.23 BILATERAL IMPACTED CERUMEN: Primary | ICD-10-CM

## 2023-10-18 PROCEDURE — 92567 TYMPANOMETRY: CPT | Mod: S$GLB,,, | Performed by: AUDIOLOGIST

## 2023-10-18 PROCEDURE — 1101F PR PT FALLS ASSESS DOC 0-1 FALLS W/OUT INJ PAST YR: ICD-10-PCS | Mod: CPTII,S$GLB,, | Performed by: OTOLARYNGOLOGY

## 2023-10-18 PROCEDURE — 99203 PR OFFICE/OUTPT VISIT, NEW, LEVL III, 30-44 MIN: ICD-10-PCS | Mod: 25,S$GLB,, | Performed by: OTOLARYNGOLOGY

## 2023-10-18 PROCEDURE — 69210 REMOVE IMPACTED EAR WAX UNI: CPT | Mod: S$GLB,,, | Performed by: OTOLARYNGOLOGY

## 2023-10-18 PROCEDURE — 3060F POS MICROALBUMINURIA REV: CPT | Mod: CPTII,S$GLB,, | Performed by: OTOLARYNGOLOGY

## 2023-10-18 PROCEDURE — 1159F MED LIST DOCD IN RCRD: CPT | Mod: CPTII,S$GLB,, | Performed by: OTOLARYNGOLOGY

## 2023-10-18 PROCEDURE — 99999 PR PBB SHADOW E&M-EST. PATIENT-LVL IV: ICD-10-PCS | Mod: PBBFAC,,, | Performed by: OTOLARYNGOLOGY

## 2023-10-18 PROCEDURE — 92567 PR TYMPA2METRY: ICD-10-PCS | Mod: S$GLB,,, | Performed by: AUDIOLOGIST

## 2023-10-18 PROCEDURE — 1159F PR MEDICATION LIST DOCUMENTED IN MEDICAL RECORD: ICD-10-PCS | Mod: CPTII,S$GLB,, | Performed by: OTOLARYNGOLOGY

## 2023-10-18 PROCEDURE — 1101F PT FALLS ASSESS-DOCD LE1/YR: CPT | Mod: CPTII,S$GLB,, | Performed by: OTOLARYNGOLOGY

## 2023-10-18 PROCEDURE — 3066F NEPHROPATHY DOC TX: CPT | Mod: CPTII,S$GLB,, | Performed by: OTOLARYNGOLOGY

## 2023-10-18 PROCEDURE — 1126F AMNT PAIN NOTED NONE PRSNT: CPT | Mod: CPTII,S$GLB,, | Performed by: OTOLARYNGOLOGY

## 2023-10-18 PROCEDURE — 3288F FALL RISK ASSESSMENT DOCD: CPT | Mod: CPTII,S$GLB,, | Performed by: OTOLARYNGOLOGY

## 2023-10-18 PROCEDURE — 99999 PR PBB SHADOW E&M-EST. PATIENT-LVL I: CPT | Mod: PBBFAC,,, | Performed by: AUDIOLOGIST

## 2023-10-18 PROCEDURE — 99999 PR PBB SHADOW E&M-EST. PATIENT-LVL I: ICD-10-PCS | Mod: PBBFAC,,, | Performed by: AUDIOLOGIST

## 2023-10-18 PROCEDURE — 3044F HG A1C LEVEL LT 7.0%: CPT | Mod: CPTII,S$GLB,, | Performed by: OTOLARYNGOLOGY

## 2023-10-18 PROCEDURE — 3060F PR POS MICROALBUMINURIA RESULT DOCUMENTED/REVIEW: ICD-10-PCS | Mod: CPTII,S$GLB,, | Performed by: OTOLARYNGOLOGY

## 2023-10-18 PROCEDURE — 4010F ACE/ARB THERAPY RXD/TAKEN: CPT | Mod: CPTII,S$GLB,, | Performed by: OTOLARYNGOLOGY

## 2023-10-18 PROCEDURE — 99203 OFFICE O/P NEW LOW 30 MIN: CPT | Mod: 25,S$GLB,, | Performed by: OTOLARYNGOLOGY

## 2023-10-18 PROCEDURE — 99999 PR PBB SHADOW E&M-EST. PATIENT-LVL IV: CPT | Mod: PBBFAC,,, | Performed by: OTOLARYNGOLOGY

## 2023-10-18 PROCEDURE — 3066F PR DOCUMENTATION OF TREATMENT FOR NEPHROPATHY: ICD-10-PCS | Mod: CPTII,S$GLB,, | Performed by: OTOLARYNGOLOGY

## 2023-10-18 PROCEDURE — 92557 PR COMPREHENSIVE HEARING TEST: ICD-10-PCS | Mod: S$GLB,,, | Performed by: AUDIOLOGIST

## 2023-10-18 PROCEDURE — 4010F PR ACE/ARB THEARPY RXD/TAKEN: ICD-10-PCS | Mod: CPTII,S$GLB,, | Performed by: OTOLARYNGOLOGY

## 2023-10-18 PROCEDURE — 1126F PR PAIN SEVERITY QUANTIFIED, NO PAIN PRESENT: ICD-10-PCS | Mod: CPTII,S$GLB,, | Performed by: OTOLARYNGOLOGY

## 2023-10-18 PROCEDURE — 92557 COMPREHENSIVE HEARING TEST: CPT | Mod: S$GLB,,, | Performed by: AUDIOLOGIST

## 2023-10-18 PROCEDURE — 3288F PR FALLS RISK ASSESSMENT DOCUMENTED: ICD-10-PCS | Mod: CPTII,S$GLB,, | Performed by: OTOLARYNGOLOGY

## 2023-10-18 PROCEDURE — 3044F PR MOST RECENT HEMOGLOBIN A1C LEVEL <7.0%: ICD-10-PCS | Mod: CPTII,S$GLB,, | Performed by: OTOLARYNGOLOGY

## 2023-10-18 PROCEDURE — 69210 PR REMOVAL IMPACTED CERUMEN REQUIRING INSTRUMENTATION, UNILATERAL: ICD-10-PCS | Mod: S$GLB,,, | Performed by: OTOLARYNGOLOGY

## 2023-10-18 NOTE — PROGRESS NOTES
Subjective     Patient ID: Tony Han is a 73 y.o. male.    Chief Complaint: Hearing Loss    HPI: Has Lloyd HL.    Prog.    ?? Fam Hx.    Hx of CI.    Past Medical History: Patient has a past medical history of Angina pectoris, Back pain, Cluster headaches, Colon polyps, Coronary artery disease (11/2011), Diabetes mellitus, GERD (gastroesophageal reflux disease), Hyperlipidemia, Hypertension, Lumbar disc disease, and Nephrolithiasis.    Past Surgical History: Patient has a past surgical history that includes Cholecystectomy; ORIF left humerus; Colonoscopy (N/A, 12/07/2016); Lithotripsy; Arthroscopic repair of rotator cuff of shoulder (Left, 01/09/2020); Arthroscopic debridement of shoulder (Left, 01/09/2020); Excision of bursa (Left, 01/09/2020); Arthroscopy of shoulder with decompression of subacromial space (Left, 01/09/2020); Colonoscopy (N/A, 02/02/2022); Endoscopic ultrasound of upper gastrointestinal tract (N/A, 03/14/2023); and Esophagogastroduodenoscopy (N/A, 03/14/2023).    Social History: Patient reports that he has been smoking cigarettes. He has a 75.0 pack-year smoking history. He has been exposed to tobacco smoke. He has never used smokeless tobacco. He reports current alcohol use. He reports current drug use. Drug: Hydrocodone.    Family History: family history includes Alzheimer's disease in his brother; Blindness in his son; Cancer in his sister; Diabetes in his son, son, son, and son; Heart disease in his brother and mother; Heart failure in his brother; Hypertension in his son, son, and son; Kidney disease in his brother; No Known Problems in his brother and father; Sleep apnea in his son.    Medications:   Current Outpatient Medications   Medication Sig    amLODIPine (NORVASC) 10 MG tablet Take 1 tablet (10 mg total) by mouth once daily.    aspirin 81 MG Chew Take 1 tablet (81 mg total) by mouth once daily.    blood sugar diagnostic Strp True Metrics test strips - check blood sugar twice daily -  fluctuating blood sugars    blood-glucose meter kit Use as instructed.  Gabriel MEtric    diazePAM (VALIUM) 10 MG Tab diazepam 10 mg tablet    diazePAM (VALIUM) 5 MG tablet Take 1 tablet (5 mg total) by mouth nightly as needed for Anxiety.    diphth,pertus,acell,,tetanus (BOOSTRIX TDAP) 2.5-8-5 Lf-mcg-Lf/0.5mL Syrg injection Boostrix Tdap 2.5 Lf unit-8 mcg-5 Lf/0.5 mL intramuscular syringe   ADM 0.5ML IM UTD    fenofibrate (TRICOR) 145 MG tablet Take 145 mg by mouth.    fenofibrate (TRICOR) 145 MG tablet fenofibrate nanocrystallized 145 mg tablet    finasteride (PROSCAR) 5 mg tablet TAKE 1 TABLET(5 MG) BY MOUTH EVERY DAY    flu vacc [id number],65yr up,-PF (FLUZONE HIGHDOSE QUAD 20-21 PF) 240 mcg/0.7 mL Syrg Fluzone High-Dose Quad 2020-21 (PF) 240 mcg/0.7 mL IM syringe   ADM 0.7ML IM UTD    fluoride, sodium, (PREVIDENT) 1.1 % Gel PreviDent 5000 Booster Plus 1.1 % dental paste   USE AS DIRECTED ONCE DAILY    gabapentin (NEURONTIN) 100 MG capsule TAKE 1 TO 2 CAPSULES BY MOUTH THREE TIMES DAILY FOR NERVE PAIN    gabapentin (NEURONTIN) 400 MG capsule gabapentin 400 mg capsule    hyoscyamine (LEVSIN/SL) 0.125 mg Subl Place 1 tablet (0.125 mg total) under the tongue every 4 (four) hours as needed.    lactulose (CHRONULAC) 10 gram/15 mL solution lactulose 10 gram/15 mL oral solution   TAKE 15 ML BY MOUTH EVERY 6 HOURS AS NEEDED    lancets Misc True Matrix Lancets -c heck blood sugar twice daily - fluctuating blood sugars    levETIRAcetam (KEPPRA) 500 MG Tab levetiracetam 500 mg tablet   TK 1 T PO  NIGHTLY    levoFLOXacin (LEVAQUIN) 500 MG tablet levofloxacin 500 mg tablet    losartan (COZAAR) 100 MG tablet Take 1 tablet (100 mg total) by mouth once daily.    losartan (COZAAR) 25 MG tablet losartan 25 mg tablet    losartan (COZAAR) 50 MG tablet losartan 50 mg tablet    metFORMIN (GLUCOPHAGE-XR) 500 MG ER 24hr tablet Take 2 tablets (1,000 mg total) by mouth 2 (two) times daily.    metoprolol succinate (TOPROL-XL) 25 MG 24 hr  tablet TAKE 1 TABLET(25 MG) BY MOUTH EVERY DAY    mirtazapine (REMERON) 15 MG tablet mirtazapine 15 mg tablet    MULTIVITS-MINERALS/FA/LYCOPENE (ONE-A-DAY MEN'S ORAL) Take 1 tablet by mouth once daily.    oxyCODONE-acetaminophen (PERCOCET)  mg per tablet Take 1 tablet by mouth every 8 (eight) hours as needed for Pain.    oxyCODONE-acetaminophen (PERCOCET) 7.5-325 mg per tablet Take 1 tablet by mouth 4 (four) times daily as needed.    pantoprazole (PROTONIX) 40 MG tablet Take 1 tablet (40 mg total) by mouth once daily.    PFIZER COVID BIVAL,12Y UP,,PF, 30 mcg/0.3 mL injection     pioglitazone (ACTOS) 15 MG tablet pioglitazone 15 mg tablet    PREVIDENT 5000 BOOSTER PLUS 1.1 % Pste SMARTSIG:To Teeth    senna-docusate 8.6-50 mg (PERICOLACE) 8.6-50 mg per tablet Take 1 tablet by mouth daily as needed for Constipation.    simvastatin (ZOCOR) 20 MG tablet simvastatin 20 mg tablet    sucralfate (CARAFATE) 100 mg/mL suspension sucralfate 100 mg/mL oral suspension   TAKE 10 ML BY MOUTH FOUR TIMES DAILY    tamsulosin (FLOMAX) 0.4 mg Cap TAKE 1 CAPSULE(0.4 MG) BY MOUTH EVERY DAY    traMADoL (ULTRAM) 50 mg tablet tramadol 50 mg tablet   TK 1 TO 2 T PO PRN Q 4 TO 6 H    vitamin D 1000 units Tab Take 2,000 Units by mouth once daily. Take 1 tablet daily     No current facility-administered medications for this visit.       Allergies: Patient is allergic to aspirin.    Review of Systems   HENT:  Positive for hearing loss.           Objective     Physical Exam  Constitutional:       General: He is not in acute distress.     Appearance: Normal appearance. He is well-developed. He is not ill-appearing, toxic-appearing or diaphoretic.   HENT:      Head: Not macrocephalic and not microcephalic. No raccoon eyes, Brown's sign, abrasion, contusion, right periorbital erythema, left periorbital erythema or laceration. Hair is normal.      Jaw: No trismus.      Right Ear: Decreased hearing noted. No laceration, drainage, swelling or  tenderness. No middle ear effusion. There is impacted cerumen. No foreign body. No mastoid tenderness. No hemotympanum. Tympanic membrane is not injected, scarred, perforated, erythematous, retracted or bulging. Tympanic membrane has normal mobility.      Left Ear: Decreased hearing noted. No laceration, drainage, swelling or tenderness.  No middle ear effusion. There is impacted cerumen. No foreign body. No mastoid tenderness. No hemotympanum. Tympanic membrane is not injected, scarred, perforated, erythematous, retracted or bulging. Tympanic membrane has normal mobility.      Ears:      Comments:     Procedure Note:    Patient was brought to the minor procedure room and using the operating microscope the right ear canal  was cleaned of ceruminous debris. There was a significant cerumen impaction.  The forceps and suction were both used to perform this. Tympanic membrane intact. Pt tolerated well. There were no complications.    Procedure Note:    The patient was brought to the minor procedure room and placed under the operating microscope. Using a combination of suction, curettes and cup forceps the patient's cerumen impaction was removed. The tympanic membrane was evaluated and was unremarkable. The patient tolerated the procedure well. There were no complications.         Nose: No nasal deformity, septal deviation, laceration, mucosal edema or rhinorrhea.      Right Sinus: No maxillary sinus tenderness.      Left Sinus: No maxillary sinus tenderness.      Mouth/Throat:      Mouth: Mucous membranes are not pale, not dry and not cyanotic. No lacerations or oral lesions.      Dentition: Normal dentition. No dental caries or dental abscesses.      Pharynx: Uvula midline. No oropharyngeal exudate or uvula swelling.      Tonsils: No tonsillar abscesses.   Eyes:      General: No scleral icterus.        Right eye: No foreign body or discharge.         Left eye: No foreign body or discharge.      Extraocular Movements:       Right eye: Normal extraocular motion and no nystagmus.      Left eye: Normal extraocular motion and no nystagmus.      Conjunctiva/sclera:      Right eye: Right conjunctiva is not injected. No chemosis, exudate or hemorrhage.     Left eye: Left conjunctiva is not injected. No chemosis, exudate or hemorrhage.     Pupils: Pupils are equal.      Right eye: Pupil is round and reactive.      Left eye: Pupil is round and reactive.   Neck:      Thyroid: No thyroid mass or thyromegaly.      Vascular: No JVD.      Trachea: No tracheal tenderness or tracheal deviation.   Cardiovascular:      Rate and Rhythm: Normal rate and regular rhythm.   Pulmonary:      Effort: No tachypnea, bradypnea, accessory muscle usage or respiratory distress.      Breath sounds: Normal breath sounds. No stridor.   Abdominal:      Palpations: Abdomen is soft.   Musculoskeletal:         General: Normal range of motion.      Cervical back: No edema, erythema or rigidity. No muscular tenderness. Normal range of motion.   Lymphadenopathy:      Head:      Right side of head: No submental, submandibular, tonsillar, preauricular, posterior auricular or occipital adenopathy.      Left side of head: No submental, submandibular, tonsillar, preauricular, posterior auricular or occipital adenopathy.      Cervical: No cervical adenopathy.      Right cervical: No superficial, deep or posterior cervical adenopathy.     Left cervical: No superficial, deep or posterior cervical adenopathy.   Skin:     Coloration: Skin is not pale.      Findings: No abrasion, bruising, burn, ecchymosis, erythema, laceration, lesion or rash.   Neurological:      Mental Status: He is alert and oriented to person, place, and time. He is not disoriented.      Cranial Nerves: No cranial nerve deficit.      Sensory: No sensory deficit.      Motor: No tremor, atrophy, abnormal muscle tone or seizure activity.   Psychiatric:         Mood and Affect: Mood is not anxious or depressed.  Affect is not labile, blunt, angry or inappropriate.         Speech: He is communicative. Speech is not rapid and pressured, delayed, slurred or tangential.         Behavior: Behavior normal. Behavior is not agitated, aggressive, withdrawn, hyperactive or combative.         Thought Content: Thought content normal.         Cognition and Memory: Memory is not impaired. He does not exhibit impaired recent memory or impaired remote memory.              Assessment and Plan     1. Bilateral impacted cerumen    2. Hearing loss, unspecified hearing loss type, unspecified laterality  -     Ambulatory referral/consult to ENT        Patient to see Audiology for hearing aid evaluation.    F/U prn.             No follow-ups on file.

## 2023-10-18 NOTE — PROGRESS NOTES
Mr. Tony Han was seen in the clinic today for an audiological evaluation.     Audiological testing revealed normal hearing sloping to a mild to moderate sensorineural hearing loss for the right ear and normal hearing sloping to a mild to moderately-severe rising to moderate sensorineural hearing loss for the left ear.  A speech reception threshold was obtained at 25 dBHL for the right ear and at 25 dBHL for the left ear.  Speech discrimination was 96% for the right ear and 96% for the left ear.      Tympanometry testing revealed a Type A tympanogram for the right ear and a Type A tympanogram for the left ear.      Recommendations:  1. Otologic evaluation  2. Annual audiological evaluation  3. Hearing protection when in noise   4. Hearing aid consultation

## 2023-10-27 ENCOUNTER — TELEPHONE (OUTPATIENT)
Dept: INTERNAL MEDICINE | Facility: CLINIC | Age: 73
End: 2023-10-27
Payer: MEDICARE

## 2023-10-27 NOTE — TELEPHONE ENCOUNTER
Returned pt call. Pt states someone already returned his call. Pt has no further needs at this time.

## 2023-10-27 NOTE — TELEPHONE ENCOUNTER
----- Message from Li Obdulia sent at 10/27/2023  1:04 PM CDT -----  Contact: Pt [phone]  He wants to know if his cholesterol was high from the last lab results on 9/6/23 and should he be taking a statin.    [id number] [address] AT [address] Phone:  [phone] Fax:  [phone]     Thank you

## 2023-11-08 ENCOUNTER — OFFICE VISIT (OUTPATIENT)
Dept: INTERNAL MEDICINE | Facility: CLINIC | Age: 73
End: 2023-11-08
Payer: MEDICARE

## 2023-11-08 VITALS
SYSTOLIC BLOOD PRESSURE: 120 MMHG | HEIGHT: 69 IN | DIASTOLIC BLOOD PRESSURE: 72 MMHG | WEIGHT: 180 LBS | HEART RATE: 69 BPM | BODY MASS INDEX: 26.66 KG/M2 | OXYGEN SATURATION: 99 %

## 2023-11-08 DIAGNOSIS — E53.8 VITAMIN B12 DEFICIENCY: ICD-10-CM

## 2023-11-08 DIAGNOSIS — Z00.00 ROUTINE GENERAL MEDICAL EXAMINATION AT A HEALTH CARE FACILITY: Primary | ICD-10-CM

## 2023-11-08 DIAGNOSIS — Z12.5 SCREENING PSA (PROSTATE SPECIFIC ANTIGEN): ICD-10-CM

## 2023-11-08 DIAGNOSIS — E55.9 VITAMIN D DEFICIENCY DISEASE: ICD-10-CM

## 2023-11-08 DIAGNOSIS — E11.40 TYPE 2 DIABETES MELLITUS WITH DIABETIC NEUROPATHY, WITHOUT LONG-TERM CURRENT USE OF INSULIN: ICD-10-CM

## 2023-11-08 PROCEDURE — 3044F HG A1C LEVEL LT 7.0%: CPT | Mod: CPTII,S$GLB,, | Performed by: INTERNAL MEDICINE

## 2023-11-08 PROCEDURE — 3074F PR MOST RECENT SYSTOLIC BLOOD PRESSURE < 130 MM HG: ICD-10-PCS | Mod: CPTII,S$GLB,, | Performed by: INTERNAL MEDICINE

## 2023-11-08 PROCEDURE — 1101F PT FALLS ASSESS-DOCD LE1/YR: CPT | Mod: CPTII,S$GLB,, | Performed by: INTERNAL MEDICINE

## 2023-11-08 PROCEDURE — 99397 PER PM REEVAL EST PAT 65+ YR: CPT | Mod: GZ,S$GLB,, | Performed by: INTERNAL MEDICINE

## 2023-11-08 PROCEDURE — 3044F PR MOST RECENT HEMOGLOBIN A1C LEVEL <7.0%: ICD-10-PCS | Mod: CPTII,S$GLB,, | Performed by: INTERNAL MEDICINE

## 2023-11-08 PROCEDURE — 1125F AMNT PAIN NOTED PAIN PRSNT: CPT | Mod: CPTII,S$GLB,, | Performed by: INTERNAL MEDICINE

## 2023-11-08 PROCEDURE — 99999 PR PBB SHADOW E&M-EST. PATIENT-LVL III: ICD-10-PCS | Mod: PBBFAC,,, | Performed by: INTERNAL MEDICINE

## 2023-11-08 PROCEDURE — 1101F PR PT FALLS ASSESS DOC 0-1 FALLS W/OUT INJ PAST YR: ICD-10-PCS | Mod: CPTII,S$GLB,, | Performed by: INTERNAL MEDICINE

## 2023-11-08 PROCEDURE — 3060F POS MICROALBUMINURIA REV: CPT | Mod: CPTII,S$GLB,, | Performed by: INTERNAL MEDICINE

## 2023-11-08 PROCEDURE — 4010F ACE/ARB THERAPY RXD/TAKEN: CPT | Mod: CPTII,S$GLB,, | Performed by: INTERNAL MEDICINE

## 2023-11-08 PROCEDURE — 3288F PR FALLS RISK ASSESSMENT DOCUMENTED: ICD-10-PCS | Mod: CPTII,S$GLB,, | Performed by: INTERNAL MEDICINE

## 2023-11-08 PROCEDURE — 3288F FALL RISK ASSESSMENT DOCD: CPT | Mod: CPTII,S$GLB,, | Performed by: INTERNAL MEDICINE

## 2023-11-08 PROCEDURE — 99999 PR PBB SHADOW E&M-EST. PATIENT-LVL III: CPT | Mod: PBBFAC,,, | Performed by: INTERNAL MEDICINE

## 2023-11-08 PROCEDURE — 1125F PR PAIN SEVERITY QUANTIFIED, PAIN PRESENT: ICD-10-PCS | Mod: CPTII,S$GLB,, | Performed by: INTERNAL MEDICINE

## 2023-11-08 PROCEDURE — 4010F PR ACE/ARB THEARPY RXD/TAKEN: ICD-10-PCS | Mod: CPTII,S$GLB,, | Performed by: INTERNAL MEDICINE

## 2023-11-08 PROCEDURE — 1159F MED LIST DOCD IN RCRD: CPT | Mod: CPTII,S$GLB,, | Performed by: INTERNAL MEDICINE

## 2023-11-08 PROCEDURE — 3078F PR MOST RECENT DIASTOLIC BLOOD PRESSURE < 80 MM HG: ICD-10-PCS | Mod: CPTII,S$GLB,, | Performed by: INTERNAL MEDICINE

## 2023-11-08 PROCEDURE — 3060F PR POS MICROALBUMINURIA RESULT DOCUMENTED/REVIEW: ICD-10-PCS | Mod: CPTII,S$GLB,, | Performed by: INTERNAL MEDICINE

## 2023-11-08 PROCEDURE — 3074F SYST BP LT 130 MM HG: CPT | Mod: CPTII,S$GLB,, | Performed by: INTERNAL MEDICINE

## 2023-11-08 PROCEDURE — 99397 PR PREVENTIVE VISIT,EST,65 & OVER: ICD-10-PCS | Mod: GZ,S$GLB,, | Performed by: INTERNAL MEDICINE

## 2023-11-08 PROCEDURE — 1159F PR MEDICATION LIST DOCUMENTED IN MEDICAL RECORD: ICD-10-PCS | Mod: CPTII,S$GLB,, | Performed by: INTERNAL MEDICINE

## 2023-11-08 PROCEDURE — 3078F DIAST BP <80 MM HG: CPT | Mod: CPTII,S$GLB,, | Performed by: INTERNAL MEDICINE

## 2023-11-08 PROCEDURE — 3066F NEPHROPATHY DOC TX: CPT | Mod: CPTII,S$GLB,, | Performed by: INTERNAL MEDICINE

## 2023-11-08 PROCEDURE — 3066F PR DOCUMENTATION OF TREATMENT FOR NEPHROPATHY: ICD-10-PCS | Mod: CPTII,S$GLB,, | Performed by: INTERNAL MEDICINE

## 2023-11-08 RX ORDER — OXYCODONE AND ACETAMINOPHEN 10; 325 MG/1; MG/1
1 TABLET ORAL 3 TIMES DAILY PRN
COMMUNITY

## 2023-11-08 NOTE — PROGRESS NOTES
Chief Complaint: ANNUAL EXAM AND  Follow up of multiple problems     HISTORY OF PRESENT ILLNESS: This is a 73-year-old man who presents for above.    He has been riding the stationary bike for 30 minutes daily. Blood sugar is lower the next morning when he rides the bike.        He has been off pantoprazole 40 mg for the last 1-2 months. He feels that his stomach is doing well.  He had a repeat endoscopic ultrasound 3/14/23 - erythema in the gastric body and antrum,  Pancreas revealed sequelae of prior pancreatitis.       No nausea or vomiting. Appetite is good. Bowels are moving well after drinking a Diet Coke and water. Prevident was giving him diarrhea -he now rinses his mouth after using prevident.      No melana or bloody stools.       No cough. He is back smoking 1.5 packs of cigarettes daily.        He is taking Metformin  mg one tablet in the morning and 2 at night.       He stopped fenofibrate when he started to have abdominal issues.         He continues to have back pain and lateral right leg pain. He sees Dr Carrasquillo in pain management.  Currently taking oxycodone apap 10/325 1/2 tablet three times daily. He takes gabapentin 100 mg 1-2 tablets as needed which does help a little.     He has intermittent pins and needles sensation in his right thigh if he sits more than  20 minutes.  Pain is keeping him awake at  night. He is sleeping 5-6 hours at [facility].      He had a normal cystoscope  with Dr Dexter  9/13/21      Dr Randall  on 3/17/20 who felt he has -  Right meralgia paresthetica; right lateral thigh tendinitis at the hip; history lumbar disc disease rule out lumbar radiculopathy. EMG was 5/5/20 -  Sensory neuropathy consistent with a history of diabetes; these findings would be compatible with clinical picture of meralgia paresthetica.  He continues to have significant right leg pain that starts in the right groin and goes down the lateral right thigh and the anterior right thigh.  He has a  "burning sensation on the right thigh.  HE is followed by  Dr Carrasquillo in pain management for his pain.   No change in this pain     Left shoulder is ok. . He is doing his exercises on his own.       He had lithotripsy on 3/23/17. No dysuria, hematuria.   He saw Dr cerna on 1/19. He still has a stone in the left kidney. He is on finasteride and tamsulosin for his prostate.      He has not had any alcohol      He is taking amlodipine 10 mg 1 once daily, Toprol-XL 25 mg once daily and losartan 100 mg daily.  No chest pain, shortness of breath.     . HE checks his blood sugar twice daily due to fluctuating blood sugars.   We are holding simvastatin due to weight issues     No polydipsia. He has a history of hyperlipidemia  No joint pain or muscle pain. NO shortness breath, headaches, vision, sinus congestion.  He is not taking anything for triglycerides or cholesterol.             PAST MEDICAL HISTORY:    1. Hypertension.    2. Coronary artery disease with left heart catheterization in November 2011 with 40 to 50% narrowing in the mid LAD.    3. Diabetes mellitus.    4. Hyperlipidemia.    5. History of reflux.    6. Colon polyps.    7. History of cluster headaches.    8. History of glaucoma.    9. Lumbar disk disease at L5 to S1.    10. History of stomach ulcers.    11. Nephrolithiasis.       PAST SURGICAL HISTORY: Cholecystectomy in May 2013, ORIF of the left humerus.      SOCIAL HISTORY: Currently smokes a pack of cigarettes daily, has smoked his whole life. 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 He is a retired schoolteacher.       FAMILY HISTORY: Updated on [facility].      PHYSICAL EXAMINATION:      /72 (BP Location: Left arm, Patient Position: Sitting, BP Method: Large (Manual))   Pulse 69   Ht 5' 9" (1.753 m)   Wt 81.6 kg (180 lb)   SpO2 99%   BMI 26.58 kg/m²        GENERAL: He is alert, oriented, no apparent distress. Affect within normal limits.    HEENT: Conjunctivae anicteric. " Tympanic membranes clear.     NECK: Supple. No cervical lymphadenopathy, no thyroid enlargement.    Respiratory: Effort normal. Lungs are clear to auscultation.    HEART: Regular rate and rhythm without murmurs, gallops or rubs. No lower extremity edema  ABDOMEN: soft, non distended, non tender, bowel sounds present, no hepatosplenomgaly           Labs 9/6/23 reviewed 6.8     CT chest 9/2023 reviewed     ASSESSMENT AND PLAN:      Annual exam - discussed diet, exercise and safety issues.     1. Gastritis and duodenitis on EGD by Raoul Alford 1/11/22 - doing better. off pantoprazole 40 mg daily      2. Elevated lipase and  -saw DR Rodriguez for endoscopic ultrasound on 3/14/23     3. Lung changes on CT scan chest (bronchiectasis) 8/2022- saw pulmonary on 10/28/22 -CT chest 9/2023 improved  4. Hypertension-  stable  5. Diabetes mellitus with neuropathy. stable  6. Hyperlipidemia. off fenofibrate  7. Tubular adenomas - colonoscopy 2/2022 - due 2027  9. History of glaucoma - not on treatment - pressure in eyes ok - followed Nile Avina and retinal specialist at [facility]  10. History of nephrolithiasis -asymptomatic.    11. Irregular heart beat sensation - holter revealed PVC  12. Anxiety and depression -valium 5 mg nightly prn anxiety.  Rarely takes  13. Tobacco abuse -working on cessation.   II will see him back in 4 months, sooner if issues.   14. Aortic atherosclerosis - modifying risk factors  15. Back pain - followed by Dr Carrasquillo - on chronic opiates - stable.      Colonoscopy 2/2/2022- due 2027

## 2023-12-03 ENCOUNTER — PATIENT MESSAGE (OUTPATIENT)
Dept: INTERNAL MEDICINE | Facility: CLINIC | Age: 73
End: 2023-12-03
Payer: MEDICARE

## 2023-12-17 ENCOUNTER — PATIENT MESSAGE (OUTPATIENT)
Dept: INTERNAL MEDICINE | Facility: CLINIC | Age: 73
End: 2023-12-17
Payer: MEDICARE

## 2024-01-10 LAB
LEFT EYE DM RETINOPATHY: NEGATIVE
RIGHT EYE DM RETINOPATHY: NEGATIVE

## 2024-01-18 ENCOUNTER — PATIENT OUTREACH (OUTPATIENT)
Dept: ADMINISTRATIVE | Facility: HOSPITAL | Age: 74
End: 2024-01-18
Payer: MEDICARE

## 2024-01-18 NOTE — PROGRESS NOTES

## 2024-02-16 DIAGNOSIS — E11.40 TYPE 2 DIABETES MELLITUS WITH DIABETIC NEUROPATHY, WITHOUT LONG-TERM CURRENT USE OF INSULIN: ICD-10-CM

## 2024-02-16 RX ORDER — METFORMIN HYDROCHLORIDE 500 MG/1
TABLET, EXTENDED RELEASE ORAL
Qty: 360 TABLET | Refills: 0 | Status: SHIPPED | OUTPATIENT
Start: 2024-02-16

## 2024-02-16 NOTE — TELEPHONE ENCOUNTER
Refill Decision Note   Tony Han  is requesting a refill authorization.  Brief Assessment and Rationale for Refill:  Approve     Medication Therapy Plan:         Pharmacist review requested: Yes   Comments:     Note composed:3:58 PM 02/16/2024

## 2024-02-16 NOTE — TELEPHONE ENCOUNTER
No care due was identified.  [facility] Embedded Care Due Messages. Reference number: [id number].   2/16/2024 5:22:32 AM CST

## 2024-02-16 NOTE — TELEPHONE ENCOUNTER
Refill Routing Note   Medication(s) are not appropriate for processing by [facility] for the following reason(s):        Drug-disease interaction    [facility] action(s):  Defer        Medication Therapy Plan: Drug-Disease: metFORMIN and Fatty liver    Pharmacist review requested: Yes     Appointments  past 12m or future 3m with PCP    Date Provider   Last Visit   11/8/2023 Elizabeth Benitez MD   Next Visit   3/20/2024 Elizabeth Benitez MD   ED visits in past 90 days: 0        Note composed:1:38 PM 02/16/2024

## 2024-02-27 RX ORDER — METOPROLOL SUCCINATE 25 MG/1
TABLET, EXTENDED RELEASE ORAL
Qty: 90 TABLET | Refills: 2 | Status: SHIPPED | OUTPATIENT
Start: 2024-02-27

## 2024-02-27 NOTE — TELEPHONE ENCOUNTER
Refill Routing Note   Medication(s) are not appropriate for processing by [facility] for the following reason(s):        Drug-disease interaction  Drug-Disease: metoprolol succinate and Fatty liver    [facility] action(s):  Defer               Appointments  past 12m or future 3m with PCP    Date Provider   Last Visit   11/8/2023 Elizabeth Benitez MD   Next Visit   3/20/2024 Elizabeth Benitez MD   ED visits in past 90 days: 0        Note composed:4:11 AM 02/27/2024

## 2024-02-27 NOTE — TELEPHONE ENCOUNTER
No care due was identified.  [facility] Embedded Care Due Messages. Reference number: [id number].   2/27/2024 3:27:00 AM CST

## 2024-03-13 ENCOUNTER — LAB VISIT (OUTPATIENT)
Dept: LAB | Facility: OTHER | Age: 74
End: 2024-03-13
Attending: INTERNAL MEDICINE
Payer: MEDICARE

## 2024-03-13 DIAGNOSIS — Z12.5 SCREENING PSA (PROSTATE SPECIFIC ANTIGEN): ICD-10-CM

## 2024-03-13 DIAGNOSIS — E55.9 VITAMIN D DEFICIENCY DISEASE: ICD-10-CM

## 2024-03-13 DIAGNOSIS — E11.40 TYPE 2 DIABETES MELLITUS WITH DIABETIC NEUROPATHY, WITHOUT LONG-TERM CURRENT USE OF INSULIN: ICD-10-CM

## 2024-03-13 DIAGNOSIS — E53.8 VITAMIN B12 DEFICIENCY: ICD-10-CM

## 2024-03-13 LAB
25(OH)D3+25(OH)D2 SERPL-MCNC: 27 NG/ML (ref 30–96)
ALBUMIN SERPL BCP-MCNC: 4.2 G/DL (ref 3.5–5.2)
ALP SERPL-CCNC: 81 U/L (ref 55–135)
ALT SERPL W/O P-5'-P-CCNC: 44 U/L (ref 10–44)
ANION GAP SERPL CALC-SCNC: 8 MMOL/L (ref 8–16)
AST SERPL-CCNC: 37 U/L (ref 10–40)
BASOPHILS # BLD AUTO: 0.04 K/UL (ref 0–0.2)
BASOPHILS NFR BLD: 0.6 % (ref 0–1.9)
BILIRUB SERPL-MCNC: 0.2 MG/DL (ref 0.1–1)
BUN SERPL-MCNC: 13 MG/DL (ref 8–23)
CALCIUM SERPL-MCNC: 9.7 MG/DL (ref 8.7–10.5)
CHLORIDE SERPL-SCNC: 108 MMOL/L (ref 95–110)
CHOLEST SERPL-MCNC: 152 MG/DL (ref 120–199)
CHOLEST/HDLC SERPL: 4 {RATIO} (ref 2–5)
CO2 SERPL-SCNC: 24 MMOL/L (ref 23–29)
COMPLEXED PSA SERPL-MCNC: 2 NG/ML (ref 0–4)
CREAT SERPL-MCNC: 1.2 MG/DL (ref 0.5–1.4)
DIFFERENTIAL METHOD BLD: ABNORMAL
EOSINOPHIL # BLD AUTO: 0.3 K/UL (ref 0–0.5)
EOSINOPHIL NFR BLD: 3.6 % (ref 0–8)
ERYTHROCYTE [DISTWIDTH] IN BLOOD BY AUTOMATED COUNT: 12.8 % (ref 11.5–14.5)
EST. GFR  (NO RACE VARIABLE): >60 ML/MIN/1.73 M^2
ESTIMATED AVG GLUCOSE: 146 MG/DL (ref 68–131)
GLUCOSE SERPL-MCNC: 122 MG/DL (ref 70–110)
HBA1C MFR BLD: 6.7 % (ref 4–5.6)
HCT VFR BLD AUTO: 38.1 % (ref 40–54)
HDLC SERPL-MCNC: 38 MG/DL (ref 40–75)
HDLC SERPL: 25 % (ref 20–50)
HGB BLD-MCNC: 12.4 G/DL (ref 14–18)
IMM GRANULOCYTES # BLD AUTO: 0.02 K/UL (ref 0–0.04)
IMM GRANULOCYTES NFR BLD AUTO: 0.3 % (ref 0–0.5)
LDLC SERPL CALC-MCNC: 58.8 MG/DL (ref 63–159)
LYMPHOCYTES # BLD AUTO: 3.6 K/UL (ref 1–4.8)
LYMPHOCYTES NFR BLD: 52 % (ref 18–48)
MCH RBC QN AUTO: 32.4 PG (ref 27–31)
MCHC RBC AUTO-ENTMCNC: 32.5 G/DL (ref 32–36)
MCV RBC AUTO: 100 FL (ref 82–98)
MONOCYTES # BLD AUTO: 0.3 K/UL (ref 0.3–1)
MONOCYTES NFR BLD: 4.3 % (ref 4–15)
NEUTROPHILS # BLD AUTO: 2.8 K/UL (ref 1.8–7.7)
NEUTROPHILS NFR BLD: 39.2 % (ref 38–73)
NONHDLC SERPL-MCNC: 114 MG/DL
NRBC BLD-RTO: 0 /100 WBC
PLATELET # BLD AUTO: 295 K/UL (ref 150–450)
PMV BLD AUTO: 8.4 FL (ref 9.2–12.9)
POTASSIUM SERPL-SCNC: 5.2 MMOL/L (ref 3.5–5.1)
PROT SERPL-MCNC: 7.2 G/DL (ref 6–8.4)
RBC # BLD AUTO: 3.83 M/UL (ref 4.6–6.2)
SODIUM SERPL-SCNC: 140 MMOL/L (ref 136–145)
TRIGL SERPL-MCNC: 276 MG/DL (ref 30–150)
TSH SERPL DL<=0.005 MIU/L-ACNC: 0.96 UIU/ML (ref 0.4–4)
VIT B12 SERPL-MCNC: >2000 PG/ML (ref 210–950)
WBC # BLD AUTO: 7 K/UL (ref 3.9–12.7)

## 2024-03-13 PROCEDURE — 82306 VITAMIN D 25 HYDROXY: CPT | Performed by: INTERNAL MEDICINE

## 2024-03-13 PROCEDURE — 36415 COLL VENOUS BLD VENIPUNCTURE: CPT | Performed by: INTERNAL MEDICINE

## 2024-03-13 PROCEDURE — 85025 COMPLETE CBC W/AUTO DIFF WBC: CPT | Performed by: INTERNAL MEDICINE

## 2024-03-13 PROCEDURE — 80053 COMPREHEN METABOLIC PANEL: CPT | Performed by: INTERNAL MEDICINE

## 2024-03-13 PROCEDURE — 83036 HEMOGLOBIN GLYCOSYLATED A1C: CPT | Performed by: INTERNAL MEDICINE

## 2024-03-13 PROCEDURE — 82607 VITAMIN B-12: CPT | Performed by: INTERNAL MEDICINE

## 2024-03-13 PROCEDURE — 80061 LIPID PANEL: CPT | Performed by: INTERNAL MEDICINE

## 2024-03-13 PROCEDURE — 84153 ASSAY OF PSA TOTAL: CPT | Performed by: INTERNAL MEDICINE

## 2024-03-13 PROCEDURE — 84443 ASSAY THYROID STIM HORMONE: CPT | Performed by: INTERNAL MEDICINE

## 2024-03-17 DIAGNOSIS — K21.9 GASTROESOPHAGEAL REFLUX DISEASE WITHOUT ESOPHAGITIS: Primary | ICD-10-CM

## 2024-03-18 RX ORDER — PANTOPRAZOLE SODIUM 40 MG/1
40 TABLET, DELAYED RELEASE ORAL
Qty: 90 TABLET | Refills: 3 | Status: SHIPPED | OUTPATIENT
Start: 2024-03-18

## 2024-03-20 ENCOUNTER — OFFICE VISIT (OUTPATIENT)
Dept: INTERNAL MEDICINE | Facility: CLINIC | Age: 74
End: 2024-03-20
Payer: MEDICARE

## 2024-03-20 VITALS
HEART RATE: 75 BPM | BODY MASS INDEX: 26.96 KG/M2 | SYSTOLIC BLOOD PRESSURE: 130 MMHG | DIASTOLIC BLOOD PRESSURE: 58 MMHG | WEIGHT: 182 LBS | OXYGEN SATURATION: 99 % | HEIGHT: 69 IN

## 2024-03-20 DIAGNOSIS — J47.9 BRONCHIECTASIS, UNCOMPLICATED: ICD-10-CM

## 2024-03-20 DIAGNOSIS — E78.5 HYPERLIPIDEMIA, UNSPECIFIED HYPERLIPIDEMIA TYPE: ICD-10-CM

## 2024-03-20 DIAGNOSIS — I10 ESSENTIAL HYPERTENSION: ICD-10-CM

## 2024-03-20 DIAGNOSIS — I70.0 AORTIC ATHEROSCLEROSIS: ICD-10-CM

## 2024-03-20 DIAGNOSIS — E11.40 TYPE 2 DIABETES MELLITUS WITH DIABETIC NEUROPATHY, WITHOUT LONG-TERM CURRENT USE OF INSULIN: Primary | ICD-10-CM

## 2024-03-20 DIAGNOSIS — F11.20 OPIOID DEPENDENCE, UNCOMPLICATED: ICD-10-CM

## 2024-03-20 DIAGNOSIS — K21.9 GASTROESOPHAGEAL REFLUX DISEASE WITHOUT ESOPHAGITIS: ICD-10-CM

## 2024-03-20 DIAGNOSIS — F39 MOOD DISORDER: ICD-10-CM

## 2024-03-20 PROCEDURE — 99999 PR PBB SHADOW E&M-EST. PATIENT-LVL II: CPT | Mod: PBBFAC,,, | Performed by: INTERNAL MEDICINE

## 2024-03-20 PROCEDURE — 99214 OFFICE O/P EST MOD 30 MIN: CPT | Mod: S$GLB,,, | Performed by: INTERNAL MEDICINE

## 2024-03-20 RX ORDER — CHOLECALCIFEROL (VITAMIN D3) 50 MCG
4000 TABLET ORAL DAILY
Start: 2024-03-20

## 2024-03-20 NOTE — PROGRESS NOTES
Chief Complaint:  Follow up of multiple problems     HISTORY OF PRESENT ILLNESS: This is a 73-year-old man who presents for above.    He has been exercising.  For an hour he collects water when it rains.  He walks regularly.    He has not been riding the stationary bike      He has been off pantoprazole 40 mg for the last 1-2 months. He feels that his stomach is doing well.  He had a repeat endoscopic ultrasound 3/14/23 - erythema in the gastric body and antrum,  Pancreas revealed sequelae of prior pancreatitis.       No nausea or vomiting. Appetite is good. Bowels are moving well after drinking a Diet Coke and water. Prevident was giving him diarrhea -he now rinses his mouth after using prevident.  No melana or bloody stools.        No cough. He is back smoking 1.5 packs of cigarettes daily.        He is taking Metformin  mg one tablet in the morning and 2 at night.       He stopped fenofibrate when he started to have abdominal issues.         He continues to have back pain and lateral right leg pain. He sees Dr Carrasquillo in pain management.  Currently taking oxycodone apap 10/325 1/2 tablet three times daily. He takes gabapentin 100 mg 1-2 tablets as needed which does help a little.     He has intermittent pins and needles sensation in his right thigh if he sits more than  20 minutes.  Pain is keeping him awake at  night. He is [facility]-6 hours at [facility].      He had a normal cystoscope  with Dr Dexter  9/13/21      Dr Randall  on 3/17/20 who felt he has -  Right meralgia paresthetica; right lateral thigh tendinitis at the hip; history lumbar disc disease rule out lumbar radiculopathy. EMG was 5/5/20 -  Sensory neuropathy consistent with a history of diabetes; these findings would be compatible with clinical picture of meralgia paresthetica.  He continues to have significant right leg pain that starts in the right groin and goes down the lateral right thigh and the anterior right thigh.  He has a burning  "sensation on the right thigh.  HE is followed by  Dr Carrasquillo in pain management for his pain.   No change in this pain     Left shoulder is ok. He is doing his exercises on his own.       He had lithotripsy on 3/23/17. No dysuria, hematuria.   He saw Dr cerna on 1/19. He still has a stone in the left kidney. He is on finasteride and tamsulosin for his prostate.      He has not had any alcohol      He is taking amlodipine 10 mg 1 once daily, Toprol-XL 25 mg once daily and losartan 100 mg daily.  No chest pain, shortness of breath.     HE checks his blood sugar twice daily due to fluctuating blood sugars.   We are holding simvastatin due to weight issues     No polydipsia. He has a history of hyperlipidemia  No joint pain or muscle pain. NO shortness breath, headaches, vision, sinus congestion.  He is not taking anything for triglycerides or cholesterol.     He is taking vitamin D 2000 units daily            PAST MEDICAL HISTORY:    1. Hypertension.    2. Coronary artery disease with left heart catheterization in November 2011 with 40 to 50% narrowing in the mid LAD.    3. Diabetes mellitus.    4. Hyperlipidemia.    5. History of reflux.    6. Colon polyps.    7. History of cluster headaches.    8. History of glaucoma.    9. Lumbar disk disease at L5 to S1.    10. History of stomach ulcers.    11. Nephrolithiasis.       PAST SURGICAL HISTORY: Cholecystectomy in May 2013, ORIF of the left humerus.      SOCIAL HISTORY: Currently smokes a pack of cigarettes daily, has smoked his whole life. 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 He is a retired schoolteacher.       FAMILY HISTORY: Updated on [facility].      PHYSICAL EXAMINATION:       BP (!) 130/58   Pulse 75   Ht 5' 9" (1.753 m)   Wt 82.6 kg (181 lb 15.8 oz)   SpO2 99%   BMI 26.88 kg/m²           GENERAL: He is alert, oriented, no apparent distress. Affect within normal limits.    HEENT: Conjunctivae anicteric. Tympanic membranes clear.     NECK: " Supple. No cervical lymphadenopathy, no thyroid enlargement.    Respiratory: Effort normal. Lungs are clear to auscultation.    HEART: Regular rate and rhythm without murmurs, gallops or rubs. No lower extremity edema  ABDOMEN: soft, non distended, non tender, bowel sounds present, no hepatosplenomgaly           Labs 3/13/24 reviewed 6.7     CT chest 9/2023 reviewed     ASSESSMENT AND PLAN:        1. Gastritis and duodenitis on EGD by Raoul Alford 1/11/22 - doing better. off pantoprazole 40 mg daily      2. Elevated lipase and  -saw DR Rodriguez for endoscopic ultrasound on 3/14/23     3. Lung changes on CT scan chest (bronchiectasis) 8/2022- saw pulmonary on 10/28/22 -CT chest 9/2023 improved  4. Hypertension-  stable  5. Diabetes mellitus with neuropathy. stable  6. Hyperlipidemia. off fenofibrate  7. Tubular adenomas - colonoscopy 2/2022 - due 2027  9. History of glaucoma - not on treatment - pressure in eyes ok - followed Nile Avina and retinal specialist at [facility]  10. History of nephrolithiasis -asymptomatic.    11. Irregular heart beat sensation - holter revealed PVC  12. Anxiety and depression -valium 5 mg nightly prn anxiety.  Rarely takes  13. Tobacco abuse -working on cessation.   II will see him back in 4 months, sooner if issues.   14. Aortic atherosclerosis - modifying risk factors  15. Back pain - followed by Dr Carrasquillo - on chronic opiates - stable.   16. Vitamin D deficiency - increase vitamin D to 2000 units 2 tablets daily.      Colonoscopy 2/2/2022- due 2027    Follow up in 4 months, sooner if problems arise

## 2024-04-15 ENCOUNTER — PATIENT MESSAGE (OUTPATIENT)
Dept: ADMINISTRATIVE | Facility: HOSPITAL | Age: 74
End: 2024-04-15
Payer: MEDICARE

## 2024-04-19 ENCOUNTER — PATIENT MESSAGE (OUTPATIENT)
Dept: INTERNAL MEDICINE | Facility: CLINIC | Age: 74
End: 2024-04-19
Payer: MEDICARE

## 2024-05-08 ENCOUNTER — TELEPHONE (OUTPATIENT)
Dept: UROLOGY | Facility: CLINIC | Age: 74
End: 2024-05-08
Payer: MEDICARE

## 2024-05-08 NOTE — TELEPHONE ENCOUNTER
Appt scheduled w/male provider per request.  ----- Message from Jackie Pereira MA sent at 5/8/2024  4:20 PM CDT -----  Contact: Patient    ----- Message -----  From: Ingrid Graham  Sent: 5/8/2024   4:00 PM CDT  To: [facility] Urology Clinical Support    Type:  Patient Call          Who Called: patient         Does the patient know what this is regarding?: Requesting a call back from a missed call ; please advise           Would the patient rather a call back or a response via [facility]?call           Best Call Back Number: [phone]             Additional Information:

## 2024-05-13 ENCOUNTER — PATIENT MESSAGE (OUTPATIENT)
Dept: INTERNAL MEDICINE | Facility: CLINIC | Age: 74
End: 2024-05-13
Payer: MEDICARE

## 2024-05-14 RX ORDER — TADALAFIL 20 MG/1
20 TABLET ORAL DAILY PRN
Qty: 30 TABLET | Refills: 0 | Status: SHIPPED | OUTPATIENT
Start: 2024-05-14 | End: 2024-05-25 | Stop reason: SDUPTHER

## 2024-05-14 NOTE — TELEPHONE ENCOUNTER
Refill Routing Note   Medication(s) are not appropriate for processing by [facility] for the following reason(s):        No active prescription written by provider    [facility] action(s):  Defer        Medication Therapy Plan: Last ordered for patient 3/8/2021      Appointments  past 12m or future 3m with PCP    Date Provider   Last Visit   3/20/2024 Elizabeth Benitez MD   Next Visit   8/7/2024 Elizabeth Benitez MD   ED visits in past 90 days: 0        Note composed:9:50 AM 05/14/2024

## 2024-05-14 NOTE — TELEPHONE ENCOUNTER
ANTICOAGULATION FOLLOW-UP CLINIC VISIT    Patient Name:  Melvin Abad  Date:  10/16/2019  Contact Type:  Face to Face    SUBJECTIVE:  Patient Findings     Comments:   The patient was assessed for diet, medication, and activity level changes, missed or extra doses, bruising or bleeding, with no problem findings.  Shani Ross RN          Clinical Outcomes     Negatives:   Major bleeding event, Thromboembolic event, Anticoagulation-related hospital admission, Anticoagulation-related ED visit, Anticoagulation-related fatality    Comments:   The patient was assessed for diet, medication, and activity level changes, missed or extra doses, bruising or bleeding, with no problem findings.  Shani Ross RN             OBJECTIVE    INR Protime   Date Value Ref Range Status   10/16/2019 2.4 (A) 0.9 - 1.1 Final       ASSESSMENT / PLAN  INR assessment THER    Recheck INR In: 6 WEEKS    INR Location Clinic      Anticoagulation Summary  As of 10/16/2019    INR goal:   2.0-3.0   TTR:   89.5 % (3.5 y)   INR used for dosin.4 (10/16/2019)   Warfarin maintenance plan:   2.5 mg (5 mg x 0.5) every Mon, Fri; 5 mg (5 mg x 1) all other days   Full warfarin instructions:   2.5 mg every Mon, Fri; 5 mg all other days   Weekly warfarin total:   30 mg   No change documented:   Shani Ross RN   Plan last modified:   Shani Ross RN (2016)   Next INR check:   2019   Target end date:       Indications    Atrial fibrillation (Resolved) [I48.91]  Long-term (current) use of anticoagulants [Z79.01] [Z79.01]             Anticoagulation Episode Summary     INR check location:       Preferred lab:       Send INR reminders to:   [facility] [place]    Comments:   5 mg tablets, AM taker, likes book, BP       Anticoagulation Care Providers     Provider Role Specialty Phone number    Lazaro Huston MD [facility] [phone]            See the Encounter Report to view Anticoagulation Flowsheet  No care due was identified.  [facility] Embedded Care Due Messages. Reference number: [id number].   5/14/2024 9:35:55 AM CDT   and Dosing Calendar (Go to Encounters tab in chart review, and find the Anticoagulation Therapy Visit)    Dosage adjustment made based on physician directed care plan.      Shani Ross RN

## 2024-05-22 ENCOUNTER — PATIENT MESSAGE (OUTPATIENT)
Dept: INTERNAL MEDICINE | Facility: CLINIC | Age: 74
End: 2024-05-22
Payer: MEDICARE

## 2024-05-23 ENCOUNTER — TELEPHONE (OUTPATIENT)
Dept: INTERNAL MEDICINE | Facility: CLINIC | Age: 74
End: 2024-05-23
Payer: MEDICARE

## 2024-05-23 NOTE — TELEPHONE ENCOUNTER
----- Message from Tracie Valle sent at 5/23/2024 10:40 AM CDT -----  Contact: [phone]  1MEDICALADVICE     Patient is calling for Medical Advice regarding:Office is calling to check if Dr received request for the Stain RX    How long has patient had these symptoms:    Pharmacy name and phone#:    Would like response via [employer]:  CALL     Comments:

## 2024-05-23 NOTE — TELEPHONE ENCOUNTER
----- Message from Tracie Valle sent at 5/23/2024 10:40 AM CDT -----  Contact: [phone]  1MEDICALADVICE     Patient is calling for Medical Advice regarding:Office is calling to check if Dr received request for the Stain RX    How long has patient had these symptoms:    Pharmacy name and phone#:    Would like response via [employer]:  CALL     Comments:

## 2024-05-23 NOTE — TELEPHONE ENCOUNTER
----- Message from Tracie Valle sent at 5/23/2024 10:40 AM CDT -----  Contact: [phone]  1MEDICALADVICE     Patient is calling for Medical Advice regarding:Office is calling to check if Dr received request for the Stain RX    How long has patient had these symptoms:    Pharmacy name and phone#:    Would like response via [employer]:  CALL     Comments:

## 2024-05-25 NOTE — TELEPHONE ENCOUNTER
No care due was identified.  [facility] Embedded Care Due Messages. Reference number: [id number].   5/25/2024 5:05:30 PM CDT

## 2024-05-27 RX ORDER — TADALAFIL 20 MG/1
20 TABLET ORAL DAILY PRN
Qty: 30 TABLET | Refills: 11 | Status: SHIPPED | OUTPATIENT
Start: 2024-05-27 | End: 2025-05-27

## 2024-06-10 ENCOUNTER — PATIENT MESSAGE (OUTPATIENT)
Dept: INTERNAL MEDICINE | Facility: CLINIC | Age: 74
End: 2024-06-10
Payer: MEDICARE

## 2024-06-26 ENCOUNTER — OFFICE VISIT (OUTPATIENT)
Dept: UROLOGY | Facility: CLINIC | Age: 74
End: 2024-06-26
Payer: MEDICARE

## 2024-06-26 VITALS
BODY MASS INDEX: 26.97 KG/M2 | SYSTOLIC BLOOD PRESSURE: 122 MMHG | OXYGEN SATURATION: 100 % | RESPIRATION RATE: 12 BRPM | WEIGHT: 182.13 LBS | DIASTOLIC BLOOD PRESSURE: 66 MMHG | HEIGHT: 69 IN | HEART RATE: 74 BPM

## 2024-06-26 DIAGNOSIS — E11.40 TYPE 2 DIABETES MELLITUS WITH DIABETIC NEUROPATHY, WITHOUT LONG-TERM CURRENT USE OF INSULIN: ICD-10-CM

## 2024-06-26 DIAGNOSIS — N13.8 BPH WITH OBSTRUCTION/LOWER URINARY TRACT SYMPTOMS: Primary | ICD-10-CM

## 2024-06-26 DIAGNOSIS — N40.1 BPH WITH OBSTRUCTION/LOWER URINARY TRACT SYMPTOMS: Primary | ICD-10-CM

## 2024-06-26 DIAGNOSIS — N40.0 BENIGN PROSTATIC HYPERPLASIA, UNSPECIFIED WHETHER LOWER URINARY TRACT SYMPTOMS PRESENT: ICD-10-CM

## 2024-06-26 PROCEDURE — 3078F DIAST BP <80 MM HG: CPT | Mod: CPTII,S$GLB,, | Performed by: STUDENT IN AN ORGANIZED HEALTH CARE EDUCATION/TRAINING PROGRAM

## 2024-06-26 PROCEDURE — 3074F SYST BP LT 130 MM HG: CPT | Mod: CPTII,S$GLB,, | Performed by: STUDENT IN AN ORGANIZED HEALTH CARE EDUCATION/TRAINING PROGRAM

## 2024-06-26 PROCEDURE — 1159F MED LIST DOCD IN RCRD: CPT | Mod: CPTII,S$GLB,, | Performed by: STUDENT IN AN ORGANIZED HEALTH CARE EDUCATION/TRAINING PROGRAM

## 2024-06-26 PROCEDURE — 99212 OFFICE O/P EST SF 10 MIN: CPT | Mod: S$GLB,,, | Performed by: STUDENT IN AN ORGANIZED HEALTH CARE EDUCATION/TRAINING PROGRAM

## 2024-06-26 PROCEDURE — 1101F PT FALLS ASSESS-DOCD LE1/YR: CPT | Mod: CPTII,S$GLB,, | Performed by: STUDENT IN AN ORGANIZED HEALTH CARE EDUCATION/TRAINING PROGRAM

## 2024-06-26 PROCEDURE — 3288F FALL RISK ASSESSMENT DOCD: CPT | Mod: CPTII,S$GLB,, | Performed by: STUDENT IN AN ORGANIZED HEALTH CARE EDUCATION/TRAINING PROGRAM

## 2024-06-26 PROCEDURE — 1126F AMNT PAIN NOTED NONE PRSNT: CPT | Mod: CPTII,S$GLB,, | Performed by: STUDENT IN AN ORGANIZED HEALTH CARE EDUCATION/TRAINING PROGRAM

## 2024-06-26 RX ORDER — FINASTERIDE 5 MG/1
5 TABLET, FILM COATED ORAL DAILY
Qty: 90 TABLET | Refills: 3 | Status: SHIPPED | OUTPATIENT
Start: 2024-06-26

## 2024-06-26 RX ORDER — TAMSULOSIN HYDROCHLORIDE 0.4 MG/1
0.4 CAPSULE ORAL DAILY
Qty: 90 CAPSULE | Refills: 3 | Status: SHIPPED | OUTPATIENT
Start: 2024-06-26

## 2024-06-26 NOTE — PROGRESS NOTES
"[facility] - Urology   Clinic Note    SUBJECTIVE:     Chief Complaint: BPH with LUTS    History of Present Illness:  Tony Han is a 74 y.o. male who presents to clinic for Bph with LUTS. He is established to our clinic but new to me.     Takes Flomax and finasteride, reports LUTS adequately controlled. Has previously declined outlet procedure.      OBJECTIVE:     Estimated body mass index is 26.89 kg/m² as calculated from the following:    Height as of this encounter: 5' 9" (1.753 m).    Weight as of this encounter: 82.6 kg (182 lb 1.6 oz).    Vital Signs (Most Recent)  Pulse: 74 (06/26/24 1040)  Resp: 12 (06/26/24 1040)  BP: 122/66 (06/26/24 1040)  SpO2: 100 % (06/26/24 1040)    Physical Exam  Vitals reviewed.   Constitutional:       Appearance: Normal appearance.   HENT:      Head: Normocephalic and atraumatic.   Eyes:      Conjunctiva/sclera: Conjunctivae normal.   Cardiovascular:      Rate and Rhythm: Normal rate.   Abdominal:      General: Abdomen is flat. There is no distension.      Tenderness: There is no abdominal tenderness.   Musculoskeletal:         General: Normal range of motion.   Skin:     General: Skin is warm and dry.   Neurological:      General: No focal deficit present.      Mental Status: He is alert and oriented to person, place, and time.   Psychiatric:         Mood and Affect: Mood normal.         Behavior: Behavior normal.         Thought Content: Thought content normal.         Judgment: Judgment normal.         Lab Results   Component Value Date    BUN 13 03/13/2024    CREATININE 1.2 03/13/2024    WBC 7.00 03/13/2024    HGB 12.4 (L) 03/13/2024    HCT 38.1 (L) 03/13/2024     03/13/2024    AST 37 03/13/2024    ALT 44 03/13/2024    ALKPHOS 81 03/13/2024    ALBUMIN 4.2 03/13/2024    HGBA1C 6.7 (H) 03/13/2024        Lab Results   Component Value Date    PSA 2.0 03/13/2024    PSA 0.59 03/24/2021    PSA 0.49 04/05/2019    PSA 3.2 03/15/2017    PSA 0.82 01/06/2016    PSA 0.73 11/05/2014    " PSA 0.69 10/01/2013    PSADIAG 0.60 07/14/2017       Urine dip showed no blood, leukocyte esterase, and nitrite. Negative protein.     ASSESSMENT     1. BPH with obstruction/lower urinary tract symptoms    2. Acute prostatitis    3. Acute cystitis without hematuria      PLAN:   1. BPH with obstruction/lower urinary tract symptoms  -     tamsulosin (FLOMAX) 0.4 mg Cap; Take 1 capsule (0.4 mg total) by mouth once daily.  Dispense: 90 capsule; Refill: 3    2. Acute prostatitis  -     finasteride (PROSCAR) 5 mg tablet; Take 1 tablet (5 mg total) by mouth once daily.  Dispense: 90 tablet; Refill: 3    3. Acute cystitis without hematuria  -     finasteride (PROSCAR) 5 mg tablet; Take 1 tablet (5 mg total) by mouth once daily.  Dispense: 90 tablet; Refill: 3       Continue Flomax and finasteride. Refills sent.  A1c good, no glucosuria today.  RTC 1 year with SLOANE    Aniceto Reddy MD

## 2024-07-15 RX ORDER — AMLODIPINE BESYLATE 10 MG/1
10 TABLET ORAL DAILY
Qty: 90 TABLET | Refills: 2 | Status: SHIPPED | OUTPATIENT
Start: 2024-07-15

## 2024-07-15 NOTE — TELEPHONE ENCOUNTER
Tony Han  is requesting a refill authorization.  Brief Assessment and Rationale for Refill:  Approve     Medication Therapy Plan:  FLOS      Comments:     Note composed:5:38 AM 07/15/2024

## 2024-07-15 NOTE — TELEPHONE ENCOUNTER
Care Due:                  Date            Visit Type   Department     Provider  --------------------------------------------------------------------------------                                EP -                              PRIMARY      [facility] INTERNAL  Last Visit: 03-      CARE ([facility])   MEDICINE       MIGUEL CORREA                              EP                               PRIMARY      [facility] INTERNAL  Next Visit: 08-      CARE ([facility])   MEDICINE       MIGUEL CORREA                                                            Last  Test          Frequency    Reason                     Performed    Due Date  --------------------------------------------------------------------------------    HBA1C.......  6 months...  metFORMIN................  03-   09-    Health [facility] Embedded Care Due Messages. Reference number: [id number].   7/15/2024 5:14:02 AM CDT

## 2024-07-19 ENCOUNTER — OFFICE VISIT (OUTPATIENT)
Dept: ORTHOPEDICS | Facility: CLINIC | Age: 74
End: 2024-07-19
Payer: MEDICARE

## 2024-07-19 DIAGNOSIS — M77.8 TENDONITIS OF ELBOW, RIGHT: Primary | ICD-10-CM

## 2024-07-19 DIAGNOSIS — M77.11 LATERAL EPICONDYLITIS OF RIGHT ELBOW: ICD-10-CM

## 2024-07-19 PROCEDURE — 1159F MED LIST DOCD IN RCRD: CPT | Mod: CPTII,S$GLB,, | Performed by: PHYSICIAN ASSISTANT

## 2024-07-19 PROCEDURE — 99203 OFFICE O/P NEW LOW 30 MIN: CPT | Mod: S$GLB,,, | Performed by: PHYSICIAN ASSISTANT

## 2024-07-19 PROCEDURE — 1125F AMNT PAIN NOTED PAIN PRSNT: CPT | Mod: CPTII,S$GLB,, | Performed by: PHYSICIAN ASSISTANT

## 2024-07-19 PROCEDURE — 1160F RVW MEDS BY RX/DR IN RCRD: CPT | Mod: CPTII,S$GLB,, | Performed by: PHYSICIAN ASSISTANT

## 2024-07-19 PROCEDURE — 99999 PR PBB SHADOW E&M-EST. PATIENT-LVL III: CPT | Mod: PBBFAC,,, | Performed by: PHYSICIAN ASSISTANT

## 2024-07-19 RX ORDER — DICLOFENAC SODIUM 10 MG/G
2 GEL TOPICAL 4 TIMES DAILY
Qty: 400 G | Refills: 0 | Status: SHIPPED | OUTPATIENT
Start: 2024-07-19 | End: 2024-07-29

## 2024-07-19 NOTE — PROGRESS NOTES
Chief Complaint & History of Present Illness:    Tony Han is a New patient 74 y.o. male who is seen here today with a complaint of    Chief Complaint   Patient presents with    Right Elbow - Pain    Right Wrist - Pain    .  Patient is here today for evaluation treatment of rather sudden onset pain soreness in the anterior aspect of the right forearm.  States he has had slow insidious onset of pain soreness associated with flexing and rotational movements of his arm.  Does not remember a specific trauma or injury but does quite a bit of repetitive motion transferring water with that arm and notices increases in soreness afterwards not had any formal treatment to date  On a scale of 1-10, with 10 being worst pain imaginable, he rates this pain as 2 on good days and 5 on bad days.  he describes the pain as sore.    Review of patient's allergies indicates:   Allergen Reactions    Aspirin Nausea And Vomiting     If over 81mg         Current Outpatient Medications   Medication Sig Dispense Refill    amLODIPine (NORVASC) 10 MG tablet Take 1 tablet (10 mg total) by mouth once daily. 90 tablet 2    aspirin 81 MG Chew Take 1 tablet (81 mg total) by mouth once daily. 180 tablet 4    blood sugar diagnostic Strp True Metrics test strips - check blood sugar twice daily - fluctuating blood sugars 200 strip 4    blood-glucose meter kit Use as instructed.  Gabriel MEtric 1 each 0    cholecalciferol, vitamin D3, (VITAMIN D3) 50 mcg (2,000 unit) Tab Take 2 tablets (4,000 Units total) by mouth once daily.      diazePAM (VALIUM) 5 MG tablet Take 1 tablet (5 mg total) by mouth nightly as needed for Anxiety. 30 tablet 1    diclofenac sodium (VOLTAREN) 1 % Gel Apply 2 g topically 4 (four) times daily. for 10 days 400 g 0    finasteride (PROSCAR) 5 mg tablet Take 1 tablet (5 mg total) by mouth once daily. 90 tablet 3    fluoride, sodium, (PREVIDENT) 1.1 % Gel PreviDent 5000 Booster Plus 1.1 % dental paste   USE AS DIRECTED ONCE DAILY       gabapentin (NEURONTIN) 100 MG capsule TAKE 1 TO 2 CAPSULES BY MOUTH THREE TIMES DAILY FOR NERVE PAIN 180 capsule 11    hyoscyamine (LEVSIN/SL) 0.125 mg Subl Place 1 tablet (0.125 mg total) under the tongue every 4 (four) hours as needed. (Patient not taking: Reported on 11/8/2023) 15 tablet 0    lancets Misc True Matrix Lancets -c heck blood sugar twice daily - fluctuating blood sugars 200 each 4    losartan (COZAAR) 100 MG tablet TAKE 1 TABLET(100 MG) BY MOUTH EVERY DAY 90 tablet 2    metFORMIN (GLUCOPHAGE-XR) 500 MG ER 24hr tablet TAKE 2 TABLETS(1000 MG) BY MOUTH TWICE DAILY 360 tablet 0    metoprolol succinate (TOPROL-XL) 25 MG 24 hr tablet TAKE 1 TABLET(25 MG) BY MOUTH EVERY DAY 90 tablet 2    MULTIVITS-MINERALS/FA/LYCOPENE (ONE-A-DAY MEN'S ORAL) Take 1 tablet by mouth once daily.      oxyCODONE-acetaminophen (PERCOCET)  mg per tablet Take 1 tablet by mouth 3 (three) times daily as needed for Pain.      pantoprazole (PROTONIX) 40 MG tablet TAKE 1 TABLET(40 MG) BY MOUTH EVERY DAY 90 tablet 3    tadalafiL (CIALIS) 20 MG Tab Take 1 tablet (20 mg total) by mouth daily as needed. 30 tablet 11    tamsulosin (FLOMAX) 0.4 mg Cap Take 1 capsule (0.4 mg total) by mouth once daily. 90 capsule 3     No current facility-administered medications for this visit.       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    Back pain     Cataracts, both eyes 01/10/2024    Cluster headaches     1999    Colon polyps     5 polyps in Dec 2010    Coronary artery disease 11/2011    moderate nonobstructive cad on left heart cath at [facility] 11/2011    Diabetes mellitus     GERD (gastroesophageal reflux disease)     history of peptic ulcer disease    Hyperlipidemia     Hypertension     Lumbar disc disease     MRI 2008 - L5-S1    Nephrolithiasis     July 2013       Past Surgical History:   Procedure Laterality Date    ARTHROSCOPIC DEBRIDEMENT OF SHOULDER Left 01/09/2020     Procedure: EXTENSIVE DEBRIDEMENT, SHOULDER, ARTHROSCOPIC;  Surgeon: Marcela John MD;  Location: [facility] OR;  Service: Orthopedics;  Laterality: Left;    ARTHROSCOPIC REPAIR OF ROTATOR CUFF OF SHOULDER Left 01/09/2020    Procedure: REPAIR, ROTATOR CUFF, ARTHROSCOPIC, MEDIUM DOUBLE ROW;  Surgeon: Marcela John MD;  Location: [facility] OR;  Service: Orthopedics;  Laterality: Left;    ARTHROSCOPY OF SHOULDER WITH DECOMPRESSION OF SUBACROMIAL SPACE Left 01/09/2020    Procedure: ARTHROSCOPY, SHOULDER, WITH SUBACROMIAL DECOMPRESSION;  Surgeon: Marcela John MD;  Location: [facility] OR;  Service: Orthopedics;  Laterality: Left;    CHOLECYSTECTOMY      COLONOSCOPY N/A 12/07/2016    Procedure: COLONOSCOPY;  Surgeon: Lance Crocker MD;  Location: [facility] ENDO (4TH FLR);  Service: Endoscopy;  Laterality: N/A;    COLONOSCOPY N/A 02/02/2022    Procedure: COLONOSCOPY;  Surgeon: Zena Maddox MD;  Location: [facility] ENDO (4TH FLR);  Service: Endoscopy;  Laterality: N/A;  covid test 1/30/22 arian, prep instr emailed -ml  Miralax prep    ENDOSCOPIC ULTRASOUND OF UPPER GASTROINTESTINAL TRACT N/A 03/14/2023    Procedure: ULTRASOUND, UPPER GI TRACT, ENDOSCOPIC;  Surgeon: Maverick Rodriguez MD;  Location: [facility]OMA (2ND FLR);  Service: Endoscopy;  Laterality: N/A;  2/22/23-Instructions via portal and mailed to address on file, unable to arrive earlier-DS    ESOPHAGOGASTRODUODENOSCOPY N/A 03/14/2023    Procedure: EGD (ESOPHAGOGASTRODUODENOSCOPY);  Surgeon: Maverick Rodriguez MD;  Location: [facility] (2ND FLR);  Service: Endoscopy;  Laterality: N/A;  3/8 pre call complete-SL    EXCISION OF BURSA Left 01/09/2020    Procedure: BURSECTOMY;  Surgeon: Marcela John MD;  Location: [facility] OR;  Service: Orthopedics;  Laterality: Left;    LITHOTRIPSY      ORIF left humerus         Vital Signs (Most Recent)  There were no vitals filed for this visit.        Review of Systems:  ROS:  Constitutional: no fever or chills, positive for insomnia, nicotine dependence  Eyes: no visual  changes  ENT: no nasal congestion or sore throat  Respiratory: no cough or shortness of breath, positive pulmonary nodules, bronchiolectasis  Cardiovascular: no chest pain or palpitations, positive hypertension aortic atherosclerosis cirrhosis  Gastrointestinal: no nausea or vomiting, tolerating diet, positive for GERD, fatty liver disease adenomatous polyps of the colon duodenitis  Genitourinary: no hematuria or dysuria, positive nephrolithiasis BPH  Integument/Breast: no rash or pruritis  Hematologic/Lymphatic: no easy bruising or lymphadenopathy  Musculoskeletal: no arthralgias or myalgias, acute right shoulder pain, nontraumatic rotator cuff tear left shoulder, status post rotator cuff surgery, trochanteric bursitis of the right hip, right-sided low back pain with sciatica trochanteric bursitis of the right hip  Neurological: no seizures or tremors, positive lumbar disc disease  Behavioral/Psych: no auditory or visual hallucinations, opioid dependence without complication, mood disorder  Endocrine: no heat or cold intolerance, positive diabetes type 2                OBJECTIVE:     PHYSICAL EXAM:     , General Appearance: Well nourished, well developed, in no acute distress.  Neurological: Mood & affect are normal.  right  Elbow:   History of injury: no, repetitive injury  Pain: Description: mild, moderate, and intermittent  Night pain: no  Rest pain: no  Quality: aching and sharp  Location: anterior  Radiates to: arm  Exacerbating factors: activity  Mass/swelling:  None  Neurological complaints: none  Vascular complaints: none  radial pulse normal, positive Cozen  ROM: flexion arc 0/145, pronation 90, and supination 90  Strength: normal, flexion grade 5 of 5, extension grade 5 of 5, supination grade 5 of 5, and pronation grade 5 of 5      ASSESSMENT/PLAN:       ICD-10-CM ICD-9-CM   1. Tendonitis of elbow, right  M77.8 727.09       Plan: We discussed with the patient at length all the different treatment options  available for his elbow including anti-inflammatories, acetaminophen, rest, ice, physical therapy to include strengthening, range of motion exercise,  splinting,  occasional cortisone injections for temporary relief,  or possible surgical interventions.  Diclofenac gel to affected area up to t.i.d. p.r.n.  Follow-up if symptoms not resolve 3-6 weeks

## 2024-07-31 ENCOUNTER — LAB VISIT (OUTPATIENT)
Dept: LAB | Facility: OTHER | Age: 74
End: 2024-07-31
Attending: INTERNAL MEDICINE
Payer: MEDICARE

## 2024-07-31 DIAGNOSIS — E11.40 TYPE 2 DIABETES MELLITUS WITH DIABETIC NEUROPATHY, WITHOUT LONG-TERM CURRENT USE OF INSULIN: ICD-10-CM

## 2024-07-31 LAB
ALBUMIN SERPL BCP-MCNC: 4.2 G/DL (ref 3.5–5.2)
ALP SERPL-CCNC: 69 U/L (ref 55–135)
ALT SERPL W/O P-5'-P-CCNC: 29 U/L (ref 10–44)
ANION GAP SERPL CALC-SCNC: 11 MMOL/L (ref 8–16)
AST SERPL-CCNC: 28 U/L (ref 10–40)
BASOPHILS # BLD AUTO: 0.03 K/UL (ref 0–0.2)
BASOPHILS NFR BLD: 0.5 % (ref 0–1.9)
BILIRUB SERPL-MCNC: 0.3 MG/DL (ref 0.1–1)
BUN SERPL-MCNC: 16 MG/DL (ref 8–23)
CALCIUM SERPL-MCNC: 9.9 MG/DL (ref 8.7–10.5)
CHLORIDE SERPL-SCNC: 107 MMOL/L (ref 95–110)
CHOLEST SERPL-MCNC: 144 MG/DL (ref 120–199)
CHOLEST/HDLC SERPL: 3.7 {RATIO} (ref 2–5)
CO2 SERPL-SCNC: 21 MMOL/L (ref 23–29)
CREAT SERPL-MCNC: 1.3 MG/DL (ref 0.5–1.4)
DIFFERENTIAL METHOD BLD: ABNORMAL
EOSINOPHIL # BLD AUTO: 0.3 K/UL (ref 0–0.5)
EOSINOPHIL NFR BLD: 5.2 % (ref 0–8)
ERYTHROCYTE [DISTWIDTH] IN BLOOD BY AUTOMATED COUNT: 13.2 % (ref 11.5–14.5)
EST. GFR  (NO RACE VARIABLE): 57 ML/MIN/1.73 M^2
ESTIMATED AVG GLUCOSE: 134 MG/DL (ref 68–131)
GLUCOSE SERPL-MCNC: 100 MG/DL (ref 70–110)
HBA1C MFR BLD: 6.3 % (ref 4–5.6)
HCT VFR BLD AUTO: 36 % (ref 40–54)
HDLC SERPL-MCNC: 39 MG/DL (ref 40–75)
HDLC SERPL: 27.1 % (ref 20–50)
HGB BLD-MCNC: 12.1 G/DL (ref 14–18)
IMM GRANULOCYTES # BLD AUTO: 0.01 K/UL (ref 0–0.04)
IMM GRANULOCYTES NFR BLD AUTO: 0.2 % (ref 0–0.5)
LDLC SERPL CALC-MCNC: 57.6 MG/DL (ref 63–159)
LYMPHOCYTES # BLD AUTO: 3.2 K/UL (ref 1–4.8)
LYMPHOCYTES NFR BLD: 48.9 % (ref 18–48)
MCH RBC QN AUTO: 33.2 PG (ref 27–31)
MCHC RBC AUTO-ENTMCNC: 33.6 G/DL (ref 32–36)
MCV RBC AUTO: 99 FL (ref 82–98)
MONOCYTES # BLD AUTO: 0.4 K/UL (ref 0.3–1)
MONOCYTES NFR BLD: 5.9 % (ref 4–15)
NEUTROPHILS # BLD AUTO: 2.6 K/UL (ref 1.8–7.7)
NEUTROPHILS NFR BLD: 39.3 % (ref 38–73)
NONHDLC SERPL-MCNC: 105 MG/DL
NRBC BLD-RTO: 0 /100 WBC
PLATELET # BLD AUTO: 306 K/UL (ref 150–450)
PMV BLD AUTO: 8.5 FL (ref 9.2–12.9)
POTASSIUM SERPL-SCNC: 4.9 MMOL/L (ref 3.5–5.1)
PROT SERPL-MCNC: 7.4 G/DL (ref 6–8.4)
RBC # BLD AUTO: 3.64 M/UL (ref 4.6–6.2)
SODIUM SERPL-SCNC: 139 MMOL/L (ref 136–145)
TRIGL SERPL-MCNC: 237 MG/DL (ref 30–150)
WBC # BLD AUTO: 6.56 K/UL (ref 3.9–12.7)

## 2024-07-31 PROCEDURE — 36415 COLL VENOUS BLD VENIPUNCTURE: CPT | Performed by: INTERNAL MEDICINE

## 2024-07-31 PROCEDURE — 83036 HEMOGLOBIN GLYCOSYLATED A1C: CPT | Performed by: INTERNAL MEDICINE

## 2024-07-31 PROCEDURE — 80053 COMPREHEN METABOLIC PANEL: CPT | Performed by: INTERNAL MEDICINE

## 2024-07-31 PROCEDURE — 80061 LIPID PANEL: CPT | Performed by: INTERNAL MEDICINE

## 2024-07-31 PROCEDURE — 85025 COMPLETE CBC W/AUTO DIFF WBC: CPT | Performed by: INTERNAL MEDICINE

## 2024-08-07 ENCOUNTER — OFFICE VISIT (OUTPATIENT)
Dept: INTERNAL MEDICINE | Facility: CLINIC | Age: 74
End: 2024-08-07
Payer: MEDICARE

## 2024-08-07 VITALS
SYSTOLIC BLOOD PRESSURE: 128 MMHG | HEIGHT: 69 IN | OXYGEN SATURATION: 99 % | HEART RATE: 82 BPM | WEIGHT: 180.13 LBS | DIASTOLIC BLOOD PRESSURE: 80 MMHG | BODY MASS INDEX: 26.68 KG/M2

## 2024-08-07 DIAGNOSIS — K21.9 GASTROESOPHAGEAL REFLUX DISEASE WITHOUT ESOPHAGITIS: ICD-10-CM

## 2024-08-07 DIAGNOSIS — F39 MOOD DISORDER: ICD-10-CM

## 2024-08-07 DIAGNOSIS — E11.40 TYPE 2 DIABETES MELLITUS WITH DIABETIC NEUROPATHY, WITHOUT LONG-TERM CURRENT USE OF INSULIN: ICD-10-CM

## 2024-08-07 DIAGNOSIS — I10 ESSENTIAL HYPERTENSION: ICD-10-CM

## 2024-08-07 DIAGNOSIS — E78.5 HYPERLIPIDEMIA, UNSPECIFIED HYPERLIPIDEMIA TYPE: ICD-10-CM

## 2024-08-07 DIAGNOSIS — R91.1 SOLITARY PULMONARY NODULE: Primary | ICD-10-CM

## 2024-08-07 PROCEDURE — 2023F DILAT RTA XM W/O RTNOPTHY: CPT | Mod: CPTII,S$GLB,, | Performed by: INTERNAL MEDICINE

## 2024-08-07 PROCEDURE — 99999 PR PBB SHADOW E&M-EST. PATIENT-LVL II: CPT | Mod: PBBFAC,,, | Performed by: INTERNAL MEDICINE

## 2024-08-07 PROCEDURE — 3079F DIAST BP 80-89 MM HG: CPT | Mod: CPTII,S$GLB,, | Performed by: INTERNAL MEDICINE

## 2024-08-07 PROCEDURE — 3074F SYST BP LT 130 MM HG: CPT | Mod: CPTII,S$GLB,, | Performed by: INTERNAL MEDICINE

## 2024-08-07 PROCEDURE — 99214 OFFICE O/P EST MOD 30 MIN: CPT | Mod: S$GLB,,, | Performed by: INTERNAL MEDICINE

## 2024-08-08 ENCOUNTER — PATIENT MESSAGE (OUTPATIENT)
Dept: INTERNAL MEDICINE | Facility: CLINIC | Age: 74
End: 2024-08-08
Payer: MEDICARE

## 2024-08-13 ENCOUNTER — TELEPHONE (OUTPATIENT)
Dept: INTERNAL MEDICINE | Facility: CLINIC | Age: 74
End: 2024-08-13
Payer: MEDICARE

## 2024-08-13 DIAGNOSIS — E78.5 HYPERLIPIDEMIA, UNSPECIFIED HYPERLIPIDEMIA TYPE: ICD-10-CM

## 2024-08-13 DIAGNOSIS — I10 ESSENTIAL HYPERTENSION: Primary | ICD-10-CM

## 2024-08-13 NOTE — TELEPHONE ENCOUNTER
----- Message from Dariela Hernandez sent at 8/13/2024  1:48 PM CDT -----  Contact: [phone]  Patient is returning a phone call.    Who left a message for the patient: Dr Benitez's office    Does patient know what this is regarding:  no    Would you like a call back, or a response through your [facility] portal?:   phone    Comments:

## 2024-08-13 NOTE — TELEPHONE ENCOUNTER
----- Message from Dariela Hernandez sent at 8/13/2024  1:48 PM CDT -----  Contact: [phone]  Patient is returning a phone call.    Who left a message for the patient: Dr Benitez's office    Does patient know what this is regarding:  no    Would you like a call back, or a response through your [facility] portal?:   phone    Comments:

## 2024-08-14 DIAGNOSIS — E11.40 TYPE 2 DIABETES MELLITUS WITH DIABETIC NEUROPATHY, WITHOUT LONG-TERM CURRENT USE OF INSULIN: ICD-10-CM

## 2024-08-14 RX ORDER — METFORMIN HYDROCHLORIDE 500 MG/1
TABLET, EXTENDED RELEASE ORAL
Qty: 360 TABLET | Refills: 1 | Status: SHIPPED | OUTPATIENT
Start: 2024-08-14

## 2024-08-14 NOTE — TELEPHONE ENCOUNTER
Refill Decision Note   Tony Han  is requesting a refill authorization.  Brief Assessment and Rationale for Refill:  Approve     Medication Therapy Plan:         Alert overridden per protocol: Yes   Comments:     Note composed:12:16 PM 08/14/2024

## 2024-08-14 NOTE — TELEPHONE ENCOUNTER
No care due was identified.  [facility] Embedded Care Due Messages. Reference number: [id number].   8/14/2024 5:14:26 AM CDT

## 2024-08-14 NOTE — TELEPHONE ENCOUNTER
----- Message from Carolin Light sent at 8/14/2024  3:33 PM CDT -----  Contact: [phone]  Patient is returning a phone call.    Who left a message for the patient: Elizabeth Benitez MD    Does patient know what this is regarding:  yes     Would you like a call back, or a response through your [facility] portal?:  call back     Comments:

## 2024-09-13 ENCOUNTER — HOSPITAL ENCOUNTER (OUTPATIENT)
Dept: RADIOLOGY | Facility: OTHER | Age: 74
Discharge: HOME | End: 2024-09-13
Attending: INTERNAL MEDICINE
Payer: MEDICARE

## 2024-09-13 DIAGNOSIS — R91.1 SOLITARY PULMONARY NODULE: ICD-10-CM

## 2024-09-13 PROCEDURE — 71250 CT THORAX DX C-: CPT | Mod: TC

## 2024-09-13 PROCEDURE — 71250 CT THORAX DX C-: CPT | Mod: 26,,, | Performed by: RADIOLOGY

## 2024-09-23 ENCOUNTER — TELEPHONE (OUTPATIENT)
Dept: INTERNAL MEDICINE | Facility: CLINIC | Age: 74
End: 2024-09-23
Payer: MEDICARE

## 2024-09-23 NOTE — TELEPHONE ENCOUNTER
----- Message from Elizabeth Benitez MD sent at 9/22/2024  9:07 PM CDT -----  Please notify pt that CT scan of his chest reveals that the pulmonary nodules are stable. Repeat CT scan in one year  Elizabeth Benitez M.D.

## 2024-09-23 NOTE — TELEPHONE ENCOUNTER
----- Message from Laila Ludwig sent at 9/23/2024  2:07 PM CDT -----  Contact: [phone]  Returning a phone call.    Who left a message for the patient:  Jennifer Vogt MA    Do they know what this is regarding:  yes    Would they like a phone call back or a response via [facility]:  call

## 2024-09-23 NOTE — TELEPHONE ENCOUNTER
----- Message from Donna Taylor sent at 9/23/2024 12:14 PM CDT -----  Contact: Patient   [phone]  Returned call    [phone]

## 2024-09-24 NOTE — TELEPHONE ENCOUNTER
Pt states that he was already informed.   Message sent to the appointment desk to place on the hold list for f/u [facility].

## 2024-10-10 RX ORDER — LOSARTAN POTASSIUM 100 MG/1
100 TABLET ORAL
Qty: 90 TABLET | Refills: 3 | Status: SHIPPED | OUTPATIENT
Start: 2024-10-10

## 2024-10-10 NOTE — TELEPHONE ENCOUNTER
Refill Decision Note   Tony Han  is requesting a refill authorization.  Brief Assessment and Rationale for Refill:  Approve     Medication Therapy Plan:        Comments:     Note composed:10:45 AM 10/10/2024

## 2024-10-10 NOTE — TELEPHONE ENCOUNTER
No care due was identified.  [facility] Embedded Care Due Messages. Reference number: [id number].   10/10/2024 5:14:19 AM CDT

## 2024-10-31 RX ORDER — METOPROLOL SUCCINATE 25 MG/1
TABLET, EXTENDED RELEASE ORAL
Qty: 90 TABLET | Refills: 3 | Status: SHIPPED | OUTPATIENT
Start: 2024-10-31

## 2024-11-01 ENCOUNTER — PATIENT MESSAGE (OUTPATIENT)
Dept: INTERNAL MEDICINE | Facility: CLINIC | Age: 74
End: 2024-11-01
Payer: MEDICARE

## 2024-11-01 DIAGNOSIS — R97.20 RISING PSA LEVEL: Primary | ICD-10-CM

## 2024-12-11 ENCOUNTER — LAB VISIT (OUTPATIENT)
Dept: LAB | Facility: OTHER | Age: 74
End: 2024-12-11
Attending: INTERNAL MEDICINE
Payer: MEDICARE

## 2024-12-11 DIAGNOSIS — R97.20 RISING PSA LEVEL: ICD-10-CM

## 2024-12-11 DIAGNOSIS — E11.40 TYPE 2 DIABETES MELLITUS WITH DIABETIC NEUROPATHY, WITHOUT LONG-TERM CURRENT USE OF INSULIN: ICD-10-CM

## 2024-12-11 LAB
ALBUMIN SERPL BCP-MCNC: 4.5 G/DL (ref 3.5–5.2)
ALP SERPL-CCNC: 77 U/L (ref 40–150)
ALT SERPL W/O P-5'-P-CCNC: 30 U/L (ref 10–44)
ANION GAP SERPL CALC-SCNC: 11 MMOL/L (ref 8–16)
AST SERPL-CCNC: 31 U/L (ref 10–40)
BASOPHILS # BLD AUTO: 0.03 K/UL (ref 0–0.2)
BASOPHILS NFR BLD: 0.4 % (ref 0–1.9)
BILIRUB SERPL-MCNC: 0.3 MG/DL (ref 0.1–1)
BUN SERPL-MCNC: 13 MG/DL (ref 8–23)
CALCIUM SERPL-MCNC: 10 MG/DL (ref 8.7–10.5)
CHLORIDE SERPL-SCNC: 106 MMOL/L (ref 95–110)
CO2 SERPL-SCNC: 23 MMOL/L (ref 23–29)
COMPLEXED PSA SERPL-MCNC: 2.8 NG/ML (ref 0–4)
CREAT SERPL-MCNC: 1.3 MG/DL (ref 0.5–1.4)
DIFFERENTIAL METHOD BLD: ABNORMAL
EOSINOPHIL # BLD AUTO: 0.2 K/UL (ref 0–0.5)
EOSINOPHIL NFR BLD: 3.1 % (ref 0–8)
ERYTHROCYTE [DISTWIDTH] IN BLOOD BY AUTOMATED COUNT: 12.9 % (ref 11.5–14.5)
EST. GFR  (NO RACE VARIABLE): 57 ML/MIN/1.73 M^2
ESTIMATED AVG GLUCOSE: 128 MG/DL (ref 68–131)
GLUCOSE SERPL-MCNC: 122 MG/DL (ref 70–110)
HBA1C MFR BLD: 6.1 % (ref 4–5.6)
HCT VFR BLD AUTO: 37.8 % (ref 40–54)
HGB BLD-MCNC: 12.9 G/DL (ref 14–18)
IMM GRANULOCYTES # BLD AUTO: 0.01 K/UL (ref 0–0.04)
IMM GRANULOCYTES NFR BLD AUTO: 0.1 % (ref 0–0.5)
LYMPHOCYTES # BLD AUTO: 3.7 K/UL (ref 1–4.8)
LYMPHOCYTES NFR BLD: 53.9 % (ref 18–48)
MCH RBC QN AUTO: 33.7 PG (ref 27–31)
MCHC RBC AUTO-ENTMCNC: 34.1 G/DL (ref 32–36)
MCV RBC AUTO: 99 FL (ref 82–98)
MONOCYTES # BLD AUTO: 0.4 K/UL (ref 0.3–1)
MONOCYTES NFR BLD: 5.6 % (ref 4–15)
NEUTROPHILS # BLD AUTO: 2.5 K/UL (ref 1.8–7.7)
NEUTROPHILS NFR BLD: 36.9 % (ref 38–73)
NRBC BLD-RTO: 0 /100 WBC
PLATELET # BLD AUTO: 314 K/UL (ref 150–450)
PMV BLD AUTO: 8.6 FL (ref 9.2–12.9)
POTASSIUM SERPL-SCNC: 4.4 MMOL/L (ref 3.5–5.1)
PROT SERPL-MCNC: 8 G/DL (ref 6–8.4)
RBC # BLD AUTO: 3.83 M/UL (ref 4.6–6.2)
SODIUM SERPL-SCNC: 140 MMOL/L (ref 136–145)
WBC # BLD AUTO: 6.77 K/UL (ref 3.9–12.7)

## 2024-12-11 PROCEDURE — 36415 COLL VENOUS BLD VENIPUNCTURE: CPT | Performed by: INTERNAL MEDICINE

## 2024-12-11 PROCEDURE — 85025 COMPLETE CBC W/AUTO DIFF WBC: CPT | Performed by: INTERNAL MEDICINE

## 2024-12-11 PROCEDURE — 83036 HEMOGLOBIN GLYCOSYLATED A1C: CPT | Performed by: INTERNAL MEDICINE

## 2024-12-11 PROCEDURE — 84153 ASSAY OF PSA TOTAL: CPT | Performed by: INTERNAL MEDICINE

## 2024-12-11 PROCEDURE — 80053 COMPREHEN METABOLIC PANEL: CPT | Performed by: INTERNAL MEDICINE

## 2024-12-18 ENCOUNTER — OFFICE VISIT (OUTPATIENT)
Dept: INTERNAL MEDICINE | Facility: CLINIC | Age: 74
End: 2024-12-18
Payer: MEDICARE

## 2024-12-18 VITALS
WEIGHT: 174.06 LBS | HEART RATE: 85 BPM | OXYGEN SATURATION: 99 % | DIASTOLIC BLOOD PRESSURE: 56 MMHG | BODY MASS INDEX: 25.7 KG/M2 | SYSTOLIC BLOOD PRESSURE: 136 MMHG

## 2024-12-18 DIAGNOSIS — I10 ESSENTIAL HYPERTENSION: ICD-10-CM

## 2024-12-18 DIAGNOSIS — Z23 NEED FOR PNEUMOCOCCAL VACCINE: ICD-10-CM

## 2024-12-18 DIAGNOSIS — Z00.00 ROUTINE GENERAL MEDICAL EXAMINATION AT A HEALTH CARE FACILITY: Primary | ICD-10-CM

## 2024-12-18 DIAGNOSIS — N18.31 CHRONIC KIDNEY DISEASE, STAGE 3A: ICD-10-CM

## 2024-12-18 DIAGNOSIS — K21.9 GASTROESOPHAGEAL REFLUX DISEASE WITHOUT ESOPHAGITIS: ICD-10-CM

## 2024-12-18 DIAGNOSIS — R97.20 RISING PSA LEVEL: ICD-10-CM

## 2024-12-18 DIAGNOSIS — E11.40 TYPE 2 DIABETES MELLITUS WITH DIABETIC NEUROPATHY, WITHOUT LONG-TERM CURRENT USE OF INSULIN: ICD-10-CM

## 2024-12-18 PROCEDURE — 99999 PR PBB SHADOW E&M-EST. PATIENT-LVL II: CPT | Mod: PBBFAC,,, | Performed by: INTERNAL MEDICINE

## 2024-12-18 PROCEDURE — 99397 PER PM REEVAL EST PAT 65+ YR: CPT | Mod: S$GLB,,, | Performed by: INTERNAL MEDICINE

## 2024-12-18 PROCEDURE — G0009 ADMIN PNEUMOCOCCAL VACCINE: HCPCS | Mod: S$GLB,,, | Performed by: INTERNAL MEDICINE

## 2024-12-18 PROCEDURE — 90677 PCV20 VACCINE IM: CPT | Mod: S$GLB,,, | Performed by: INTERNAL MEDICINE

## 2024-12-18 PROCEDURE — 3078F DIAST BP <80 MM HG: CPT | Mod: CPTII,S$GLB,, | Performed by: INTERNAL MEDICINE

## 2024-12-18 PROCEDURE — 3075F SYST BP GE 130 - 139MM HG: CPT | Mod: CPTII,S$GLB,, | Performed by: INTERNAL MEDICINE

## 2024-12-18 RX ORDER — AZITHROMYCIN 250 MG/1
TABLET, FILM COATED ORAL
Qty: 6 TABLET | Refills: 0 | Status: SHIPPED | OUTPATIENT
Start: 2024-12-18

## 2024-12-18 NOTE — PROGRESS NOTES
Chief Complaint: Annual exam and   Follow up of multiple problems     HISTORY OF PRESENT ILLNESS: This is a 74-year-old man who presents for above.    For the last week, he has had a cough - he is bringing up clear mucous.  No fever or chills. He had a scratchy throat. The cough is a wet cough at the level of the larynx.  Cough is not keeping him awake at night. Not getting better. No wheezing. No shortness of breath.      He remains off pantoprazole 40 mg.He will have a burning sensation in his stomach once every 2 weeks and he will eat 1/2 piece of toast which will resolved the symptoms.  He feels that his stomach is doing well.  He had a repeat endoscopic ultrasound 3/14/23 - erythema in the gastric body and antrum,  Pancreas revealed sequelae of prior pancreatitis.       No nausea or vomiting.  Bowels are moving well. Chocolate covered peanuts help his bowels move No diarrhea now melana or bloody stools.        He is still smoking 1.75 packs of cigarettes daily. No motivation to quit. He is smoking more       He is taking Metformin  mg one tablet in the morning and 2 at night.       He stopped fenofibrate when he started to have abdominal issues.         He continues to have back pain and lateral right leg pain. He sees Dr Carrasquillo in pain management.  Currently taking oxycodone apap 10/325 1 tablet three times daily. He takes gabapentin 100 mg 1 cap at 8 pm and another capsule in the middle of the night which helps.  He has intermittent pins and needles sensation in his right thigh if he sits more than  20 minutes.  Pain is keeping him awake at  night. He is sleeping 5-6 hours at [email or web address]      He had a normal cystoscope  with Dr Dexter  9/13/21      Dr Randall  on 3/17/20 who felt he has -  Right meralgia paresthetica; right lateral thigh tendinitis at the hip; history lumbar disc disease rule out lumbar radiculopathy. EMG was 5/5/20 -  Sensory neuropathy consistent with a history of diabetes; these  findings would be compatible with clinical picture of meralgia paresthetica.  He continues to have significant right leg pain that starts in the right groin and goes down the lateral right thigh and the anterior right thigh.  He has a burning sensation on the right thigh.  HE is followed by  Dr Carrasquillo in pain management for his pain.   No change in this pain     Left shoulder is ok. He is doing his exercises on his own.       He had lithotripsy on 3/23/17. No dysuria, hematuria.   He saw Dr cerna on 1/19. He still has a stone in the left kidney. He is on finasteride and tamsulosin for his prostate.      He has not had any alcohol      He is taking amlodipine 10 mg 1 once daily, Toprol-XL 25 mg once daily and losartan 100 mg daily.  No chest pain, shortness of breath.     HE checks his blood sugar twice daily due to fluctuating blood sugars.       No polydipsia. He has a history of hyperlipidemia  No joint pain or muscle pain. NO shortness breath, headaches, vision, sinus congestion.  He is not taking anything for triglycerides or cholesterol.      He is taking vitamin D 2000 units daily            PAST MEDICAL HISTORY:    1. Hypertension.    2. Coronary artery disease with left heart catheterization in November 2011 with 40 to 50% narrowing in the mid LAD.    3. Diabetes mellitus.    4. Hyperlipidemia.    5. History of reflux.    6. Colon polyps.    7. History of cluster headaches.    8. History of glaucoma.    9. Lumbar disk disease at L5 to S1.    10. History of stomach ulcers.    11. Nephrolithiasis.       PAST SURGICAL HISTORY: Cholecystectomy in May 2013, ORIF of the left humerus.      SOCIAL HISTORY: Currently smokes a pack of cigarettes daily, has smoked his whole life. 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 He is a retired schoolteacher.       FAMILY HISTORY: Updated on [facility].      PHYSICAL EXAMINATION:            GENERAL: He is alert, oriented, no apparent distress. Affect within  normal limits.    HEENT: Conjunctivae anicteric. Tympanic membranes clear.     NECK: Supple. No cervical lymphadenopathy, no thyroid enlargement.    Respiratory: Effort normal. Lungs are clear to auscultation.    HEART: Regular rate and rhythm without murmurs, gallops or rubs. No lower extremity edema  ABDOMEN: soft, non distended, non tender, bowel sounds present, no hepatosplenomgaly           Labs 7/31/24  reviewed 6.3     CT chest 9/2023 reviewed     ASSESSMENT AND PLAN:        1. Gastritis and duodenitis on EGD by Raoul Alford 1/11/22 - doing better. off pantoprazole 40 mg daily      2. Elevated lipase and  -saw DR Rodriguez for endoscopic ultrasound on 3/14/23     3. Lung changes on CT scan chest (bronchiectasis) 8/2022- saw pulmonary on 10/28/22 -CT chest 9/2023 improved  4. Hypertension-  stable  5. Diabetes mellitus with neuropathy. stable  6. Hyperlipidemia. off fenofibrate- doing better.   7. Tubular adenomas - colonoscopy 2/2022 - due 2027  9. History of glaucoma - not on treatment - pressure in eyes ok - followed Nile Avina and retinal specialist at [facility]  10. History of nephrolithiasis -asymptomatic.    11. Irregular heart beat sensation - holter revealed PVC  12. Anxiety and depression -valium 5 mg nightly prn anxiety.  Rarely takes  13. Tobacco abuse -working on cessation.    14. Aortic atherosclerosis - modifying risk factors  15. Back pain - followed by Dr Carrasquillo - on chronic opiates - stable.   16. Vitamin D deficiency - increase vitamin D to 2000 units 2 tablets daily.   17. Cough - could be pertussis.  Will give a  Z pack.   18. Rising PSA- repeat PSA at next visit  19. CRI stage 3b due to age- modifying risk factors with blood pressure and blood sugars.   Monitoring.      Colonoscopy 2/2/2022- due 2027     Follow up in 4 months, sooner if problems arise

## 2025-03-24 DIAGNOSIS — Z00.00 ENCOUNTER FOR MEDICARE ANNUAL WELLNESS EXAM: ICD-10-CM

## 2025-03-26 ENCOUNTER — TELEPHONE (OUTPATIENT)
Dept: INTERNAL MEDICINE | Facility: CLINIC | Age: 75
End: 2025-03-26
Payer: MEDICARE

## 2025-03-26 DIAGNOSIS — E11.40 TYPE 2 DIABETES MELLITUS WITH DIABETIC NEUROPATHY, WITHOUT LONG-TERM CURRENT USE OF INSULIN: ICD-10-CM

## 2025-03-27 RX ORDER — INSULIN PUMP SYRINGE, 3 ML
EACH MISCELLANEOUS
Qty: 1 EACH | Refills: 0 | Status: SHIPPED | OUTPATIENT
Start: 2025-03-27

## 2025-03-27 RX ORDER — LANCETS
EACH MISCELLANEOUS
Qty: 200 EACH | Refills: 4 | Status: SHIPPED | OUTPATIENT
Start: 2025-03-27

## 2025-03-27 NOTE — TELEPHONE ENCOUNTER
----- Message from Kelvin sent at 3/26/2025  3:53 PM CDT -----  Regarding: Gluscose Monitors - Callback Request  Contact: Pt [phone]  [email or web address] Patient is calling for Medical Advice regarding: Patient and his wife Nelly Han need orders for new glucose monitors. He says he and his wife have been using the same one since 2021 and it does not work. Please call patient to discuss options.How long has patient had these symptoms:Pharmacy name and phone#:Patient wants a call back or thru [facility]: CallComments:Please advise patient replies from provider may take up to 48 hours.

## 2025-03-27 NOTE — TELEPHONE ENCOUNTER
Sent everything to [facility]   Called and left a message for the pt to know that it was printed out and faxed for both him and his wife

## 2025-04-08 DIAGNOSIS — N40.0 BENIGN PROSTATIC HYPERPLASIA, UNSPECIFIED WHETHER LOWER URINARY TRACT SYMPTOMS PRESENT: ICD-10-CM

## 2025-04-08 RX ORDER — FINASTERIDE 5 MG/1
5 TABLET, FILM COATED ORAL DAILY
Qty: 90 TABLET | Refills: 3 | Status: SHIPPED | OUTPATIENT
Start: 2025-04-08

## 2025-04-08 RX ORDER — AMLODIPINE BESYLATE 10 MG/1
10 TABLET ORAL
Qty: 90 TABLET | Refills: 2 | Status: SHIPPED | OUTPATIENT
Start: 2025-04-08

## 2025-04-08 NOTE — TELEPHONE ENCOUNTER
Care Due:                  Date            Visit Type   Department     Provider  --------------------------------------------------------------------------------                                EP -                              PRIMARY      [facility] INTERNAL  Last Visit: 12-      CARE ([facility])   MEDICINE       Elizabeth Benitez                               -                              PRIMARY      [facility] INTERNAL  Next Visit: 04-      CARE ([facility])   MEDICINE       Elizabeth Benitez                                                            Last  Test          Frequency    Reason                     Performed    Due Date  --------------------------------------------------------------------------------    HBA1C.......  6 months...  metFORMIN................  12-   06-    Vitamin D...  12 months..  cholecalciferol,.........  03- 03-    [facility] Embedded Care Due Messages. Reference number: [id number].   4/08/2025 5:14:26 AM CDT   Subjective:      Patient ID: Kasandra Osborn 

## 2025-04-08 NOTE — TELEPHONE ENCOUNTER
Tony Han  is requesting a refill authorization.  Brief Assessment and Rationale for Refill:  Approve     Medication Therapy Plan:  FLOS      Comments:     Note composed:12:02 PM 04/08/2025

## 2025-04-23 ENCOUNTER — LAB VISIT (OUTPATIENT)
Dept: LAB | Facility: OTHER | Age: 75
End: 2025-04-23
Attending: INTERNAL MEDICINE
Payer: MEDICARE

## 2025-04-23 DIAGNOSIS — R97.20 RISING PSA LEVEL: ICD-10-CM

## 2025-04-23 DIAGNOSIS — E11.40 TYPE 2 DIABETES MELLITUS WITH DIABETIC NEUROPATHY, WITHOUT LONG-TERM CURRENT USE OF INSULIN: ICD-10-CM

## 2025-04-23 LAB
ABSOLUTE EOSINOPHIL (OHS): 0.26 K/UL
ABSOLUTE MONOCYTE (OHS): 0.35 K/UL (ref 0.3–1)
ABSOLUTE NEUTROPHIL COUNT (OHS): 2.94 K/UL (ref 1.8–7.7)
ALBUMIN SERPL BCP-MCNC: 4.3 G/DL (ref 3.5–5.2)
ALP SERPL-CCNC: 72 UNIT/L (ref 40–150)
ALT SERPL W/O P-5'-P-CCNC: 39 UNIT/L (ref 10–44)
ANION GAP (OHS): 11 MMOL/L (ref 8–16)
AST SERPL-CCNC: 35 UNIT/L (ref 11–45)
BASOPHILS # BLD AUTO: 0.04 K/UL
BASOPHILS NFR BLD AUTO: 0.6 %
BILIRUB SERPL-MCNC: 0.3 MG/DL (ref 0.1–1)
BUN SERPL-MCNC: 22 MG/DL (ref 8–23)
CALCIUM SERPL-MCNC: 9.6 MG/DL (ref 8.7–10.5)
CHLORIDE SERPL-SCNC: 108 MMOL/L (ref 95–110)
CHOLEST SERPL-MCNC: 156 MG/DL (ref 120–199)
CHOLEST/HDLC SERPL: 4 {RATIO} (ref 2–5)
CO2 SERPL-SCNC: 19 MMOL/L (ref 23–29)
CREAT SERPL-MCNC: 1.3 MG/DL (ref 0.5–1.4)
EAG (OHS): 131 MG/DL (ref 68–131)
ERYTHROCYTE [DISTWIDTH] IN BLOOD BY AUTOMATED COUNT: 13.2 % (ref 11.5–14.5)
GFR SERPLBLD CREATININE-BSD FMLA CKD-EPI: 57 ML/MIN/1.73/M2
GLUCOSE SERPL-MCNC: 108 MG/DL (ref 70–110)
HBA1C MFR BLD: 6.2 % (ref 4–5.6)
HCT VFR BLD AUTO: 34.6 % (ref 40–54)
HDLC SERPL-MCNC: 39 MG/DL (ref 40–75)
HDLC SERPL: 25 % (ref 20–50)
HGB BLD-MCNC: 11.7 GM/DL (ref 14–18)
IMM GRANULOCYTES # BLD AUTO: 0.01 K/UL (ref 0–0.04)
IMM GRANULOCYTES NFR BLD AUTO: 0.2 % (ref 0–0.5)
LDLC SERPL CALC-MCNC: 64 MG/DL (ref 63–159)
LYMPHOCYTES # BLD AUTO: 2.8 K/UL (ref 1–4.8)
MCH RBC QN AUTO: 33.8 PG (ref 27–31)
MCHC RBC AUTO-ENTMCNC: 33.8 G/DL (ref 32–36)
MCV RBC AUTO: 100 FL (ref 82–98)
NONHDLC SERPL-MCNC: 117 MG/DL
NUCLEATED RBC (/100WBC) (OHS): 0 /100 WBC
PLATELET # BLD AUTO: 280 K/UL (ref 150–450)
PMV BLD AUTO: 8.4 FL (ref 9.2–12.9)
POTASSIUM SERPL-SCNC: 4.8 MMOL/L (ref 3.5–5.1)
PROT SERPL-MCNC: 7.8 GM/DL (ref 6–8.4)
PSA SERPL-MCNC: 2.7 NG/ML
RBC # BLD AUTO: 3.46 M/UL (ref 4.6–6.2)
RELATIVE EOSINOPHIL (OHS): 4.1 %
RELATIVE LYMPHOCYTE (OHS): 43.8 % (ref 18–48)
RELATIVE MONOCYTE (OHS): 5.5 % (ref 4–15)
RELATIVE NEUTROPHIL (OHS): 45.8 % (ref 38–73)
SODIUM SERPL-SCNC: 138 MMOL/L (ref 136–145)
TRIGL SERPL-MCNC: 265 MG/DL (ref 30–150)
TSH SERPL-ACNC: 0.82 UIU/ML (ref 0.4–4)
WBC # BLD AUTO: 6.4 K/UL (ref 3.9–12.7)

## 2025-04-23 PROCEDURE — 84153 ASSAY OF PSA TOTAL: CPT

## 2025-04-23 PROCEDURE — 80061 LIPID PANEL: CPT

## 2025-04-23 PROCEDURE — 83036 HEMOGLOBIN GLYCOSYLATED A1C: CPT

## 2025-04-23 PROCEDURE — 84295 ASSAY OF SERUM SODIUM: CPT

## 2025-04-23 PROCEDURE — 85025 COMPLETE CBC W/AUTO DIFF WBC: CPT

## 2025-04-23 PROCEDURE — 84443 ASSAY THYROID STIM HORMONE: CPT

## 2025-04-23 PROCEDURE — 36415 COLL VENOUS BLD VENIPUNCTURE: CPT

## 2025-04-30 ENCOUNTER — OFFICE VISIT (OUTPATIENT)
Dept: INTERNAL MEDICINE | Facility: CLINIC | Age: 75
End: 2025-04-30
Payer: MEDICARE

## 2025-04-30 ENCOUNTER — LAB VISIT (OUTPATIENT)
Dept: LAB | Facility: HOSPITAL | Age: 75
End: 2025-04-30
Attending: INTERNAL MEDICINE
Payer: MEDICARE

## 2025-04-30 VITALS
DIASTOLIC BLOOD PRESSURE: 60 MMHG | SYSTOLIC BLOOD PRESSURE: 126 MMHG | WEIGHT: 174.81 LBS | HEART RATE: 72 BPM | OXYGEN SATURATION: 98 % | HEIGHT: 69 IN | BODY MASS INDEX: 25.89 KG/M2

## 2025-04-30 DIAGNOSIS — E11.40 TYPE 2 DIABETES MELLITUS WITH DIABETIC NEUROPATHY, WITHOUT LONG-TERM CURRENT USE OF INSULIN: ICD-10-CM

## 2025-04-30 DIAGNOSIS — F11.20 OPIOID DEPENDENCE, UNCOMPLICATED: ICD-10-CM

## 2025-04-30 DIAGNOSIS — E78.5 HYPERLIPIDEMIA, UNSPECIFIED HYPERLIPIDEMIA TYPE: ICD-10-CM

## 2025-04-30 DIAGNOSIS — F39 MOOD DISORDER: ICD-10-CM

## 2025-04-30 DIAGNOSIS — R97.20 RISING PSA LEVEL: Primary | ICD-10-CM

## 2025-04-30 DIAGNOSIS — D64.9 ANEMIA, UNSPECIFIED TYPE: ICD-10-CM

## 2025-04-30 DIAGNOSIS — J47.9 BRONCHIECTASIS, UNCOMPLICATED: ICD-10-CM

## 2025-04-30 DIAGNOSIS — N18.32 CHRONIC KIDNEY DISEASE, STAGE 3B: ICD-10-CM

## 2025-04-30 DIAGNOSIS — K21.9 GASTROESOPHAGEAL REFLUX DISEASE WITHOUT ESOPHAGITIS: ICD-10-CM

## 2025-04-30 DIAGNOSIS — N18.31 CHRONIC KIDNEY DISEASE, STAGE 3A: ICD-10-CM

## 2025-04-30 LAB
ABSOLUTE EOSINOPHIL (OHS): 0.28 K/UL
ABSOLUTE MONOCYTE (OHS): 0.44 K/UL (ref 0.3–1)
ABSOLUTE NEUTROPHIL COUNT (OHS): 4.98 K/UL (ref 1.8–7.7)
BASOPHILS # BLD AUTO: 0.06 K/UL
BASOPHILS NFR BLD AUTO: 0.7 %
ERYTHROCYTE [DISTWIDTH] IN BLOOD BY AUTOMATED COUNT: 13 % (ref 11.5–14.5)
HCT VFR BLD AUTO: 34.5 % (ref 40–54)
HGB BLD-MCNC: 11.5 GM/DL (ref 14–18)
IMM GRANULOCYTES # BLD AUTO: 0.02 K/UL (ref 0–0.04)
IMM GRANULOCYTES NFR BLD AUTO: 0.2 % (ref 0–0.5)
IRON SATN MFR SERPL: 17 % (ref 20–50)
IRON SERPL-MCNC: 63 UG/DL (ref 45–160)
LYMPHOCYTES # BLD AUTO: 3.08 K/UL (ref 1–4.8)
MCH RBC QN AUTO: 33 PG (ref 27–31)
MCHC RBC AUTO-ENTMCNC: 33.3 G/DL (ref 32–36)
MCV RBC AUTO: 99 FL (ref 82–98)
NUCLEATED RBC (/100WBC) (OHS): 0 /100 WBC
PLATELET # BLD AUTO: 307 K/UL (ref 150–450)
PMV BLD AUTO: 9.6 FL (ref 9.2–12.9)
RBC # BLD AUTO: 3.48 M/UL (ref 4.6–6.2)
RELATIVE EOSINOPHIL (OHS): 3.2 %
RELATIVE LYMPHOCYTE (OHS): 34.8 % (ref 18–48)
RELATIVE MONOCYTE (OHS): 5 % (ref 4–15)
RELATIVE NEUTROPHIL (OHS): 56.1 % (ref 38–73)
TIBC SERPL-MCNC: 373 UG/DL (ref 250–450)
TRANSFERRIN SERPL-MCNC: 252 MG/DL (ref 200–375)
WBC # BLD AUTO: 8.86 K/UL (ref 3.9–12.7)

## 2025-04-30 PROCEDURE — 36415 COLL VENOUS BLD VENIPUNCTURE: CPT

## 2025-04-30 PROCEDURE — 85025 COMPLETE CBC W/AUTO DIFF WBC: CPT

## 2025-04-30 PROCEDURE — 99214 OFFICE O/P EST MOD 30 MIN: CPT | Mod: S$GLB,,, | Performed by: INTERNAL MEDICINE

## 2025-04-30 PROCEDURE — 3288F FALL RISK ASSESSMENT DOCD: CPT | Mod: CPTII,S$GLB,, | Performed by: INTERNAL MEDICINE

## 2025-04-30 PROCEDURE — 82728 ASSAY OF FERRITIN: CPT

## 2025-04-30 PROCEDURE — 3074F SYST BP LT 130 MM HG: CPT | Mod: CPTII,S$GLB,, | Performed by: INTERNAL MEDICINE

## 2025-04-30 PROCEDURE — G2211 COMPLEX E/M VISIT ADD ON: HCPCS | Mod: S$GLB,,, | Performed by: INTERNAL MEDICINE

## 2025-04-30 PROCEDURE — 83540 ASSAY OF IRON: CPT

## 2025-04-30 PROCEDURE — 3078F DIAST BP <80 MM HG: CPT | Mod: CPTII,S$GLB,, | Performed by: INTERNAL MEDICINE

## 2025-04-30 PROCEDURE — 1159F MED LIST DOCD IN RCRD: CPT | Mod: CPTII,S$GLB,, | Performed by: INTERNAL MEDICINE

## 2025-04-30 PROCEDURE — 1101F PT FALLS ASSESS-DOCD LE1/YR: CPT | Mod: CPTII,S$GLB,, | Performed by: INTERNAL MEDICINE

## 2025-04-30 PROCEDURE — 99999 PR PBB SHADOW E&M-EST. PATIENT-LVL IV: CPT | Mod: PBBFAC,,, | Performed by: INTERNAL MEDICINE

## 2025-04-30 RX ORDER — LOSARTAN POTASSIUM 50 MG/1
50 TABLET ORAL 2 TIMES DAILY
Qty: 180 TABLET | Refills: 4 | Status: SHIPPED | OUTPATIENT
Start: 2025-04-30

## 2025-04-30 NOTE — PROGRESS NOTES
Chief Complaint:   Follow up of multiple problems     HISTORY OF PRESENT ILLNESS: This is a 74-year-old man who presents for above.    Breathing is fine. No cough    Stomach is fine. He has heartburn when he eats spicy foods - takes a tums or drinks water and resolve.      He remains off pantoprazole 40 mg.  He feels that his stomach is doing well.  He had a repeat endoscopic ultrasound 3/14/23 - erythema in the gastric body and antrum,  Pancreas revealed sequelae of prior pancreatitis.       Bowels are moving well. Chocolate covered peanuts help his bowels move No diarrhea now melana or bloody stools.        He is still smoking 1.75 packs of cigarettes daily. Thinking about quitting smoking.         He is taking Metformin  mg one tablet in the morning and 2 at night.       He stopped fenofibrate when he started to have abdominal issues.         He continues to have back pain and lateral right leg pain. He sees Dr Carrasquillo in pain management.  Currently taking oxycodone apap 10/325 1 tablet three times daily.  Today, he reports that he has not been taking gabapentin.   He has intermittent pins and needles sensation in his right thigh if he sits more than  20 minutes.  Medcation controlls the pain     He had a normal cystoscope  with Dr Dexter  9/13/21      Dr Randall  on 3/17/20 who felt he has -  Right meralgia paresthetica; right lateral thigh tendinitis at the hip; history lumbar disc disease rule out lumbar radiculopathy. EMG was 5/5/20 -  Sensory neuropathy consistent with a history of diabetes; these findings would be compatible with clinical picture of meralgia paresthetica.  He continues to have significant right leg pain that starts in the right groin and goes down the lateral right thigh and the anterior right thigh.  He has a burning sensation on the right thigh.  HE is followed by  Dr Carrasquillo in pain management for his pain.   No change in this pain     Left shoulder is ok. He is doing his  "exercises on his own.       He had lithotripsy on 3/23/17. No dysuria, hematuria.  He feels that he is  emptying his bladder.  He saw Dr cerna on 1/19. He still has a stone in the left kidney. He is on finasteride and tamsulosin for his prostate.      He has not had any alcohol      He is taking amlodipine 10 mg 1 once daily, Toprol-XL 25 mg once daily and losartan 100 mg daily.  No chest pain, shortness of breath.     HE checks his blood sugar twice daily due to fluctuating blood sugars.       No polydipsia. He has a history of hyperlipidemia  No joint pain or muscle pain. NO shortness breath, headaches, vision, sinus congestion.  He is not taking anything for triglycerides or cholesterol.      He is taking vitamin D 2000 units daily            PAST MEDICAL HISTORY:    1. Hypertension.    2. Coronary artery disease with left heart catheterization in November 2011 with 40 to 50% narrowing in the mid LAD.    3. Diabetes mellitus.    4. Hyperlipidemia.    5. History of reflux.    6. Colon polyps.    7. History of cluster headaches.    8. History of glaucoma.    9. Lumbar disk disease at L5 to S1.    10. History of stomach ulcers.    11. Nephrolithiasis.       PAST SURGICAL HISTORY: Cholecystectomy in May 2013, ORIF of the left humerus.      SOCIAL HISTORY: Currently smokes a pack of cigarettes daily, has smoked his whole life. 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 He is a retired schoolteacher.       FAMILY HISTORY: Updated on [facility].      PHYSICAL EXAMINATION:          /60 (BP Location: Left arm, Patient Position: Sitting)   Pulse 72   Ht 5' 9" (1.753 m)   Wt 79.3 kg (174 lb 13.2 oz)   SpO2 98%   BMI 25.82 kg/m²     GENERAL: He is alert, oriented, no apparent distress. Affect within normal limits.    HEENT: Conjunctivae anicteric. Tympanic membranes clear.     NECK: Supple. No cervical lymphadenopathy, no thyroid enlargement.    Respiratory: Effort normal. Lungs are clear to " auscultation.    HEART: Regular rate and rhythm without murmurs, gallops or rubs. No lower extremity edema  ABDOMEN: soft, non distended, non tender, bowel sounds present, no hepatosplenomgaly           Labs 4/23/25 - Hemoglobin 11.7, A1C 6.2, triglycerides 265, LDL 64, HDL 39, T chol 156     CT chest 9/2024 reviewed     ASSESSMENT AND PLAN:        1. Gastritis and duodenitis on EGD by Raoul Alford 1/11/22 -  off pantoprazole 40 mg daily. Repeat CBC today, If iron deficient then will need to get back on pantoprazole. He does not want to add more medication unless he has to        2. Elevated lipase and  -saw DR Rodriguez for endoscopic ultrasound on 3/14/23     3. Lung changes on CT scan chest (bronchiectasis) 8/2022- saw pulmonary on 10/28/22 -CT chest 9/2024- stable  4. Hypertension-  he wants to change losartan 100 mg one tablet daily to losartan 50 mg one tablet twice daily  5. Diabetes mellitus with neuropathy. stable  6. Hyperlipidemia. off fenofibrate- doing better.   7. Tubular adenomas - colonoscopy 2/2022 - due 2027  9. History of glaucoma - not on treatment - pressure in eyes ok - followed Nile Avina and retinal specialist at [facility]  10. History of nephrolithiasis -asymptomatic.    11. Irregular heart beat sensation - holter revealed PVC  12. Anxiety and depression -valium 5 mg nightly prn anxiety.  Rarely takes  13. Tobacco abuse -working on cessation.    14. Aortic atherosclerosis - modifying risk factors  15. Back pain - followed by Dr Carrasquillo - opioid dependent- stable.   16. Vitamin D deficiency - increase vitamin D to 2000 units 2 tablets daily.   17.  Rising PSA- repeat PSA at next visit  18. CRI stage 3b due to age- modifying risk factors with blood pressure and blood sugars.   Monitoring.   19. Anemia - labs today     Colonoscopy 2/2/2022- due 2027     Follow up in 4 months, sooner if problems arise    Visit today included increased complexity associated with the care of the episodic problem   addressed and managing the longitudinal care of the patient due to the serious and/or complex managed problem(s) Htn, diabetes, hyperlipidemia, mood disorder, tobacco abuse, back pain and right leg pain, rising psa, CRI stage 3B, bronciectasis

## 2025-05-01 PROBLEM — N18.32 CHRONIC KIDNEY DISEASE, STAGE 3B: Status: ACTIVE | Noted: 2025-05-01

## 2025-05-01 LAB — FERRITIN SERPL-MCNC: 52 NG/ML (ref 20–300)

## 2025-06-20 ENCOUNTER — OFFICE VISIT (OUTPATIENT)
Dept: INTERNAL MEDICINE | Facility: CLINIC | Age: 75
End: 2025-06-20
Payer: MEDICARE

## 2025-06-20 VITALS
SYSTOLIC BLOOD PRESSURE: 114 MMHG | HEART RATE: 72 BPM | DIASTOLIC BLOOD PRESSURE: 60 MMHG | OXYGEN SATURATION: 99 % | WEIGHT: 171.75 LBS | BODY MASS INDEX: 25.44 KG/M2 | HEIGHT: 69 IN

## 2025-06-20 DIAGNOSIS — Z00.00 ENCOUNTER FOR MEDICARE ANNUAL WELLNESS EXAM: Primary | ICD-10-CM

## 2025-06-20 DIAGNOSIS — F17.210 CIGARETTE NICOTINE DEPENDENCE WITHOUT COMPLICATION: ICD-10-CM

## 2025-06-20 DIAGNOSIS — K63.5 POLYP OF COLON, UNSPECIFIED PART OF COLON, UNSPECIFIED TYPE: ICD-10-CM

## 2025-06-20 DIAGNOSIS — N18.32 CHRONIC KIDNEY DISEASE, STAGE 3B: ICD-10-CM

## 2025-06-20 DIAGNOSIS — R91.8 PULMONARY NODULES: ICD-10-CM

## 2025-06-20 DIAGNOSIS — J47.9 BRONCHIECTASIS, UNCOMPLICATED: ICD-10-CM

## 2025-06-20 DIAGNOSIS — E78.5 HYPERLIPIDEMIA, UNSPECIFIED HYPERLIPIDEMIA TYPE: ICD-10-CM

## 2025-06-20 DIAGNOSIS — E11.40 TYPE 2 DIABETES MELLITUS WITH DIABETIC NEUROPATHY, WITHOUT LONG-TERM CURRENT USE OF INSULIN: ICD-10-CM

## 2025-06-20 DIAGNOSIS — I10 ESSENTIAL HYPERTENSION: ICD-10-CM

## 2025-06-20 DIAGNOSIS — F11.20 OPIOID DEPENDENCE, UNCOMPLICATED: ICD-10-CM

## 2025-06-20 DIAGNOSIS — I70.0 AORTIC ATHEROSCLEROSIS: ICD-10-CM

## 2025-06-20 LAB
ALBUMIN/CREAT UR: 59.4 UG/MG
CREAT UR-MCNC: 197 MG/DL (ref 23–375)
MICROALBUMIN UR-MCNC: 117 UG/ML (ref ?–5000)

## 2025-06-20 PROCEDURE — 99999 PR PBB SHADOW E&M-EST. PATIENT-LVL IV: CPT | Mod: PBBFAC,,, | Performed by: NURSE PRACTITIONER

## 2025-06-20 PROCEDURE — 82570 ASSAY OF URINE CREATININE: CPT | Performed by: NURSE PRACTITIONER

## 2025-06-20 NOTE — PROGRESS NOTES
"  Tony Han presented for a follow-up Medicare AWV today. The following components were reviewed and updated:    Medical history  Family History  Social history  Allergies and Current Medications  Health Risk Assessment  Health Maintenance  Care Team    **See Completed Assessments for Annual Wellness visit with in the encounter summary    The following assessments were completed:  Depression Screening  Cognitive function Screening  Timed Get Up Test  Whisper Test      Opioid documentation:      Patient does have a current opioid prescription.      Patient accepted further discussion regarding opioid medication use.      Patient is currently taking oxycodone narcotic for back pain.        Pain level today is 7/10.       In addition to narcotic pain medications, patient is also using physical therapy for pain control.       Patient is followed by a specialist currently for their pain and will not be referred today.       Patient's opioid risk potential based on ORT-OUD tool:       Urban each box that applies   No   Yes     Family history of substance abuse   Alcohol [] [x]   Illegal drugs [] [x]   Rx drugs [x] []     Personal history of substance abuse   Alcohol [x] []   Illegal drugs [x] []   Rx drugs [] [x]     Age between 16-45 years   [x]   []     Patient with ADD, OCD, Bipolar disorder, schizoprenia   [x]   []     Patient with depression   [x]   []                         Scoring total                                                                 Non-opioid treatment options have been discussed today and added to the patient's after visit summary.          Vitals:    06/20/25 1512   BP: 114/60   BP Location: Left arm   Patient Position: Sitting   Pulse: 72   SpO2: 99%   Weight: 77.9 kg (171 lb 11.8 oz)   Height: 5' 9" (1.753 m)     Body mass index is 25.36 kg/m².       Physical Exam  Constitutional:       General: He is not in acute distress.     Appearance: Normal appearance. He is not ill-appearing, " toxic-appearing or diaphoretic.   HENT:      Head: Normocephalic and atraumatic.      Mouth/Throat:      Mouth: Mucous membranes are moist.      Pharynx: Oropharynx is clear.   Cardiovascular:      Rate and Rhythm: Normal rate and regular rhythm.   Pulmonary:      Effort: Pulmonary effort is normal.      Breath sounds: Normal breath sounds.   Abdominal:      General: Abdomen is flat.   Skin:     General: Skin is warm and dry.   Neurological:      Mental Status: He is alert and oriented to person, place, and time.   Psychiatric:         Mood and Affect: Mood normal.         Behavior: Behavior normal.       Diagnoses and health risks identified today and associated recommendations/orders:  1. Encounter for Medicare annual wellness exam  Here for Health Risk Assessment/Annual Wellness Visit. Health maintenance reviewed and updated. Follow up in one year.    - Referral to Enhanced Annual Wellness Visit (eAWV) W+1    2. Essential hypertension  Problem is stable. Will continue current management. Follow up with PCP      3. Hyperlipidemia, unspecified hyperlipidemia type  Problem is stable. Will continue current management. Follow up with PCP      4. Aortic atherosclerosis  Problem is stable. Will continue current management. Follow up with PCP      5. Opioid dependence, uncomplicated  Problem is stable. Will continue current management. Follow up with PCP      6. Pulmonary nodules  Problem is stable. Will continue current management. Follow up with PCP      7. Type 2 diabetes mellitus with diabetic neuropathy, without long-term current use of insulin  Problem is stable. Will continue current management. Follow up with PCP  -     Microalbumin/creatinine urine ratio    8. Polyp of colon, unspecified part of colon, unspecified type  Problem is stable. Will continue current management. Follow up with PCP      9. Cigarette nicotine dependence without complication  Problem is stable. Will continue current management. Follow up  with PCP      10. Chronic kidney disease, stage 3b  Problem is stable. Will continue current management. Follow up with PCP  -     Microalbumin/creatinine urine ratio    11. Bronchiectasis, uncomplicated  Problem is stable. Will continue current management. Follow up with PCP          Provided Tony with a 5-10 year written screening schedule and personal prevention plan. Recommendations were developed using the USPSTF age appropriate recommendations. Education, counseling, and referrals were provided as needed.  After Visit Summary printed and given to patient which includes a list of additional screenings\tests needed.          Jaya Saldivar NP

## 2025-06-20 NOTE — PATIENT INSTRUCTIONS
Counseling and Referral of Other Preventative  (Italic type indicates deductible and co-insurance are waived)    Patient Name: Tony Han  Today's Date: 6/20/2025    Health Maintenance       Date Due Completion Date    Diabetes Urine Screening 05/02/2024 5/2/2023    Diabetic Eye Exam 07/10/2025 7/10/2024    Override on 10/11/2021: Done (Dr Hendrix)    Override on 2/21/2018: Done (Sees Dr Estrella and retinal specialist at [facility])    Override on 2/13/2017: Done (Nile Avina)    Override on 1/16/2017: Done (Dr Jaime Rosenbaum)    Override on 7/10/2014: Done    LDCT Lung Screen 09/13/2025 9/13/2024    Hemoglobin A1c 10/23/2025 4/23/2025    PROSTATE-SPECIFIC ANTIGEN 04/23/2026 4/23/2025    Lipid Panel 04/23/2026 4/23/2025    TETANUS VACCINE 07/12/2030 7/12/2020    Override on 12/14/2016: Declined        No orders of the defined types were placed in this encounter.      The following information is provided to all patients.  This information is to help you find resources for any of the problems found today that may be affecting your health:                  Living healthy guide: www.[facility].[place].gov      Understanding Diabetes: www.diabetes.org      Eating healthy: www.cdc.gov/healthyweight      CDC home safety checklist: www.cdc.gov/steadi/patient.html      Agency on Aging: www.goea.[place].gov      Alcoholics anonymous (AA): www.aa.org      Physical Activity: www.tamiko.nih.gov/kg5mstd      Tobacco use: www.quitwithusla.org

## 2025-07-07 DIAGNOSIS — E11.40 TYPE 2 DIABETES MELLITUS WITH DIABETIC NEUROPATHY, WITHOUT LONG-TERM CURRENT USE OF INSULIN: ICD-10-CM

## 2025-07-07 RX ORDER — METFORMIN HYDROCHLORIDE 500 MG/1
TABLET, EXTENDED RELEASE ORAL
Qty: 360 TABLET | Refills: 1 | Status: SHIPPED | OUTPATIENT
Start: 2025-07-07

## 2025-07-07 NOTE — TELEPHONE ENCOUNTER
Tony Han  is requesting a refill authorization.  Brief Assessment and Rationale for Refill:  Approve     Medication Therapy Plan:         Pharmacist review requested: Yes   Extended chart review required: Yes   Comments:     Note composed:10:14 AM 07/07/2025

## 2025-07-07 NOTE — TELEPHONE ENCOUNTER
Refill Routing Note   Medication(s) are not appropriate for processing by [facility] for the following reason(s):        Drug-disease interaction    [facility] action(s):  Defer      Medication Therapy Plan: Chronic kidney disease, stage 3b    Pharmacist review requested: Yes     Appointments  past 12m or future 3m with PCP    Date Provider   Last Visit   4/30/2025 Elizabeth Benitez MD   Next Visit   9/24/2025 Elizabeth Benitez MD   ED visits in past 90 days: 0        Note composed:8:26 AM 07/07/2025

## 2025-07-07 NOTE — TELEPHONE ENCOUNTER
Care Due:                  Date            Visit Type   Department     Provider  --------------------------------------------------------------------------------                                EP -                              PRIMARY      [facility] INTERNAL  Last Visit: 04-      CARE ([facility])   MEDICINE       Elizabeth Benitez                              EP -                              PRIMARY      [facility] INTERNAL  Next Visit: 09-      CARE ([facility])   MEDICINE       Elizabeth Benitez                                                            Last  Test          Frequency    Reason                     Performed    Due Date  --------------------------------------------------------------------------------    Vitamin D...  12 months..  cholecalciferol,.........  03- 03-    Health [facility] Embedded Care Due Messages. Reference number: [id number].   7/07/2025 5:13:37 AM CDT

## 2025-07-10 ENCOUNTER — TELEPHONE (OUTPATIENT)
Dept: UROLOGY | Facility: CLINIC | Age: 75
End: 2025-07-10
Payer: MEDICARE

## 2025-07-10 NOTE — TELEPHONE ENCOUNTER
Staff called pt to schedule appt. Pt was informed of appt time/date/provider. Pt confirmed and voiced understanding.

## 2025-07-10 NOTE — TELEPHONE ENCOUNTER
Staff called pt to inform pt that refill request has been denied.  Pt did not answer, staff left  instructing call back.

## 2025-07-17 ENCOUNTER — OFFICE VISIT (OUTPATIENT)
Dept: UROLOGY | Facility: CLINIC | Age: 75
End: 2025-07-17
Payer: MEDICARE

## 2025-07-17 VITALS
HEART RATE: 78 BPM | BODY MASS INDEX: 25.36 KG/M2 | HEIGHT: 69 IN | SYSTOLIC BLOOD PRESSURE: 161 MMHG | OXYGEN SATURATION: 99 % | DIASTOLIC BLOOD PRESSURE: 73 MMHG

## 2025-07-17 DIAGNOSIS — N40.1 BPH WITH OBSTRUCTION/LOWER URINARY TRACT SYMPTOMS: ICD-10-CM

## 2025-07-17 DIAGNOSIS — N40.0 BENIGN PROSTATIC HYPERPLASIA, UNSPECIFIED WHETHER LOWER URINARY TRACT SYMPTOMS PRESENT: ICD-10-CM

## 2025-07-17 DIAGNOSIS — N13.8 BPH WITH OBSTRUCTION/LOWER URINARY TRACT SYMPTOMS: ICD-10-CM

## 2025-07-17 PROCEDURE — 1160F RVW MEDS BY RX/DR IN RCRD: CPT | Mod: CPTII,S$GLB,, | Performed by: NURSE PRACTITIONER

## 2025-07-17 PROCEDURE — 3077F SYST BP >= 140 MM HG: CPT | Mod: CPTII,S$GLB,, | Performed by: NURSE PRACTITIONER

## 2025-07-17 PROCEDURE — 3288F FALL RISK ASSESSMENT DOCD: CPT | Mod: CPTII,S$GLB,, | Performed by: NURSE PRACTITIONER

## 2025-07-17 PROCEDURE — 3078F DIAST BP <80 MM HG: CPT | Mod: CPTII,S$GLB,, | Performed by: NURSE PRACTITIONER

## 2025-07-17 PROCEDURE — 1101F PT FALLS ASSESS-DOCD LE1/YR: CPT | Mod: CPTII,S$GLB,, | Performed by: NURSE PRACTITIONER

## 2025-07-17 PROCEDURE — 1159F MED LIST DOCD IN RCRD: CPT | Mod: CPTII,S$GLB,, | Performed by: NURSE PRACTITIONER

## 2025-07-17 PROCEDURE — 99213 OFFICE O/P EST LOW 20 MIN: CPT | Mod: S$GLB,,, | Performed by: NURSE PRACTITIONER

## 2025-07-17 PROCEDURE — 1125F AMNT PAIN NOTED PAIN PRSNT: CPT | Mod: CPTII,S$GLB,, | Performed by: NURSE PRACTITIONER

## 2025-07-17 RX ORDER — TAMSULOSIN HYDROCHLORIDE 0.4 MG/1
0.4 CAPSULE ORAL DAILY
Qty: 90 CAPSULE | Refills: 3 | Status: SHIPPED | OUTPATIENT
Start: 2025-07-17

## 2025-07-17 RX ORDER — FINASTERIDE 5 MG/1
5 TABLET, FILM COATED ORAL DAILY
Qty: 90 TABLET | Refills: 3 | Status: SHIPPED | OUTPATIENT
Start: 2025-07-17

## 2025-07-17 NOTE — PROGRESS NOTES
"Subjective:      Tony Han is a 75 y.o. male who returns today regarding his enlarged prostate. Established patient of Dr. Dexter's and Dr. Reddy's- new to me today.    On flomax and finasteride for years for his enlarged prostate and LUTS. Symptoms are fairly well controlled. He reports some slow flow but denies straining to void. Denies dysuria and gross hematuria.      Prostate Specific Antigen PSA Diagnostic   Latest Ref Rng <=4.00 ng/mL 0.00 - 4.00 ng/mL   3/24/2021 0.59     3/13/2024 2.0     12/11/2024  2.8    4/23/2025 2.70       The following portions of the patient's history were reviewed and updated as appropriate: allergies, current medications, past family history, past medical history, past social history, past surgical history and problem list.    Review of Systems  Constitutional: no fever or chills  ENT: no nasal congestion or sore throat  Respiratory: no cough or shortness of breath  Cardiovascular: no chest pain or palpitations  Gastrointestinal: no nausea or vomiting, tolerating diet  Genitourinary: as per HPI  Hematologic/Lymphatic: no easy bruising or lymphadenopathy  Musculoskeletal: no arthralgias or myalgias  Neurological: no seizures or tremors  Behavioral/Psych: no auditory or visual hallucinations     Objective:   Vitals: BP (!) 161/73 (BP Location: Right arm, Patient Position: Sitting)   Pulse 78   Ht 5' 9" (1.753 m)   SpO2 99%   BMI 25.36 kg/m²     Physical Exam   General: alert and oriented, no acute distress  Head: normocephalic, atraumatic  Neck: supple, normal ROM  Respiratory: Symmetric expansion, non-labored breathing  Cardiovascular: regular rate and rhythm  Abdomen: soft, non tender, non distended  Genitourinary: pt declined STANLEY  Skin: normal coloration and turgor, no rashes, no suspicious skin lesions noted  Neuro: alert and oriented x3, no gross deficits  Psych: normal judgment and insight, normal mood/affect, and non-anxious    Lab Review   Urinalysis demonstrates : " no specimen  Lab Results   Component Value Date    WBC 8.86 04/30/2025    HGB 11.5 (L) 04/30/2025    HGB 12.9 (L) 12/11/2024    HCT 34.5 (L) 04/30/2025    HCT 37.8 (L) 12/11/2024    MCV 99 (H) 04/30/2025    MCV 99 (H) 12/11/2024     04/30/2025     12/11/2024     Lab Results   Component Value Date    CREATININE 1.3 04/23/2025    BUN 22 04/23/2025     Lab Results   Component Value Date    PSA 2.70 04/23/2025    PSA 2.0 03/13/2024     Imaging   None    Assessment:     1. BPH with obstruction/lower urinary tract symptoms    2. Benign prostatic hyperplasia, unspecified whether lower urinary tract symptoms present        Plan:   Diagnoses and all orders for this visit:    BPH with obstruction/lower urinary tract symptoms  -     tamsulosin (FLOMAX) 0.4 mg Cap; Take 1 capsule (0.4 mg total) by mouth once daily.    Benign prostatic hyperplasia, unspecified whether lower urinary tract symptoms present  -     finasteride (PROSCAR) 5 mg tablet; Take 1 tablet (5 mg total) by mouth once daily.    Plan:  --Continue flomax and finasteride  --Discussed increasing flomax dose for residual slow flow, pt declines for now  --Follow up annually for STANLEY  --PSA ordered annually by PCP

## (undated) DEVICE — NDL SCORPIAN SUTURE PASSER

## (undated) DEVICE — SOL IRR NACL .9% 3000ML

## (undated) DEVICE — Device

## (undated) DEVICE — GLOVE ORTHO PF SZ 8.5

## (undated) DEVICE — SUT MCRYL PLUS 4-0 PS2 27IN

## (undated) DEVICE — SEE MEDLINE ITEM 146420

## (undated) DEVICE — DRESSING XEROFORM FOIL PK 1X8

## (undated) DEVICE — SEE MEDLINE ITEM 152529

## (undated) DEVICE — GLOVE BIOGEL ECLIPSE SZ 9

## (undated) DEVICE — BIT DRILL 2.4MM GRYPHON

## (undated) DEVICE — GAUZE SPONGE 4X4 12PLY

## (undated) DEVICE — DRG DOC 8.0 X 85MM

## (undated) DEVICE — PAD ELECTRODE STER 1.5X3

## (undated) DEVICE — CANNULA DRY DOC 7 X 85

## (undated) DEVICE — SHAVER ULTRAFFR 4.2MM

## (undated) DEVICE — DRAPE STERI INSTRUMENT 1018

## (undated) DEVICE — SYR 10CC LUER LOCK

## (undated) DEVICE — NDL 18GA X1 1/2 REG BEVEL

## (undated) DEVICE — PAD SHOULDER CARE POLAR

## (undated) DEVICE — POSITIONER IV ARMBOARD FOAM

## (undated) DEVICE — PAD ABD 8X10 STERILE

## (undated) DEVICE — GLOVE SURGEON SYN PF SZ 9

## (undated) DEVICE — GOWN SMARTGOWN LVL4 X-LONG XL

## (undated) DEVICE — SEE MEDLINE ITEM 152530

## (undated) DEVICE — ELECTRODE REM PLYHSV RETURN 9

## (undated) DEVICE — DRAPE STERI U-SHAPED 47X51IN

## (undated) DEVICE — SEE MEDLINE ITEM 146313

## (undated) DEVICE — GLOVE BIOGEL SKINSENSE PI 7.0

## (undated) DEVICE — BLADE SHAVER 4.5 6/BX

## (undated) DEVICE — SUPPORT SLING SHOT II MEDIUM

## (undated) DEVICE — APPLICATOR CHLORAPREP ORN 26ML

## (undated) DEVICE — ADHESIVE MASTISOL VIAL 48/BX

## (undated) DEVICE — SEE MEDLINE ITEM 157117

## (undated) DEVICE — UNDERGLOVES BIOGEL PI SIZE 7.5

## (undated) DEVICE — PAD ABDOMINAL 5X9 STERILE

## (undated) DEVICE — SOL 9P NACL IRR PIC IL

## (undated) DEVICE — SYR 30CC LUER LOCK

## (undated) DEVICE — TUBE SET INFLOW/OUTFLOW

## (undated) DEVICE — CLOSURE SKIN STERI STRIP 1/2X4

## (undated) DEVICE — NDL HYPO REG 25G X 1 1/2

## (undated) DEVICE — TAPE MEDIPORE 4IN X 2YDS

## (undated) DEVICE — NDL SPINAL 18GX3.5 SPINOCAN

## (undated) DEVICE — SEE MEDLINE ITEM 146347

## (undated) DEVICE — KIT SHOULDER POSITIONER SPIDER

## (undated) DEVICE — GOWN SMART IMP BREATHABLE XXLG